# Patient Record
Sex: MALE | Race: WHITE | Employment: OTHER | URBAN - METROPOLITAN AREA
[De-identification: names, ages, dates, MRNs, and addresses within clinical notes are randomized per-mention and may not be internally consistent; named-entity substitution may affect disease eponyms.]

---

## 2017-08-03 ENCOUNTER — HOSPITAL ENCOUNTER (OUTPATIENT)
Dept: WOUND CARE | Age: 57
Discharge: HOME | End: 2017-08-03
Attending: SURGERY | Admitting: SURGERY

## 2017-08-03 VITALS
HEIGHT: 73 IN | BODY MASS INDEX: 26.77 KG/M2 | DIASTOLIC BLOOD PRESSURE: 113 MMHG | WEIGHT: 202 LBS | TEMPERATURE: 97.7 F | HEART RATE: 81 BPM | RESPIRATION RATE: 18 BRPM | SYSTOLIC BLOOD PRESSURE: 199 MMHG

## 2017-08-03 LAB
GLUCOSE BLD-MCNC: 303 MG/DL (ref 70–99)
PERFORMED ON: ABNORMAL

## 2017-08-03 ASSESSMENT — PAIN DESCRIPTION - ORIENTATION: ORIENTATION: LEFT

## 2017-08-03 ASSESSMENT — PAIN SCALES - GENERAL: PAINLEVEL_OUTOF10: 6

## 2017-08-03 ASSESSMENT — PAIN DESCRIPTION - DESCRIPTORS: DESCRIPTORS: DISCOMFORT

## 2017-08-03 ASSESSMENT — PAIN DESCRIPTION - LOCATION: LOCATION: FOOT

## 2017-08-03 ASSESSMENT — PAIN DESCRIPTION - ONSET: ONSET: ON-GOING

## 2017-08-03 ASSESSMENT — PAIN DESCRIPTION - FREQUENCY: FREQUENCY: CONTINUOUS

## 2017-08-03 ASSESSMENT — PAIN DESCRIPTION - PAIN TYPE: TYPE: CHRONIC PAIN

## 2017-08-10 ENCOUNTER — HOSPITAL ENCOUNTER (OUTPATIENT)
Dept: WOUND CARE | Age: 57
Discharge: HOME | End: 2017-08-10
Attending: SURGERY | Admitting: SURGERY

## 2017-08-10 VITALS
RESPIRATION RATE: 16 BRPM | SYSTOLIC BLOOD PRESSURE: 195 MMHG | DIASTOLIC BLOOD PRESSURE: 109 MMHG | WEIGHT: 204.6 LBS | HEART RATE: 80 BPM | HEIGHT: 73 IN | BODY MASS INDEX: 27.11 KG/M2 | TEMPERATURE: 97.7 F

## 2017-08-10 LAB
GLUCOSE BLD-MCNC: 243 MG/DL (ref 70–99)
PERFORMED ON: ABNORMAL

## 2017-08-12 LAB
GRAM STAIN RESULT: ABNORMAL
ORGANISM: ABNORMAL
WOUND/ABSCESS: ABNORMAL
WOUND/ABSCESS: ABNORMAL

## 2017-08-17 ENCOUNTER — HOSPITAL ENCOUNTER (OUTPATIENT)
Dept: WOUND CARE | Age: 57
Discharge: HOME | End: 2017-08-17
Attending: SURGERY | Admitting: SURGERY

## 2017-11-18 PROBLEM — L03.90 CELLULITIS: Status: ACTIVE | Noted: 2017-11-18

## 2017-11-18 PROBLEM — I21.4 NSTEMI (NON-ST ELEVATED MYOCARDIAL INFARCTION) (HCC): Status: ACTIVE | Noted: 2017-11-18

## 2017-11-19 PROBLEM — M86.172 OSTEOMYELITIS OF FOOT, LEFT, ACUTE (HCC): Status: ACTIVE | Noted: 2017-11-19

## 2017-12-26 ENCOUNTER — HOSPITAL ENCOUNTER (OUTPATIENT)
Dept: WOUND CARE | Age: 57
Discharge: HOME | End: 2017-12-26
Attending: CLINICAL NURSE SPECIALIST | Admitting: CLINICAL NURSE SPECIALIST

## 2017-12-26 VITALS
HEIGHT: 73 IN | SYSTOLIC BLOOD PRESSURE: 195 MMHG | BODY MASS INDEX: 29.77 KG/M2 | DIASTOLIC BLOOD PRESSURE: 109 MMHG | OXYGEN SATURATION: 96 % | WEIGHT: 224.6 LBS | RESPIRATION RATE: 20 BRPM | TEMPERATURE: 97.3 F | HEART RATE: 100 BPM

## 2017-12-26 DIAGNOSIS — M79.605 PAIN OF LEFT LOWER EXTREMITY: Primary | ICD-10-CM

## 2017-12-26 LAB
GLUCOSE BLD-MCNC: 264 MG/DL (ref 70–99)
PERFORMED ON: ABNORMAL

## 2017-12-26 PROCEDURE — 99213 OFFICE O/P EST LOW 20 MIN: CPT | Performed by: CLINICAL NURSE SPECIALIST

## 2017-12-26 PROCEDURE — 97597 DBRDMT OPN WND 1ST 20 CM/<: CPT | Performed by: CLINICAL NURSE SPECIALIST

## 2017-12-26 ASSESSMENT — PAIN DESCRIPTION - LOCATION: LOCATION: FOOT

## 2017-12-26 ASSESSMENT — PAIN DESCRIPTION - PROGRESSION: CLINICAL_PROGRESSION: NOT CHANGED

## 2017-12-26 ASSESSMENT — PAIN DESCRIPTION - FREQUENCY: FREQUENCY: INTERMITTENT

## 2017-12-26 ASSESSMENT — PAIN SCALES - GENERAL: PAINLEVEL_OUTOF10: 7

## 2017-12-26 ASSESSMENT — PAIN DESCRIPTION - PAIN TYPE: TYPE: CHRONIC PAIN

## 2017-12-26 ASSESSMENT — PAIN DESCRIPTION - DESCRIPTORS: DESCRIPTORS: ACHING

## 2017-12-26 ASSESSMENT — PAIN DESCRIPTION - ORIENTATION: ORIENTATION: LEFT

## 2017-12-26 NOTE — PROGRESS NOTES
Vascular lab staff states that they can not due venous doppler today, requests to be rescheduled. Call to Central scheduling, first available is next week. Informed [email or web address].PERLITA. Of above. Central scheduling left message for vascular to open time to schedule this exam.  She will call me when she hears back from patient. Spoke with Mary Ann Thompson, informed him of above. Instructed him to report to ER should he experience SOB or chest pain. He verbalizes understanding and agrees.

## 2017-12-26 NOTE — PROGRESS NOTES
Performing new (return) intake in wound care for patient today. He states that he has seen a foreign doctor in wound care in the past and, \"he didn'e even take a culture\". Discussed that Dr. Sarwat Sandoval did take a wound culture, I had left him a message as antibiotic needed to be changed, and never received return call. Told him that as  with Dr. Sarwat Sandoval, I have observed non follow up on his part which is probably the reason this wound has been present x 1 year. He states that he often does not wear a dressing on wound, Instructed him to wear dressing at all times, after cleansing of wound to avoid contamination. He verbalizes understanding.

## 2017-12-27 NOTE — PROGRESS NOTES
ulcer(s) above. Procedure note:     Consent obtained. Time out performed per [facility] policy. Anesthetic  Anesthetic: 4% Topical Xylocaine     Using a #15 blade scalpel and curette, I sharply debrided the left dorsal and plantar foot ulcer ulcer(s) down through and including the removal of epidermis and dermis. The type(s) of tissue debrided included biofilm, exudate and callus. Total Surface Area Debrided: 8.43 sq cm. Post  Debridement Measurements:  Wound 12/26/17 #5, top of left foot anterior,DFU wag 1, gradually appeared-Wound Length (cm): 0.7 cm  Wound 12/26/17 #4, Left plantar foot, DFU, Garcia 3, onset 8-2017, pressure-Wound Length (cm): 3 cm    Wound 12/26/17 #5, top of left foot anterior,DFU wag 1, gradually appeared-Wound Width (cm): 0.9 cm  Wound 12/26/17 #4, Left plantar foot, DFU, Garcia 3, onset 8-2017, pressure-Wound Width (cm): 2.6 cm    Wound 12/26/17 #5, top of left foot anterior,DFU wag 1, gradually appeared-Wound Depth (cm) : 0.1  Wound 12/26/17 #4, Left plantar foot, DFU, Garcia 3, onset 8-2017, pressure-Wound Depth (cm) : 1.3    The ulcers were then irrigated with normal saline solution. The procedure was completed with a small amount of bleeding, and hemostasis was by pressure. The patient tolerated the procedure well, with no significant complications. The patient's level of pain during and after the procedure was monitored, and is noted in the wound documentation flowsheet. Will discuss with Dr. Talia Gonsalez and Dr. Dell Robison for possible follow up    Discharge plan:     Continue Bactrim as prescribed  Off load as much as possible, wear Darco shoe with peg assist  Foot Xray today  US left leg to rule out DVT ordered  Go to ED for fever, chills, chest pain or shortness of breath  Appointment with PCP 12/28/17 to discuss lab work. Notify PCP of elevated B/P. Notify PCP of not having DM supplies to check BS  Stop or cut down smoking     Treatment in the wound care center today:  .     Home treatment: good handwashing before and after any dressing changes. Cleanse ulcer with saline or soap & water before dressing change. May use Vaseline (petrolatum), Aquaphor, Aveeno, CeraVe, Cetaphil, Eucerin, Lubriderm, etc for dry skin. Dressing type for home: Other: Nexodyn cleanser, Aquacel Ag and dry dressing daily. Darco shoe with peg assist. Written discharge instructions given to patient. Follow up in 1 week.     Electronically signed by LARRY Tran on 12/27/2017 at 1:10 PM.

## 2017-12-28 ENCOUNTER — HOSPITAL ENCOUNTER (OUTPATIENT)
Dept: VASCULAR LAB | Age: 57
Discharge: HOME | End: 2017-12-28
Attending: CLINICAL NURSE SPECIALIST | Admitting: CLINICAL NURSE SPECIALIST

## 2017-12-28 DIAGNOSIS — M79.605 PAIN OF LEFT LEG: ICD-10-CM

## 2018-01-02 ENCOUNTER — HOSPITAL ENCOUNTER (OUTPATIENT)
Dept: WOUND CARE | Age: 58
Discharge: HOME | End: 2018-01-02
Attending: CLINICAL NURSE SPECIALIST | Admitting: CLINICAL NURSE SPECIALIST

## 2018-01-08 ENCOUNTER — HOSPITAL ENCOUNTER (OUTPATIENT)
Dept: WOUND CARE | Age: 58
Discharge: HOME | End: 2018-01-08
Attending: PODIATRIST | Admitting: PODIATRIST

## 2018-01-08 VITALS
HEIGHT: 73 IN | TEMPERATURE: 97.1 F | SYSTOLIC BLOOD PRESSURE: 192 MMHG | BODY MASS INDEX: 30.48 KG/M2 | RESPIRATION RATE: 14 BRPM | HEART RATE: 84 BPM | DIASTOLIC BLOOD PRESSURE: 113 MMHG | WEIGHT: 230 LBS | OXYGEN SATURATION: 97 %

## 2018-01-08 DIAGNOSIS — L08.9 TYPE 2 DIABETES MELLITUS WITH LEFT DIABETIC FOOT INFECTION (HCC): ICD-10-CM

## 2018-01-08 DIAGNOSIS — M86.172 OSTEOMYELITIS OF FOOT, LEFT, ACUTE (HCC): Primary | ICD-10-CM

## 2018-01-08 DIAGNOSIS — E11.628 TYPE 2 DIABETES MELLITUS WITH LEFT DIABETIC FOOT INFECTION (HCC): ICD-10-CM

## 2018-01-08 ASSESSMENT — PAIN SCALES - GENERAL: PAINLEVEL_OUTOF10: 0

## 2018-01-08 NOTE — PROGRESS NOTES
[facility] Progress Note      Ganesh Murguia     : 1960    DATE OF VISIT:  2018    Subjective:     Ganesh Murguia is a 59 y.o. male who has a chief complaint of a diabetic ulcer located on the left foot. Significant symptoms or pertinent ulcer history since last visit: Patient has been seen in the [facility] by Beto Greenfield and Dr. Tiara Roberson. Has also seen Dr. Aaliyah Coppola in the past as well. Did see Dr. Shobha Cordoba for the wound while admitted at [facility] as well. Does know he had infection in the foot in the past, but thought it might improve with the antibiotics. Was told that his blood cultures are now negative. States that he needs a cardiac cath but could not get it due to his positive blood culture and foot issues. States that he is more worried about his breathing and has been having more shortness of breath over last several days. Has been in the ER a couple times for this and has taken rounds of antibiotics with no improvement in the breathing. Was told by his physician earlier today to go to [facility] and be seen due to his shortness of breath. Mr. Ree Butcher has a past medical history of Bacteremia; Cellulitis and abscess of foot, except toes; Diabetes mellitus ([address] Kidney disease; and MRSA (methicillin resistant staph aureus) culture positive. He has no past surgical history on file. His family history includes Dementia in his mother; Diabetes in his mother; Heart Attack in his brother and father; Hypertension in his mother; No Known Problems in his maternal grandfather, maternal grandmother, paternal grandfather, and paternal grandmother; Stroke in his brother. Mr. Ree Butcher reports that he has been smoking Cigarettes. He has been smoking about 0.25 packs per day. He has quit using smokeless tobacco. His smokeless tobacco use included Chew. He reports that he does not drink alcohol or use drugs.     His current medication list consists of acetaminophen, albuterol sulfate HFA, aspirin, furosemide, guaiFENesin-codeine, insulin glargine, and metoprolol tartrate. Allergies: Pcn [penicillins]    Pertinent items from the review of systems are discussed in the HPI; the remainder of the ROS was reviewed and is negative. Denies nausea, vomiting, fevers, chills, shortness of breath or chest pain. Objective:     BP (!) 192/113 Comment: rechecked BP  Pulse 84   Temp 97.1 °F (36.2 °C) (Oral)   Resp 14   Ht 6' 1\" (1.854 m)   Wt 230 lb (104.3 kg)   SpO2 97%   BMI 30.34 kg/m²      General Appearance: alert and oriented to person, place and time, well developed and well- nourished, in no acute distress  Skin: warm and dry, no rash or erythema  Head: normocephalic and atraumatic  Eyes: pupils equal, round, and reactive to light, extraocular eye movements intact, conjunctivae normal  ENT: tympanic membrane, external ear and ear canal normal bilaterally, nose without deformity, nasal mucosa and turbinates normal without polyps  Neck: supple and non-tender without mass, no thyromegaly or thyroid nodules, no cervical lymphadenopathy  Pulmonary/Chest: clear to auscultation bilaterally- no wheezes, rales or rhonchi, normal air movement, no respiratory distress  Cardiovascular: normal rate, regular rhythm, normal S1 and S2, no murmurs, rubs, clicks, or gallops, distal pulses intact, no carotid bruits  Abdomen: soft, non-tender, non-distended, normal bowel sounds, no masses or organomegaly.     Dorsalis pedis pulse left palpable  Posterior tibial pulse left palpable  Dorsalis pedis pulse right palpable  Posterior tibial pulse right palpable  Protective sensation absent bilateral LE      Ulcer on the plantar aspect of the left 1st MH with mild fibrotic tissue, red granulation tissue, mild to moderate serous drainage, mild hyperkeratotic rim, no undermining, no tunneling, no purulence, no malodor, no eschar, mild periwound maceration, mild to moderate periwound erythema, mild Hyponatremia E87.1    Diabetic polyneuropathy associated with type 2 diabetes mellitus ([facility] [address] E11.42    Mixed hyperlipidemia E78.2    Tobacco abuse counseling Z71.6    Diabetes education, encounter for Z71.89    Essential hypertension I10    Cardiomyopathy ([facility] [address] I42.9    Abnormal nuclear stress test R94.39    Pain of left lower extremity M79.605       Assessment of today's active condition(s): osteomyelitis left foot, diabetic foot ulcer left sub 1st MH (Garcia III), diabetes mellitus uncontrolled. Factors contributing to occurrence and/or persistence of the chronic ulcer include diabetes, poor glucose control, chronic pressure, smoking and non-adherence. Sharp debridement is not indicated today, based upon the exam findings in the ulcer(s) above. Discharge plan:     Treatment in the wound care center today:  . Home treatment: good handwashing before and after any dressing changes. Cleanse ulcer with saline or soap & water before dressing change. May use Vaseline (petrolatum), Aquaphor, Aveeno, CeraVe, Cetaphil, Eucerin, Lubriderm, etc for dry skin. Dressing type for home: Continue silver alginate and dry dressing to the ulcer as previously performed. Written discharge instructions given to patient. Offload ulcer(s) as directed. Elevate leg(s) as directed. Follow up in 1 week. Reviewed imaging results with patient. Explained that the last xray 12/26/17 showed obvious osteomyelitic changes with periosteal reaction covering large portion of the 1st metatarsal. Given that xray and multiple comorbidities I suspect that the infection has spread at this point. Explained that he would most likely require surgical intervention for the infection and high possibility of needing amputation of the hallux and partial 1st metatarsal at a minimum.  Patient states that he saw his physician earlier today and was told that he needing to go to [facility] for further evaluation given his shortness of breath. States was told he will most likely be admitted. I am not sure who he is seeing there. Patient advised to follow up with his cardiologist and nephrologist as well. If patient is admitted to [facility] then recommend Podiatry see him there. He has seen Dr. Sayra Carson there in the past.   If he is not admitted contact the [facility] tomorrow and we can help arrange appointment for the MRI as ordered today. Will need an MRI in order to better determine extent of infection in the foot. Will need to be compliant with care or will increase his odds of complications. Review of EPIC reveals multiple episodes of noncompliance. Needs to control glucose levels.        Electronically signed by Suze Neff DPM on 1/8/2018 at 2:44 PM.

## 2018-01-08 NOTE — PROGRESS NOTES
Scheduled MRI Left foot at [facility] for Monday 1/15/18 at 12:30PM.  Sujit Philippeys at 12:00PM.  Called him to inform of above.   He verbalizes understanding and also acknowledges that wound care appointment is scheduled after MRI at 1:30PM.

## 2018-01-08 NOTE — PROGRESS NOTES
Patient calls, states his f/u with cardiologist is Feb 1, 2018, not today as he had thought. Thus, he did not visit a doctor today who would possibly admit him for SOB. He continues with SOB, instructed him to report to ER. He states, \"they didn't do anything for me Saturday night except give me Lasix and BP medicine\". Informed him that if SOB is not improved,he may need additional Lasix, etc.  He verbalizes understanding and states he does not know if he will go to ER tonight. Dr. Rossy Brown aware of above.

## 2018-01-09 ENCOUNTER — HOSPITAL ENCOUNTER (OUTPATIENT)
Dept: WOUND CARE | Age: 58
Discharge: HOME | End: 2018-01-09
Attending: CLINICAL NURSE SPECIALIST | Admitting: CLINICAL NURSE SPECIALIST

## 2018-01-15 ENCOUNTER — HOSPITAL ENCOUNTER (OUTPATIENT)
Dept: WOUND CARE | Age: 58
Discharge: HOME | End: 2018-01-15
Attending: PODIATRIST | Admitting: PODIATRIST

## 2018-01-22 ENCOUNTER — HOSPITAL ENCOUNTER (OUTPATIENT)
Dept: WOUND CARE | Age: 58
Discharge: HOME | End: 2018-01-22
Attending: PODIATRIST | Admitting: PODIATRIST

## 2018-01-22 DIAGNOSIS — E11.628 TYPE 2 DIABETES MELLITUS WITH OTHER SKIN COMPLICATIONS (CODE): ICD-10-CM

## 2018-01-29 ENCOUNTER — HOSPITAL ENCOUNTER (OUTPATIENT)
Dept: WOUND CARE | Age: 58
Discharge: HOME | End: 2018-01-29
Attending: PODIATRIST | Admitting: PODIATRIST

## 2018-02-28 ENCOUNTER — OFFICE VISIT (OUTPATIENT)
Dept: CARDIOLOGY CLINIC | Age: 58
End: 2018-02-28

## 2018-02-28 VITALS
HEART RATE: 80 BPM | OXYGEN SATURATION: 99 % | BODY MASS INDEX: 28.77 KG/M2 | WEIGHT: 217.12 LBS | DIASTOLIC BLOOD PRESSURE: 92 MMHG | HEIGHT: 73 IN | SYSTOLIC BLOOD PRESSURE: 166 MMHG

## 2018-02-28 DIAGNOSIS — N18.30 CKD (CHRONIC KIDNEY DISEASE) STAGE 3, GFR 30-59 ML/MIN (HCC): ICD-10-CM

## 2018-02-28 DIAGNOSIS — I25.5 ISCHEMIC CARDIOMYOPATHY: Primary | ICD-10-CM

## 2018-02-28 DIAGNOSIS — I10 ESSENTIAL HYPERTENSION: ICD-10-CM

## 2018-02-28 PROCEDURE — 4004F PT TOBACCO SCREEN RCVD TLK: CPT | Performed by: INTERNAL MEDICINE

## 2018-02-28 PROCEDURE — 99214 OFFICE O/P EST MOD 30 MIN: CPT | Performed by: INTERNAL MEDICINE

## 2018-02-28 PROCEDURE — G8598 ASA/ANTIPLAT THER USED: HCPCS | Performed by: INTERNAL MEDICINE

## 2018-02-28 PROCEDURE — 93000 ELECTROCARDIOGRAM COMPLETE: CPT | Performed by: INTERNAL MEDICINE

## 2018-02-28 PROCEDURE — 3017F COLORECTAL CA SCREEN DOC REV: CPT | Performed by: INTERNAL MEDICINE

## 2018-02-28 PROCEDURE — G8484 FLU IMMUNIZE NO ADMIN: HCPCS | Performed by: INTERNAL MEDICINE

## 2018-02-28 PROCEDURE — G8427 DOCREV CUR MEDS BY ELIG CLIN: HCPCS | Performed by: INTERNAL MEDICINE

## 2018-02-28 PROCEDURE — G8419 CALC BMI OUT NRM PARAM NOF/U: HCPCS | Performed by: INTERNAL MEDICINE

## 2018-02-28 RX ORDER — ISOSORBIDE DINITRATE 10 MG/1
10 TABLET ORAL 2 TIMES DAILY
Qty: 60 TABLET | Refills: 6 | Status: SHIPPED | OUTPATIENT
Start: 2018-02-28 | End: 2019-01-19

## 2018-02-28 RX ORDER — FUROSEMIDE 40 MG/1
40 TABLET ORAL DAILY
Qty: 30 TABLET | Refills: 5 | Status: SHIPPED | OUTPATIENT
Start: 2018-02-28 | End: 2019-01-19

## 2018-02-28 RX ORDER — HYDRALAZINE HYDROCHLORIDE 25 MG/1
25 TABLET, FILM COATED ORAL 2 TIMES DAILY
Qty: 60 TABLET | Refills: 5 | Status: SHIPPED | OUTPATIENT
Start: 2018-02-28 | End: 2019-01-19

## 2018-02-28 RX ORDER — METOPROLOL SUCCINATE 50 MG/1
50 TABLET, EXTENDED RELEASE ORAL DAILY
Qty: 30 TABLET | Refills: 5 | Status: SHIPPED | OUTPATIENT
Start: 2018-02-28 | End: 2019-01-19

## 2018-02-28 NOTE — PROGRESS NOTES
[address] Office Note  2/28/2018     Subjective:  Mr. Nancy Jama is being seen today for cardiology follow up of HTN, HLD, ischemic CMP    Today he reports being seen by PCP and was advised to follow up with cardiologist for heart issues. He denies chest pain but admits to SOB at times. He reports since starting Lasix SOB has improved. He continues to smoke a few cigs per day. HPI:  [facility] Ab[facility] E[facility] and elevated troponin. 67-year-old male without  prior cardiac history who presented to the hospital in November 2017 with complaints of left  foot pain. The pain has been going on for months. It has gotten progressively worse, constant, severe, worse when he tries to walk on it. He has an associated ulcer. In the emergency room, his EKG was abnormal, therefore a troponin was obtained, which was abnormal and subsequently Cardiology consulted. The patient states, he never gets any chest pain or shortness  of breath. He reports having an angiogram about 4 years ago at [facility] and was told it was normal.  Unfortunately he left AMA  From [facility]  He had lexiscan and echo indicative of ischemic cardiomyopathy. Review of Systems:         12 point ROS negative in all areas as listed below except as in [place]  Constitutional, EENT, Cardiovascular, pulmonary, GI, , Musculoskeletal, skin, neurological, hematological, endocrine, Psychiatric    Reviewed past medical history, social, and family history. Smoker   Past Medical History:   Diagnosis Date    Bacteremia 11/18/2017    strep agalactiae (also cultured from foot)    Cellulitis and abscess of foot, except toes     Diabetes mellitus ([facility] [address]     Kidney disease     MRSA (methicillin resistant staph aureus) culture positive 11/19/2017    left foot     History reviewed. No pertinent surgical history.     Objective:   BP (!) 166/92   Pulse 80   Ht 6' 1\" (1.854 m)   Wt 217 lb 1.9 oz (98.5 kg)   SpO2 99%   BMI 28.65 kg/m²     Wt Readings from Last 3 Encounters:   02/28/18 217 lb 1.9 oz (98.5 kg)   01/08/18 230 lb (104.3 kg)   01/06/18 225 lb (102.1 kg)       Physical Exam:  General: No Respiratory distress, appears well developed and well nourished. Eyes:  Sclera nonicteric  Nose/Sinuses:  negative findings: nose shows no deformity, asymmetry, or inflammation, nasal mucosa normal, septum midline with no perforation or bleeding  Back:  no pain to palpation  Joint:  no active joint inflammation  Musculoskeletal:  negative  Skin:  Warm and dry  Neck:  Negative for JVD and Carotid Bruits. Chest:  Clear to auscultation, respiration easy  Cardiovascular:  RRR, S1S2 normal, no murmur, no rub or thrill. Abdomen:  Soft normal liver and spleen  Extremities:   LLL 2+ edema, no clubbing, cyanosis,  Open wound left foot looks like infeced with abscess or sinus drainage  Pulses: Femoral and pedal pulses are normal.  Neuro: intact    Medications:   Outpatient Encounter Prescriptions as of 2/28/2018   Medication Sig Dispense Refill    FERROUS SULFATE PO Take by mouth daily      metoprolol tartrate (LOPRESSOR) 25 MG tablet Take 1 tablet by mouth 2 times daily 60 tablet 0    insulin glargine (LANTUS) 100 UNIT/ML injection vial Inject 10 Units into the skin every morning      albuterol sulfate HFA (PROAIR HFA) 108 (90 Base) MCG/ACT inhaler Inhale 2 puffs into the lungs every 6 hours as needed for Wheezing 1 Inhaler 3    aspirin 81 MG tablet Take 81 mg by mouth daily      albuterol sulfate HFA (PROVENTIL HFA) 108 (90 Base) MCG/ACT inhaler Inhale 2 puffs into the lungs every 6 hours as needed for Wheezing (with spacer) 1 Inhaler 0    furosemide (LASIX) 20 MG tablet Take 1 tablet by mouth daily for 5 days 5 tablet 0    acetaminophen (APAP EXTRA STRENGTH) 500 MG tablet Take 1 tablet by mouth every 6 hours as needed for Pain 20 tablet 0     No facility-administered encounter medications on file as of 2/28/2018.          Lab Data:  CBC: No results for input(s): WBC, HGB, HCT, MCV, PLT in the last 72 hours. BMP: No results for input(s): NA, K, CL, CO2, PHOS, BUN, CREATININE in the last 72 hours. Invalid input(s): CA  LIVER PROFILE: No results for input(s): AST, ALT, LIPASE, BILIDIR, BILITOT, ALKPHOS in the last 72 hours. Invalid input(s): AMYLASE,  ALB  LIPID:   Lab Results   Component Value Date    CHOL 117 11/19/2017     Lab Results   Component Value Date    TRIG 138 11/19/2017     Lab Results   Component Value Date    HDL 21 (L) 11/19/2017     Lab Results   Component Value Date    LDLCALC 68 11/19/2017     Lab Results   Component Value Date    LABVLDL 28 11/19/2017     No results found for: CHOLHDLRATIO  PT/INR: No results for input(s): PROTIME, INR in the last 72 hours. A1C:   Lab Results   Component Value Date    LABA1C 9.2 11/18/2017     BNP:  No results for input(s): BNP in the last 72 hours. IMAGING:   EKG 2/28/18  SR Right BBB, LAFB  CXR 1/6/18  The heart size and mediastinal contours are unchanged. Bibasilar reticular opacities not substantially changed from 01/01/2018 and 12/14/2017. Lateral view suggests small pleural effusions. Findings compatible with pulmonary edema. Small pleural effusions are present    ECHO 11/20/17  Summary   Left ventricular systolic function is moderately reduced with an estimated   ejection fraction of 35-40 %.   Akinetic basal inferior and basal/mid inferolateral walls.   Moderate concentric left ventricular hypertrophy.   Grade I diastolic dysfunction with normal filling pressure.   Mild mitral and tricuspid regurgitation.   Mildly dilated left atrium.   Systolic pulmonary artery pressure (SPAP) is normal and estimated at 32 mmHg   (RA pressure 3 mmHg).   No obvious vegetation noted. Consider SONG if clinically indicated.   MPI 11/20/17:     Abnormal high risk myocardial perfusion study.   Verlean Deya is a large size moderate intensity perfusion defect involving the    inferolateral

## 2018-02-28 NOTE — LETTER
3. Documentation to PCP/referring for new patient:  Sent to PCP at close of office visit  4. CAD patient on anti-platelet: Yes  5. CAD patient on STATIN therapy:  Yes  6. Patient with CHF and aFib on anticoagulation:  NA     [address] [place], MD 2/28/2018 2:54 PM             If you have questions, please do not hesitate to call me. I look forward to following Marshall Herring along with you.     Sincerely,        [address] [place], MD

## 2018-02-28 NOTE — COMMUNICATION BODY
[address] Office Note  2/28/2018     Subjective:  Mr. Ree Butcher is being seen today for cardiology follow up of HTN, HLD, ischemic CMP    Today he reports being seen by PCP and was advised to follow up with cardiologist for heart issues. He denies chest pain but admits to SOB at times. He reports since starting Lasix SOB has improved. He continues to smoke a few cigs per day. HPI:   Patient was seen in [facility] for Abnormal EKG and elevated troponin. 67-year-old male without  prior cardiac history who presented to the hospital in November 2017 with complaints of left  foot pain. The pain has been going on for months. It has gotten progressively worse, constant, severe, worse when he tries to walk on it. He has an associated ulcer. In the emergency room, his EKG was abnormal, therefore a troponin was obtained, which was abnormal and subsequently Cardiology consulted. The patient states, he never gets any chest pain or shortness  of breath. He reports having an angiogram about 4 years ago at [facility] and was told it was normal.  Unfortunately he left AMA  From [facility]  He had lexiscan and echo indicative of ischemic cardiomyopathy. Review of Systems:         12 point ROS negative in all areas as listed below except as in [place]  Constitutional, EENT, Cardiovascular, pulmonary, GI, , Musculoskeletal, skin, neurological, hematological, endocrine, Psychiatric    Reviewed past medical history, social, and family history. Smoker   Past Medical History:   Diagnosis Date    Bacteremia 11/18/2017    strep agalactiae (also cultured from foot)    Cellulitis and abscess of foot, except toes     Diabetes mellitus ([facility] [address]     Kidney disease     MRSA (methicillin resistant staph aureus) culture positive 11/19/2017    left foot     History reviewed. No pertinent surgical history.     Objective:   BP (!) 166/92   Pulse 80   Ht 6' 1\" (1.854 m)   Wt 217 lb 1.9 oz (98.5 kg)   SpO2 99%   BMI 28.65 kg/m²      Wt Readings from Last 3 Encounters:   02/28/18 217 lb 1.9 oz (98.5 kg)   01/08/18 230 lb (104.3 kg)   01/06/18 225 lb (102.1 kg)       Physical Exam:  General: No Respiratory distress, appears well developed and well nourished. Eyes:  Sclera nonicteric  Nose/Sinuses:  negative findings: nose shows no deformity, asymmetry, or inflammation, nasal mucosa normal, septum midline with no perforation or bleeding  Back:  no pain to palpation  Joint:  no active joint inflammation  Musculoskeletal:  negative  Skin:  Warm and dry  Neck:  Negative for JVD and Carotid Bruits. Chest:  Clear to auscultation, respiration easy  Cardiovascular:  RRR, S1S2 normal, no murmur, no rub or thrill. Abdomen:  Soft normal liver and spleen  Extremities:   LLL 2+ edema, no clubbing, cyanosis,  Open wound left foot looks like infeced with abscess or sinus drainage  Pulses: Femoral and pedal pulses are normal.  Neuro: intact    Medications:   Outpatient Encounter Prescriptions as of 2/28/2018   Medication Sig Dispense Refill    FERROUS SULFATE PO Take by mouth daily      metoprolol tartrate (LOPRESSOR) 25 MG tablet Take 1 tablet by mouth 2 times daily 60 tablet 0    insulin glargine (LANTUS) 100 UNIT/ML injection vial Inject 10 Units into the skin every morning      albuterol sulfate HFA (PROAIR HFA) 108 (90 Base) MCG/ACT inhaler Inhale 2 puffs into the lungs every 6 hours as needed for Wheezing 1 Inhaler 3    aspirin 81 MG tablet Take 81 mg by mouth daily      albuterol sulfate HFA (PROVENTIL HFA) 108 (90 Base) MCG/ACT inhaler Inhale 2 puffs into the lungs every 6 hours as needed for Wheezing (with spacer) 1 Inhaler 0    furosemide (LASIX) 20 MG tablet Take 1 tablet by mouth daily for 5 days 5 tablet 0    acetaminophen (APAP EXTRA STRENGTH) 500 MG tablet Take 1 tablet by mouth every 6 hours as needed for Pain 20 tablet 0     No facility-administered encounter medications on file as of 2/28/2018.          Lab

## 2018-03-26 ENCOUNTER — HOSPITAL ENCOUNTER (OUTPATIENT)
Dept: OTHER | Age: 58
Discharge: HOME | End: 2018-03-26
Attending: NURSE PRACTITIONER | Admitting: NURSE PRACTITIONER

## 2018-03-26 DIAGNOSIS — L03.116 CELLULITIS OF LEFT FOOT: ICD-10-CM

## 2018-04-17 PROBLEM — M86.9 OSTEOMYELITIS (HCC): Status: ACTIVE | Noted: 2018-04-17

## 2018-07-03 ENCOUNTER — TELEPHONE (OUTPATIENT)
Dept: CARDIOLOGY CLINIC | Age: 58
End: 2018-07-03

## 2018-09-25 ENCOUNTER — HOSPITAL ENCOUNTER (OUTPATIENT)
Dept: WOUND CARE | Age: 58
Discharge: HOME | End: 2018-09-25
Payer: COMMERCIAL

## 2019-01-19 ENCOUNTER — APPOINTMENT (OUTPATIENT)
Dept: GENERAL RADIOLOGY | Age: 59
End: 2019-01-19
Payer: MEDICAID

## 2019-01-19 ENCOUNTER — HOSPITAL ENCOUNTER (INPATIENT)
Age: 59
LOS: 11 days | Discharge: NURSING, REHAB OR LONG-TERM CARE | DRG: 194 | End: 2019-01-30
Attending: HOSPITALIST | Admitting: INTERNAL MEDICINE
Payer: MEDICAID

## 2019-01-19 ENCOUNTER — HOSPITAL ENCOUNTER (EMERGENCY)
Age: 59
Discharge: ANOTHER HOSPITAL | End: 2019-01-19
Attending: EMERGENCY MEDICINE
Payer: MEDICAID

## 2019-01-19 ENCOUNTER — APPOINTMENT (OUTPATIENT)
Dept: CT IMAGING | Age: 59
End: 2019-01-19
Payer: MEDICAID

## 2019-01-19 VITALS
WEIGHT: 260 LBS | OXYGEN SATURATION: 97 % | BODY MASS INDEX: 35.21 KG/M2 | DIASTOLIC BLOOD PRESSURE: 76 MMHG | RESPIRATION RATE: 21 BRPM | HEART RATE: 88 BPM | SYSTOLIC BLOOD PRESSURE: 99 MMHG | HEIGHT: 72 IN | TEMPERATURE: 98 F

## 2019-01-19 DIAGNOSIS — I50.9 CONGESTIVE HEART FAILURE, UNSPECIFIED HF CHRONICITY, UNSPECIFIED HEART FAILURE TYPE (HCC): ICD-10-CM

## 2019-01-19 DIAGNOSIS — I21.4 NSTEMI (NON-ST ELEVATED MYOCARDIAL INFARCTION) (HCC): Primary | ICD-10-CM

## 2019-01-19 DIAGNOSIS — R60.1 ANASARCA: ICD-10-CM

## 2019-01-19 PROBLEM — J96.00 ACUTE RESPIRATORY FAILURE (HCC): Status: ACTIVE | Noted: 2019-01-19

## 2019-01-19 PROBLEM — J96.01 ACUTE RESPIRATORY FAILURE WITH HYPOXIA (HCC): Status: ACTIVE | Noted: 2019-01-19

## 2019-01-19 PROBLEM — R18.8 ASCITES: Status: ACTIVE | Noted: 2019-01-19

## 2019-01-19 LAB
ALBUMIN SERPL-MCNC: 2.9 G/DL (ref 3.4–5)
ALP BLD-CCNC: 80 U/L (ref 40–129)
ALT SERPL-CCNC: <5 U/L (ref 10–40)
ANION GAP SERPL CALCULATED.3IONS-SCNC: 9 MMOL/L (ref 3–16)
APTT: 30.9 SEC (ref 26–36)
APTT: 33.1 SEC (ref 26–36)
APTT: 39.7 SEC (ref 26–36)
AST SERPL-CCNC: 13 U/L (ref 15–37)
BACTERIA: ABNORMAL /HPF
BASOPHILS ABSOLUTE: 0 K/UL (ref 0–0.2)
BASOPHILS RELATIVE PERCENT: 0.5 %
BILIRUB SERPL-MCNC: <0.2 MG/DL (ref 0–1)
BILIRUBIN DIRECT: <0.2 MG/DL (ref 0–0.3)
BILIRUBIN URINE: NEGATIVE
BILIRUBIN, INDIRECT: ABNORMAL MG/DL (ref 0–1)
BLOOD, URINE: ABNORMAL
BUN BLDV-MCNC: 26 MG/DL (ref 7–20)
CALCIUM SERPL-MCNC: 8.8 MG/DL (ref 8.3–10.6)
CASTS 2: ABNORMAL /LPF
CASTS: ABNORMAL /LPF
CHLORIDE BLD-SCNC: 97 MMOL/L (ref 99–110)
CLARITY: ABNORMAL
CO2: 28 MMOL/L (ref 21–32)
COLOR: YELLOW
CREAT SERPL-MCNC: 1.7 MG/DL (ref 0.8–1.3)
EKG ATRIAL RATE: 87 BPM
EKG ATRIAL RATE: 88 BPM
EKG ATRIAL RATE: 88 BPM
EKG DIAGNOSIS: NORMAL
EKG P AXIS: 21 DEGREES
EKG P-R INTERVAL: 118 MS
EKG P-R INTERVAL: 128 MS
EKG P-R INTERVAL: 244 MS
EKG Q-T INTERVAL: 384 MS
EKG Q-T INTERVAL: 390 MS
EKG Q-T INTERVAL: 392 MS
EKG QRS DURATION: 108 MS
EKG QRS DURATION: 116 MS
EKG QRS DURATION: 118 MS
EKG QTC CALCULATION (BAZETT): 464 MS
EKG QTC CALCULATION (BAZETT): 469 MS
EKG QTC CALCULATION (BAZETT): 474 MS
EKG R AXIS: -36 DEGREES
EKG R AXIS: -47 DEGREES
EKG R AXIS: -50 DEGREES
EKG T AXIS: 133 DEGREES
EKG T AXIS: 138 DEGREES
EKG T AXIS: 139 DEGREES
EKG VENTRICULAR RATE: 87 BPM
EKG VENTRICULAR RATE: 88 BPM
EKG VENTRICULAR RATE: 88 BPM
EOSINOPHILS ABSOLUTE: 0 K/UL (ref 0–0.6)
EOSINOPHILS RELATIVE PERCENT: 0.8 %
EPITHELIAL CELLS, UA: ABNORMAL /HPF
GFR AFRICAN AMERICAN: 50
GFR NON-AFRICAN AMERICAN: 41
GLUCOSE BLD-MCNC: 146 MG/DL (ref 70–99)
GLUCOSE BLD-MCNC: 148 MG/DL (ref 70–99)
GLUCOSE BLD-MCNC: 166 MG/DL (ref 70–99)
GLUCOSE URINE: NEGATIVE MG/DL
HCT VFR BLD CALC: 28 % (ref 40.5–52.5)
HEMOGLOBIN: 8.9 G/DL (ref 13.5–17.5)
KETONES, URINE: NEGATIVE MG/DL
LEUKOCYTE ESTERASE, URINE: NEGATIVE
LYMPHOCYTES ABSOLUTE: 0.5 K/UL (ref 1–5.1)
LYMPHOCYTES RELATIVE PERCENT: 12.6 %
MCH RBC QN AUTO: 24.6 PG (ref 26–34)
MCHC RBC AUTO-ENTMCNC: 32 G/DL (ref 31–36)
MCV RBC AUTO: 77.1 FL (ref 80–100)
MICROSCOPIC EXAMINATION: YES
MONOCYTES ABSOLUTE: 0.2 K/UL (ref 0–1.3)
MONOCYTES RELATIVE PERCENT: 5.9 %
MUCUS: ABNORMAL /LPF
NEUTROPHILS ABSOLUTE: 3.1 K/UL (ref 1.7–7.7)
NEUTROPHILS RELATIVE PERCENT: 80.2 %
NITRITE, URINE: NEGATIVE
PDW BLD-RTO: 15.6 % (ref 12.4–15.4)
PERFORMED ON: ABNORMAL
PERFORMED ON: ABNORMAL
PH UA: 5
PLATELET # BLD: 348 K/UL (ref 135–450)
PMV BLD AUTO: 6.7 FL (ref 5–10.5)
POTASSIUM REFLEX MAGNESIUM: 4.4 MMOL/L (ref 3.5–5.1)
PRO-BNP: ABNORMAL PG/ML (ref 0–124)
PROTEIN UA: 100 MG/DL
RBC # BLD: 3.63 M/UL (ref 4.2–5.9)
RBC UA: ABNORMAL /HPF (ref 0–2)
SODIUM BLD-SCNC: 134 MMOL/L (ref 136–145)
SPECIFIC GRAVITY UA: 1.02
TOTAL CK: 111 U/L (ref 39–308)
TOTAL PROTEIN: 6.8 G/DL (ref 6.4–8.2)
TROPONIN: 0.49 NG/ML
TROPONIN: 0.55 NG/ML
URINE REFLEX TO CULTURE: ABNORMAL
URINE TYPE: ABNORMAL
UROBILINOGEN, URINE: 0.2 E.U./DL
WBC # BLD: 3.9 K/UL (ref 4–11)
WBC UA: ABNORMAL /HPF (ref 0–5)

## 2019-01-19 PROCEDURE — 6360000002 HC RX W HCPCS: Performed by: EMERGENCY MEDICINE

## 2019-01-19 PROCEDURE — 81001 URINALYSIS AUTO W/SCOPE: CPT

## 2019-01-19 PROCEDURE — 84484 ASSAY OF TROPONIN QUANT: CPT

## 2019-01-19 PROCEDURE — 36415 COLL VENOUS BLD VENIPUNCTURE: CPT

## 2019-01-19 PROCEDURE — 99291 CRITICAL CARE FIRST HOUR: CPT

## 2019-01-19 PROCEDURE — 93010 ELECTROCARDIOGRAM REPORT: CPT | Performed by: INTERNAL MEDICINE

## 2019-01-19 PROCEDURE — 6360000002 HC RX W HCPCS: Performed by: HOSPITALIST

## 2019-01-19 PROCEDURE — 99291 CRITICAL CARE FIRST HOUR: CPT | Performed by: INTERNAL MEDICINE

## 2019-01-19 PROCEDURE — 74177 CT ABD & PELVIS W/CONTRAST: CPT

## 2019-01-19 PROCEDURE — 85025 COMPLETE CBC W/AUTO DIFF WBC: CPT

## 2019-01-19 PROCEDURE — 6370000000 HC RX 637 (ALT 250 FOR IP): Performed by: EMERGENCY MEDICINE

## 2019-01-19 PROCEDURE — 1200000000 HC SEMI PRIVATE

## 2019-01-19 PROCEDURE — 96375 TX/PRO/DX INJ NEW DRUG ADDON: CPT

## 2019-01-19 PROCEDURE — 2580000003 HC RX 258: Performed by: HOSPITALIST

## 2019-01-19 PROCEDURE — 93005 ELECTROCARDIOGRAM TRACING: CPT | Performed by: EMERGENCY MEDICINE

## 2019-01-19 PROCEDURE — 71045 X-RAY EXAM CHEST 1 VIEW: CPT

## 2019-01-19 PROCEDURE — 6370000000 HC RX 637 (ALT 250 FOR IP): Performed by: HOSPITALIST

## 2019-01-19 PROCEDURE — 6370000000 HC RX 637 (ALT 250 FOR IP): Performed by: INTERNAL MEDICINE

## 2019-01-19 PROCEDURE — 6360000002 HC RX W HCPCS: Performed by: INTERNAL MEDICINE

## 2019-01-19 PROCEDURE — 82550 ASSAY OF CK (CPK): CPT

## 2019-01-19 PROCEDURE — 85730 THROMBOPLASTIN TIME PARTIAL: CPT

## 2019-01-19 PROCEDURE — 80048 BASIC METABOLIC PNL TOTAL CA: CPT

## 2019-01-19 PROCEDURE — 6360000004 HC RX CONTRAST MEDICATION: Performed by: EMERGENCY MEDICINE

## 2019-01-19 PROCEDURE — 2060000000 HC ICU INTERMEDIATE R&B

## 2019-01-19 PROCEDURE — 99292 CRITICAL CARE ADDL 30 MIN: CPT

## 2019-01-19 PROCEDURE — 80076 HEPATIC FUNCTION PANEL: CPT

## 2019-01-19 PROCEDURE — 83880 ASSAY OF NATRIURETIC PEPTIDE: CPT

## 2019-01-19 PROCEDURE — 96374 THER/PROPH/DIAG INJ IV PUSH: CPT

## 2019-01-19 PROCEDURE — 87641 MR-STAPH DNA AMP PROBE: CPT

## 2019-01-19 PROCEDURE — 99255 IP/OBS CONSLTJ NEW/EST HI 80: CPT | Performed by: INTERNAL MEDICINE

## 2019-01-19 PROCEDURE — 2500000003 HC RX 250 WO HCPCS: Performed by: EMERGENCY MEDICINE

## 2019-01-19 RX ORDER — ASPIRIN 81 MG/1
324 TABLET, CHEWABLE ORAL ONCE
Status: COMPLETED | OUTPATIENT
Start: 2019-01-19 | End: 2019-01-19

## 2019-01-19 RX ORDER — ALBUTEROL SULFATE 90 UG/1
2 AEROSOL, METERED RESPIRATORY (INHALATION) EVERY 6 HOURS PRN
Status: CANCELLED | OUTPATIENT
Start: 2019-01-19

## 2019-01-19 RX ORDER — SODIUM CHLORIDE 0.9 % (FLUSH) 0.9 %
10 SYRINGE (ML) INJECTION PRN
Status: DISCONTINUED | OUTPATIENT
Start: 2019-01-19 | End: 2019-01-30 | Stop reason: HOSPADM

## 2019-01-19 RX ORDER — HEPARIN SODIUM 1000 [USP'U]/ML
2000 INJECTION, SOLUTION INTRAVENOUS; SUBCUTANEOUS PRN
Status: DISCONTINUED | OUTPATIENT
Start: 2019-01-19 | End: 2019-01-20 | Stop reason: ALTCHOICE

## 2019-01-19 RX ORDER — SPIRONOLACTONE 25 MG/1
50 TABLET ORAL DAILY
Status: DISCONTINUED | OUTPATIENT
Start: 2019-01-20 | End: 2019-01-23

## 2019-01-19 RX ORDER — CALCIUM CARBONATE 200(500)MG
500 TABLET,CHEWABLE ORAL 3 TIMES DAILY PRN
Status: DISCONTINUED | OUTPATIENT
Start: 2019-01-19 | End: 2019-01-30 | Stop reason: HOSPADM

## 2019-01-19 RX ORDER — FUROSEMIDE 10 MG/ML
80 INJECTION INTRAMUSCULAR; INTRAVENOUS ONCE
Status: COMPLETED | OUTPATIENT
Start: 2019-01-19 | End: 2019-01-19

## 2019-01-19 RX ORDER — SODIUM CHLORIDE 0.9 % (FLUSH) 0.9 %
10 SYRINGE (ML) INJECTION PRN
Status: CANCELLED | OUTPATIENT
Start: 2019-01-19

## 2019-01-19 RX ORDER — LORAZEPAM 0.5 MG/1
0.5 TABLET ORAL EVERY 12 HOURS PRN
Status: DISCONTINUED | OUTPATIENT
Start: 2019-01-19 | End: 2019-01-20

## 2019-01-19 RX ORDER — SODIUM CHLORIDE 0.9 % (FLUSH) 0.9 %
10 SYRINGE (ML) INJECTION EVERY 12 HOURS SCHEDULED
Status: DISCONTINUED | OUTPATIENT
Start: 2019-01-19 | End: 2019-01-30 | Stop reason: HOSPADM

## 2019-01-19 RX ORDER — ASPIRIN 81 MG/1
81 TABLET ORAL DAILY
Status: DISCONTINUED | OUTPATIENT
Start: 2019-01-20 | End: 2019-01-30 | Stop reason: HOSPADM

## 2019-01-19 RX ORDER — LORAZEPAM 2 MG/ML
0.5 INJECTION INTRAMUSCULAR
Status: DISCONTINUED | OUTPATIENT
Start: 2019-01-19 | End: 2019-01-19 | Stop reason: HOSPADM

## 2019-01-19 RX ORDER — ATORVASTATIN CALCIUM 40 MG/1
40 TABLET, FILM COATED ORAL NIGHTLY
Status: DISCONTINUED | OUTPATIENT
Start: 2019-01-19 | End: 2019-01-30 | Stop reason: HOSPADM

## 2019-01-19 RX ORDER — DEXTROSE MONOHYDRATE 25 G/50ML
12.5 INJECTION, SOLUTION INTRAVENOUS PRN
Status: DISCONTINUED | OUTPATIENT
Start: 2019-01-19 | End: 2019-01-30 | Stop reason: HOSPADM

## 2019-01-19 RX ORDER — HEPARIN SODIUM 1000 [USP'U]/ML
4000 INJECTION, SOLUTION INTRAVENOUS; SUBCUTANEOUS PRN
Status: DISCONTINUED | OUTPATIENT
Start: 2019-01-19 | End: 2019-01-19 | Stop reason: HOSPADM

## 2019-01-19 RX ORDER — SODIUM CHLORIDE 0.9 % (FLUSH) 0.9 %
10 SYRINGE (ML) INJECTION EVERY 12 HOURS SCHEDULED
Status: CANCELLED | OUTPATIENT
Start: 2019-01-19

## 2019-01-19 RX ORDER — NICOTINE POLACRILEX 4 MG
15 LOZENGE BUCCAL PRN
Status: DISCONTINUED | OUTPATIENT
Start: 2019-01-19 | End: 2019-01-30 | Stop reason: HOSPADM

## 2019-01-19 RX ORDER — FUROSEMIDE 10 MG/ML
40 INJECTION INTRAMUSCULAR; INTRAVENOUS ONCE
Status: COMPLETED | OUTPATIENT
Start: 2019-01-19 | End: 2019-01-19

## 2019-01-19 RX ORDER — ONDANSETRON 2 MG/ML
4 INJECTION INTRAMUSCULAR; INTRAVENOUS EVERY 6 HOURS PRN
Status: CANCELLED | OUTPATIENT
Start: 2019-01-19

## 2019-01-19 RX ORDER — DEXTROSE MONOHYDRATE 50 MG/ML
100 INJECTION, SOLUTION INTRAVENOUS PRN
Status: DISCONTINUED | OUTPATIENT
Start: 2019-01-19 | End: 2019-01-30 | Stop reason: HOSPADM

## 2019-01-19 RX ORDER — HEPARIN SODIUM 1000 [USP'U]/ML
4000 INJECTION, SOLUTION INTRAVENOUS; SUBCUTANEOUS PRN
Status: DISCONTINUED | OUTPATIENT
Start: 2019-01-19 | End: 2019-01-20 | Stop reason: ALTCHOICE

## 2019-01-19 RX ORDER — FAMOTIDINE 20 MG/1
20 TABLET, FILM COATED ORAL DAILY
Status: DISCONTINUED | OUTPATIENT
Start: 2019-01-19 | End: 2019-01-29

## 2019-01-19 RX ORDER — HEPARIN SODIUM 1000 [USP'U]/ML
2000 INJECTION, SOLUTION INTRAVENOUS; SUBCUTANEOUS PRN
Status: DISCONTINUED | OUTPATIENT
Start: 2019-01-19 | End: 2019-01-19 | Stop reason: HOSPADM

## 2019-01-19 RX ORDER — HEPARIN SODIUM 1000 [USP'U]/ML
4000 INJECTION, SOLUTION INTRAVENOUS; SUBCUTANEOUS ONCE
Status: COMPLETED | OUTPATIENT
Start: 2019-01-19 | End: 2019-01-19

## 2019-01-19 RX ORDER — ONDANSETRON 2 MG/ML
4 INJECTION INTRAMUSCULAR; INTRAVENOUS EVERY 6 HOURS PRN
Status: DISCONTINUED | OUTPATIENT
Start: 2019-01-19 | End: 2019-01-30 | Stop reason: HOSPADM

## 2019-01-19 RX ORDER — ACETAMINOPHEN 500 MG
1000 TABLET ORAL EVERY 8 HOURS PRN
Status: DISCONTINUED | OUTPATIENT
Start: 2019-01-19 | End: 2019-01-30 | Stop reason: HOSPADM

## 2019-01-19 RX ORDER — ENALAPRILAT 2.5 MG/2ML
1.25 INJECTION INTRAVENOUS ONCE
Status: COMPLETED | OUTPATIENT
Start: 2019-01-19 | End: 2019-01-19

## 2019-01-19 RX ORDER — M-VIT,TX,IRON,MINS/CALC/FOLIC 27MG-0.4MG
1 TABLET ORAL DAILY
Status: DISCONTINUED | OUTPATIENT
Start: 2019-01-20 | End: 2019-01-30 | Stop reason: HOSPADM

## 2019-01-19 RX ADMIN — LORAZEPAM 0.5 MG: 0.5 TABLET ORAL at 16:56

## 2019-01-19 RX ADMIN — HEPARIN SODIUM 4000 UNITS: 1000 INJECTION, SOLUTION INTRAVENOUS; SUBCUTANEOUS at 09:48

## 2019-01-19 RX ADMIN — FUROSEMIDE 80 MG: 10 INJECTION, SOLUTION INTRAMUSCULAR; INTRAVENOUS at 16:56

## 2019-01-19 RX ADMIN — IOPAMIDOL 75 ML: 755 INJECTION, SOLUTION INTRAVENOUS at 09:28

## 2019-01-19 RX ADMIN — INSULIN LISPRO 1 UNITS: 100 INJECTION, SOLUTION INTRAVENOUS; SUBCUTANEOUS at 21:10

## 2019-01-19 RX ADMIN — FUROSEMIDE 10 MG/HR: 10 INJECTION, SOLUTION INTRAMUSCULAR; INTRAVENOUS at 15:24

## 2019-01-19 RX ADMIN — ANTACID TABLETS 500 MG: 500 TABLET, CHEWABLE ORAL at 15:24

## 2019-01-19 RX ADMIN — LORAZEPAM 0.5 MG: 0.5 TABLET ORAL at 21:04

## 2019-01-19 RX ADMIN — HEPARIN SODIUM 4000 UNITS: 1000 INJECTION INTRAVENOUS; SUBCUTANEOUS at 22:46

## 2019-01-19 RX ADMIN — NITROGLYCERIN 1 INCH: 20 OINTMENT TOPICAL at 08:47

## 2019-01-19 RX ADMIN — ATORVASTATIN CALCIUM 40 MG: 40 TABLET, FILM COATED ORAL at 21:04

## 2019-01-19 RX ADMIN — ASPIRIN 81 MG 324 MG: 81 TABLET ORAL at 09:48

## 2019-01-19 RX ADMIN — HEPARIN SODIUM 1000 UNITS/HR: 10000 INJECTION, SOLUTION INTRAVENOUS at 09:48

## 2019-01-19 RX ADMIN — LORAZEPAM 0.5 MG: 2 INJECTION INTRAMUSCULAR at 09:07

## 2019-01-19 RX ADMIN — ENALAPRILAT 1.25 MG: 1.25 INJECTION INTRAVENOUS at 08:44

## 2019-01-19 RX ADMIN — INSULIN LISPRO 1 UNITS: 100 INJECTION, SOLUTION INTRAVENOUS; SUBCUTANEOUS at 17:15

## 2019-01-19 RX ADMIN — HEPARIN SODIUM 2000 UNITS: 1000 INJECTION INTRAVENOUS; SUBCUTANEOUS at 15:28

## 2019-01-19 RX ADMIN — FAMOTIDINE 20 MG: 20 TABLET ORAL at 16:56

## 2019-01-19 RX ADMIN — ONDANSETRON 4 MG: 2 INJECTION INTRAMUSCULAR; INTRAVENOUS at 17:01

## 2019-01-19 RX ADMIN — FUROSEMIDE 40 MG: 10 INJECTION, SOLUTION INTRAMUSCULAR; INTRAVENOUS at 08:46

## 2019-01-19 RX ADMIN — HEPARIN SODIUM 12.4 ML/HR: 10000 INJECTION, SOLUTION INTRAVENOUS at 15:25

## 2019-01-20 LAB
ALBUMIN SERPL-MCNC: 2.3 G/DL (ref 3.4–5)
ALP BLD-CCNC: 69 U/L (ref 40–129)
ALT SERPL-CCNC: <5 U/L (ref 10–40)
ANION GAP SERPL CALCULATED.3IONS-SCNC: 8 MMOL/L (ref 3–16)
APPEARANCE FLUID: CLEAR
APTT: 39.6 SEC (ref 26–36)
APTT: 85.2 SEC (ref 26–36)
AST SERPL-CCNC: 12 U/L (ref 15–37)
BILIRUB SERPL-MCNC: <0.2 MG/DL (ref 0–1)
BILIRUBIN DIRECT: <0.2 MG/DL (ref 0–0.3)
BILIRUBIN, INDIRECT: ABNORMAL MG/DL (ref 0–1)
BUN BLDV-MCNC: 28 MG/DL (ref 7–20)
CALCIUM SERPL-MCNC: 8.1 MG/DL (ref 8.3–10.6)
CELL COUNT FLUID TYPE: NORMAL
CHLORIDE BLD-SCNC: 103 MMOL/L (ref 99–110)
CLOT EVALUATION: NORMAL
CO2: 27 MMOL/L (ref 21–32)
COLOR FLUID: YELLOW
CREAT SERPL-MCNC: 1.6 MG/DL (ref 0.8–1.3)
GFR AFRICAN AMERICAN: 54
GFR NON-AFRICAN AMERICAN: 44
GLUCOSE BLD-MCNC: 110 MG/DL (ref 70–99)
GLUCOSE BLD-MCNC: 113 MG/DL (ref 70–99)
GLUCOSE BLD-MCNC: 116 MG/DL (ref 70–99)
GLUCOSE BLD-MCNC: 131 MG/DL (ref 70–99)
GLUCOSE BLD-MCNC: 140 MG/DL (ref 70–99)
HCT VFR BLD CALC: 26.2 % (ref 40.5–52.5)
HEMOGLOBIN: 8.1 G/DL (ref 13.5–17.5)
LYMPHOCYTES, BODY FLUID: 26 %
MACROPHAGE FLUID: 70 %
MAGNESIUM: 1.6 MG/DL (ref 1.8–2.4)
MCH RBC QN AUTO: 24.7 PG (ref 26–34)
MCHC RBC AUTO-ENTMCNC: 31.1 G/DL (ref 31–36)
MCV RBC AUTO: 79.4 FL (ref 80–100)
MONOCYTE, FLUID: 2 %
NEUTROPHIL, FLUID: 2 %
NUCLEATED CELLS FLUID: 123 /CUMM
NUMBER OF CELLS COUNTED FLUID: 100
PDW BLD-RTO: 15.8 % (ref 12.4–15.4)
PERFORMED ON: ABNORMAL
PLATELET # BLD: 324 K/UL (ref 135–450)
PMV BLD AUTO: 6.4 FL (ref 5–10.5)
POTASSIUM REFLEX MAGNESIUM: 4.5 MMOL/L (ref 3.5–5.1)
RBC # BLD: 3.29 M/UL (ref 4.2–5.9)
RBC FLUID: 500 /CUMM
SODIUM BLD-SCNC: 138 MMOL/L (ref 136–145)
TOTAL PROTEIN: 5.8 G/DL (ref 6.4–8.2)
WBC # BLD: 5.3 K/UL (ref 4–11)

## 2019-01-20 PROCEDURE — 6360000002 HC RX W HCPCS: Performed by: HOSPITALIST

## 2019-01-20 PROCEDURE — 2580000003 HC RX 258: Performed by: HOSPITALIST

## 2019-01-20 PROCEDURE — 94761 N-INVAS EAR/PLS OXIMETRY MLT: CPT

## 2019-01-20 PROCEDURE — 6370000000 HC RX 637 (ALT 250 FOR IP): Performed by: HOSPITALIST

## 2019-01-20 PROCEDURE — 2060000000 HC ICU INTERMEDIATE R&B

## 2019-01-20 PROCEDURE — 80048 BASIC METABOLIC PNL TOTAL CA: CPT

## 2019-01-20 PROCEDURE — 88112 CYTOPATH CELL ENHANCE TECH: CPT

## 2019-01-20 PROCEDURE — 0W9G3ZZ DRAINAGE OF PERITONEAL CAVITY, PERCUTANEOUS APPROACH: ICD-10-PCS | Performed by: HOSPITALIST

## 2019-01-20 PROCEDURE — 80076 HEPATIC FUNCTION PANEL: CPT

## 2019-01-20 PROCEDURE — 99233 SBSQ HOSP IP/OBS HIGH 50: CPT | Performed by: NURSE PRACTITIONER

## 2019-01-20 PROCEDURE — 6370000000 HC RX 637 (ALT 250 FOR IP): Performed by: INTERNAL MEDICINE

## 2019-01-20 PROCEDURE — 36415 COLL VENOUS BLD VENIPUNCTURE: CPT

## 2019-01-20 PROCEDURE — 85730 THROMBOPLASTIN TIME PARTIAL: CPT

## 2019-01-20 PROCEDURE — 83735 ASSAY OF MAGNESIUM: CPT

## 2019-01-20 PROCEDURE — 87205 SMEAR GRAM STAIN: CPT

## 2019-01-20 PROCEDURE — 87070 CULTURE OTHR SPECIMN AEROBIC: CPT

## 2019-01-20 PROCEDURE — 89051 BODY FLUID CELL COUNT: CPT

## 2019-01-20 PROCEDURE — 82042 OTHER SOURCE ALBUMIN QUAN EA: CPT

## 2019-01-20 PROCEDURE — 85027 COMPLETE CBC AUTOMATED: CPT

## 2019-01-20 PROCEDURE — 88305 TISSUE EXAM BY PATHOLOGIST: CPT

## 2019-01-20 RX ORDER — HEPARIN SODIUM 5000 [USP'U]/ML
5000 INJECTION, SOLUTION INTRAVENOUS; SUBCUTANEOUS EVERY 8 HOURS SCHEDULED
Status: DISCONTINUED | OUTPATIENT
Start: 2019-01-21 | End: 2019-01-30 | Stop reason: HOSPADM

## 2019-01-20 RX ORDER — HEPARIN SODIUM 5000 [USP'U]/ML
5000 INJECTION, SOLUTION INTRAVENOUS; SUBCUTANEOUS EVERY 8 HOURS SCHEDULED
Status: DISCONTINUED | OUTPATIENT
Start: 2019-01-21 | End: 2019-01-20

## 2019-01-20 RX ORDER — CARVEDILOL 6.25 MG/1
6.25 TABLET ORAL 2 TIMES DAILY WITH MEALS
Status: DISCONTINUED | OUTPATIENT
Start: 2019-01-20 | End: 2019-01-21

## 2019-01-20 RX ORDER — MAGNESIUM SULFATE IN WATER 40 MG/ML
2 INJECTION, SOLUTION INTRAVENOUS ONCE
Status: COMPLETED | OUTPATIENT
Start: 2019-01-20 | End: 2019-01-20

## 2019-01-20 RX ORDER — LORAZEPAM 0.5 MG/1
0.5 TABLET ORAL EVERY 4 HOURS PRN
Status: DISCONTINUED | OUTPATIENT
Start: 2019-01-20 | End: 2019-01-30 | Stop reason: HOSPADM

## 2019-01-20 RX ORDER — LORAZEPAM 2 MG/ML
2 INJECTION INTRAMUSCULAR ONCE
Status: COMPLETED | OUTPATIENT
Start: 2019-01-20 | End: 2019-01-20

## 2019-01-20 RX ADMIN — ASPIRIN 81 MG: 81 TABLET, COATED ORAL at 09:45

## 2019-01-20 RX ADMIN — Medication 10 ML: at 20:27

## 2019-01-20 RX ADMIN — Medication 1 TABLET: at 09:45

## 2019-01-20 RX ADMIN — FAMOTIDINE 20 MG: 20 TABLET ORAL at 09:45

## 2019-01-20 RX ADMIN — MAGNESIUM SULFATE HEPTAHYDRATE 2 G: 40 INJECTION, SOLUTION INTRAVENOUS at 09:46

## 2019-01-20 RX ADMIN — SPIRONOLACTONE 50 MG: 25 TABLET ORAL at 09:45

## 2019-01-20 RX ADMIN — FUROSEMIDE 10 MG/HR: 10 INJECTION, SOLUTION INTRAMUSCULAR; INTRAVENOUS at 06:54

## 2019-01-20 RX ADMIN — HEPARIN SODIUM 17.1 ML/HR: 10000 INJECTION, SOLUTION INTRAVENOUS at 05:09

## 2019-01-20 RX ADMIN — LORAZEPAM 2 MG: 2 INJECTION, SOLUTION INTRAMUSCULAR; INTRAVENOUS at 16:38

## 2019-01-20 RX ADMIN — CARVEDILOL 6.25 MG: 6.25 TABLET, FILM COATED ORAL at 18:44

## 2019-01-20 RX ADMIN — ATORVASTATIN CALCIUM 40 MG: 40 TABLET, FILM COATED ORAL at 20:30

## 2019-01-20 RX ADMIN — Medication 10 ML: at 09:45

## 2019-01-20 ASSESSMENT — ENCOUNTER SYMPTOMS
WHEEZING: 0
ABDOMINAL DISTENTION: 1
SHORTNESS OF BREATH: 0
COUGH: 0

## 2019-01-21 LAB
ALBUMIN FLUID: 1.8 G/DL
ALBUMIN SERPL-MCNC: 2.4 G/DL (ref 3.4–5)
ALP BLD-CCNC: 76 U/L (ref 40–129)
ALT SERPL-CCNC: 6 U/L (ref 10–40)
ANION GAP SERPL CALCULATED.3IONS-SCNC: 9 MMOL/L (ref 3–16)
AST SERPL-CCNC: 11 U/L (ref 15–37)
BASOPHILS ABSOLUTE: 0 K/UL (ref 0–0.2)
BASOPHILS RELATIVE PERCENT: 0.3 %
BILIRUB SERPL-MCNC: <0.2 MG/DL (ref 0–1)
BILIRUBIN DIRECT: <0.2 MG/DL (ref 0–0.3)
BILIRUBIN, INDIRECT: ABNORMAL MG/DL (ref 0–1)
BUN BLDV-MCNC: 29 MG/DL (ref 7–20)
CALCIUM SERPL-MCNC: 8 MG/DL (ref 8.3–10.6)
CHLORIDE BLD-SCNC: 101 MMOL/L (ref 99–110)
CO2: 29 MMOL/L (ref 21–32)
CREAT SERPL-MCNC: 1.6 MG/DL (ref 0.8–1.3)
EOSINOPHILS ABSOLUTE: 0.1 K/UL (ref 0–0.6)
EOSINOPHILS RELATIVE PERCENT: 1.1 %
FLUID TYPE: NORMAL
GFR AFRICAN AMERICAN: 54
GFR NON-AFRICAN AMERICAN: 44
GLUCOSE BLD-MCNC: 161 MG/DL (ref 70–99)
GLUCOSE BLD-MCNC: 161 MG/DL (ref 70–99)
GLUCOSE BLD-MCNC: 169 MG/DL (ref 70–99)
GLUCOSE BLD-MCNC: 176 MG/DL (ref 70–99)
HCT VFR BLD CALC: 26.7 % (ref 40.5–52.5)
HEMOGLOBIN: 8.3 G/DL (ref 13.5–17.5)
LV EF: 38 %
LVEF MODALITY: NORMAL
LYMPHOCYTES ABSOLUTE: 0.4 K/UL (ref 1–5.1)
LYMPHOCYTES RELATIVE PERCENT: 5.2 %
MAGNESIUM: 1.8 MG/DL (ref 1.8–2.4)
MCH RBC QN AUTO: 24.8 PG (ref 26–34)
MCHC RBC AUTO-ENTMCNC: 31.2 G/DL (ref 31–36)
MCV RBC AUTO: 79.5 FL (ref 80–100)
MONOCYTES ABSOLUTE: 0.4 K/UL (ref 0–1.3)
MONOCYTES RELATIVE PERCENT: 6 %
MRSA SCREEN RT-PCR: NORMAL
NEUTROPHILS ABSOLUTE: 6 K/UL (ref 1.7–7.7)
NEUTROPHILS RELATIVE PERCENT: 87.4 %
PDW BLD-RTO: 15.7 % (ref 12.4–15.4)
PERFORMED ON: ABNORMAL
PLATELET # BLD: 347 K/UL (ref 135–450)
PMV BLD AUTO: 6.3 FL (ref 5–10.5)
POTASSIUM SERPL-SCNC: 4.6 MMOL/L (ref 3.5–5.1)
PRO-BNP: ABNORMAL PG/ML (ref 0–124)
RBC # BLD: 3.35 M/UL (ref 4.2–5.9)
SODIUM BLD-SCNC: 139 MMOL/L (ref 136–145)
TOTAL PROTEIN: 5.8 G/DL (ref 6.4–8.2)
WBC # BLD: 6.9 K/UL (ref 4–11)

## 2019-01-21 PROCEDURE — 93306 TTE W/DOPPLER COMPLETE: CPT

## 2019-01-21 PROCEDURE — 6370000000 HC RX 637 (ALT 250 FOR IP): Performed by: NURSE PRACTITIONER

## 2019-01-21 PROCEDURE — 97530 THERAPEUTIC ACTIVITIES: CPT

## 2019-01-21 PROCEDURE — 2060000000 HC ICU INTERMEDIATE R&B

## 2019-01-21 PROCEDURE — 83735 ASSAY OF MAGNESIUM: CPT

## 2019-01-21 PROCEDURE — 6370000000 HC RX 637 (ALT 250 FOR IP): Performed by: HOSPITALIST

## 2019-01-21 PROCEDURE — 97166 OT EVAL MOD COMPLEX 45 MIN: CPT

## 2019-01-21 PROCEDURE — 6360000002 HC RX W HCPCS: Performed by: HOSPITALIST

## 2019-01-21 PROCEDURE — 80076 HEPATIC FUNCTION PANEL: CPT

## 2019-01-21 PROCEDURE — 97162 PT EVAL MOD COMPLEX 30 MIN: CPT

## 2019-01-21 PROCEDURE — 2580000003 HC RX 258: Performed by: HOSPITALIST

## 2019-01-21 PROCEDURE — 85025 COMPLETE CBC W/AUTO DIFF WBC: CPT

## 2019-01-21 PROCEDURE — 6370000000 HC RX 637 (ALT 250 FOR IP): Performed by: INTERNAL MEDICINE

## 2019-01-21 PROCEDURE — 83880 ASSAY OF NATRIURETIC PEPTIDE: CPT

## 2019-01-21 PROCEDURE — 36415 COLL VENOUS BLD VENIPUNCTURE: CPT

## 2019-01-21 PROCEDURE — 99233 SBSQ HOSP IP/OBS HIGH 50: CPT | Performed by: NURSE PRACTITIONER

## 2019-01-21 PROCEDURE — 80048 BASIC METABOLIC PNL TOTAL CA: CPT

## 2019-01-21 RX ORDER — CARVEDILOL 6.25 MG/1
6.25 TABLET ORAL ONCE
Status: DISCONTINUED | OUTPATIENT
Start: 2019-01-21 | End: 2019-01-25

## 2019-01-21 RX ORDER — HYDROXYZINE PAMOATE 25 MG/1
25 CAPSULE ORAL ONCE
Status: COMPLETED | OUTPATIENT
Start: 2019-01-21 | End: 2019-01-21

## 2019-01-21 RX ORDER — UREA 10 %
2 LOTION (ML) TOPICAL NIGHTLY PRN
Status: DISCONTINUED | OUTPATIENT
Start: 2019-01-21 | End: 2019-01-24

## 2019-01-21 RX ORDER — CARVEDILOL 6.25 MG/1
12.5 TABLET ORAL 2 TIMES DAILY WITH MEALS
Status: DISCONTINUED | OUTPATIENT
Start: 2019-01-21 | End: 2019-01-25

## 2019-01-21 RX ORDER — POLYETHYLENE GLYCOL 3350 17 G/17G
17 POWDER, FOR SOLUTION ORAL 2 TIMES DAILY
Status: DISCONTINUED | OUTPATIENT
Start: 2019-01-21 | End: 2019-01-30 | Stop reason: HOSPADM

## 2019-01-21 RX ORDER — DOCUSATE SODIUM 100 MG/1
100 CAPSULE, LIQUID FILLED ORAL 2 TIMES DAILY
Status: DISCONTINUED | OUTPATIENT
Start: 2019-01-21 | End: 2019-01-30 | Stop reason: HOSPADM

## 2019-01-21 RX ADMIN — LORAZEPAM 0.5 MG: 0.5 TABLET ORAL at 01:43

## 2019-01-21 RX ADMIN — HYDROXYZINE PAMOATE 25 MG: 25 CAPSULE ORAL at 22:32

## 2019-01-21 RX ADMIN — Medication 10 ML: at 20:13

## 2019-01-21 RX ADMIN — LORAZEPAM 0.5 MG: 0.5 TABLET ORAL at 22:32

## 2019-01-21 RX ADMIN — Medication 10 ML: at 09:25

## 2019-01-21 RX ADMIN — ATORVASTATIN CALCIUM 40 MG: 40 TABLET, FILM COATED ORAL at 22:32

## 2019-01-21 RX ADMIN — SPIRONOLACTONE 50 MG: 25 TABLET ORAL at 09:16

## 2019-01-21 RX ADMIN — FAMOTIDINE 20 MG: 20 TABLET ORAL at 09:17

## 2019-01-21 RX ADMIN — MAGNESIUM HYDROXIDE 30 ML: 400 SUSPENSION ORAL at 09:28

## 2019-01-21 RX ADMIN — HEPARIN SODIUM 5000 UNITS: 5000 INJECTION INTRAVENOUS; SUBCUTANEOUS at 16:53

## 2019-01-21 RX ADMIN — Medication 1 TABLET: at 09:16

## 2019-01-21 RX ADMIN — Medication 10 ML: at 22:36

## 2019-01-21 RX ADMIN — INSULIN LISPRO 1 UNITS: 100 INJECTION, SOLUTION INTRAVENOUS; SUBCUTANEOUS at 16:53

## 2019-01-21 RX ADMIN — CARVEDILOL 12.5 MG: 6.25 TABLET, FILM COATED ORAL at 16:43

## 2019-01-21 RX ADMIN — INSULIN LISPRO 1 UNITS: 100 INJECTION, SOLUTION INTRAVENOUS; SUBCUTANEOUS at 09:17

## 2019-01-21 RX ADMIN — HEPARIN SODIUM 5000 UNITS: 5000 INJECTION INTRAVENOUS; SUBCUTANEOUS at 22:32

## 2019-01-21 RX ADMIN — INSULIN LISPRO 1 UNITS: 100 INJECTION, SOLUTION INTRAVENOUS; SUBCUTANEOUS at 22:35

## 2019-01-21 RX ADMIN — POLYETHYLENE GLYCOL 3350 17 G: 17 POWDER, FOR SOLUTION ORAL at 22:32

## 2019-01-21 RX ADMIN — DOCUSATE SODIUM 100 MG: 100 CAPSULE, LIQUID FILLED ORAL at 22:32

## 2019-01-21 RX ADMIN — Medication 400 MG: at 09:28

## 2019-01-21 RX ADMIN — ASPIRIN 81 MG: 81 TABLET, COATED ORAL at 09:17

## 2019-01-21 RX ADMIN — ONDANSETRON 4 MG: 2 INJECTION INTRAMUSCULAR; INTRAVENOUS at 20:13

## 2019-01-21 RX ADMIN — CARVEDILOL 6.25 MG: 6.25 TABLET, FILM COATED ORAL at 09:17

## 2019-01-21 RX ADMIN — LORAZEPAM 0.5 MG: 0.5 TABLET ORAL at 09:25

## 2019-01-21 RX ADMIN — LORAZEPAM 0.5 MG: 0.5 TABLET ORAL at 16:43

## 2019-01-21 ASSESSMENT — ENCOUNTER SYMPTOMS
SHORTNESS OF BREATH: 1
ABDOMINAL DISTENTION: 1
EYE DISCHARGE: 1
WHEEZING: 0
COUGH: 1

## 2019-01-21 ASSESSMENT — PAIN SCALES - GENERAL
PAINLEVEL_OUTOF10: 0
PAINLEVEL_OUTOF10: 0
PAINLEVEL_OUTOF10: 8
PAINLEVEL_OUTOF10: 0
PAINLEVEL_OUTOF10: 10
PAINLEVEL_OUTOF10: 8

## 2019-01-21 ASSESSMENT — PAIN DESCRIPTION - PAIN TYPE
TYPE: ACUTE PAIN

## 2019-01-21 ASSESSMENT — PAIN DESCRIPTION - LOCATION
LOCATION: ABDOMEN
LOCATION: HEAD;BACK
LOCATION: ABDOMEN

## 2019-01-21 ASSESSMENT — PAIN DESCRIPTION - DESCRIPTORS
DESCRIPTORS: ACHING
DESCRIPTORS: ACHING
DESCRIPTORS: HEADACHE;ACHING

## 2019-01-21 ASSESSMENT — PAIN DESCRIPTION - ORIENTATION: ORIENTATION: LOWER

## 2019-01-21 ASSESSMENT — PAIN DESCRIPTION - FREQUENCY: FREQUENCY: CONTINUOUS

## 2019-01-22 ENCOUNTER — APPOINTMENT (OUTPATIENT)
Dept: ULTRASOUND IMAGING | Age: 59
DRG: 194 | End: 2019-01-22
Attending: HOSPITALIST
Payer: MEDICAID

## 2019-01-22 ENCOUNTER — APPOINTMENT (OUTPATIENT)
Dept: ULTRASOUND IMAGING | Age: 59
DRG: 194 | End: 2019-01-22
Payer: MEDICAID

## 2019-01-22 LAB
ANION GAP SERPL CALCULATED.3IONS-SCNC: 11 MMOL/L (ref 3–16)
BUN BLDV-MCNC: 33 MG/DL (ref 7–20)
CALCIUM SERPL-MCNC: 8 MG/DL (ref 8.3–10.6)
CHLORIDE BLD-SCNC: 98 MMOL/L (ref 99–110)
CO2: 28 MMOL/L (ref 21–32)
CREAT SERPL-MCNC: 1.7 MG/DL (ref 0.8–1.3)
GFR AFRICAN AMERICAN: 50
GFR NON-AFRICAN AMERICAN: 41
GLUCOSE BLD-MCNC: 135 MG/DL (ref 70–99)
GLUCOSE BLD-MCNC: 139 MG/DL (ref 70–99)
GLUCOSE BLD-MCNC: 149 MG/DL (ref 70–99)
GLUCOSE BLD-MCNC: 150 MG/DL (ref 70–99)
GLUCOSE BLD-MCNC: 151 MG/DL (ref 70–99)
PERFORMED ON: ABNORMAL
POTASSIUM REFLEX MAGNESIUM: 4.3 MMOL/L (ref 3.5–5.1)
SODIUM BLD-SCNC: 137 MMOL/L (ref 136–145)

## 2019-01-22 PROCEDURE — 97530 THERAPEUTIC ACTIVITIES: CPT

## 2019-01-22 PROCEDURE — 6370000000 HC RX 637 (ALT 250 FOR IP): Performed by: HOSPITALIST

## 2019-01-22 PROCEDURE — 2060000000 HC ICU INTERMEDIATE R&B

## 2019-01-22 PROCEDURE — 82105 ALPHA-FETOPROTEIN SERUM: CPT

## 2019-01-22 PROCEDURE — 99232 SBSQ HOSP IP/OBS MODERATE 35: CPT | Performed by: NURSE PRACTITIONER

## 2019-01-22 PROCEDURE — 80048 BASIC METABOLIC PNL TOTAL CA: CPT

## 2019-01-22 PROCEDURE — 36415 COLL VENOUS BLD VENIPUNCTURE: CPT

## 2019-01-22 PROCEDURE — 2580000003 HC RX 258: Performed by: HOSPITALIST

## 2019-01-22 PROCEDURE — 6360000002 HC RX W HCPCS: Performed by: HOSPITALIST

## 2019-01-22 PROCEDURE — 2700000000 HC OXYGEN THERAPY PER DAY

## 2019-01-22 PROCEDURE — 94761 N-INVAS EAR/PLS OXIMETRY MLT: CPT

## 2019-01-22 PROCEDURE — 6370000000 HC RX 637 (ALT 250 FOR IP): Performed by: INTERNAL MEDICINE

## 2019-01-22 PROCEDURE — 6370000000 HC RX 637 (ALT 250 FOR IP): Performed by: NURSE PRACTITIONER

## 2019-01-22 PROCEDURE — 76705 ECHO EXAM OF ABDOMEN: CPT

## 2019-01-22 RX ORDER — METOLAZONE 2.5 MG/1
2.5 TABLET ORAL ONCE
Status: COMPLETED | OUTPATIENT
Start: 2019-01-22 | End: 2019-01-22

## 2019-01-22 RX ADMIN — HEPARIN SODIUM 5000 UNITS: 5000 INJECTION INTRAVENOUS; SUBCUTANEOUS at 08:39

## 2019-01-22 RX ADMIN — DOCUSATE SODIUM 100 MG: 100 CAPSULE, LIQUID FILLED ORAL at 11:53

## 2019-01-22 RX ADMIN — Medication 10 ML: at 22:42

## 2019-01-22 RX ADMIN — ATORVASTATIN CALCIUM 40 MG: 40 TABLET, FILM COATED ORAL at 22:36

## 2019-01-22 RX ADMIN — Medication 2 MG: at 00:06

## 2019-01-22 RX ADMIN — Medication 2 MG: at 22:36

## 2019-01-22 RX ADMIN — CARVEDILOL 12.5 MG: 6.25 TABLET, FILM COATED ORAL at 11:51

## 2019-01-22 RX ADMIN — SPIRONOLACTONE 50 MG: 25 TABLET ORAL at 11:51

## 2019-01-22 RX ADMIN — POLYETHYLENE GLYCOL 3350 17 G: 17 POWDER, FOR SOLUTION ORAL at 11:52

## 2019-01-22 RX ADMIN — ASPIRIN 81 MG: 81 TABLET, COATED ORAL at 11:51

## 2019-01-22 RX ADMIN — ACETAMINOPHEN 1000 MG: 500 TABLET ORAL at 00:07

## 2019-01-22 RX ADMIN — METOLAZONE 2.5 MG: 2.5 TABLET ORAL at 11:51

## 2019-01-22 RX ADMIN — HEPARIN SODIUM 5000 UNITS: 5000 INJECTION INTRAVENOUS; SUBCUTANEOUS at 14:49

## 2019-01-22 RX ADMIN — ANTACID TABLETS 500 MG: 500 TABLET, CHEWABLE ORAL at 00:07

## 2019-01-22 RX ADMIN — CARVEDILOL 12.5 MG: 6.25 TABLET, FILM COATED ORAL at 18:41

## 2019-01-22 RX ADMIN — ONDANSETRON 4 MG: 2 INJECTION INTRAMUSCULAR; INTRAVENOUS at 11:51

## 2019-01-22 RX ADMIN — HEPARIN SODIUM 5000 UNITS: 5000 INJECTION INTRAVENOUS; SUBCUTANEOUS at 22:36

## 2019-01-22 RX ADMIN — Medication 400 MG: at 11:52

## 2019-01-22 RX ADMIN — LORAZEPAM 0.5 MG: 0.5 TABLET ORAL at 12:24

## 2019-01-22 RX ADMIN — LORAZEPAM 0.5 MG: 0.5 TABLET ORAL at 22:36

## 2019-01-22 RX ADMIN — FAMOTIDINE 20 MG: 20 TABLET ORAL at 11:52

## 2019-01-22 RX ADMIN — POLYETHYLENE GLYCOL 3350 17 G: 17 POWDER, FOR SOLUTION ORAL at 22:37

## 2019-01-22 RX ADMIN — Medication 1 TABLET: at 11:52

## 2019-01-22 RX ADMIN — LORAZEPAM 0.5 MG: 0.5 TABLET ORAL at 03:02

## 2019-01-22 RX ADMIN — DOCUSATE SODIUM 100 MG: 100 CAPSULE, LIQUID FILLED ORAL at 22:36

## 2019-01-22 ASSESSMENT — PAIN DESCRIPTION - LOCATION
LOCATION: BACK

## 2019-01-22 ASSESSMENT — ENCOUNTER SYMPTOMS
SHORTNESS OF BREATH: 1
ABDOMINAL DISTENTION: 1
EYE DISCHARGE: 1
COUGH: 1
WHEEZING: 0

## 2019-01-22 ASSESSMENT — PAIN DESCRIPTION - ORIENTATION
ORIENTATION: LOWER

## 2019-01-22 ASSESSMENT — PAIN DESCRIPTION - PAIN TYPE
TYPE: ACUTE PAIN

## 2019-01-22 ASSESSMENT — PAIN SCALES - GENERAL
PAINLEVEL_OUTOF10: 8
PAINLEVEL_OUTOF10: 10
PAINLEVEL_OUTOF10: 9

## 2019-01-23 LAB
AMMONIA: 19 UMOL/L (ref 16–60)
ANION GAP SERPL CALCULATED.3IONS-SCNC: 10 MMOL/L (ref 3–16)
BUN BLDV-MCNC: 44 MG/DL (ref 7–20)
CALCIUM SERPL-MCNC: 7.8 MG/DL (ref 8.3–10.6)
CHLORIDE BLD-SCNC: 100 MMOL/L (ref 99–110)
CO2: 29 MMOL/L (ref 21–32)
CREAT SERPL-MCNC: 2 MG/DL (ref 0.8–1.3)
GFR AFRICAN AMERICAN: 41
GFR NON-AFRICAN AMERICAN: 34
GLUCOSE BLD-MCNC: 139 MG/DL (ref 70–99)
GLUCOSE BLD-MCNC: 141 MG/DL (ref 70–99)
GLUCOSE BLD-MCNC: 189 MG/DL (ref 70–99)
GLUCOSE BLD-MCNC: 198 MG/DL (ref 70–99)
GLUCOSE BLD-MCNC: 206 MG/DL (ref 70–99)
GLUCOSE BLD-MCNC: 291 MG/DL (ref 70–99)
HBV SURFACE AB TITR SER: 13.04 MIU/ML
PERFORMED ON: ABNORMAL
POTASSIUM REFLEX MAGNESIUM: 4.6 MMOL/L (ref 3.5–5.1)
PRO-BNP: ABNORMAL PG/ML (ref 0–124)
SODIUM BLD-SCNC: 139 MMOL/L (ref 136–145)

## 2019-01-23 PROCEDURE — 86803 HEPATITIS C AB TEST: CPT

## 2019-01-23 PROCEDURE — 6370000000 HC RX 637 (ALT 250 FOR IP): Performed by: HOSPITALIST

## 2019-01-23 PROCEDURE — 6360000002 HC RX W HCPCS: Performed by: HOSPITALIST

## 2019-01-23 PROCEDURE — 86706 HEP B SURFACE ANTIBODY: CPT

## 2019-01-23 PROCEDURE — 6370000000 HC RX 637 (ALT 250 FOR IP): Performed by: INTERNAL MEDICINE

## 2019-01-23 PROCEDURE — 2060000000 HC ICU INTERMEDIATE R&B

## 2019-01-23 PROCEDURE — 2580000003 HC RX 258: Performed by: HOSPITALIST

## 2019-01-23 PROCEDURE — 2700000000 HC OXYGEN THERAPY PER DAY

## 2019-01-23 PROCEDURE — 99232 SBSQ HOSP IP/OBS MODERATE 35: CPT | Performed by: NURSE PRACTITIONER

## 2019-01-23 PROCEDURE — 94761 N-INVAS EAR/PLS OXIMETRY MLT: CPT

## 2019-01-23 PROCEDURE — 36415 COLL VENOUS BLD VENIPUNCTURE: CPT

## 2019-01-23 PROCEDURE — 80048 BASIC METABOLIC PNL TOTAL CA: CPT

## 2019-01-23 PROCEDURE — 82140 ASSAY OF AMMONIA: CPT

## 2019-01-23 PROCEDURE — 83880 ASSAY OF NATRIURETIC PEPTIDE: CPT

## 2019-01-23 PROCEDURE — 6370000000 HC RX 637 (ALT 250 FOR IP): Performed by: NURSE PRACTITIONER

## 2019-01-23 RX ORDER — SPIRONOLACTONE 25 MG/1
100 TABLET ORAL DAILY
Status: DISCONTINUED | OUTPATIENT
Start: 2019-01-23 | End: 2019-01-26

## 2019-01-23 RX ADMIN — LORAZEPAM 0.5 MG: 0.5 TABLET ORAL at 20:56

## 2019-01-23 RX ADMIN — Medication 10 ML: at 09:00

## 2019-01-23 RX ADMIN — HEPARIN SODIUM 5000 UNITS: 5000 INJECTION INTRAVENOUS; SUBCUTANEOUS at 14:41

## 2019-01-23 RX ADMIN — FUROSEMIDE 12.5 MG/HR: 10 INJECTION, SOLUTION INTRAMUSCULAR; INTRAVENOUS at 04:25

## 2019-01-23 RX ADMIN — Medication 10 ML: at 20:52

## 2019-01-23 RX ADMIN — LORAZEPAM 0.5 MG: 0.5 TABLET ORAL at 08:00

## 2019-01-23 RX ADMIN — INSULIN LISPRO 1 UNITS: 100 INJECTION, SOLUTION INTRAVENOUS; SUBCUTANEOUS at 18:06

## 2019-01-23 RX ADMIN — SPIRONOLACTONE 100 MG: 25 TABLET ORAL at 14:41

## 2019-01-23 RX ADMIN — INSULIN LISPRO 3 UNITS: 100 INJECTION, SOLUTION INTRAVENOUS; SUBCUTANEOUS at 12:54

## 2019-01-23 RX ADMIN — CARVEDILOL 12.5 MG: 6.25 TABLET, FILM COATED ORAL at 18:12

## 2019-01-23 RX ADMIN — FAMOTIDINE 20 MG: 20 TABLET ORAL at 09:27

## 2019-01-23 RX ADMIN — SPIRONOLACTONE 50 MG: 25 TABLET ORAL at 09:26

## 2019-01-23 RX ADMIN — HEPARIN SODIUM 5000 UNITS: 5000 INJECTION INTRAVENOUS; SUBCUTANEOUS at 06:05

## 2019-01-23 RX ADMIN — Medication 400 MG: at 09:26

## 2019-01-23 RX ADMIN — Medication 1 TABLET: at 09:27

## 2019-01-23 RX ADMIN — Medication 10 ML: at 09:26

## 2019-01-23 RX ADMIN — ASPIRIN 81 MG: 81 TABLET, COATED ORAL at 09:27

## 2019-01-23 RX ADMIN — ATORVASTATIN CALCIUM 40 MG: 40 TABLET, FILM COATED ORAL at 20:47

## 2019-01-23 RX ADMIN — HEPARIN SODIUM 5000 UNITS: 5000 INJECTION INTRAVENOUS; SUBCUTANEOUS at 20:47

## 2019-01-23 RX ADMIN — POLYETHYLENE GLYCOL 3350 17 G: 17 POWDER, FOR SOLUTION ORAL at 09:25

## 2019-01-23 RX ADMIN — CARVEDILOL 12.5 MG: 6.25 TABLET, FILM COATED ORAL at 08:30

## 2019-01-23 RX ADMIN — INSULIN LISPRO 1 UNITS: 100 INJECTION, SOLUTION INTRAVENOUS; SUBCUTANEOUS at 08:30

## 2019-01-23 RX ADMIN — DOCUSATE SODIUM 100 MG: 100 CAPSULE, LIQUID FILLED ORAL at 20:47

## 2019-01-23 RX ADMIN — INSULIN LISPRO 1 UNITS: 100 INJECTION, SOLUTION INTRAVENOUS; SUBCUTANEOUS at 20:47

## 2019-01-23 RX ADMIN — POLYETHYLENE GLYCOL 3350 17 G: 17 POWDER, FOR SOLUTION ORAL at 20:47

## 2019-01-23 RX ADMIN — DOCUSATE SODIUM 100 MG: 100 CAPSULE, LIQUID FILLED ORAL at 09:27

## 2019-01-23 ASSESSMENT — PAIN DESCRIPTION - ORIENTATION
ORIENTATION: LOWER
ORIENTATION: LOWER

## 2019-01-23 ASSESSMENT — ENCOUNTER SYMPTOMS
EYE DISCHARGE: 1
COUGH: 1
SHORTNESS OF BREATH: 1
WHEEZING: 0
ABDOMINAL DISTENTION: 1

## 2019-01-23 ASSESSMENT — PAIN DESCRIPTION - PAIN TYPE
TYPE: ACUTE PAIN
TYPE: ACUTE PAIN

## 2019-01-23 ASSESSMENT — PAIN DESCRIPTION - LOCATION
LOCATION: BACK
LOCATION: BACK

## 2019-01-23 ASSESSMENT — PAIN SCALES - GENERAL
PAINLEVEL_OUTOF10: 8
PAINLEVEL_OUTOF10: 6

## 2019-01-24 LAB
AFP: 0.8 UG/L
ANION GAP SERPL CALCULATED.3IONS-SCNC: 8 MMOL/L (ref 3–16)
ANTI-NUCLEAR ANTIBODY (ANA): NEGATIVE
BODY FLUID CULTURE, STERILE: NORMAL
BUN BLDV-MCNC: 45 MG/DL (ref 7–20)
CALCIUM SERPL-MCNC: 7.9 MG/DL (ref 8.3–10.6)
CHLORIDE BLD-SCNC: 100 MMOL/L (ref 99–110)
CO2: 32 MMOL/L (ref 21–32)
CREAT SERPL-MCNC: 2.1 MG/DL (ref 0.8–1.3)
FERRITIN: 274 NG/ML (ref 30–400)
GFR AFRICAN AMERICAN: 39
GFR NON-AFRICAN AMERICAN: 32
GLUCOSE BLD-MCNC: 140 MG/DL (ref 70–99)
GLUCOSE BLD-MCNC: 154 MG/DL (ref 70–99)
GLUCOSE BLD-MCNC: 193 MG/DL (ref 70–99)
GLUCOSE BLD-MCNC: 198 MG/DL (ref 70–99)
GLUCOSE BLD-MCNC: 260 MG/DL (ref 70–99)
GRAM STAIN RESULT: NORMAL
HAV IGM SER IA-ACNC: NORMAL
HEPATITIS B CORE IGM ANTIBODY: NORMAL
HEPATITIS B SURFACE ANTIGEN INTERPRETATION: NORMAL
HEPATITIS C ANTIBODY INTERPRETATION: NORMAL
HEPATITIS C VIRUS AB BY CIA INDEX: 0.06 IV
HEPATITIS C VIRUS AB BY CIA INTERPRETATION: NEGATIVE
INR BLD: 1.05 (ref 0.86–1.14)
IRON SATURATION: 16 % (ref 20–50)
IRON: 25 UG/DL (ref 59–158)
PERFORMED ON: ABNORMAL
POTASSIUM REFLEX MAGNESIUM: 4.5 MMOL/L (ref 3.5–5.1)
PROTHROMBIN TIME: 12 SEC (ref 9.8–13)
SODIUM BLD-SCNC: 140 MMOL/L (ref 136–145)
TOTAL IRON BINDING CAPACITY: 160 UG/DL (ref 260–445)

## 2019-01-24 PROCEDURE — 82105 ALPHA-FETOPROTEIN SERUM: CPT

## 2019-01-24 PROCEDURE — 99232 SBSQ HOSP IP/OBS MODERATE 35: CPT | Performed by: NURSE PRACTITIONER

## 2019-01-24 PROCEDURE — 94640 AIRWAY INHALATION TREATMENT: CPT

## 2019-01-24 PROCEDURE — 82103 ALPHA-1-ANTITRYPSIN TOTAL: CPT

## 2019-01-24 PROCEDURE — 6370000000 HC RX 637 (ALT 250 FOR IP): Performed by: INTERNAL MEDICINE

## 2019-01-24 PROCEDURE — 83540 ASSAY OF IRON: CPT

## 2019-01-24 PROCEDURE — 85610 PROTHROMBIN TIME: CPT

## 2019-01-24 PROCEDURE — 2580000003 HC RX 258: Performed by: HOSPITALIST

## 2019-01-24 PROCEDURE — 83516 IMMUNOASSAY NONANTIBODY: CPT

## 2019-01-24 PROCEDURE — 6370000000 HC RX 637 (ALT 250 FOR IP): Performed by: HOSPITALIST

## 2019-01-24 PROCEDURE — 36415 COLL VENOUS BLD VENIPUNCTURE: CPT

## 2019-01-24 PROCEDURE — 6360000002 HC RX W HCPCS: Performed by: HOSPITALIST

## 2019-01-24 PROCEDURE — 94664 DEMO&/EVAL PT USE INHALER: CPT

## 2019-01-24 PROCEDURE — 86038 ANTINUCLEAR ANTIBODIES: CPT

## 2019-01-24 PROCEDURE — 2060000000 HC ICU INTERMEDIATE R&B

## 2019-01-24 PROCEDURE — 97530 THERAPEUTIC ACTIVITIES: CPT

## 2019-01-24 PROCEDURE — 6360000002 HC RX W HCPCS: Performed by: NURSE PRACTITIONER

## 2019-01-24 PROCEDURE — 80074 ACUTE HEPATITIS PANEL: CPT

## 2019-01-24 PROCEDURE — 6370000000 HC RX 637 (ALT 250 FOR IP): Performed by: NURSE PRACTITIONER

## 2019-01-24 PROCEDURE — 80048 BASIC METABOLIC PNL TOTAL CA: CPT

## 2019-01-24 PROCEDURE — 82728 ASSAY OF FERRITIN: CPT

## 2019-01-24 PROCEDURE — 83550 IRON BINDING TEST: CPT

## 2019-01-24 PROCEDURE — 82104 ALPHA-1-ANTITRYPSIN PHENO: CPT

## 2019-01-24 PROCEDURE — 94761 N-INVAS EAR/PLS OXIMETRY MLT: CPT

## 2019-01-24 PROCEDURE — 2700000000 HC OXYGEN THERAPY PER DAY

## 2019-01-24 RX ORDER — CHOLECALCIFEROL (VITAMIN D3) 125 MCG
5 CAPSULE ORAL NIGHTLY PRN
Status: DISCONTINUED | OUTPATIENT
Start: 2019-01-24 | End: 2019-01-30 | Stop reason: HOSPADM

## 2019-01-24 RX ORDER — INSULIN GLARGINE 100 [IU]/ML
5 INJECTION, SOLUTION SUBCUTANEOUS NIGHTLY
Status: DISCONTINUED | OUTPATIENT
Start: 2019-01-24 | End: 2019-01-26

## 2019-01-24 RX ORDER — ALBUTEROL SULFATE 2.5 MG/3ML
2.5 SOLUTION RESPIRATORY (INHALATION) EVERY 6 HOURS PRN
Status: DISCONTINUED | OUTPATIENT
Start: 2019-01-24 | End: 2019-01-30 | Stop reason: HOSPADM

## 2019-01-24 RX ADMIN — DOCUSATE SODIUM 100 MG: 100 CAPSULE, LIQUID FILLED ORAL at 09:15

## 2019-01-24 RX ADMIN — Medication 5 MG: at 22:26

## 2019-01-24 RX ADMIN — HEPARIN SODIUM 5000 UNITS: 5000 INJECTION INTRAVENOUS; SUBCUTANEOUS at 14:10

## 2019-01-24 RX ADMIN — Medication 400 MG: at 09:15

## 2019-01-24 RX ADMIN — CARVEDILOL 12.5 MG: 6.25 TABLET, FILM COATED ORAL at 16:18

## 2019-01-24 RX ADMIN — INSULIN GLARGINE 5 UNITS: 100 INJECTION, SOLUTION SUBCUTANEOUS at 22:28

## 2019-01-24 RX ADMIN — Medication 1 TABLET: at 09:14

## 2019-01-24 RX ADMIN — CARVEDILOL 12.5 MG: 6.25 TABLET, FILM COATED ORAL at 09:15

## 2019-01-24 RX ADMIN — ASPIRIN 81 MG: 81 TABLET, COATED ORAL at 09:15

## 2019-01-24 RX ADMIN — Medication 10 ML: at 09:15

## 2019-01-24 RX ADMIN — LORAZEPAM 0.5 MG: 0.5 TABLET ORAL at 09:15

## 2019-01-24 RX ADMIN — POLYETHYLENE GLYCOL 3350 17 G: 17 POWDER, FOR SOLUTION ORAL at 22:11

## 2019-01-24 RX ADMIN — SPIRONOLACTONE 100 MG: 25 TABLET ORAL at 09:15

## 2019-01-24 RX ADMIN — HEPARIN SODIUM 5000 UNITS: 5000 INJECTION INTRAVENOUS; SUBCUTANEOUS at 20:13

## 2019-01-24 RX ADMIN — HEPARIN SODIUM 5000 UNITS: 5000 INJECTION INTRAVENOUS; SUBCUTANEOUS at 06:00

## 2019-01-24 RX ADMIN — INSULIN LISPRO 1 UNITS: 100 INJECTION, SOLUTION INTRAVENOUS; SUBCUTANEOUS at 22:26

## 2019-01-24 RX ADMIN — ATORVASTATIN CALCIUM 40 MG: 40 TABLET, FILM COATED ORAL at 20:13

## 2019-01-24 RX ADMIN — POLYETHYLENE GLYCOL 3350 17 G: 17 POWDER, FOR SOLUTION ORAL at 09:14

## 2019-01-24 RX ADMIN — INSULIN LISPRO 1 UNITS: 100 INJECTION, SOLUTION INTRAVENOUS; SUBCUTANEOUS at 16:25

## 2019-01-24 RX ADMIN — INSULIN LISPRO 3 UNITS: 100 INJECTION, SOLUTION INTRAVENOUS; SUBCUTANEOUS at 13:01

## 2019-01-24 RX ADMIN — DOCUSATE SODIUM 100 MG: 100 CAPSULE, LIQUID FILLED ORAL at 20:13

## 2019-01-24 RX ADMIN — Medication 10 ML: at 20:13

## 2019-01-24 RX ADMIN — ALBUTEROL SULFATE 2.5 MG: 2.5 SOLUTION RESPIRATORY (INHALATION) at 01:48

## 2019-01-24 RX ADMIN — FAMOTIDINE 20 MG: 20 TABLET ORAL at 09:15

## 2019-01-24 RX ADMIN — LORAZEPAM 0.5 MG: 0.5 TABLET ORAL at 20:13

## 2019-01-24 RX ADMIN — FUROSEMIDE 12.5 MG/HR: 10 INJECTION, SOLUTION INTRAMUSCULAR; INTRAVENOUS at 03:47

## 2019-01-24 RX ADMIN — LORAZEPAM 0.5 MG: 0.5 TABLET ORAL at 01:25

## 2019-01-24 RX ADMIN — INSULIN LISPRO 1 UNITS: 100 INJECTION, SOLUTION INTRAVENOUS; SUBCUTANEOUS at 09:14

## 2019-01-24 ASSESSMENT — PAIN SCALES - GENERAL
PAINLEVEL_OUTOF10: 0

## 2019-01-24 ASSESSMENT — ENCOUNTER SYMPTOMS
WHEEZING: 0
ABDOMINAL DISTENTION: 1
EYE DISCHARGE: 1
SHORTNESS OF BREATH: 1
COUGH: 1

## 2019-01-25 PROBLEM — E44.0 MODERATE MALNUTRITION (HCC): Chronic | Status: ACTIVE | Noted: 2019-01-25

## 2019-01-25 LAB
ANION GAP SERPL CALCULATED.3IONS-SCNC: 6 MMOL/L (ref 3–16)
BUN BLDV-MCNC: 50 MG/DL (ref 7–20)
CALCIUM SERPL-MCNC: 7.7 MG/DL (ref 8.3–10.6)
CHLORIDE BLD-SCNC: 95 MMOL/L (ref 99–110)
CO2: 34 MMOL/L (ref 21–32)
CREAT SERPL-MCNC: 2.1 MG/DL (ref 0.8–1.3)
GFR AFRICAN AMERICAN: 39
GFR NON-AFRICAN AMERICAN: 32
GLUCOSE BLD-MCNC: 180 MG/DL (ref 70–99)
GLUCOSE BLD-MCNC: 209 MG/DL (ref 70–99)
GLUCOSE BLD-MCNC: 210 MG/DL (ref 70–99)
GLUCOSE BLD-MCNC: 225 MG/DL (ref 70–99)
GLUCOSE BLD-MCNC: 279 MG/DL (ref 70–99)
PERFORMED ON: ABNORMAL
POTASSIUM SERPL-SCNC: 4.5 MMOL/L (ref 3.5–5.1)
PRO-BNP: ABNORMAL PG/ML (ref 0–124)
SODIUM BLD-SCNC: 135 MMOL/L (ref 136–145)

## 2019-01-25 PROCEDURE — 6370000000 HC RX 637 (ALT 250 FOR IP): Performed by: INTERNAL MEDICINE

## 2019-01-25 PROCEDURE — 99233 SBSQ HOSP IP/OBS HIGH 50: CPT | Performed by: NURSE PRACTITIONER

## 2019-01-25 PROCEDURE — 2580000003 HC RX 258: Performed by: NURSE PRACTITIONER

## 2019-01-25 PROCEDURE — 83880 ASSAY OF NATRIURETIC PEPTIDE: CPT

## 2019-01-25 PROCEDURE — 6360000002 HC RX W HCPCS: Performed by: NURSE PRACTITIONER

## 2019-01-25 PROCEDURE — 6370000000 HC RX 637 (ALT 250 FOR IP): Performed by: NURSE PRACTITIONER

## 2019-01-25 PROCEDURE — 2060000000 HC ICU INTERMEDIATE R&B

## 2019-01-25 PROCEDURE — 2580000003 HC RX 258: Performed by: HOSPITALIST

## 2019-01-25 PROCEDURE — 6370000000 HC RX 637 (ALT 250 FOR IP): Performed by: HOSPITALIST

## 2019-01-25 PROCEDURE — 6360000002 HC RX W HCPCS: Performed by: HOSPITALIST

## 2019-01-25 PROCEDURE — 80048 BASIC METABOLIC PNL TOTAL CA: CPT

## 2019-01-25 PROCEDURE — 36415 COLL VENOUS BLD VENIPUNCTURE: CPT

## 2019-01-25 RX ORDER — CARVEDILOL 25 MG/1
25 TABLET ORAL 2 TIMES DAILY WITH MEALS
Status: DISCONTINUED | OUTPATIENT
Start: 2019-01-25 | End: 2019-01-30 | Stop reason: HOSPADM

## 2019-01-25 RX ADMIN — ASPIRIN 81 MG: 81 TABLET, COATED ORAL at 08:56

## 2019-01-25 RX ADMIN — FUROSEMIDE 10 MG/HR: 10 INJECTION, SOLUTION INTRAMUSCULAR; INTRAVENOUS at 02:29

## 2019-01-25 RX ADMIN — POLYETHYLENE GLYCOL 3350 17 G: 17 POWDER, FOR SOLUTION ORAL at 08:56

## 2019-01-25 RX ADMIN — SPIRONOLACTONE 100 MG: 25 TABLET ORAL at 08:56

## 2019-01-25 RX ADMIN — ATORVASTATIN CALCIUM 40 MG: 40 TABLET, FILM COATED ORAL at 21:08

## 2019-01-25 RX ADMIN — INSULIN LISPRO 1 UNITS: 100 INJECTION, SOLUTION INTRAVENOUS; SUBCUTANEOUS at 21:09

## 2019-01-25 RX ADMIN — Medication 1 TABLET: at 08:56

## 2019-01-25 RX ADMIN — Medication 10 ML: at 21:09

## 2019-01-25 RX ADMIN — HEPARIN SODIUM 5000 UNITS: 5000 INJECTION INTRAVENOUS; SUBCUTANEOUS at 05:25

## 2019-01-25 RX ADMIN — Medication 10 ML: at 08:56

## 2019-01-25 RX ADMIN — LORAZEPAM 0.5 MG: 0.5 TABLET ORAL at 14:20

## 2019-01-25 RX ADMIN — INSULIN LISPRO 2 UNITS: 100 INJECTION, SOLUTION INTRAVENOUS; SUBCUTANEOUS at 08:55

## 2019-01-25 RX ADMIN — HEPARIN SODIUM 5000 UNITS: 5000 INJECTION INTRAVENOUS; SUBCUTANEOUS at 21:09

## 2019-01-25 RX ADMIN — CARVEDILOL 25 MG: 25 TABLET, FILM COATED ORAL at 17:30

## 2019-01-25 RX ADMIN — HEPARIN SODIUM 5000 UNITS: 5000 INJECTION INTRAVENOUS; SUBCUTANEOUS at 13:35

## 2019-01-25 RX ADMIN — ONDANSETRON 4 MG: 2 INJECTION INTRAMUSCULAR; INTRAVENOUS at 13:30

## 2019-01-25 RX ADMIN — Medication 400 MG: at 08:56

## 2019-01-25 RX ADMIN — Medication 10 ML: at 21:11

## 2019-01-25 RX ADMIN — INSULIN LISPRO 3 UNITS: 100 INJECTION, SOLUTION INTRAVENOUS; SUBCUTANEOUS at 11:28

## 2019-01-25 RX ADMIN — DOCUSATE SODIUM 100 MG: 100 CAPSULE, LIQUID FILLED ORAL at 08:56

## 2019-01-25 RX ADMIN — LORAZEPAM 0.5 MG: 0.5 TABLET ORAL at 21:08

## 2019-01-25 RX ADMIN — INSULIN LISPRO 2 UNITS: 100 INJECTION, SOLUTION INTRAVENOUS; SUBCUTANEOUS at 16:55

## 2019-01-25 RX ADMIN — Medication 5 MG: at 23:49

## 2019-01-25 RX ADMIN — CARVEDILOL 12.5 MG: 6.25 TABLET, FILM COATED ORAL at 08:56

## 2019-01-25 RX ADMIN — INSULIN GLARGINE 5 UNITS: 100 INJECTION, SOLUTION SUBCUTANEOUS at 21:08

## 2019-01-25 RX ADMIN — FAMOTIDINE 20 MG: 20 TABLET ORAL at 08:56

## 2019-01-25 ASSESSMENT — ENCOUNTER SYMPTOMS
EYE DISCHARGE: 1
SHORTNESS OF BREATH: 1
WHEEZING: 0
ABDOMINAL DISTENTION: 1
COUGH: 1

## 2019-01-25 ASSESSMENT — PAIN SCALES - GENERAL
PAINLEVEL_OUTOF10: 0
PAINLEVEL_OUTOF10: 0

## 2019-01-26 LAB
AFP: 0.6 UG/L
ALBUMIN SERPL-MCNC: 2.3 G/DL (ref 3.4–5)
ALPHA-1 ANTITRYPSIN PHENOTYPE: ABNORMAL
ALPHA-1 ANTITRYPSIN: 240 MG/DL (ref 90–200)
ANION GAP SERPL CALCULATED.3IONS-SCNC: 7 MMOL/L (ref 3–16)
BACTERIA: ABNORMAL /HPF
BILIRUBIN URINE: NEGATIVE
BLOOD, URINE: ABNORMAL
BUN BLDV-MCNC: 51 MG/DL (ref 7–20)
CALCIUM SERPL-MCNC: 7.8 MG/DL (ref 8.3–10.6)
CHLORIDE BLD-SCNC: 96 MMOL/L (ref 99–110)
CLARITY: CLEAR
CO2: 34 MMOL/L (ref 21–32)
COLOR: YELLOW
CREAT SERPL-MCNC: 2 MG/DL (ref 0.8–1.3)
CREATININE URINE: 42.7 MG/DL (ref 39–259)
EPITHELIAL CELLS, UA: ABNORMAL /HPF
F-ACTIN AB IGG: 10 UNITS (ref 0–19)
GFR AFRICAN AMERICAN: 41
GFR NON-AFRICAN AMERICAN: 34
GLUCOSE BLD-MCNC: 163 MG/DL (ref 70–99)
GLUCOSE BLD-MCNC: 193 MG/DL (ref 70–99)
GLUCOSE BLD-MCNC: 235 MG/DL (ref 70–99)
GLUCOSE BLD-MCNC: 250 MG/DL (ref 70–99)
GLUCOSE BLD-MCNC: 266 MG/DL (ref 70–99)
GLUCOSE URINE: NEGATIVE MG/DL
KETONES, URINE: NEGATIVE MG/DL
LEUKOCYTE ESTERASE, URINE: ABNORMAL
MICROSCOPIC EXAMINATION: YES
MITOCHONDRIAL M2 AB, IGG: 9 UNITS (ref 0–20)
NITRITE, URINE: NEGATIVE
PERFORMED ON: ABNORMAL
PH UA: 7
POTASSIUM SERPL-SCNC: 5 MMOL/L (ref 3.5–5.1)
PROTEIN PROTEIN: 89 MG/DL
PROTEIN UA: 100 MG/DL
PROTEIN/CREAT RATIO: 2.1 MG/DL
RBC UA: ABNORMAL /HPF (ref 0–2)
SODIUM BLD-SCNC: 137 MMOL/L (ref 136–145)
SPECIFIC GRAVITY UA: 1.01
UROBILINOGEN, URINE: 0.2 E.U./DL
WBC UA: ABNORMAL /HPF (ref 0–5)

## 2019-01-26 PROCEDURE — 6370000000 HC RX 637 (ALT 250 FOR IP): Performed by: NURSE PRACTITIONER

## 2019-01-26 PROCEDURE — 2580000003 HC RX 258: Performed by: HOSPITALIST

## 2019-01-26 PROCEDURE — 6370000000 HC RX 637 (ALT 250 FOR IP): Performed by: INTERNAL MEDICINE

## 2019-01-26 PROCEDURE — 82040 ASSAY OF SERUM ALBUMIN: CPT

## 2019-01-26 PROCEDURE — 80048 BASIC METABOLIC PNL TOTAL CA: CPT

## 2019-01-26 PROCEDURE — 6360000002 HC RX W HCPCS: Performed by: HOSPITALIST

## 2019-01-26 PROCEDURE — 36415 COLL VENOUS BLD VENIPUNCTURE: CPT

## 2019-01-26 PROCEDURE — 2060000000 HC ICU INTERMEDIATE R&B

## 2019-01-26 PROCEDURE — 81001 URINALYSIS AUTO W/SCOPE: CPT

## 2019-01-26 PROCEDURE — 84156 ASSAY OF PROTEIN URINE: CPT

## 2019-01-26 PROCEDURE — 82570 ASSAY OF URINE CREATININE: CPT

## 2019-01-26 PROCEDURE — 6370000000 HC RX 637 (ALT 250 FOR IP): Performed by: HOSPITALIST

## 2019-01-26 PROCEDURE — 99233 SBSQ HOSP IP/OBS HIGH 50: CPT | Performed by: NURSE PRACTITIONER

## 2019-01-26 PROCEDURE — 94761 N-INVAS EAR/PLS OXIMETRY MLT: CPT

## 2019-01-26 PROCEDURE — 2700000000 HC OXYGEN THERAPY PER DAY

## 2019-01-26 RX ORDER — INSULIN GLARGINE 100 [IU]/ML
7 INJECTION, SOLUTION SUBCUTANEOUS NIGHTLY
Status: DISCONTINUED | OUTPATIENT
Start: 2019-01-26 | End: 2019-01-30 | Stop reason: HOSPADM

## 2019-01-26 RX ORDER — INSULIN GLARGINE 100 [IU]/ML
8 INJECTION, SOLUTION SUBCUTANEOUS NIGHTLY
Status: DISCONTINUED | OUTPATIENT
Start: 2019-01-26 | End: 2019-01-26

## 2019-01-26 RX ORDER — SPIRONOLACTONE 25 MG/1
75 TABLET ORAL DAILY
Status: DISCONTINUED | OUTPATIENT
Start: 2019-01-27 | End: 2019-01-26

## 2019-01-26 RX ORDER — HYDRALAZINE HYDROCHLORIDE 25 MG/1
25 TABLET, FILM COATED ORAL EVERY 12 HOURS SCHEDULED
Status: DISCONTINUED | OUTPATIENT
Start: 2019-01-26 | End: 2019-01-30 | Stop reason: HOSPADM

## 2019-01-26 RX ADMIN — DOCUSATE SODIUM 100 MG: 100 CAPSULE, LIQUID FILLED ORAL at 08:14

## 2019-01-26 RX ADMIN — INSULIN LISPRO 1 UNITS: 100 INJECTION, SOLUTION INTRAVENOUS; SUBCUTANEOUS at 08:14

## 2019-01-26 RX ADMIN — ONDANSETRON 4 MG: 2 INJECTION INTRAMUSCULAR; INTRAVENOUS at 13:59

## 2019-01-26 RX ADMIN — HEPARIN SODIUM 5000 UNITS: 5000 INJECTION INTRAVENOUS; SUBCUTANEOUS at 05:42

## 2019-01-26 RX ADMIN — INSULIN GLARGINE 7 UNITS: 100 INJECTION, SOLUTION SUBCUTANEOUS at 20:37

## 2019-01-26 RX ADMIN — Medication 5 MG: at 20:37

## 2019-01-26 RX ADMIN — SODIUM CHLORIDE, PRESERVATIVE FREE 10 ML: 5 INJECTION INTRAVENOUS at 00:03

## 2019-01-26 RX ADMIN — HYDRALAZINE HYDROCHLORIDE 25 MG: 25 TABLET, FILM COATED ORAL at 20:36

## 2019-01-26 RX ADMIN — Medication 10 ML: at 20:36

## 2019-01-26 RX ADMIN — SPIRONOLACTONE 100 MG: 25 TABLET ORAL at 08:14

## 2019-01-26 RX ADMIN — CARVEDILOL 25 MG: 25 TABLET, FILM COATED ORAL at 17:52

## 2019-01-26 RX ADMIN — HEPARIN SODIUM 5000 UNITS: 5000 INJECTION INTRAVENOUS; SUBCUTANEOUS at 13:59

## 2019-01-26 RX ADMIN — ASPIRIN 81 MG: 81 TABLET, COATED ORAL at 08:14

## 2019-01-26 RX ADMIN — ONDANSETRON 4 MG: 2 INJECTION INTRAMUSCULAR; INTRAVENOUS at 00:02

## 2019-01-26 RX ADMIN — ONDANSETRON 4 MG: 2 INJECTION INTRAMUSCULAR; INTRAVENOUS at 22:29

## 2019-01-26 RX ADMIN — INSULIN LISPRO 3 UNITS: 100 INJECTION, SOLUTION INTRAVENOUS; SUBCUTANEOUS at 17:52

## 2019-01-26 RX ADMIN — CARVEDILOL 25 MG: 25 TABLET, FILM COATED ORAL at 08:14

## 2019-01-26 RX ADMIN — FAMOTIDINE 20 MG: 20 TABLET ORAL at 08:14

## 2019-01-26 RX ADMIN — INSULIN LISPRO 3 UNITS: 100 INJECTION, SOLUTION INTRAVENOUS; SUBCUTANEOUS at 12:06

## 2019-01-26 RX ADMIN — POLYETHYLENE GLYCOL 3350 17 G: 17 POWDER, FOR SOLUTION ORAL at 20:37

## 2019-01-26 RX ADMIN — Medication 400 MG: at 08:14

## 2019-01-26 RX ADMIN — LORAZEPAM 0.5 MG: 0.5 TABLET ORAL at 01:08

## 2019-01-26 RX ADMIN — LORAZEPAM 0.5 MG: 0.5 TABLET ORAL at 12:33

## 2019-01-26 RX ADMIN — LORAZEPAM 0.5 MG: 0.5 TABLET ORAL at 05:42

## 2019-01-26 RX ADMIN — Medication 10 ML: at 08:14

## 2019-01-26 RX ADMIN — DOCUSATE SODIUM 100 MG: 100 CAPSULE, LIQUID FILLED ORAL at 20:37

## 2019-01-26 RX ADMIN — POLYETHYLENE GLYCOL 3350 17 G: 17 POWDER, FOR SOLUTION ORAL at 08:14

## 2019-01-26 RX ADMIN — INSULIN LISPRO 1 UNITS: 100 INJECTION, SOLUTION INTRAVENOUS; SUBCUTANEOUS at 20:37

## 2019-01-26 RX ADMIN — ATORVASTATIN CALCIUM 40 MG: 40 TABLET, FILM COATED ORAL at 20:37

## 2019-01-26 RX ADMIN — HEPARIN SODIUM 5000 UNITS: 5000 INJECTION INTRAVENOUS; SUBCUTANEOUS at 20:37

## 2019-01-26 RX ADMIN — LORAZEPAM 0.5 MG: 0.5 TABLET ORAL at 20:37

## 2019-01-26 RX ADMIN — HYDRALAZINE HYDROCHLORIDE 25 MG: 25 TABLET, FILM COATED ORAL at 12:30

## 2019-01-26 RX ADMIN — Medication 1 TABLET: at 08:14

## 2019-01-26 ASSESSMENT — PAIN SCALES - GENERAL: PAINLEVEL_OUTOF10: 0

## 2019-01-27 LAB
ANION GAP SERPL CALCULATED.3IONS-SCNC: 5 MMOL/L (ref 3–16)
BUN BLDV-MCNC: 50 MG/DL (ref 7–20)
CALCIUM SERPL-MCNC: 7.9 MG/DL (ref 8.3–10.6)
CHLORIDE BLD-SCNC: 93 MMOL/L (ref 99–110)
CO2: 36 MMOL/L (ref 21–32)
CREAT SERPL-MCNC: 1.8 MG/DL (ref 0.8–1.3)
GFR AFRICAN AMERICAN: 47
GFR NON-AFRICAN AMERICAN: 39
GLUCOSE BLD-MCNC: 153 MG/DL (ref 70–99)
GLUCOSE BLD-MCNC: 156 MG/DL (ref 70–99)
GLUCOSE BLD-MCNC: 192 MG/DL (ref 70–99)
GLUCOSE BLD-MCNC: 204 MG/DL (ref 70–99)
GLUCOSE BLD-MCNC: 247 MG/DL (ref 70–99)
PERFORMED ON: ABNORMAL
POTASSIUM SERPL-SCNC: 5.1 MMOL/L (ref 3.5–5.1)
SODIUM BLD-SCNC: 134 MMOL/L (ref 136–145)

## 2019-01-27 PROCEDURE — 80048 BASIC METABOLIC PNL TOTAL CA: CPT

## 2019-01-27 PROCEDURE — 94761 N-INVAS EAR/PLS OXIMETRY MLT: CPT

## 2019-01-27 PROCEDURE — 6370000000 HC RX 637 (ALT 250 FOR IP): Performed by: NURSE PRACTITIONER

## 2019-01-27 PROCEDURE — 6360000002 HC RX W HCPCS: Performed by: HOSPITALIST

## 2019-01-27 PROCEDURE — 6370000000 HC RX 637 (ALT 250 FOR IP): Performed by: INTERNAL MEDICINE

## 2019-01-27 PROCEDURE — 6370000000 HC RX 637 (ALT 250 FOR IP): Performed by: HOSPITALIST

## 2019-01-27 PROCEDURE — 82105 ALPHA-FETOPROTEIN SERUM: CPT

## 2019-01-27 PROCEDURE — 2060000000 HC ICU INTERMEDIATE R&B

## 2019-01-27 PROCEDURE — 36415 COLL VENOUS BLD VENIPUNCTURE: CPT

## 2019-01-27 PROCEDURE — 99232 SBSQ HOSP IP/OBS MODERATE 35: CPT | Performed by: INTERNAL MEDICINE

## 2019-01-27 PROCEDURE — 2580000003 HC RX 258: Performed by: HOSPITALIST

## 2019-01-27 PROCEDURE — 2700000000 HC OXYGEN THERAPY PER DAY

## 2019-01-27 RX ORDER — ALBUMIN (HUMAN) 12.5 G/50ML
50 SOLUTION INTRAVENOUS ONCE
Status: DISCONTINUED | OUTPATIENT
Start: 2019-01-28 | End: 2019-01-30

## 2019-01-27 RX ADMIN — Medication 10 ML: at 09:09

## 2019-01-27 RX ADMIN — Medication 5 MG: at 20:16

## 2019-01-27 RX ADMIN — FAMOTIDINE 20 MG: 20 TABLET ORAL at 09:07

## 2019-01-27 RX ADMIN — LORAZEPAM 0.5 MG: 0.5 TABLET ORAL at 03:09

## 2019-01-27 RX ADMIN — HYDRALAZINE HYDROCHLORIDE 25 MG: 25 TABLET, FILM COATED ORAL at 09:07

## 2019-01-27 RX ADMIN — INSULIN LISPRO 2 UNITS: 100 INJECTION, SOLUTION INTRAVENOUS; SUBCUTANEOUS at 09:07

## 2019-01-27 RX ADMIN — ATORVASTATIN CALCIUM 40 MG: 40 TABLET, FILM COATED ORAL at 20:16

## 2019-01-27 RX ADMIN — CARVEDILOL 25 MG: 25 TABLET, FILM COATED ORAL at 09:06

## 2019-01-27 RX ADMIN — POLYETHYLENE GLYCOL 3350 17 G: 17 POWDER, FOR SOLUTION ORAL at 09:09

## 2019-01-27 RX ADMIN — DOCUSATE SODIUM 100 MG: 100 CAPSULE, LIQUID FILLED ORAL at 20:16

## 2019-01-27 RX ADMIN — INSULIN LISPRO 1 UNITS: 100 INJECTION, SOLUTION INTRAVENOUS; SUBCUTANEOUS at 20:26

## 2019-01-27 RX ADMIN — INSULIN GLARGINE 7 UNITS: 100 INJECTION, SOLUTION SUBCUTANEOUS at 20:17

## 2019-01-27 RX ADMIN — INSULIN LISPRO 1 UNITS: 100 INJECTION, SOLUTION INTRAVENOUS; SUBCUTANEOUS at 13:46

## 2019-01-27 RX ADMIN — INSULIN LISPRO 1 UNITS: 100 INJECTION, SOLUTION INTRAVENOUS; SUBCUTANEOUS at 17:59

## 2019-01-27 RX ADMIN — CARVEDILOL 25 MG: 25 TABLET, FILM COATED ORAL at 18:07

## 2019-01-27 RX ADMIN — Medication 10 ML: at 09:07

## 2019-01-27 RX ADMIN — HEPARIN SODIUM 5000 UNITS: 5000 INJECTION INTRAVENOUS; SUBCUTANEOUS at 04:53

## 2019-01-27 RX ADMIN — Medication 400 MG: at 09:07

## 2019-01-27 RX ADMIN — Medication 10 ML: at 20:16

## 2019-01-27 RX ADMIN — LORAZEPAM 0.5 MG: 0.5 TABLET ORAL at 20:16

## 2019-01-27 RX ADMIN — ASPIRIN 81 MG: 81 TABLET, COATED ORAL at 09:07

## 2019-01-27 RX ADMIN — Medication 1 TABLET: at 09:07

## 2019-01-27 RX ADMIN — DOCUSATE SODIUM 100 MG: 100 CAPSULE, LIQUID FILLED ORAL at 09:06

## 2019-01-27 RX ADMIN — HYDRALAZINE HYDROCHLORIDE 25 MG: 25 TABLET, FILM COATED ORAL at 20:16

## 2019-01-27 ASSESSMENT — PAIN SCALES - GENERAL
PAINLEVEL_OUTOF10: 0

## 2019-01-28 ENCOUNTER — APPOINTMENT (OUTPATIENT)
Dept: ULTRASOUND IMAGING | Age: 59
DRG: 194 | End: 2019-01-28
Attending: HOSPITALIST
Payer: MEDICAID

## 2019-01-28 LAB
ABO/RH: NORMAL
ANTIBODY SCREEN: NORMAL
BLOOD BANK DISPENSE STATUS: NORMAL
BLOOD BANK DISPENSE STATUS: NORMAL
BLOOD BANK PRODUCT CODE: NORMAL
BLOOD BANK PRODUCT CODE: NORMAL
BPU ID: NORMAL
BPU ID: NORMAL
DESCRIPTION BLOOD BANK: NORMAL
DESCRIPTION BLOOD BANK: NORMAL
GLUCOSE BLD-MCNC: 128 MG/DL (ref 70–99)
GLUCOSE BLD-MCNC: 187 MG/DL (ref 70–99)
GLUCOSE BLD-MCNC: 227 MG/DL (ref 70–99)
GLUCOSE BLD-MCNC: 237 MG/DL (ref 70–99)
HCT VFR BLD CALC: 19.4 % (ref 40.5–52.5)
HEMOGLOBIN: 6.1 G/DL (ref 13.5–17.5)
INR BLD: 1.11 (ref 0.86–1.14)
MCH RBC QN AUTO: 25.3 PG (ref 26–34)
MCHC RBC AUTO-ENTMCNC: 31.4 G/DL (ref 31–36)
MCV RBC AUTO: 80.5 FL (ref 80–100)
PDW BLD-RTO: 17 % (ref 12.4–15.4)
PERFORMED ON: ABNORMAL
PLATELET # BLD: 310 K/UL (ref 135–450)
PMV BLD AUTO: 6.6 FL (ref 5–10.5)
PROTHROMBIN TIME: 12.7 SEC (ref 9.8–13)
RBC # BLD: 2.41 M/UL (ref 4.2–5.9)
WBC # BLD: 5.1 K/UL (ref 4–11)

## 2019-01-28 PROCEDURE — 6370000000 HC RX 637 (ALT 250 FOR IP): Performed by: INTERNAL MEDICINE

## 2019-01-28 PROCEDURE — 6360000002 HC RX W HCPCS: Performed by: INTERNAL MEDICINE

## 2019-01-28 PROCEDURE — 86901 BLOOD TYPING SEROLOGIC RH(D): CPT

## 2019-01-28 PROCEDURE — 85027 COMPLETE CBC AUTOMATED: CPT

## 2019-01-28 PROCEDURE — 2580000003 HC RX 258: Performed by: HOSPITALIST

## 2019-01-28 PROCEDURE — 36415 COLL VENOUS BLD VENIPUNCTURE: CPT

## 2019-01-28 PROCEDURE — 36430 TRANSFUSION BLD/BLD COMPNT: CPT

## 2019-01-28 PROCEDURE — 85610 PROTHROMBIN TIME: CPT

## 2019-01-28 PROCEDURE — 6360000002 HC RX W HCPCS: Performed by: HOSPITALIST

## 2019-01-28 PROCEDURE — 6370000000 HC RX 637 (ALT 250 FOR IP): Performed by: HOSPITALIST

## 2019-01-28 PROCEDURE — P9047 ALBUMIN (HUMAN), 25%, 50ML: HCPCS | Performed by: INTERNAL MEDICINE

## 2019-01-28 PROCEDURE — 86900 BLOOD TYPING SEROLOGIC ABO: CPT

## 2019-01-28 PROCEDURE — 2060000000 HC ICU INTERMEDIATE R&B

## 2019-01-28 PROCEDURE — 49083 ABD PARACENTESIS W/IMAGING: CPT

## 2019-01-28 PROCEDURE — 86850 RBC ANTIBODY SCREEN: CPT

## 2019-01-28 PROCEDURE — P9016 RBC LEUKOCYTES REDUCED: HCPCS

## 2019-01-28 PROCEDURE — 97110 THERAPEUTIC EXERCISES: CPT

## 2019-01-28 PROCEDURE — 6370000000 HC RX 637 (ALT 250 FOR IP): Performed by: NURSE PRACTITIONER

## 2019-01-28 PROCEDURE — 2580000003 HC RX 258: Performed by: INTERNAL MEDICINE

## 2019-01-28 PROCEDURE — 86923 COMPATIBILITY TEST ELECTRIC: CPT

## 2019-01-28 RX ORDER — ISOSORBIDE MONONITRATE 30 MG/1
30 TABLET, EXTENDED RELEASE ORAL DAILY
Status: DISCONTINUED | OUTPATIENT
Start: 2019-01-28 | End: 2019-01-30 | Stop reason: HOSPADM

## 2019-01-28 RX ORDER — 0.9 % SODIUM CHLORIDE 0.9 %
250 INTRAVENOUS SOLUTION INTRAVENOUS ONCE
Status: COMPLETED | OUTPATIENT
Start: 2019-01-28 | End: 2019-01-29

## 2019-01-28 RX ORDER — ALBUMIN (HUMAN) 12.5 G/50ML
25 SOLUTION INTRAVENOUS EVERY 8 HOURS
Status: COMPLETED | OUTPATIENT
Start: 2019-01-28 | End: 2019-01-30

## 2019-01-28 RX ADMIN — CARVEDILOL 25 MG: 25 TABLET, FILM COATED ORAL at 16:30

## 2019-01-28 RX ADMIN — HEPARIN SODIUM 5000 UNITS: 5000 INJECTION INTRAVENOUS; SUBCUTANEOUS at 22:49

## 2019-01-28 RX ADMIN — INSULIN GLARGINE 7 UNITS: 100 INJECTION, SOLUTION SUBCUTANEOUS at 22:49

## 2019-01-28 RX ADMIN — Medication 400 MG: at 08:15

## 2019-01-28 RX ADMIN — ISOSORBIDE MONONITRATE 30 MG: 30 TABLET, EXTENDED RELEASE ORAL at 15:54

## 2019-01-28 RX ADMIN — CARVEDILOL 25 MG: 25 TABLET, FILM COATED ORAL at 08:15

## 2019-01-28 RX ADMIN — LORAZEPAM 0.5 MG: 0.5 TABLET ORAL at 02:36

## 2019-01-28 RX ADMIN — LORAZEPAM 0.5 MG: 0.5 TABLET ORAL at 18:30

## 2019-01-28 RX ADMIN — Medication 10 ML: at 08:21

## 2019-01-28 RX ADMIN — ASPIRIN 81 MG: 81 TABLET, COATED ORAL at 08:16

## 2019-01-28 RX ADMIN — INSULIN LISPRO 1 UNITS: 100 INJECTION, SOLUTION INTRAVENOUS; SUBCUTANEOUS at 08:17

## 2019-01-28 RX ADMIN — HYDRALAZINE HYDROCHLORIDE 25 MG: 25 TABLET, FILM COATED ORAL at 22:55

## 2019-01-28 RX ADMIN — Medication 1 TABLET: at 08:15

## 2019-01-28 RX ADMIN — LORAZEPAM 0.5 MG: 0.5 TABLET ORAL at 08:16

## 2019-01-28 RX ADMIN — LORAZEPAM 0.5 MG: 0.5 TABLET ORAL at 22:55

## 2019-01-28 RX ADMIN — Medication 10 ML: at 22:51

## 2019-01-28 RX ADMIN — Medication 10 ML: at 23:09

## 2019-01-28 RX ADMIN — SODIUM CHLORIDE, PRESERVATIVE FREE 10 ML: 5 INJECTION INTRAVENOUS at 08:21

## 2019-01-28 RX ADMIN — SODIUM CHLORIDE 250 ML: 9 INJECTION, SOLUTION INTRAVENOUS at 17:36

## 2019-01-28 RX ADMIN — HYDRALAZINE HYDROCHLORIDE 25 MG: 25 TABLET, FILM COATED ORAL at 08:15

## 2019-01-28 RX ADMIN — FAMOTIDINE 20 MG: 20 TABLET ORAL at 08:16

## 2019-01-28 RX ADMIN — Medication 10 ML: at 08:16

## 2019-01-28 RX ADMIN — INSULIN LISPRO 1 UNITS: 100 INJECTION, SOLUTION INTRAVENOUS; SUBCUTANEOUS at 22:49

## 2019-01-28 RX ADMIN — ATORVASTATIN CALCIUM 40 MG: 40 TABLET, FILM COATED ORAL at 22:55

## 2019-01-28 RX ADMIN — INSULIN LISPRO 2 UNITS: 100 INJECTION, SOLUTION INTRAVENOUS; SUBCUTANEOUS at 16:57

## 2019-01-28 RX ADMIN — ALBUMIN (HUMAN) 25 G: 0.25 INJECTION, SOLUTION INTRAVENOUS at 16:21

## 2019-01-28 ASSESSMENT — PAIN SCALES - GENERAL
PAINLEVEL_OUTOF10: 0

## 2019-01-29 ENCOUNTER — ANESTHESIA (OUTPATIENT)
Dept: ENDOSCOPY | Age: 59
DRG: 194 | End: 2019-01-29
Payer: MEDICAID

## 2019-01-29 ENCOUNTER — ANESTHESIA EVENT (OUTPATIENT)
Dept: ENDOSCOPY | Age: 59
DRG: 194 | End: 2019-01-29
Payer: MEDICAID

## 2019-01-29 VITALS — OXYGEN SATURATION: 93 % | SYSTOLIC BLOOD PRESSURE: 127 MMHG | DIASTOLIC BLOOD PRESSURE: 87 MMHG

## 2019-01-29 LAB
AFP: 0.6 UG/L
ALBUMIN SERPL-MCNC: 2.6 G/DL (ref 3.4–5)
ANION GAP SERPL CALCULATED.3IONS-SCNC: 7 MMOL/L (ref 3–16)
BUN BLDV-MCNC: 50 MG/DL (ref 7–20)
CALCIUM SERPL-MCNC: 7.8 MG/DL (ref 8.3–10.6)
CHLORIDE BLD-SCNC: 96 MMOL/L (ref 99–110)
CO2: 33 MMOL/L (ref 21–32)
CREAT SERPL-MCNC: 1.9 MG/DL (ref 0.8–1.3)
GFR AFRICAN AMERICAN: 44
GFR NON-AFRICAN AMERICAN: 36
GLUCOSE BLD-MCNC: 113 MG/DL (ref 70–99)
GLUCOSE BLD-MCNC: 128 MG/DL (ref 70–99)
GLUCOSE BLD-MCNC: 135 MG/DL (ref 70–99)
GLUCOSE BLD-MCNC: 180 MG/DL (ref 70–99)
GLUCOSE BLD-MCNC: 216 MG/DL (ref 70–99)
HCT VFR BLD CALC: 23.6 % (ref 40.5–52.5)
HEMOGLOBIN: 7.7 G/DL (ref 13.5–17.5)
MCH RBC QN AUTO: 26 PG (ref 26–34)
MCHC RBC AUTO-ENTMCNC: 32.4 G/DL (ref 31–36)
MCV RBC AUTO: 80.1 FL (ref 80–100)
PDW BLD-RTO: 17.5 % (ref 12.4–15.4)
PERFORMED ON: ABNORMAL
PHOSPHORUS: 2.6 MG/DL (ref 2.5–4.9)
PLATELET # BLD: 288 K/UL (ref 135–450)
PMV BLD AUTO: 6.6 FL (ref 5–10.5)
POTASSIUM SERPL-SCNC: 4.7 MMOL/L (ref 3.5–5.1)
RBC # BLD: 2.95 M/UL (ref 4.2–5.9)
SODIUM BLD-SCNC: 136 MMOL/L (ref 136–145)
WBC # BLD: 5.7 K/UL (ref 4–11)

## 2019-01-29 PROCEDURE — 6370000000 HC RX 637 (ALT 250 FOR IP): Performed by: INTERNAL MEDICINE

## 2019-01-29 PROCEDURE — 2580000003 HC RX 258: Performed by: NURSE ANESTHETIST, CERTIFIED REGISTERED

## 2019-01-29 PROCEDURE — 7100000011 HC PHASE II RECOVERY - ADDTL 15 MIN: Performed by: INTERNAL MEDICINE

## 2019-01-29 PROCEDURE — 2580000003 HC RX 258: Performed by: HOSPITALIST

## 2019-01-29 PROCEDURE — 2060000000 HC ICU INTERMEDIATE R&B

## 2019-01-29 PROCEDURE — 6360000002 HC RX W HCPCS: Performed by: HOSPITALIST

## 2019-01-29 PROCEDURE — 6360000002 HC RX W HCPCS: Performed by: NURSE ANESTHETIST, CERTIFIED REGISTERED

## 2019-01-29 PROCEDURE — 6360000002 HC RX W HCPCS: Performed by: INTERNAL MEDICINE

## 2019-01-29 PROCEDURE — 2709999900 HC NON-CHARGEABLE SUPPLY: Performed by: INTERNAL MEDICINE

## 2019-01-29 PROCEDURE — 2500000003 HC RX 250 WO HCPCS: Performed by: NURSE ANESTHETIST, CERTIFIED REGISTERED

## 2019-01-29 PROCEDURE — 80069 RENAL FUNCTION PANEL: CPT

## 2019-01-29 PROCEDURE — 36415 COLL VENOUS BLD VENIPUNCTURE: CPT

## 2019-01-29 PROCEDURE — 0DJ08ZZ INSPECTION OF UPPER INTESTINAL TRACT, VIA NATURAL OR ARTIFICIAL OPENING ENDOSCOPIC: ICD-10-PCS | Performed by: INTERNAL MEDICINE

## 2019-01-29 PROCEDURE — 3609017100 HC EGD: Performed by: INTERNAL MEDICINE

## 2019-01-29 PROCEDURE — 6370000000 HC RX 637 (ALT 250 FOR IP): Performed by: NURSE PRACTITIONER

## 2019-01-29 PROCEDURE — 7100000010 HC PHASE II RECOVERY - FIRST 15 MIN: Performed by: INTERNAL MEDICINE

## 2019-01-29 PROCEDURE — 6370000000 HC RX 637 (ALT 250 FOR IP): Performed by: HOSPITALIST

## 2019-01-29 PROCEDURE — 99232 SBSQ HOSP IP/OBS MODERATE 35: CPT | Performed by: INTERNAL MEDICINE

## 2019-01-29 PROCEDURE — 85027 COMPLETE CBC AUTOMATED: CPT

## 2019-01-29 PROCEDURE — P9047 ALBUMIN (HUMAN), 25%, 50ML: HCPCS | Performed by: INTERNAL MEDICINE

## 2019-01-29 PROCEDURE — 3700000000 HC ANESTHESIA ATTENDED CARE: Performed by: INTERNAL MEDICINE

## 2019-01-29 RX ORDER — PANTOPRAZOLE SODIUM 40 MG/1
40 TABLET, DELAYED RELEASE ORAL
Qty: 30 TABLET | Refills: 0 | Status: SHIPPED | OUTPATIENT
Start: 2019-01-30 | End: 2021-01-09

## 2019-01-29 RX ORDER — TORSEMIDE 100 MG/1
50 TABLET ORAL DAILY
Status: DISCONTINUED | OUTPATIENT
Start: 2019-01-30 | End: 2019-01-30 | Stop reason: HOSPADM

## 2019-01-29 RX ORDER — ISOSORBIDE MONONITRATE 30 MG/1
30 TABLET, EXTENDED RELEASE ORAL DAILY
Qty: 30 TABLET | Refills: 0 | Status: SHIPPED | OUTPATIENT
Start: 2019-01-30 | End: 2019-02-27 | Stop reason: ALTCHOICE

## 2019-01-29 RX ORDER — SODIUM CHLORIDE, SODIUM LACTATE, POTASSIUM CHLORIDE, CALCIUM CHLORIDE 600; 310; 30; 20 MG/100ML; MG/100ML; MG/100ML; MG/100ML
INJECTION, SOLUTION INTRAVENOUS CONTINUOUS PRN
Status: DISCONTINUED | OUTPATIENT
Start: 2019-01-29 | End: 2019-01-29 | Stop reason: SDUPTHER

## 2019-01-29 RX ORDER — LIDOCAINE HYDROCHLORIDE 10 MG/ML
INJECTION, SOLUTION INFILTRATION; PERINEURAL PRN
Status: DISCONTINUED | OUTPATIENT
Start: 2019-01-29 | End: 2019-01-29 | Stop reason: SDUPTHER

## 2019-01-29 RX ORDER — PANTOPRAZOLE SODIUM 40 MG/1
40 TABLET, DELAYED RELEASE ORAL
Status: DISCONTINUED | OUTPATIENT
Start: 2019-01-29 | End: 2019-01-30 | Stop reason: HOSPADM

## 2019-01-29 RX ORDER — HYDRALAZINE HYDROCHLORIDE 25 MG/1
25 TABLET, FILM COATED ORAL 2 TIMES DAILY
Qty: 60 TABLET | Refills: 0 | Status: SHIPPED | OUTPATIENT
Start: 2019-01-29 | End: 2019-02-27 | Stop reason: ALTCHOICE

## 2019-01-29 RX ORDER — ATORVASTATIN CALCIUM 40 MG/1
40 TABLET, FILM COATED ORAL NIGHTLY
Qty: 30 TABLET | Refills: 0 | Status: SHIPPED | OUTPATIENT
Start: 2019-01-29 | End: 2020-03-21

## 2019-01-29 RX ORDER — PROPOFOL 10 MG/ML
INJECTION, EMULSION INTRAVENOUS PRN
Status: DISCONTINUED | OUTPATIENT
Start: 2019-01-29 | End: 2019-01-29 | Stop reason: SDUPTHER

## 2019-01-29 RX ORDER — CARVEDILOL 25 MG/1
25 TABLET ORAL 2 TIMES DAILY
Qty: 60 TABLET | Refills: 0 | Status: ON HOLD | OUTPATIENT
Start: 2019-01-29 | End: 2019-02-23 | Stop reason: HOSPADM

## 2019-01-29 RX ADMIN — PROPOFOL 50 MG: 10 INJECTION, EMULSION INTRAVENOUS at 09:35

## 2019-01-29 RX ADMIN — ACETAMINOPHEN 1000 MG: 500 TABLET ORAL at 20:24

## 2019-01-29 RX ADMIN — LORAZEPAM 0.5 MG: 0.5 TABLET ORAL at 02:50

## 2019-01-29 RX ADMIN — HYDRALAZINE HYDROCHLORIDE 25 MG: 25 TABLET, FILM COATED ORAL at 20:24

## 2019-01-29 RX ADMIN — Medication 5 MG: at 20:23

## 2019-01-29 RX ADMIN — Medication 1 TABLET: at 08:10

## 2019-01-29 RX ADMIN — SODIUM CHLORIDE, POTASSIUM CHLORIDE, SODIUM LACTATE AND CALCIUM CHLORIDE: 600; 310; 30; 20 INJECTION, SOLUTION INTRAVENOUS at 09:25

## 2019-01-29 RX ADMIN — CARVEDILOL 25 MG: 25 TABLET, FILM COATED ORAL at 08:08

## 2019-01-29 RX ADMIN — PANTOPRAZOLE SODIUM 40 MG: 40 TABLET, DELAYED RELEASE ORAL at 12:16

## 2019-01-29 RX ADMIN — Medication 5 MG: at 02:50

## 2019-01-29 RX ADMIN — ATORVASTATIN CALCIUM 40 MG: 40 TABLET, FILM COATED ORAL at 20:23

## 2019-01-29 RX ADMIN — LORAZEPAM 0.5 MG: 0.5 TABLET ORAL at 23:21

## 2019-01-29 RX ADMIN — ALBUMIN (HUMAN) 25 G: 0.25 INJECTION, SOLUTION INTRAVENOUS at 07:59

## 2019-01-29 RX ADMIN — CARVEDILOL 25 MG: 25 TABLET, FILM COATED ORAL at 17:12

## 2019-01-29 RX ADMIN — INSULIN LISPRO 2 UNITS: 100 INJECTION, SOLUTION INTRAVENOUS; SUBCUTANEOUS at 17:12

## 2019-01-29 RX ADMIN — HEPARIN SODIUM 5000 UNITS: 5000 INJECTION INTRAVENOUS; SUBCUTANEOUS at 20:24

## 2019-01-29 RX ADMIN — LORAZEPAM 0.5 MG: 0.5 TABLET ORAL at 18:55

## 2019-01-29 RX ADMIN — ISOSORBIDE MONONITRATE 30 MG: 30 TABLET, EXTENDED RELEASE ORAL at 08:09

## 2019-01-29 RX ADMIN — ALBUMIN (HUMAN) 25 G: 0.25 INJECTION, SOLUTION INTRAVENOUS at 00:39

## 2019-01-29 RX ADMIN — Medication 400 MG: at 08:09

## 2019-01-29 RX ADMIN — Medication 10 ML: at 07:59

## 2019-01-29 RX ADMIN — Medication 10 ML: at 20:24

## 2019-01-29 RX ADMIN — LORAZEPAM 0.5 MG: 0.5 TABLET ORAL at 12:16

## 2019-01-29 RX ADMIN — ASPIRIN 81 MG: 81 TABLET, COATED ORAL at 08:08

## 2019-01-29 RX ADMIN — HYDRALAZINE HYDROCHLORIDE 25 MG: 25 TABLET, FILM COATED ORAL at 08:09

## 2019-01-29 RX ADMIN — HEPARIN SODIUM 5000 UNITS: 5000 INJECTION INTRAVENOUS; SUBCUTANEOUS at 06:48

## 2019-01-29 RX ADMIN — Medication 10 ML: at 20:31

## 2019-01-29 RX ADMIN — LIDOCAINE HYDROCHLORIDE 40 MG: 10 INJECTION, SOLUTION INFILTRATION; PERINEURAL at 09:35

## 2019-01-29 RX ADMIN — HEPARIN SODIUM 5000 UNITS: 5000 INJECTION INTRAVENOUS; SUBCUTANEOUS at 16:22

## 2019-01-29 RX ADMIN — ALBUMIN (HUMAN) 25 G: 0.25 INJECTION, SOLUTION INTRAVENOUS at 16:22

## 2019-01-29 RX ADMIN — INSULIN LISPRO 1 UNITS: 100 INJECTION, SOLUTION INTRAVENOUS; SUBCUTANEOUS at 20:25

## 2019-01-29 ASSESSMENT — PAIN DESCRIPTION - LOCATION: LOCATION: BACK

## 2019-01-29 ASSESSMENT — PAIN SCALES - GENERAL
PAINLEVEL_OUTOF10: 0
PAINLEVEL_OUTOF10: 5

## 2019-01-29 ASSESSMENT — PAIN DESCRIPTION - PAIN TYPE: TYPE: ACUTE PAIN

## 2019-01-30 VITALS
BODY MASS INDEX: 30.37 KG/M2 | HEIGHT: 72 IN | TEMPERATURE: 97.4 F | OXYGEN SATURATION: 94 % | WEIGHT: 224.21 LBS | HEART RATE: 72 BPM | DIASTOLIC BLOOD PRESSURE: 62 MMHG | SYSTOLIC BLOOD PRESSURE: 110 MMHG | RESPIRATION RATE: 17 BRPM

## 2019-01-30 LAB
ANION GAP SERPL CALCULATED.3IONS-SCNC: 8 MMOL/L (ref 3–16)
BUN BLDV-MCNC: 51 MG/DL (ref 7–20)
CALCIUM SERPL-MCNC: 7.8 MG/DL (ref 8.3–10.6)
CHLORIDE BLD-SCNC: 98 MMOL/L (ref 99–110)
CO2: 31 MMOL/L (ref 21–32)
CREAT SERPL-MCNC: 1.8 MG/DL (ref 0.8–1.3)
GFR AFRICAN AMERICAN: 47
GFR NON-AFRICAN AMERICAN: 39
GLUCOSE BLD-MCNC: 150 MG/DL (ref 70–99)
GLUCOSE BLD-MCNC: 151 MG/DL (ref 70–99)
GLUCOSE BLD-MCNC: 213 MG/DL (ref 70–99)
GLUCOSE BLD-MCNC: 233 MG/DL (ref 70–99)
PERFORMED ON: ABNORMAL
POTASSIUM SERPL-SCNC: 5 MMOL/L (ref 3.5–5.1)
SODIUM BLD-SCNC: 137 MMOL/L (ref 136–145)

## 2019-01-30 PROCEDURE — 6370000000 HC RX 637 (ALT 250 FOR IP): Performed by: INTERNAL MEDICINE

## 2019-01-30 PROCEDURE — 6370000000 HC RX 637 (ALT 250 FOR IP): Performed by: NURSE PRACTITIONER

## 2019-01-30 PROCEDURE — 6360000002 HC RX W HCPCS: Performed by: INTERNAL MEDICINE

## 2019-01-30 PROCEDURE — 6370000000 HC RX 637 (ALT 250 FOR IP): Performed by: HOSPITALIST

## 2019-01-30 PROCEDURE — 36415 COLL VENOUS BLD VENIPUNCTURE: CPT

## 2019-01-30 PROCEDURE — P9047 ALBUMIN (HUMAN), 25%, 50ML: HCPCS | Performed by: INTERNAL MEDICINE

## 2019-01-30 PROCEDURE — 80048 BASIC METABOLIC PNL TOTAL CA: CPT

## 2019-01-30 PROCEDURE — 6360000002 HC RX W HCPCS: Performed by: HOSPITALIST

## 2019-01-30 PROCEDURE — 2580000003 HC RX 258: Performed by: HOSPITALIST

## 2019-01-30 RX ORDER — ALBUMIN (HUMAN) 12.5 G/50ML
25 SOLUTION INTRAVENOUS EVERY 8 HOURS
Status: DISCONTINUED | OUTPATIENT
Start: 2019-01-30 | End: 2019-01-30 | Stop reason: HOSPADM

## 2019-01-30 RX ADMIN — Medication 10 ML: at 09:12

## 2019-01-30 RX ADMIN — HEPARIN SODIUM 5000 UNITS: 5000 INJECTION INTRAVENOUS; SUBCUTANEOUS at 04:13

## 2019-01-30 RX ADMIN — Medication 400 MG: at 09:10

## 2019-01-30 RX ADMIN — CARVEDILOL 25 MG: 25 TABLET, FILM COATED ORAL at 09:09

## 2019-01-30 RX ADMIN — HYDRALAZINE HYDROCHLORIDE 25 MG: 25 TABLET, FILM COATED ORAL at 09:10

## 2019-01-30 RX ADMIN — LORAZEPAM 0.5 MG: 0.5 TABLET ORAL at 04:12

## 2019-01-30 RX ADMIN — Medication 1 TABLET: at 09:09

## 2019-01-30 RX ADMIN — LORAZEPAM 0.5 MG: 0.5 TABLET ORAL at 09:10

## 2019-01-30 RX ADMIN — DOCUSATE SODIUM 100 MG: 100 CAPSULE, LIQUID FILLED ORAL at 09:10

## 2019-01-30 RX ADMIN — ASPIRIN 81 MG: 81 TABLET, COATED ORAL at 09:10

## 2019-01-30 RX ADMIN — ALBUMIN (HUMAN) 25 G: 0.25 INJECTION, SOLUTION INTRAVENOUS at 00:16

## 2019-01-30 RX ADMIN — TORSEMIDE 50 MG: 100 TABLET ORAL at 09:13

## 2019-01-30 RX ADMIN — PANTOPRAZOLE SODIUM 40 MG: 40 TABLET, DELAYED RELEASE ORAL at 04:12

## 2019-01-30 RX ADMIN — ALBUMIN (HUMAN) 25 G: 0.25 INJECTION, SOLUTION INTRAVENOUS at 09:09

## 2019-01-30 RX ADMIN — ISOSORBIDE MONONITRATE 30 MG: 30 TABLET, EXTENDED RELEASE ORAL at 09:10

## 2019-01-30 RX ADMIN — POLYETHYLENE GLYCOL 3350 17 G: 17 POWDER, FOR SOLUTION ORAL at 09:10

## 2019-01-30 RX ADMIN — INSULIN LISPRO 2 UNITS: 100 INJECTION, SOLUTION INTRAVENOUS; SUBCUTANEOUS at 09:11

## 2019-01-30 RX ADMIN — Medication 10 ML: at 09:13

## 2019-02-13 ENCOUNTER — APPOINTMENT (OUTPATIENT)
Dept: GENERAL RADIOLOGY | Age: 59
DRG: 280 | End: 2019-02-13
Payer: MEDICAID

## 2019-02-13 ENCOUNTER — HOSPITAL ENCOUNTER (INPATIENT)
Age: 59
LOS: 1 days | Discharge: NURSING, REHAB OR LONG-TERM CARE | DRG: 280 | End: 2019-02-15
Attending: EMERGENCY MEDICINE | Admitting: INTERNAL MEDICINE
Payer: MEDICAID

## 2019-02-13 DIAGNOSIS — R60.1 ANASARCA: Primary | ICD-10-CM

## 2019-02-13 DIAGNOSIS — K70.31 ASCITES DUE TO ALCOHOLIC CIRRHOSIS (HCC): ICD-10-CM

## 2019-02-13 LAB
A/G RATIO: 0.8 (ref 1.1–2.2)
ALBUMIN SERPL-MCNC: 2.6 G/DL (ref 3.4–5)
ALP BLD-CCNC: 68 U/L (ref 40–129)
ALT SERPL-CCNC: 9 U/L (ref 10–40)
AMMONIA: 23 UMOL/L (ref 16–60)
ANION GAP SERPL CALCULATED.3IONS-SCNC: 9 MMOL/L (ref 3–16)
AST SERPL-CCNC: 19 U/L (ref 15–37)
BILIRUB SERPL-MCNC: <0.2 MG/DL (ref 0–1)
BUN BLDV-MCNC: 50 MG/DL (ref 7–20)
CALCIUM SERPL-MCNC: 7.7 MG/DL (ref 8.3–10.6)
CHLORIDE BLD-SCNC: 102 MMOL/L (ref 99–110)
CO2: 24 MMOL/L (ref 21–32)
CREAT SERPL-MCNC: 1.7 MG/DL (ref 0.8–1.3)
GFR AFRICAN AMERICAN: 50
GFR NON-AFRICAN AMERICAN: 41
GLOBULIN: 3.4 G/DL
GLUCOSE BLD-MCNC: 225 MG/DL (ref 70–99)
HCT VFR BLD CALC: 22 % (ref 40.5–52.5)
HEMOGLOBIN: 7.1 G/DL (ref 13.5–17.5)
MCH RBC QN AUTO: 26.3 PG (ref 26–34)
MCHC RBC AUTO-ENTMCNC: 32 G/DL (ref 31–36)
MCV RBC AUTO: 82.1 FL (ref 80–100)
PDW BLD-RTO: 18.5 % (ref 12.4–15.4)
PLATELET # BLD: 334 K/UL (ref 135–450)
PMV BLD AUTO: 6.5 FL (ref 5–10.5)
POTASSIUM REFLEX MAGNESIUM: 5.3 MMOL/L (ref 3.5–5.1)
PRO-BNP: ABNORMAL PG/ML (ref 0–124)
RBC # BLD: 2.68 M/UL (ref 4.2–5.9)
SODIUM BLD-SCNC: 135 MMOL/L (ref 136–145)
TOTAL PROTEIN: 6 G/DL (ref 6.4–8.2)
TROPONIN: 0.28 NG/ML
WBC # BLD: 6 K/UL (ref 4–11)

## 2019-02-13 PROCEDURE — 82140 ASSAY OF AMMONIA: CPT

## 2019-02-13 PROCEDURE — 85027 COMPLETE CBC AUTOMATED: CPT

## 2019-02-13 PROCEDURE — 84484 ASSAY OF TROPONIN QUANT: CPT

## 2019-02-13 PROCEDURE — 96374 THER/PROPH/DIAG INJ IV PUSH: CPT

## 2019-02-13 PROCEDURE — 93005 ELECTROCARDIOGRAM TRACING: CPT | Performed by: EMERGENCY MEDICINE

## 2019-02-13 PROCEDURE — 87077 CULTURE AEROBIC IDENTIFY: CPT

## 2019-02-13 PROCEDURE — 93010 ELECTROCARDIOGRAM REPORT: CPT | Performed by: INTERNAL MEDICINE

## 2019-02-13 PROCEDURE — 71046 X-RAY EXAM CHEST 2 VIEWS: CPT

## 2019-02-13 PROCEDURE — 80053 COMPREHEN METABOLIC PANEL: CPT

## 2019-02-13 PROCEDURE — 87186 SC STD MICRODIL/AGAR DIL: CPT

## 2019-02-13 PROCEDURE — G0378 HOSPITAL OBSERVATION PER HR: HCPCS

## 2019-02-13 PROCEDURE — 83880 ASSAY OF NATRIURETIC PEPTIDE: CPT

## 2019-02-13 PROCEDURE — 87086 URINE CULTURE/COLONY COUNT: CPT

## 2019-02-13 PROCEDURE — 99285 EMERGENCY DEPT VISIT HI MDM: CPT

## 2019-02-13 PROCEDURE — 6360000002 HC RX W HCPCS: Performed by: EMERGENCY MEDICINE

## 2019-02-13 RX ORDER — FUROSEMIDE 10 MG/ML
40 INJECTION INTRAMUSCULAR; INTRAVENOUS ONCE
Status: COMPLETED | OUTPATIENT
Start: 2019-02-13 | End: 2019-02-13

## 2019-02-13 RX ADMIN — FUROSEMIDE 40 MG: 10 INJECTION, SOLUTION INTRAMUSCULAR; INTRAVENOUS at 22:41

## 2019-02-14 ENCOUNTER — APPOINTMENT (OUTPATIENT)
Dept: ULTRASOUND IMAGING | Age: 59
DRG: 280 | End: 2019-02-14
Payer: MEDICAID

## 2019-02-14 LAB
ANION GAP SERPL CALCULATED.3IONS-SCNC: 10 MMOL/L (ref 3–16)
BACTERIA: ABNORMAL /HPF
BASOPHILS ABSOLUTE: 0 K/UL (ref 0–0.2)
BASOPHILS RELATIVE PERCENT: 0.4 %
BILIRUBIN URINE: NEGATIVE
BLOOD, URINE: ABNORMAL
BUN BLDV-MCNC: 52 MG/DL (ref 7–20)
CALCIUM SERPL-MCNC: 8.3 MG/DL (ref 8.3–10.6)
CHLORIDE BLD-SCNC: 103 MMOL/L (ref 99–110)
CLARITY: ABNORMAL
CO2: 26 MMOL/L (ref 21–32)
COLOR: YELLOW
CREAT SERPL-MCNC: 2 MG/DL (ref 0.8–1.3)
EKG ATRIAL RATE: 76 BPM
EKG DIAGNOSIS: NORMAL
EKG P AXIS: -42 DEGREES
EKG P-R INTERVAL: 152 MS
EKG Q-T INTERVAL: 390 MS
EKG QRS DURATION: 116 MS
EKG QTC CALCULATION (BAZETT): 441 MS
EKG R AXIS: -58 DEGREES
EKG T AXIS: 185 DEGREES
EKG VENTRICULAR RATE: 77 BPM
EOSINOPHILS ABSOLUTE: 0.1 K/UL (ref 0–0.6)
EOSINOPHILS RELATIVE PERCENT: 2.5 %
GFR AFRICAN AMERICAN: 41
GFR NON-AFRICAN AMERICAN: 34
GLUCOSE BLD-MCNC: 124 MG/DL (ref 70–99)
GLUCOSE BLD-MCNC: 160 MG/DL (ref 70–99)
GLUCOSE BLD-MCNC: 161 MG/DL (ref 70–99)
GLUCOSE BLD-MCNC: 172 MG/DL (ref 70–99)
GLUCOSE BLD-MCNC: 173 MG/DL (ref 70–99)
GLUCOSE BLD-MCNC: 175 MG/DL (ref 70–99)
GLUCOSE URINE: NEGATIVE MG/DL
HCT VFR BLD CALC: 23.4 % (ref 40.5–52.5)
HEMOGLOBIN: 7.6 G/DL (ref 13.5–17.5)
INR BLD: 1.11 (ref 0.86–1.14)
KETONES, URINE: NEGATIVE MG/DL
LEUKOCYTE ESTERASE, URINE: ABNORMAL
LYMPHOCYTES ABSOLUTE: 0.6 K/UL (ref 1–5.1)
LYMPHOCYTES RELATIVE PERCENT: 10.4 %
MCH RBC QN AUTO: 26.5 PG (ref 26–34)
MCHC RBC AUTO-ENTMCNC: 32.4 G/DL (ref 31–36)
MCV RBC AUTO: 81.9 FL (ref 80–100)
MICROSCOPIC EXAMINATION: YES
MONOCYTES ABSOLUTE: 0.5 K/UL (ref 0–1.3)
MONOCYTES RELATIVE PERCENT: 8.5 %
MRSA SCREEN RT-PCR: NORMAL
NEUTROPHILS ABSOLUTE: 4.5 K/UL (ref 1.7–7.7)
NEUTROPHILS RELATIVE PERCENT: 78.2 %
NITRITE, URINE: POSITIVE
PDW BLD-RTO: 18.7 % (ref 12.4–15.4)
PERFORMED ON: ABNORMAL
PH UA: 6.5
PLATELET # BLD: 335 K/UL (ref 135–450)
PMV BLD AUTO: 6.3 FL (ref 5–10.5)
POTASSIUM REFLEX MAGNESIUM: 5.1 MMOL/L (ref 3.5–5.1)
PROTEIN UA: 100 MG/DL
PROTHROMBIN TIME: 12.7 SEC (ref 9.8–13)
RBC # BLD: 2.85 M/UL (ref 4.2–5.9)
RBC UA: ABNORMAL /HPF (ref 0–2)
SODIUM BLD-SCNC: 139 MMOL/L (ref 136–145)
SPECIFIC GRAVITY UA: 1.02
TROPONIN: 0.29 NG/ML
TROPONIN: 0.33 NG/ML
URINE TYPE: ABNORMAL
UROBILINOGEN, URINE: 0.2 E.U./DL
WBC # BLD: 5.7 K/UL (ref 4–11)
WBC UA: >100 /HPF (ref 0–5)

## 2019-02-14 PROCEDURE — 0W9G3ZZ DRAINAGE OF PERITONEAL CAVITY, PERCUTANEOUS APPROACH: ICD-10-PCS | Performed by: RADIOLOGY

## 2019-02-14 PROCEDURE — 6370000000 HC RX 637 (ALT 250 FOR IP): Performed by: NURSE PRACTITIONER

## 2019-02-14 PROCEDURE — 94664 DEMO&/EVAL PT USE INHALER: CPT

## 2019-02-14 PROCEDURE — 2580000003 HC RX 258: Performed by: NURSE PRACTITIONER

## 2019-02-14 PROCEDURE — 94640 AIRWAY INHALATION TREATMENT: CPT

## 2019-02-14 PROCEDURE — 81001 URINALYSIS AUTO W/SCOPE: CPT

## 2019-02-14 PROCEDURE — 97530 THERAPEUTIC ACTIVITIES: CPT

## 2019-02-14 PROCEDURE — 6360000002 HC RX W HCPCS: Performed by: RADIOLOGY

## 2019-02-14 PROCEDURE — 85025 COMPLETE CBC W/AUTO DIFF WBC: CPT

## 2019-02-14 PROCEDURE — 0W9G3ZZ DRAINAGE OF PERITONEAL CAVITY, PERCUTANEOUS APPROACH: ICD-10-PCS | Performed by: PHYSICAL MEDICINE & REHABILITATION

## 2019-02-14 PROCEDURE — 97166 OT EVAL MOD COMPLEX 45 MIN: CPT

## 2019-02-14 PROCEDURE — G0378 HOSPITAL OBSERVATION PER HR: HCPCS

## 2019-02-14 PROCEDURE — 85610 PROTHROMBIN TIME: CPT

## 2019-02-14 PROCEDURE — 6360000002 HC RX W HCPCS: Performed by: INTERNAL MEDICINE

## 2019-02-14 PROCEDURE — 96375 TX/PRO/DX INJ NEW DRUG ADDON: CPT

## 2019-02-14 PROCEDURE — 2709999900 US GUIDED PARACENTESIS

## 2019-02-14 PROCEDURE — 36415 COLL VENOUS BLD VENIPUNCTURE: CPT

## 2019-02-14 PROCEDURE — 94150 VITAL CAPACITY TEST: CPT

## 2019-02-14 PROCEDURE — 84484 ASSAY OF TROPONIN QUANT: CPT

## 2019-02-14 PROCEDURE — 87641 MR-STAPH DNA AMP PROBE: CPT

## 2019-02-14 PROCEDURE — 6370000000 HC RX 637 (ALT 250 FOR IP): Performed by: INTERNAL MEDICINE

## 2019-02-14 PROCEDURE — 97162 PT EVAL MOD COMPLEX 30 MIN: CPT

## 2019-02-14 PROCEDURE — P9047 ALBUMIN (HUMAN), 25%, 50ML: HCPCS | Performed by: RADIOLOGY

## 2019-02-14 PROCEDURE — 80048 BASIC METABOLIC PNL TOTAL CA: CPT

## 2019-02-14 RX ORDER — ASPIRIN 81 MG/1
243 TABLET, CHEWABLE ORAL DAILY
Status: DISCONTINUED | OUTPATIENT
Start: 2019-02-14 | End: 2019-02-15 | Stop reason: HOSPADM

## 2019-02-14 RX ORDER — FUROSEMIDE 40 MG/1
40 TABLET ORAL DAILY
Status: DISCONTINUED | OUTPATIENT
Start: 2019-02-14 | End: 2019-02-15 | Stop reason: HOSPADM

## 2019-02-14 RX ORDER — NICOTINE POLACRILEX 4 MG
15 LOZENGE BUCCAL PRN
Status: DISCONTINUED | OUTPATIENT
Start: 2019-02-14 | End: 2019-02-15 | Stop reason: HOSPADM

## 2019-02-14 RX ORDER — INSULIN GLARGINE 100 [IU]/ML
7 INJECTION, SOLUTION SUBCUTANEOUS NIGHTLY
Status: DISCONTINUED | OUTPATIENT
Start: 2019-02-14 | End: 2019-02-15 | Stop reason: HOSPADM

## 2019-02-14 RX ORDER — ALBUTEROL SULFATE 90 UG/1
2 AEROSOL, METERED RESPIRATORY (INHALATION) EVERY 6 HOURS PRN
Status: DISCONTINUED | OUTPATIENT
Start: 2019-02-14 | End: 2019-02-14

## 2019-02-14 RX ORDER — PANTOPRAZOLE SODIUM 40 MG/1
40 TABLET, DELAYED RELEASE ORAL
Status: DISCONTINUED | OUTPATIENT
Start: 2019-02-14 | End: 2019-02-15 | Stop reason: HOSPADM

## 2019-02-14 RX ORDER — ONDANSETRON 2 MG/ML
4 INJECTION INTRAMUSCULAR; INTRAVENOUS EVERY 6 HOURS PRN
Status: DISCONTINUED | OUTPATIENT
Start: 2019-02-14 | End: 2019-02-15 | Stop reason: HOSPADM

## 2019-02-14 RX ORDER — SODIUM CHLORIDE 0.9 % (FLUSH) 0.9 %
10 SYRINGE (ML) INJECTION EVERY 12 HOURS SCHEDULED
Status: DISCONTINUED | OUTPATIENT
Start: 2019-02-14 | End: 2019-02-15 | Stop reason: HOSPADM

## 2019-02-14 RX ORDER — ALBUTEROL SULFATE 2.5 MG/3ML
2.5 SOLUTION RESPIRATORY (INHALATION) EVERY 4 HOURS
Status: DISCONTINUED | OUTPATIENT
Start: 2019-02-15 | End: 2019-02-15 | Stop reason: HOSPADM

## 2019-02-14 RX ORDER — DEXTROSE MONOHYDRATE 25 G/50ML
12.5 INJECTION, SOLUTION INTRAVENOUS PRN
Status: DISCONTINUED | OUTPATIENT
Start: 2019-02-14 | End: 2019-02-15 | Stop reason: HOSPADM

## 2019-02-14 RX ORDER — ATORVASTATIN CALCIUM 10 MG/1
40 TABLET, FILM COATED ORAL NIGHTLY
Status: DISCONTINUED | OUTPATIENT
Start: 2019-02-14 | End: 2019-02-15 | Stop reason: HOSPADM

## 2019-02-14 RX ORDER — INSULIN GLARGINE 100 [IU]/ML
3 INJECTION, SOLUTION SUBCUTANEOUS ONCE
Status: COMPLETED | OUTPATIENT
Start: 2019-02-14 | End: 2019-02-14

## 2019-02-14 RX ORDER — CARVEDILOL 25 MG/1
25 TABLET ORAL 2 TIMES DAILY
Status: DISCONTINUED | OUTPATIENT
Start: 2019-02-14 | End: 2019-02-15 | Stop reason: HOSPADM

## 2019-02-14 RX ORDER — DEXTROSE MONOHYDRATE 50 MG/ML
100 INJECTION, SOLUTION INTRAVENOUS PRN
Status: DISCONTINUED | OUTPATIENT
Start: 2019-02-14 | End: 2019-02-15 | Stop reason: HOSPADM

## 2019-02-14 RX ORDER — SODIUM CHLORIDE 0.9 % (FLUSH) 0.9 %
10 SYRINGE (ML) INJECTION PRN
Status: DISCONTINUED | OUTPATIENT
Start: 2019-02-14 | End: 2019-02-15 | Stop reason: HOSPADM

## 2019-02-14 RX ORDER — HYDRALAZINE HYDROCHLORIDE 25 MG/1
25 TABLET, FILM COATED ORAL 2 TIMES DAILY
Status: DISCONTINUED | OUTPATIENT
Start: 2019-02-14 | End: 2019-02-15 | Stop reason: HOSPADM

## 2019-02-14 RX ORDER — ALBUTEROL SULFATE 90 UG/1
2 AEROSOL, METERED RESPIRATORY (INHALATION) EVERY 4 HOURS
Status: DISCONTINUED | OUTPATIENT
Start: 2019-02-14 | End: 2019-02-14

## 2019-02-14 RX ORDER — LORAZEPAM 0.5 MG/1
0.5 TABLET ORAL EVERY 8 HOURS PRN
Status: DISCONTINUED | OUTPATIENT
Start: 2019-02-14 | End: 2019-02-15 | Stop reason: HOSPADM

## 2019-02-14 RX ORDER — ALBUMIN (HUMAN) 12.5 G/50ML
100 SOLUTION INTRAVENOUS ONCE
Status: COMPLETED | OUTPATIENT
Start: 2019-02-14 | End: 2019-02-14

## 2019-02-14 RX ORDER — ISOSORBIDE MONONITRATE 30 MG/1
30 TABLET, EXTENDED RELEASE ORAL DAILY
Status: DISCONTINUED | OUTPATIENT
Start: 2019-02-14 | End: 2019-02-15 | Stop reason: HOSPADM

## 2019-02-14 RX ADMIN — HYDRALAZINE HYDROCHLORIDE 25 MG: 25 TABLET, FILM COATED ORAL at 20:00

## 2019-02-14 RX ADMIN — Medication 2 PUFF: at 03:30

## 2019-02-14 RX ADMIN — LORAZEPAM 0.5 MG: 0.5 TABLET ORAL at 20:01

## 2019-02-14 RX ADMIN — ALBUMIN (HUMAN) 100 ML: 0.25 INJECTION, SOLUTION INTRAVENOUS at 17:21

## 2019-02-14 RX ADMIN — LORAZEPAM 0.5 MG: 0.5 TABLET ORAL at 09:35

## 2019-02-14 RX ADMIN — INSULIN LISPRO 1 UNITS: 100 INJECTION, SOLUTION INTRAVENOUS; SUBCUTANEOUS at 23:01

## 2019-02-14 RX ADMIN — HYDRALAZINE HYDROCHLORIDE 25 MG: 25 TABLET, FILM COATED ORAL at 02:53

## 2019-02-14 RX ADMIN — INSULIN GLARGINE 7 UNITS: 100 INJECTION, SOLUTION SUBCUTANEOUS at 20:01

## 2019-02-14 RX ADMIN — ATORVASTATIN CALCIUM 40 MG: 10 TABLET, FILM COATED ORAL at 02:53

## 2019-02-14 RX ADMIN — CARVEDILOL 25 MG: 25 TABLET, FILM COATED ORAL at 09:35

## 2019-02-14 RX ADMIN — Medication 2 PUFF: at 16:25

## 2019-02-14 RX ADMIN — INSULIN LISPRO 2 UNITS: 100 INJECTION, SOLUTION INTRAVENOUS; SUBCUTANEOUS at 09:37

## 2019-02-14 RX ADMIN — INSULIN LISPRO 2 UNITS: 100 INJECTION, SOLUTION INTRAVENOUS; SUBCUTANEOUS at 12:57

## 2019-02-14 RX ADMIN — CARVEDILOL 25 MG: 25 TABLET, FILM COATED ORAL at 02:54

## 2019-02-14 RX ADMIN — SODIUM CHLORIDE, PRESERVATIVE FREE 10 ML: 5 INJECTION INTRAVENOUS at 20:02

## 2019-02-14 RX ADMIN — PANTOPRAZOLE SODIUM 40 MG: 40 TABLET, DELAYED RELEASE ORAL at 05:34

## 2019-02-14 RX ADMIN — CARVEDILOL 25 MG: 25 TABLET, FILM COATED ORAL at 20:01

## 2019-02-14 RX ADMIN — HYDRALAZINE HYDROCHLORIDE 25 MG: 25 TABLET, FILM COATED ORAL at 09:35

## 2019-02-14 RX ADMIN — INSULIN GLARGINE 3 UNITS: 100 INJECTION, SOLUTION SUBCUTANEOUS at 05:22

## 2019-02-14 RX ADMIN — ALBUTEROL SULFATE 2.5 MG: 2.5 SOLUTION RESPIRATORY (INHALATION) at 23:21

## 2019-02-14 RX ADMIN — Medication 2 PUFF: at 11:31

## 2019-02-14 RX ADMIN — FUROSEMIDE 40 MG: 40 TABLET ORAL at 09:35

## 2019-02-14 RX ADMIN — Medication 2 PUFF: at 19:34

## 2019-02-14 RX ADMIN — ATORVASTATIN CALCIUM 40 MG: 10 TABLET, FILM COATED ORAL at 20:01

## 2019-02-14 RX ADMIN — Medication 2 PUFF: at 07:27

## 2019-02-14 RX ADMIN — SODIUM CHLORIDE, PRESERVATIVE FREE 10 ML: 5 INJECTION INTRAVENOUS at 09:35

## 2019-02-14 RX ADMIN — ISOSORBIDE MONONITRATE 30 MG: 30 TABLET, EXTENDED RELEASE ORAL at 09:35

## 2019-02-14 ASSESSMENT — PAIN SCALES - GENERAL
PAINLEVEL_OUTOF10: 0

## 2019-02-14 ASSESSMENT — PULMONARY FUNCTION TESTS: PEFR_L/MIN: 18

## 2019-02-15 ENCOUNTER — HOSPITAL ENCOUNTER (INPATIENT)
Age: 59
LOS: 2 days | Discharge: HOME | DRG: 280 | End: 2019-02-17
Attending: PHYSICAL MEDICINE & REHABILITATION | Admitting: PHYSICAL MEDICINE & REHABILITATION
Payer: MEDICAID

## 2019-02-15 VITALS
WEIGHT: 270.6 LBS | DIASTOLIC BLOOD PRESSURE: 77 MMHG | OXYGEN SATURATION: 97 % | HEIGHT: 72 IN | BODY MASS INDEX: 36.65 KG/M2 | HEART RATE: 73 BPM | SYSTOLIC BLOOD PRESSURE: 151 MMHG | RESPIRATION RATE: 16 BRPM | TEMPERATURE: 97.8 F

## 2019-02-15 LAB
ANION GAP SERPL CALCULATED.3IONS-SCNC: 10 MMOL/L (ref 3–16)
BUN BLDV-MCNC: 56 MG/DL (ref 7–20)
CALCIUM SERPL-MCNC: 7.7 MG/DL (ref 8.3–10.6)
CHLORIDE BLD-SCNC: 103 MMOL/L (ref 99–110)
CO2: 24 MMOL/L (ref 21–32)
CREAT SERPL-MCNC: 2.1 MG/DL (ref 0.8–1.3)
GFR AFRICAN AMERICAN: 39
GFR NON-AFRICAN AMERICAN: 32
GLUCOSE BLD-MCNC: 125 MG/DL (ref 70–99)
GLUCOSE BLD-MCNC: 141 MG/DL (ref 70–99)
GLUCOSE BLD-MCNC: 145 MG/DL (ref 70–99)
GLUCOSE BLD-MCNC: 148 MG/DL (ref 70–99)
GLUCOSE BLD-MCNC: 158 MG/DL (ref 70–99)
HCT VFR BLD CALC: 22.2 % (ref 40.5–52.5)
HEMOGLOBIN: 7.3 G/DL (ref 13.5–17.5)
MCH RBC QN AUTO: 26.9 PG (ref 26–34)
MCHC RBC AUTO-ENTMCNC: 33 G/DL (ref 31–36)
MCV RBC AUTO: 81.4 FL (ref 80–100)
PDW BLD-RTO: 18.6 % (ref 12.4–15.4)
PERFORMED ON: ABNORMAL
PLATELET # BLD: 304 K/UL (ref 135–450)
PMV BLD AUTO: 6.5 FL (ref 5–10.5)
POTASSIUM SERPL-SCNC: 4.7 MMOL/L (ref 3.5–5.1)
RBC # BLD: 2.73 M/UL (ref 4.2–5.9)
SODIUM BLD-SCNC: 137 MMOL/L (ref 136–145)
WBC # BLD: 5.2 K/UL (ref 4–11)

## 2019-02-15 PROCEDURE — 6370000000 HC RX 637 (ALT 250 FOR IP): Performed by: PHYSICAL MEDICINE & REHABILITATION

## 2019-02-15 PROCEDURE — 6360000002 HC RX W HCPCS: Performed by: INTERNAL MEDICINE

## 2019-02-15 PROCEDURE — 6370000000 HC RX 637 (ALT 250 FOR IP): Performed by: NURSE PRACTITIONER

## 2019-02-15 PROCEDURE — 2580000003 HC RX 258: Performed by: NURSE PRACTITIONER

## 2019-02-15 PROCEDURE — 6370000000 HC RX 637 (ALT 250 FOR IP): Performed by: INTERNAL MEDICINE

## 2019-02-15 PROCEDURE — 85027 COMPLETE CBC AUTOMATED: CPT

## 2019-02-15 PROCEDURE — 94664 DEMO&/EVAL PT USE INHALER: CPT

## 2019-02-15 PROCEDURE — 1200000000 HC SEMI PRIVATE

## 2019-02-15 PROCEDURE — 36415 COLL VENOUS BLD VENIPUNCTURE: CPT

## 2019-02-15 PROCEDURE — 80048 BASIC METABOLIC PNL TOTAL CA: CPT

## 2019-02-15 PROCEDURE — 94640 AIRWAY INHALATION TREATMENT: CPT

## 2019-02-15 PROCEDURE — 6360000002 HC RX W HCPCS: Performed by: NURSE PRACTITIONER

## 2019-02-15 PROCEDURE — 1280000000 HC REHAB R&B

## 2019-02-15 PROCEDURE — 97116 GAIT TRAINING THERAPY: CPT

## 2019-02-15 RX ORDER — DEXTROSE MONOHYDRATE 25 G/50ML
12.5 INJECTION, SOLUTION INTRAVENOUS PRN
Status: CANCELLED | OUTPATIENT
Start: 2019-02-15

## 2019-02-15 RX ORDER — CARVEDILOL 25 MG/1
25 TABLET ORAL 2 TIMES DAILY
Status: DISCONTINUED | OUTPATIENT
Start: 2019-02-15 | End: 2019-02-17 | Stop reason: HOSPADM

## 2019-02-15 RX ORDER — POLYETHYLENE GLYCOL 3350 17 G/17G
17 POWDER, FOR SOLUTION ORAL DAILY
Status: CANCELLED | OUTPATIENT
Start: 2019-02-15

## 2019-02-15 RX ORDER — CIPROFLOXACIN 500 MG/1
250 TABLET, FILM COATED ORAL EVERY 12 HOURS SCHEDULED
Status: DISCONTINUED | OUTPATIENT
Start: 2019-02-15 | End: 2019-02-15 | Stop reason: HOSPADM

## 2019-02-15 RX ORDER — LORAZEPAM 0.5 MG/1
0.5 TABLET ORAL EVERY 8 HOURS PRN
Status: CANCELLED | OUTPATIENT
Start: 2019-02-15

## 2019-02-15 RX ORDER — NICOTINE POLACRILEX 4 MG
15 LOZENGE BUCCAL PRN
Status: DISCONTINUED | OUTPATIENT
Start: 2019-02-15 | End: 2019-02-17 | Stop reason: HOSPADM

## 2019-02-15 RX ORDER — HYDRALAZINE HYDROCHLORIDE 25 MG/1
25 TABLET, FILM COATED ORAL 2 TIMES DAILY
Status: CANCELLED | OUTPATIENT
Start: 2019-02-15

## 2019-02-15 RX ORDER — ACETAMINOPHEN 325 MG/1
650 TABLET ORAL EVERY 4 HOURS PRN
Status: CANCELLED | OUTPATIENT
Start: 2019-02-15

## 2019-02-15 RX ORDER — DEXTROSE MONOHYDRATE 50 MG/ML
100 INJECTION, SOLUTION INTRAVENOUS PRN
Status: CANCELLED | OUTPATIENT
Start: 2019-02-15

## 2019-02-15 RX ORDER — FUROSEMIDE 40 MG/1
40 TABLET ORAL DAILY
Status: CANCELLED | OUTPATIENT
Start: 2019-02-16

## 2019-02-15 RX ORDER — SENNA AND DOCUSATE SODIUM 50; 8.6 MG/1; MG/1
2 TABLET, FILM COATED ORAL 2 TIMES DAILY
Status: DISCONTINUED | OUTPATIENT
Start: 2019-02-15 | End: 2019-02-17 | Stop reason: HOSPADM

## 2019-02-15 RX ORDER — ISOSORBIDE MONONITRATE 30 MG/1
30 TABLET, EXTENDED RELEASE ORAL DAILY
Status: CANCELLED | OUTPATIENT
Start: 2019-02-16

## 2019-02-15 RX ORDER — PANTOPRAZOLE SODIUM 40 MG/1
40 TABLET, DELAYED RELEASE ORAL
Status: DISCONTINUED | OUTPATIENT
Start: 2019-02-16 | End: 2019-02-17 | Stop reason: HOSPADM

## 2019-02-15 RX ORDER — INSULIN GLARGINE 100 [IU]/ML
7 INJECTION, SOLUTION SUBCUTANEOUS NIGHTLY
Status: DISCONTINUED | OUTPATIENT
Start: 2019-02-15 | End: 2019-02-17 | Stop reason: HOSPADM

## 2019-02-15 RX ORDER — INSULIN GLARGINE 100 [IU]/ML
7 INJECTION, SOLUTION SUBCUTANEOUS NIGHTLY
Status: CANCELLED | OUTPATIENT
Start: 2019-02-15

## 2019-02-15 RX ORDER — ALBUTEROL SULFATE 2.5 MG/3ML
2.5 SOLUTION RESPIRATORY (INHALATION) EVERY 4 HOURS PRN
Status: DISCONTINUED | OUTPATIENT
Start: 2019-02-15 | End: 2019-02-17

## 2019-02-15 RX ORDER — PANTOPRAZOLE SODIUM 40 MG/1
40 TABLET, DELAYED RELEASE ORAL
Status: CANCELLED | OUTPATIENT
Start: 2019-02-16

## 2019-02-15 RX ORDER — ASPIRIN 81 MG/1
81 TABLET, CHEWABLE ORAL DAILY
Status: DISCONTINUED | OUTPATIENT
Start: 2019-02-16 | End: 2019-02-17 | Stop reason: HOSPADM

## 2019-02-15 RX ORDER — CIPROFLOXACIN 500 MG/1
250 TABLET, FILM COATED ORAL EVERY 12 HOURS SCHEDULED
Status: CANCELLED | OUTPATIENT
Start: 2019-02-15 | End: 2019-02-18

## 2019-02-15 RX ORDER — ATORVASTATIN CALCIUM 40 MG/1
40 TABLET, FILM COATED ORAL NIGHTLY
Status: CANCELLED | OUTPATIENT
Start: 2019-02-15

## 2019-02-15 RX ORDER — POLYETHYLENE GLYCOL 3350 17 G/17G
17 POWDER, FOR SOLUTION ORAL DAILY
Status: DISCONTINUED | OUTPATIENT
Start: 2019-02-15 | End: 2019-02-17 | Stop reason: HOSPADM

## 2019-02-15 RX ORDER — CARVEDILOL 25 MG/1
25 TABLET ORAL 2 TIMES DAILY
Status: CANCELLED | OUTPATIENT
Start: 2019-02-15

## 2019-02-15 RX ORDER — ATORVASTATIN CALCIUM 40 MG/1
40 TABLET, FILM COATED ORAL NIGHTLY
Status: DISCONTINUED | OUTPATIENT
Start: 2019-02-15 | End: 2019-02-17 | Stop reason: HOSPADM

## 2019-02-15 RX ORDER — SENNA AND DOCUSATE SODIUM 50; 8.6 MG/1; MG/1
2 TABLET, FILM COATED ORAL 2 TIMES DAILY
Status: CANCELLED | OUTPATIENT
Start: 2019-02-15

## 2019-02-15 RX ORDER — ONDANSETRON HYDROCHLORIDE 8 MG/1
4 TABLET, FILM COATED ORAL EVERY 8 HOURS PRN
Status: CANCELLED | OUTPATIENT
Start: 2019-02-15

## 2019-02-15 RX ORDER — ACETAMINOPHEN 325 MG/1
650 TABLET ORAL EVERY 4 HOURS PRN
Status: DISCONTINUED | OUTPATIENT
Start: 2019-02-15 | End: 2019-02-17 | Stop reason: HOSPADM

## 2019-02-15 RX ORDER — ISOSORBIDE MONONITRATE 30 MG/1
30 TABLET, EXTENDED RELEASE ORAL DAILY
Status: DISCONTINUED | OUTPATIENT
Start: 2019-02-16 | End: 2019-02-17 | Stop reason: HOSPADM

## 2019-02-15 RX ORDER — ALBUTEROL SULFATE 2.5 MG/3ML
2.5 SOLUTION RESPIRATORY (INHALATION) EVERY 4 HOURS PRN
Status: CANCELLED | OUTPATIENT
Start: 2019-02-15

## 2019-02-15 RX ORDER — ASPIRIN 81 MG/1
81 TABLET, CHEWABLE ORAL DAILY
Status: CANCELLED | OUTPATIENT
Start: 2019-02-16

## 2019-02-15 RX ORDER — NICOTINE POLACRILEX 4 MG
15 LOZENGE BUCCAL PRN
Status: CANCELLED | OUTPATIENT
Start: 2019-02-15

## 2019-02-15 RX ORDER — ONDANSETRON 4 MG/1
4 TABLET, ORALLY DISINTEGRATING ORAL EVERY 8 HOURS PRN
Status: DISCONTINUED | OUTPATIENT
Start: 2019-02-15 | End: 2019-02-17 | Stop reason: HOSPADM

## 2019-02-15 RX ORDER — HYDRALAZINE HYDROCHLORIDE 25 MG/1
25 TABLET, FILM COATED ORAL 2 TIMES DAILY
Status: DISCONTINUED | OUTPATIENT
Start: 2019-02-15 | End: 2019-02-17 | Stop reason: HOSPADM

## 2019-02-15 RX ORDER — LORAZEPAM 0.5 MG/1
0.5 TABLET ORAL EVERY 8 HOURS PRN
Status: DISCONTINUED | OUTPATIENT
Start: 2019-02-15 | End: 2019-02-16

## 2019-02-15 RX ORDER — BISACODYL 10 MG
10 SUPPOSITORY, RECTAL RECTAL DAILY PRN
Status: DISCONTINUED | OUTPATIENT
Start: 2019-02-15 | End: 2019-02-17 | Stop reason: HOSPADM

## 2019-02-15 RX ORDER — CIPROFLOXACIN 500 MG/1
250 TABLET, FILM COATED ORAL EVERY 12 HOURS SCHEDULED
Status: DISCONTINUED | OUTPATIENT
Start: 2019-02-15 | End: 2019-02-17 | Stop reason: HOSPADM

## 2019-02-15 RX ORDER — FUROSEMIDE 40 MG/1
40 TABLET ORAL DAILY
Status: DISCONTINUED | OUTPATIENT
Start: 2019-02-16 | End: 2019-02-17 | Stop reason: HOSPADM

## 2019-02-15 RX ORDER — BISACODYL 10 MG
10 SUPPOSITORY, RECTAL RECTAL DAILY PRN
Status: CANCELLED | OUTPATIENT
Start: 2019-02-15

## 2019-02-15 RX ADMIN — LORAZEPAM 0.5 MG: 0.5 TABLET ORAL at 23:22

## 2019-02-15 RX ADMIN — LORAZEPAM 0.5 MG: 0.5 TABLET ORAL at 04:31

## 2019-02-15 RX ADMIN — ENOXAPARIN SODIUM 40 MG: 40 INJECTION SUBCUTANEOUS at 09:36

## 2019-02-15 RX ADMIN — PANTOPRAZOLE SODIUM 40 MG: 40 TABLET, DELAYED RELEASE ORAL at 06:09

## 2019-02-15 RX ADMIN — ATORVASTATIN CALCIUM 40 MG: 40 TABLET, FILM COATED ORAL at 21:44

## 2019-02-15 RX ADMIN — ALBUTEROL SULFATE 2.5 MG: 2.5 SOLUTION RESPIRATORY (INHALATION) at 12:24

## 2019-02-15 RX ADMIN — CARVEDILOL 25 MG: 25 TABLET, FILM COATED ORAL at 09:37

## 2019-02-15 RX ADMIN — ALBUTEROL SULFATE 2.5 MG: 2.5 SOLUTION RESPIRATORY (INHALATION) at 21:02

## 2019-02-15 RX ADMIN — FUROSEMIDE 40 MG: 40 TABLET ORAL at 09:37

## 2019-02-15 RX ADMIN — ALBUTEROL SULFATE 2.5 MG: 2.5 SOLUTION RESPIRATORY (INHALATION) at 07:28

## 2019-02-15 RX ADMIN — HYDRALAZINE HYDROCHLORIDE 25 MG: 25 TABLET, FILM COATED ORAL at 09:37

## 2019-02-15 RX ADMIN — LORAZEPAM 0.5 MG: 0.5 TABLET ORAL at 15:24

## 2019-02-15 RX ADMIN — HYDRALAZINE HYDROCHLORIDE 25 MG: 25 TABLET, FILM COATED ORAL at 21:44

## 2019-02-15 RX ADMIN — CIPROFLOXACIN HYDROCHLORIDE 250 MG: 500 TABLET, FILM COATED ORAL at 21:44

## 2019-02-15 RX ADMIN — ASPIRIN 81 MG 243 MG: 81 TABLET ORAL at 09:37

## 2019-02-15 RX ADMIN — INSULIN GLARGINE 7 UNITS: 100 INJECTION, SOLUTION SUBCUTANEOUS at 21:45

## 2019-02-15 RX ADMIN — SODIUM CHLORIDE, PRESERVATIVE FREE 10 ML: 5 INJECTION INTRAVENOUS at 09:37

## 2019-02-15 RX ADMIN — ACETAMINOPHEN 650 MG: 325 TABLET ORAL at 22:37

## 2019-02-15 RX ADMIN — ALBUTEROL SULFATE 2.5 MG: 2.5 SOLUTION RESPIRATORY (INHALATION) at 03:50

## 2019-02-15 RX ADMIN — INSULIN LISPRO 2 UNITS: 100 INJECTION, SOLUTION INTRAVENOUS; SUBCUTANEOUS at 09:30

## 2019-02-15 RX ADMIN — STANDARDIZED SENNA CONCENTRATE AND DOCUSATE SODIUM 2 TABLET: 8.6; 5 TABLET, FILM COATED ORAL at 21:45

## 2019-02-15 RX ADMIN — CARVEDILOL 25 MG: 25 TABLET, FILM COATED ORAL at 21:45

## 2019-02-15 RX ADMIN — ALBUTEROL SULFATE 2.5 MG: 2.5 SOLUTION RESPIRATORY (INHALATION) at 16:57

## 2019-02-15 RX ADMIN — INSULIN LISPRO 1 UNITS: 100 INJECTION, SOLUTION INTRAVENOUS; SUBCUTANEOUS at 21:45

## 2019-02-15 RX ADMIN — INSULIN LISPRO 2 UNITS: 100 INJECTION, SOLUTION INTRAVENOUS; SUBCUTANEOUS at 17:32

## 2019-02-15 RX ADMIN — ISOSORBIDE MONONITRATE 30 MG: 30 TABLET, EXTENDED RELEASE ORAL at 09:37

## 2019-02-15 ASSESSMENT — PAIN SCALES - GENERAL
PAINLEVEL_OUTOF10: 0
PAINLEVEL_OUTOF10: 0
PAINLEVEL_OUTOF10: 8
PAINLEVEL_OUTOF10: 4
PAINLEVEL_OUTOF10: 0

## 2019-02-16 PROBLEM — E44.0 MODERATE MALNUTRITION (HCC): Chronic | Status: ACTIVE | Noted: 2019-02-16

## 2019-02-16 LAB
ANION GAP SERPL CALCULATED.3IONS-SCNC: 11 MMOL/L (ref 3–16)
BUN BLDV-MCNC: 59 MG/DL (ref 7–20)
CALCIUM SERPL-MCNC: 8.1 MG/DL (ref 8.3–10.6)
CHLORIDE BLD-SCNC: 103 MMOL/L (ref 99–110)
CO2: 24 MMOL/L (ref 21–32)
CREAT SERPL-MCNC: 1.8 MG/DL (ref 0.8–1.3)
GFR AFRICAN AMERICAN: 47
GFR NON-AFRICAN AMERICAN: 39
GLUCOSE BLD-MCNC: 129 MG/DL (ref 70–99)
GLUCOSE BLD-MCNC: 208 MG/DL (ref 70–99)
GLUCOSE BLD-MCNC: 215 MG/DL (ref 70–99)
GLUCOSE BLD-MCNC: 89 MG/DL (ref 70–99)
GLUCOSE BLD-MCNC: 92 MG/DL (ref 70–99)
HCT VFR BLD CALC: 24.1 % (ref 40.5–52.5)
HEMOGLOBIN: 7.7 G/DL (ref 13.5–17.5)
MAGNESIUM: 1.5 MG/DL (ref 1.8–2.4)
MCH RBC QN AUTO: 26.3 PG (ref 26–34)
MCHC RBC AUTO-ENTMCNC: 32 G/DL (ref 31–36)
MCV RBC AUTO: 82.1 FL (ref 80–100)
ORGANISM: ABNORMAL
PDW BLD-RTO: 18.5 % (ref 12.4–15.4)
PERFORMED ON: ABNORMAL
PERFORMED ON: NORMAL
PLATELET # BLD: 316 K/UL (ref 135–450)
PMV BLD AUTO: 6.3 FL (ref 5–10.5)
POTASSIUM REFLEX MAGNESIUM: 5.3 MMOL/L (ref 3.5–5.1)
RBC # BLD: 2.93 M/UL (ref 4.2–5.9)
SODIUM BLD-SCNC: 138 MMOL/L (ref 136–145)
URINE CULTURE, ROUTINE: ABNORMAL
URINE CULTURE, ROUTINE: ABNORMAL
WBC # BLD: 4.9 K/UL (ref 4–11)

## 2019-02-16 PROCEDURE — 97530 THERAPEUTIC ACTIVITIES: CPT

## 2019-02-16 PROCEDURE — 97166 OT EVAL MOD COMPLEX 45 MIN: CPT

## 2019-02-16 PROCEDURE — 1280000000 HC REHAB R&B

## 2019-02-16 PROCEDURE — 36415 COLL VENOUS BLD VENIPUNCTURE: CPT

## 2019-02-16 PROCEDURE — 83735 ASSAY OF MAGNESIUM: CPT

## 2019-02-16 PROCEDURE — 94640 AIRWAY INHALATION TREATMENT: CPT

## 2019-02-16 PROCEDURE — 6360000002 HC RX W HCPCS: Performed by: PHYSICAL MEDICINE & REHABILITATION

## 2019-02-16 PROCEDURE — 51798 US URINE CAPACITY MEASURE: CPT

## 2019-02-16 PROCEDURE — 6360000002 HC RX W HCPCS: Performed by: INTERNAL MEDICINE

## 2019-02-16 PROCEDURE — 97116 GAIT TRAINING THERAPY: CPT

## 2019-02-16 PROCEDURE — 97110 THERAPEUTIC EXERCISES: CPT

## 2019-02-16 PROCEDURE — 6370000000 HC RX 637 (ALT 250 FOR IP): Performed by: PHYSICAL MEDICINE & REHABILITATION

## 2019-02-16 PROCEDURE — 85027 COMPLETE CBC AUTOMATED: CPT

## 2019-02-16 PROCEDURE — 6370000000 HC RX 637 (ALT 250 FOR IP): Performed by: INTERNAL MEDICINE

## 2019-02-16 PROCEDURE — 97542 WHEELCHAIR MNGMENT TRAINING: CPT

## 2019-02-16 PROCEDURE — 97535 SELF CARE MNGMENT TRAINING: CPT

## 2019-02-16 PROCEDURE — 97162 PT EVAL MOD COMPLEX 30 MIN: CPT

## 2019-02-16 PROCEDURE — 80048 BASIC METABOLIC PNL TOTAL CA: CPT

## 2019-02-16 RX ORDER — LORAZEPAM 0.5 MG/1
0.5 TABLET ORAL EVERY 6 HOURS PRN
Status: DISCONTINUED | OUTPATIENT
Start: 2019-02-16 | End: 2019-02-17 | Stop reason: HOSPADM

## 2019-02-16 RX ADMIN — LORAZEPAM 0.5 MG: 0.5 TABLET ORAL at 18:58

## 2019-02-16 RX ADMIN — CARVEDILOL 25 MG: 25 TABLET, FILM COATED ORAL at 22:34

## 2019-02-16 RX ADMIN — FUROSEMIDE 40 MG: 40 TABLET ORAL at 09:08

## 2019-02-16 RX ADMIN — ATORVASTATIN CALCIUM 40 MG: 40 TABLET, FILM COATED ORAL at 22:34

## 2019-02-16 RX ADMIN — ONDANSETRON 4 MG: 4 TABLET, ORALLY DISINTEGRATING ORAL at 02:34

## 2019-02-16 RX ADMIN — STANDARDIZED SENNA CONCENTRATE AND DOCUSATE SODIUM 2 TABLET: 8.6; 5 TABLET, FILM COATED ORAL at 09:08

## 2019-02-16 RX ADMIN — PANTOPRAZOLE SODIUM 40 MG: 40 TABLET, DELAYED RELEASE ORAL at 06:17

## 2019-02-16 RX ADMIN — ASPIRIN 81 MG 81 MG: 81 TABLET ORAL at 09:08

## 2019-02-16 RX ADMIN — HYDRALAZINE HYDROCHLORIDE 25 MG: 25 TABLET, FILM COATED ORAL at 09:07

## 2019-02-16 RX ADMIN — HYDRALAZINE HYDROCHLORIDE 25 MG: 25 TABLET, FILM COATED ORAL at 22:35

## 2019-02-16 RX ADMIN — INSULIN LISPRO 4 UNITS: 100 INJECTION, SOLUTION INTRAVENOUS; SUBCUTANEOUS at 12:24

## 2019-02-16 RX ADMIN — POLYETHYLENE GLYCOL 3350 17 G: 17 POWDER, FOR SOLUTION ORAL at 09:08

## 2019-02-16 RX ADMIN — INSULIN LISPRO 2 UNITS: 100 INJECTION, SOLUTION INTRAVENOUS; SUBCUTANEOUS at 22:35

## 2019-02-16 RX ADMIN — ALBUTEROL SULFATE 2.5 MG: 2.5 SOLUTION RESPIRATORY (INHALATION) at 00:57

## 2019-02-16 RX ADMIN — LORAZEPAM 0.5 MG: 0.5 TABLET ORAL at 09:49

## 2019-02-16 RX ADMIN — ENOXAPARIN SODIUM 40 MG: 40 INJECTION SUBCUTANEOUS at 09:08

## 2019-02-16 RX ADMIN — INSULIN GLARGINE 7 UNITS: 100 INJECTION, SOLUTION SUBCUTANEOUS at 22:35

## 2019-02-16 RX ADMIN — ALBUTEROL SULFATE 2.5 MG: 2.5 SOLUTION RESPIRATORY (INHALATION) at 19:20

## 2019-02-16 RX ADMIN — ISOSORBIDE MONONITRATE 30 MG: 30 TABLET, EXTENDED RELEASE ORAL at 09:08

## 2019-02-16 RX ADMIN — CARVEDILOL 25 MG: 25 TABLET, FILM COATED ORAL at 09:08

## 2019-02-16 RX ADMIN — ALBUTEROL SULFATE 2.5 MG: 2.5 SOLUTION RESPIRATORY (INHALATION) at 09:11

## 2019-02-16 RX ADMIN — CIPROFLOXACIN HYDROCHLORIDE 250 MG: 500 TABLET, FILM COATED ORAL at 22:34

## 2019-02-16 RX ADMIN — STANDARDIZED SENNA CONCENTRATE AND DOCUSATE SODIUM 2 TABLET: 8.6; 5 TABLET, FILM COATED ORAL at 22:35

## 2019-02-16 RX ADMIN — CIPROFLOXACIN HYDROCHLORIDE 250 MG: 500 TABLET, FILM COATED ORAL at 09:08

## 2019-02-16 ASSESSMENT — PAIN SCALES - GENERAL
PAINLEVEL_OUTOF10: 0

## 2019-02-17 VITALS
HEART RATE: 76 BPM | HEIGHT: 72 IN | SYSTOLIC BLOOD PRESSURE: 150 MMHG | DIASTOLIC BLOOD PRESSURE: 76 MMHG | TEMPERATURE: 98.3 F | WEIGHT: 263.01 LBS | RESPIRATION RATE: 20 BRPM | BODY MASS INDEX: 35.62 KG/M2 | OXYGEN SATURATION: 98 %

## 2019-02-17 LAB
ANION GAP SERPL CALCULATED.3IONS-SCNC: 12 MMOL/L (ref 3–16)
BUN BLDV-MCNC: 59 MG/DL (ref 7–20)
CALCIUM SERPL-MCNC: 8 MG/DL (ref 8.3–10.6)
CHLORIDE BLD-SCNC: 100 MMOL/L (ref 99–110)
CO2: 23 MMOL/L (ref 21–32)
CREAT SERPL-MCNC: 2.1 MG/DL (ref 0.8–1.3)
GFR AFRICAN AMERICAN: 39
GFR NON-AFRICAN AMERICAN: 32
GLUCOSE BLD-MCNC: 121 MG/DL (ref 70–99)
GLUCOSE BLD-MCNC: 134 MG/DL (ref 70–99)
PERFORMED ON: ABNORMAL
POTASSIUM SERPL-SCNC: 5 MMOL/L (ref 3.5–5.1)
SODIUM BLD-SCNC: 135 MMOL/L (ref 136–145)

## 2019-02-17 PROCEDURE — 80048 BASIC METABOLIC PNL TOTAL CA: CPT

## 2019-02-17 PROCEDURE — 6370000000 HC RX 637 (ALT 250 FOR IP): Performed by: INTERNAL MEDICINE

## 2019-02-17 PROCEDURE — 94640 AIRWAY INHALATION TREATMENT: CPT

## 2019-02-17 PROCEDURE — 6370000000 HC RX 637 (ALT 250 FOR IP): Performed by: PHYSICAL MEDICINE & REHABILITATION

## 2019-02-17 PROCEDURE — 6360000002 HC RX W HCPCS: Performed by: PHYSICAL MEDICINE & REHABILITATION

## 2019-02-17 PROCEDURE — 36415 COLL VENOUS BLD VENIPUNCTURE: CPT

## 2019-02-17 PROCEDURE — 6360000002 HC RX W HCPCS: Performed by: INTERNAL MEDICINE

## 2019-02-17 RX ORDER — ALBUTEROL SULFATE 2.5 MG/3ML
2.5 SOLUTION RESPIRATORY (INHALATION) EVERY 4 HOURS
Status: DISCONTINUED | OUTPATIENT
Start: 2019-02-17 | End: 2019-02-17 | Stop reason: HOSPADM

## 2019-02-17 RX ADMIN — ENOXAPARIN SODIUM 40 MG: 40 INJECTION SUBCUTANEOUS at 07:45

## 2019-02-17 RX ADMIN — HYDRALAZINE HYDROCHLORIDE 25 MG: 25 TABLET, FILM COATED ORAL at 07:46

## 2019-02-17 RX ADMIN — ALBUTEROL SULFATE 2.5 MG: 2.5 SOLUTION RESPIRATORY (INHALATION) at 07:42

## 2019-02-17 RX ADMIN — FUROSEMIDE 40 MG: 40 TABLET ORAL at 07:46

## 2019-02-17 RX ADMIN — ALBUTEROL SULFATE 2.5 MG: 2.5 SOLUTION RESPIRATORY (INHALATION) at 04:39

## 2019-02-17 RX ADMIN — ASPIRIN 81 MG 81 MG: 81 TABLET ORAL at 07:45

## 2019-02-17 RX ADMIN — CARVEDILOL 25 MG: 25 TABLET, FILM COATED ORAL at 07:46

## 2019-02-17 RX ADMIN — ALBUTEROL SULFATE 2.5 MG: 2.5 SOLUTION RESPIRATORY (INHALATION) at 00:18

## 2019-02-17 RX ADMIN — LORAZEPAM 0.5 MG: 0.5 TABLET ORAL at 07:46

## 2019-02-17 RX ADMIN — PANTOPRAZOLE SODIUM 40 MG: 40 TABLET, DELAYED RELEASE ORAL at 06:43

## 2019-02-17 RX ADMIN — STANDARDIZED SENNA CONCENTRATE AND DOCUSATE SODIUM 2 TABLET: 8.6; 5 TABLET, FILM COATED ORAL at 07:45

## 2019-02-17 RX ADMIN — LORAZEPAM 0.5 MG: 0.5 TABLET ORAL at 01:05

## 2019-02-17 RX ADMIN — ISOSORBIDE MONONITRATE 30 MG: 30 TABLET, EXTENDED RELEASE ORAL at 07:45

## 2019-02-17 RX ADMIN — CIPROFLOXACIN HYDROCHLORIDE 250 MG: 500 TABLET, FILM COATED ORAL at 07:45

## 2019-02-17 ASSESSMENT — PAIN SCALES - GENERAL
PAINLEVEL_OUTOF10: 0

## 2019-02-21 ENCOUNTER — APPOINTMENT (OUTPATIENT)
Dept: ULTRASOUND IMAGING | Age: 59
DRG: 280 | End: 2019-02-21
Payer: MEDICAID

## 2019-02-21 ENCOUNTER — APPOINTMENT (OUTPATIENT)
Dept: GENERAL RADIOLOGY | Age: 59
DRG: 280 | End: 2019-02-21
Payer: MEDICAID

## 2019-02-21 ENCOUNTER — HOSPITAL ENCOUNTER (INPATIENT)
Age: 59
LOS: 2 days | Discharge: HOME | DRG: 280 | End: 2019-02-23
Attending: EMERGENCY MEDICINE | Admitting: INTERNAL MEDICINE
Payer: MEDICAID

## 2019-02-21 DIAGNOSIS — R06.02 SHORTNESS OF BREATH: ICD-10-CM

## 2019-02-21 DIAGNOSIS — R94.31 QT PROLONGATION: ICD-10-CM

## 2019-02-21 DIAGNOSIS — K70.31 ASCITES DUE TO ALCOHOLIC CIRRHOSIS (HCC): ICD-10-CM

## 2019-02-21 DIAGNOSIS — R09.02 HYPOXIA: ICD-10-CM

## 2019-02-21 DIAGNOSIS — R14.0 ABDOMINAL DISTENSION: Primary | ICD-10-CM

## 2019-02-21 DIAGNOSIS — R77.8 ELEVATED TROPONIN: ICD-10-CM

## 2019-02-21 DIAGNOSIS — N18.9 CHRONIC KIDNEY DISEASE, UNSPECIFIED CKD STAGE: ICD-10-CM

## 2019-02-21 PROBLEM — E87.70 FLUID OVERLOAD: Status: ACTIVE | Noted: 2019-02-21

## 2019-02-21 LAB
A/G RATIO: 0.8 (ref 1.1–2.2)
ALBUMIN SERPL-MCNC: 2.8 G/DL (ref 3.4–5)
ALP BLD-CCNC: 78 U/L (ref 40–129)
ALT SERPL-CCNC: <5 U/L (ref 10–40)
ANION GAP SERPL CALCULATED.3IONS-SCNC: 8 MMOL/L (ref 3–16)
APTT: 22.1 SEC (ref 26–36)
AST SERPL-CCNC: 13 U/L (ref 15–37)
BASOPHILS ABSOLUTE: 0.1 K/UL (ref 0–0.2)
BASOPHILS RELATIVE PERCENT: 0.9 %
BILIRUB SERPL-MCNC: <0.2 MG/DL (ref 0–1)
BUN BLDV-MCNC: 46 MG/DL (ref 7–20)
CALCIUM SERPL-MCNC: 8.3 MG/DL (ref 8.3–10.6)
CHLORIDE BLD-SCNC: 102 MMOL/L (ref 99–110)
CO2: 26 MMOL/L (ref 21–32)
CREAT SERPL-MCNC: 1.8 MG/DL (ref 0.8–1.3)
EKG ATRIAL RATE: 86 BPM
EKG DIAGNOSIS: NORMAL
EKG P-R INTERVAL: 176 MS
EKG Q-T INTERVAL: 446 MS
EKG QRS DURATION: 122 MS
EKG QTC CALCULATION (BAZETT): 533 MS
EKG R AXIS: -49 DEGREES
EKG T AXIS: 136 DEGREES
EKG VENTRICULAR RATE: 86 BPM
EOSINOPHILS ABSOLUTE: 0.2 K/UL (ref 0–0.6)
EOSINOPHILS RELATIVE PERCENT: 3 %
GFR AFRICAN AMERICAN: 47
GFR NON-AFRICAN AMERICAN: 39
GLOBULIN: 3.7 G/DL
GLUCOSE BLD-MCNC: 146 MG/DL (ref 70–99)
GLUCOSE BLD-MCNC: 146 MG/DL (ref 70–99)
GLUCOSE BLD-MCNC: 234 MG/DL (ref 70–99)
HCT VFR BLD CALC: 25.6 % (ref 40.5–52.5)
HEMOGLOBIN: 8.2 G/DL (ref 13.5–17.5)
INR BLD: 1.01 (ref 0.86–1.14)
LYMPHOCYTES ABSOLUTE: 0.7 K/UL (ref 1–5.1)
LYMPHOCYTES RELATIVE PERCENT: 9.8 %
MAGNESIUM: 1.5 MG/DL (ref 1.8–2.4)
MCH RBC QN AUTO: 26.4 PG (ref 26–34)
MCHC RBC AUTO-ENTMCNC: 31.9 G/DL (ref 31–36)
MCV RBC AUTO: 82.5 FL (ref 80–100)
MONOCYTES ABSOLUTE: 0.5 K/UL (ref 0–1.3)
MONOCYTES RELATIVE PERCENT: 6.1 %
NEUTROPHILS ABSOLUTE: 6.1 K/UL (ref 1.7–7.7)
NEUTROPHILS RELATIVE PERCENT: 80.2 %
PDW BLD-RTO: 18.4 % (ref 12.4–15.4)
PERFORMED ON: ABNORMAL
PERFORMED ON: ABNORMAL
PHOSPHORUS: 4.2 MG/DL (ref 2.5–4.9)
PLATELET # BLD: 317 K/UL (ref 135–450)
PMV BLD AUTO: 6.6 FL (ref 5–10.5)
POTASSIUM REFLEX MAGNESIUM: 4.3 MMOL/L (ref 3.5–5.1)
PRO-BNP: ABNORMAL PG/ML (ref 0–124)
PROTHROMBIN TIME: 11.5 SEC (ref 9.8–13)
RBC # BLD: 3.1 M/UL (ref 4.2–5.9)
SODIUM BLD-SCNC: 136 MMOL/L (ref 136–145)
TOTAL PROTEIN: 6.5 G/DL (ref 6.4–8.2)
TROPONIN: 0.31 NG/ML
TROPONIN: 0.34 NG/ML
TROPONIN: 0.37 NG/ML
WBC # BLD: 7.6 K/UL (ref 4–11)

## 2019-02-21 PROCEDURE — 0W9G3ZZ DRAINAGE OF PERITONEAL CAVITY, PERCUTANEOUS APPROACH: ICD-10-PCS | Performed by: RADIOLOGY

## 2019-02-21 PROCEDURE — 71045 X-RAY EXAM CHEST 1 VIEW: CPT

## 2019-02-21 PROCEDURE — 84100 ASSAY OF PHOSPHORUS: CPT

## 2019-02-21 PROCEDURE — 83735 ASSAY OF MAGNESIUM: CPT

## 2019-02-21 PROCEDURE — 2060000000 HC ICU INTERMEDIATE R&B

## 2019-02-21 PROCEDURE — 85610 PROTHROMBIN TIME: CPT

## 2019-02-21 PROCEDURE — 94761 N-INVAS EAR/PLS OXIMETRY MLT: CPT

## 2019-02-21 PROCEDURE — 6360000002 HC RX W HCPCS: Performed by: PHYSICIAN ASSISTANT

## 2019-02-21 PROCEDURE — 80053 COMPREHEN METABOLIC PANEL: CPT

## 2019-02-21 PROCEDURE — 49083 ABD PARACENTESIS W/IMAGING: CPT

## 2019-02-21 PROCEDURE — 99291 CRITICAL CARE FIRST HOUR: CPT

## 2019-02-21 PROCEDURE — 84443 ASSAY THYROID STIM HORMONE: CPT

## 2019-02-21 PROCEDURE — 84484 ASSAY OF TROPONIN QUANT: CPT

## 2019-02-21 PROCEDURE — 96365 THER/PROPH/DIAG IV INF INIT: CPT

## 2019-02-21 PROCEDURE — 85025 COMPLETE CBC W/AUTO DIFF WBC: CPT

## 2019-02-21 PROCEDURE — 93010 ELECTROCARDIOGRAM REPORT: CPT | Performed by: INTERNAL MEDICINE

## 2019-02-21 PROCEDURE — 6370000000 HC RX 637 (ALT 250 FOR IP): Performed by: PHYSICIAN ASSISTANT

## 2019-02-21 PROCEDURE — 96366 THER/PROPH/DIAG IV INF ADDON: CPT

## 2019-02-21 PROCEDURE — 6360000002 HC RX W HCPCS: Performed by: EMERGENCY MEDICINE

## 2019-02-21 PROCEDURE — 36415 COLL VENOUS BLD VENIPUNCTURE: CPT

## 2019-02-21 PROCEDURE — 85730 THROMBOPLASTIN TIME PARTIAL: CPT

## 2019-02-21 PROCEDURE — 83880 ASSAY OF NATRIURETIC PEPTIDE: CPT

## 2019-02-21 PROCEDURE — 2580000003 HC RX 258: Performed by: PHYSICIAN ASSISTANT

## 2019-02-21 PROCEDURE — 93005 ELECTROCARDIOGRAM TRACING: CPT | Performed by: PHYSICIAN ASSISTANT

## 2019-02-21 RX ORDER — SODIUM CHLORIDE 0.9 % (FLUSH) 0.9 %
10 SYRINGE (ML) INJECTION EVERY 12 HOURS SCHEDULED
Status: DISCONTINUED | OUTPATIENT
Start: 2019-02-21 | End: 2019-02-23 | Stop reason: HOSPADM

## 2019-02-21 RX ORDER — HYDRALAZINE HYDROCHLORIDE 25 MG/1
25 TABLET, FILM COATED ORAL 2 TIMES DAILY
Status: DISCONTINUED | OUTPATIENT
Start: 2019-02-21 | End: 2019-02-23

## 2019-02-21 RX ORDER — LISINOPRIL 5 MG/1
5 TABLET ORAL DAILY
Status: ON HOLD | COMMUNITY
End: 2019-02-23

## 2019-02-21 RX ORDER — SODIUM CHLORIDE 0.9 % (FLUSH) 0.9 %
10 SYRINGE (ML) INJECTION PRN
Status: DISCONTINUED | OUTPATIENT
Start: 2019-02-21 | End: 2019-02-23 | Stop reason: HOSPADM

## 2019-02-21 RX ORDER — PANTOPRAZOLE SODIUM 40 MG/1
40 TABLET, DELAYED RELEASE ORAL
Status: DISCONTINUED | OUTPATIENT
Start: 2019-02-22 | End: 2019-02-23 | Stop reason: HOSPADM

## 2019-02-21 RX ORDER — ASPIRIN 81 MG/1
243 TABLET, CHEWABLE ORAL DAILY
Status: DISCONTINUED | OUTPATIENT
Start: 2019-02-21 | End: 2019-02-22

## 2019-02-21 RX ORDER — ATORVASTATIN CALCIUM 10 MG/1
40 TABLET, FILM COATED ORAL NIGHTLY
Status: DISCONTINUED | OUTPATIENT
Start: 2019-02-21 | End: 2019-02-23 | Stop reason: HOSPADM

## 2019-02-21 RX ORDER — FUROSEMIDE 80 MG
80 TABLET ORAL DAILY
COMMUNITY
End: 2019-02-27 | Stop reason: ALTCHOICE

## 2019-02-21 RX ORDER — METOPROLOL SUCCINATE 50 MG/1
100 TABLET, EXTENDED RELEASE ORAL DAILY
Status: ON HOLD | COMMUNITY
End: 2019-02-23

## 2019-02-21 RX ORDER — ISOSORBIDE MONONITRATE 30 MG/1
30 TABLET, EXTENDED RELEASE ORAL DAILY
Status: DISCONTINUED | OUTPATIENT
Start: 2019-02-21 | End: 2019-02-23 | Stop reason: HOSPADM

## 2019-02-21 RX ORDER — MAGNESIUM SULFATE IN WATER 40 MG/ML
2 INJECTION, SOLUTION INTRAVENOUS ONCE
Status: COMPLETED | OUTPATIENT
Start: 2019-02-21 | End: 2019-02-21

## 2019-02-21 RX ORDER — ONDANSETRON 2 MG/ML
4 INJECTION INTRAMUSCULAR; INTRAVENOUS EVERY 6 HOURS PRN
Status: DISCONTINUED | OUTPATIENT
Start: 2019-02-21 | End: 2019-02-23 | Stop reason: HOSPADM

## 2019-02-21 RX ORDER — FUROSEMIDE 10 MG/ML
40 INJECTION INTRAMUSCULAR; INTRAVENOUS ONCE
Status: COMPLETED | OUTPATIENT
Start: 2019-02-21 | End: 2019-02-21

## 2019-02-21 RX ORDER — FUROSEMIDE 10 MG/ML
40 INJECTION INTRAMUSCULAR; INTRAVENOUS 2 TIMES DAILY
Status: DISCONTINUED | OUTPATIENT
Start: 2019-02-21 | End: 2019-02-22

## 2019-02-21 RX ORDER — MAGNESIUM SULFATE HEPTAHYDRATE 500 MG/ML
2 INJECTION, SOLUTION INTRAMUSCULAR; INTRAVENOUS ONCE
Status: DISCONTINUED | OUTPATIENT
Start: 2019-02-21 | End: 2019-02-21 | Stop reason: SDUPTHER

## 2019-02-21 RX ADMIN — FUROSEMIDE 40 MG: 10 INJECTION, SOLUTION INTRAMUSCULAR; INTRAVENOUS at 16:02

## 2019-02-21 RX ADMIN — ISOSORBIDE MONONITRATE 30 MG: 30 TABLET ORAL at 16:58

## 2019-02-21 RX ADMIN — HYDRALAZINE HYDROCHLORIDE 25 MG: 25 TABLET, FILM COATED ORAL at 22:00

## 2019-02-21 RX ADMIN — Medication 10 ML: at 22:10

## 2019-02-21 RX ADMIN — ATORVASTATIN CALCIUM 40 MG: 10 TABLET, FILM COATED ORAL at 22:00

## 2019-02-21 RX ADMIN — ENOXAPARIN SODIUM 40 MG: 40 INJECTION SUBCUTANEOUS at 16:58

## 2019-02-21 RX ADMIN — FUROSEMIDE 40 MG: 10 INJECTION, SOLUTION INTRAMUSCULAR; INTRAVENOUS at 22:00

## 2019-02-21 RX ADMIN — MAGNESIUM SULFATE HEPTAHYDRATE 2 G: 40 INJECTION, SOLUTION INTRAVENOUS at 13:18

## 2019-02-21 RX ADMIN — ASPIRIN 81 MG 243 MG: 81 TABLET ORAL at 16:58

## 2019-02-21 ASSESSMENT — ENCOUNTER SYMPTOMS
ABDOMINAL PAIN: 0
NAUSEA: 0
DIARRHEA: 0
SHORTNESS OF BREATH: 1
VOMITING: 0
RHINORRHEA: 0
COUGH: 0
ABDOMINAL DISTENTION: 1

## 2019-02-21 ASSESSMENT — PAIN SCALES - GENERAL: PAINLEVEL_OUTOF10: 0

## 2019-02-22 PROBLEM — N18.9 CHRONIC KIDNEY DISEASE: Status: ACTIVE | Noted: 2018-02-28

## 2019-02-22 LAB
ANION GAP SERPL CALCULATED.3IONS-SCNC: 10 MMOL/L (ref 3–16)
BASOPHILS ABSOLUTE: 0 K/UL (ref 0–0.2)
BASOPHILS RELATIVE PERCENT: 0.6 %
BUN BLDV-MCNC: 42 MG/DL (ref 7–20)
CALCIUM SERPL-MCNC: 8.2 MG/DL (ref 8.3–10.6)
CHLORIDE BLD-SCNC: 106 MMOL/L (ref 99–110)
CO2: 26 MMOL/L (ref 21–32)
CREAT SERPL-MCNC: 1.8 MG/DL (ref 0.8–1.3)
EOSINOPHILS ABSOLUTE: 0.2 K/UL (ref 0–0.6)
EOSINOPHILS RELATIVE PERCENT: 3.5 %
ESTIMATED AVERAGE GLUCOSE: 116.9 MG/DL
GFR AFRICAN AMERICAN: 47
GFR NON-AFRICAN AMERICAN: 39
GLUCOSE BLD-MCNC: 131 MG/DL (ref 70–99)
GLUCOSE BLD-MCNC: 147 MG/DL (ref 70–99)
GLUCOSE BLD-MCNC: 204 MG/DL (ref 70–99)
GLUCOSE BLD-MCNC: 209 MG/DL (ref 70–99)
GLUCOSE BLD-MCNC: 214 MG/DL (ref 70–99)
HBA1C MFR BLD: 5.7 %
HCT VFR BLD CALC: 24.3 % (ref 40.5–52.5)
HEMOGLOBIN: 7.7 G/DL (ref 13.5–17.5)
LYMPHOCYTES ABSOLUTE: 0.9 K/UL (ref 1–5.1)
LYMPHOCYTES RELATIVE PERCENT: 13.5 %
MAGNESIUM: 1.8 MG/DL (ref 1.8–2.4)
MCH RBC QN AUTO: 25.7 PG (ref 26–34)
MCHC RBC AUTO-ENTMCNC: 31.5 G/DL (ref 31–36)
MCV RBC AUTO: 81.4 FL (ref 80–100)
MONOCYTES ABSOLUTE: 0.4 K/UL (ref 0–1.3)
MONOCYTES RELATIVE PERCENT: 6.6 %
NEUTROPHILS ABSOLUTE: 5.1 K/UL (ref 1.7–7.7)
NEUTROPHILS RELATIVE PERCENT: 75.8 %
PDW BLD-RTO: 17.9 % (ref 12.4–15.4)
PERFORMED ON: ABNORMAL
PLATELET # BLD: 317 K/UL (ref 135–450)
PMV BLD AUTO: 6.9 FL (ref 5–10.5)
POTASSIUM SERPL-SCNC: 4.8 MMOL/L (ref 3.5–5.1)
RBC # BLD: 2.99 M/UL (ref 4.2–5.9)
SODIUM BLD-SCNC: 142 MMOL/L (ref 136–145)
T3 FREE: 1.5 PG/ML (ref 2.3–4.2)
T4 FREE: 0.9 NG/DL (ref 0.9–1.8)
TSH SERPL DL<=0.05 MIU/L-ACNC: 14.94 UIU/ML (ref 0.27–4.2)
WBC # BLD: 6.7 K/UL (ref 4–11)

## 2019-02-22 PROCEDURE — 6370000000 HC RX 637 (ALT 250 FOR IP): Performed by: INTERNAL MEDICINE

## 2019-02-22 PROCEDURE — 83735 ASSAY OF MAGNESIUM: CPT

## 2019-02-22 PROCEDURE — 84481 FREE ASSAY (FT-3): CPT

## 2019-02-22 PROCEDURE — 2580000003 HC RX 258: Performed by: PHYSICIAN ASSISTANT

## 2019-02-22 PROCEDURE — 36415 COLL VENOUS BLD VENIPUNCTURE: CPT

## 2019-02-22 PROCEDURE — 99254 IP/OBS CNSLTJ NEW/EST MOD 60: CPT | Performed by: INTERNAL MEDICINE

## 2019-02-22 PROCEDURE — 6360000002 HC RX W HCPCS: Performed by: PHYSICIAN ASSISTANT

## 2019-02-22 PROCEDURE — 6370000000 HC RX 637 (ALT 250 FOR IP): Performed by: PHYSICIAN ASSISTANT

## 2019-02-22 PROCEDURE — 84439 ASSAY OF FREE THYROXINE: CPT

## 2019-02-22 PROCEDURE — 99223 1ST HOSP IP/OBS HIGH 75: CPT | Performed by: INTERNAL MEDICINE

## 2019-02-22 PROCEDURE — 80061 LIPID PANEL: CPT

## 2019-02-22 PROCEDURE — 80048 BASIC METABOLIC PNL TOTAL CA: CPT

## 2019-02-22 PROCEDURE — 83036 HEMOGLOBIN GLYCOSYLATED A1C: CPT

## 2019-02-22 PROCEDURE — 85025 COMPLETE CBC W/AUTO DIFF WBC: CPT

## 2019-02-22 PROCEDURE — 2060000000 HC ICU INTERMEDIATE R&B

## 2019-02-22 RX ORDER — LEVOTHYROXINE SODIUM 0.05 MG/1
50 TABLET ORAL DAILY
Status: DISCONTINUED | OUTPATIENT
Start: 2019-02-22 | End: 2019-02-23 | Stop reason: HOSPADM

## 2019-02-22 RX ORDER — FUROSEMIDE 40 MG/1
40 TABLET ORAL DAILY
Status: DISCONTINUED | OUTPATIENT
Start: 2019-02-23 | End: 2019-02-23 | Stop reason: HOSPADM

## 2019-02-22 RX ORDER — LANOLIN ALCOHOL/MO/W.PET/CERES
3 CREAM (GRAM) TOPICAL NIGHTLY PRN
Status: DISCONTINUED | OUTPATIENT
Start: 2019-02-22 | End: 2019-02-23 | Stop reason: HOSPADM

## 2019-02-22 RX ORDER — DEXTROSE MONOHYDRATE 50 MG/ML
100 INJECTION, SOLUTION INTRAVENOUS PRN
Status: DISCONTINUED | OUTPATIENT
Start: 2019-02-22 | End: 2019-02-23 | Stop reason: HOSPADM

## 2019-02-22 RX ORDER — NICOTINE POLACRILEX 4 MG
15 LOZENGE BUCCAL PRN
Status: DISCONTINUED | OUTPATIENT
Start: 2019-02-22 | End: 2019-02-23 | Stop reason: HOSPADM

## 2019-02-22 RX ORDER — ASPIRIN 81 MG/1
81 TABLET, CHEWABLE ORAL DAILY
Status: DISCONTINUED | OUTPATIENT
Start: 2019-02-22 | End: 2019-02-22

## 2019-02-22 RX ORDER — DEXTROSE MONOHYDRATE 25 G/50ML
12.5 INJECTION, SOLUTION INTRAVENOUS PRN
Status: DISCONTINUED | OUTPATIENT
Start: 2019-02-22 | End: 2019-02-23 | Stop reason: HOSPADM

## 2019-02-22 RX ORDER — ASPIRIN 81 MG/1
81 TABLET, CHEWABLE ORAL DAILY
Status: DISCONTINUED | OUTPATIENT
Start: 2019-02-23 | End: 2019-02-23 | Stop reason: HOSPADM

## 2019-02-22 RX ORDER — METOPROLOL SUCCINATE 25 MG/1
25 TABLET, EXTENDED RELEASE ORAL DAILY
Status: DISCONTINUED | OUTPATIENT
Start: 2019-02-22 | End: 2019-02-23 | Stop reason: HOSPADM

## 2019-02-22 RX ADMIN — ATORVASTATIN CALCIUM 40 MG: 10 TABLET, FILM COATED ORAL at 20:35

## 2019-02-22 RX ADMIN — PANTOPRAZOLE SODIUM 40 MG: 40 TABLET, DELAYED RELEASE ORAL at 06:16

## 2019-02-22 RX ADMIN — ASPIRIN 81 MG 243 MG: 81 TABLET ORAL at 08:43

## 2019-02-22 RX ADMIN — FUROSEMIDE 40 MG: 10 INJECTION, SOLUTION INTRAMUSCULAR; INTRAVENOUS at 08:43

## 2019-02-22 RX ADMIN — Medication 10 ML: at 20:35

## 2019-02-22 RX ADMIN — ISOSORBIDE MONONITRATE 30 MG: 30 TABLET ORAL at 08:42

## 2019-02-22 RX ADMIN — LEVOTHYROXINE SODIUM 50 MCG: 50 TABLET ORAL at 16:03

## 2019-02-22 RX ADMIN — INSULIN LISPRO 1 UNITS: 100 INJECTION, SOLUTION INTRAVENOUS; SUBCUTANEOUS at 20:35

## 2019-02-22 RX ADMIN — HYDRALAZINE HYDROCHLORIDE 25 MG: 25 TABLET, FILM COATED ORAL at 20:35

## 2019-02-22 RX ADMIN — METOPROLOL SUCCINATE 25 MG: 25 TABLET, FILM COATED, EXTENDED RELEASE ORAL at 16:03

## 2019-02-22 RX ADMIN — INSULIN LISPRO 2 UNITS: 100 INJECTION, SOLUTION INTRAVENOUS; SUBCUTANEOUS at 16:24

## 2019-02-22 RX ADMIN — HYDRALAZINE HYDROCHLORIDE 25 MG: 25 TABLET, FILM COATED ORAL at 08:43

## 2019-02-22 RX ADMIN — ENOXAPARIN SODIUM 40 MG: 40 INJECTION SUBCUTANEOUS at 08:43

## 2019-02-22 RX ADMIN — Medication 10 ML: at 08:43

## 2019-02-23 ENCOUNTER — APPOINTMENT (OUTPATIENT)
Dept: ULTRASOUND IMAGING | Age: 59
DRG: 280 | End: 2019-02-23
Payer: MEDICAID

## 2019-02-23 VITALS
HEART RATE: 69 BPM | TEMPERATURE: 97 F | BODY MASS INDEX: 32.47 KG/M2 | OXYGEN SATURATION: 93 % | RESPIRATION RATE: 18 BRPM | HEIGHT: 72 IN | SYSTOLIC BLOOD PRESSURE: 161 MMHG | WEIGHT: 239.7 LBS | DIASTOLIC BLOOD PRESSURE: 88 MMHG

## 2019-02-23 LAB
ANION GAP SERPL CALCULATED.3IONS-SCNC: 10 MMOL/L (ref 3–16)
BUN BLDV-MCNC: 37 MG/DL (ref 7–20)
CALCIUM SERPL-MCNC: 7.7 MG/DL (ref 8.3–10.6)
CHLORIDE BLD-SCNC: 102 MMOL/L (ref 99–110)
CO2: 23 MMOL/L (ref 21–32)
CREAT SERPL-MCNC: 1.7 MG/DL (ref 0.8–1.3)
GFR AFRICAN AMERICAN: 50
GFR NON-AFRICAN AMERICAN: 41
GLUCOSE BLD-MCNC: 158 MG/DL (ref 70–99)
GLUCOSE BLD-MCNC: 172 MG/DL (ref 70–99)
GLUCOSE BLD-MCNC: 173 MG/DL (ref 70–99)
MAGNESIUM: 1.7 MG/DL (ref 1.8–2.4)
PERFORMED ON: ABNORMAL
PERFORMED ON: ABNORMAL
POTASSIUM SERPL-SCNC: 4.8 MMOL/L (ref 3.5–5.1)
PRO-BNP: ABNORMAL PG/ML (ref 0–124)
SODIUM BLD-SCNC: 135 MMOL/L (ref 136–145)

## 2019-02-23 PROCEDURE — 80048 BASIC METABOLIC PNL TOTAL CA: CPT

## 2019-02-23 PROCEDURE — 6370000000 HC RX 637 (ALT 250 FOR IP): Performed by: INTERNAL MEDICINE

## 2019-02-23 PROCEDURE — 83880 ASSAY OF NATRIURETIC PEPTIDE: CPT

## 2019-02-23 PROCEDURE — 99239 HOSP IP/OBS DSCHRG MGMT >30: CPT | Performed by: INTERNAL MEDICINE

## 2019-02-23 PROCEDURE — 36415 COLL VENOUS BLD VENIPUNCTURE: CPT

## 2019-02-23 PROCEDURE — 83735 ASSAY OF MAGNESIUM: CPT

## 2019-02-23 PROCEDURE — 94640 AIRWAY INHALATION TREATMENT: CPT

## 2019-02-23 PROCEDURE — 49083 ABD PARACENTESIS W/IMAGING: CPT

## 2019-02-23 PROCEDURE — 6370000000 HC RX 637 (ALT 250 FOR IP): Performed by: PHYSICIAN ASSISTANT

## 2019-02-23 PROCEDURE — 94664 DEMO&/EVAL PT USE INHALER: CPT

## 2019-02-23 PROCEDURE — P9047 ALBUMIN (HUMAN), 25%, 50ML: HCPCS | Performed by: INTERNAL MEDICINE

## 2019-02-23 PROCEDURE — 2580000003 HC RX 258: Performed by: PHYSICIAN ASSISTANT

## 2019-02-23 PROCEDURE — 6360000002 HC RX W HCPCS: Performed by: INTERNAL MEDICINE

## 2019-02-23 RX ORDER — ALBUMIN (HUMAN) 12.5 G/50ML
50 SOLUTION INTRAVENOUS ONCE
Status: COMPLETED | OUTPATIENT
Start: 2019-02-23 | End: 2019-02-23

## 2019-02-23 RX ORDER — LISINOPRIL 2.5 MG/1
2.5 TABLET ORAL DAILY
Qty: 30 TABLET | Refills: 0
Start: 2019-02-23 | End: 2019-02-27 | Stop reason: ALTCHOICE

## 2019-02-23 RX ORDER — METOPROLOL SUCCINATE 50 MG/1
50 TABLET, EXTENDED RELEASE ORAL DAILY
Qty: 30 TABLET | Refills: 3 | Status: ON HOLD
Start: 2019-02-23 | End: 2022-01-01 | Stop reason: SDUPTHER

## 2019-02-23 RX ORDER — HYDRALAZINE HYDROCHLORIDE 25 MG/1
25 TABLET, FILM COATED ORAL 2 TIMES DAILY
Status: DISCONTINUED | OUTPATIENT
Start: 2019-02-24 | End: 2019-02-23 | Stop reason: HOSPADM

## 2019-02-23 RX ORDER — LEVOTHYROXINE SODIUM 0.05 MG/1
50 TABLET ORAL DAILY
Qty: 30 TABLET | Refills: 0 | Status: SHIPPED | OUTPATIENT
Start: 2019-02-24 | End: 2021-01-09

## 2019-02-23 RX ORDER — ALBUTEROL SULFATE 90 UG/1
2 AEROSOL, METERED RESPIRATORY (INHALATION) EVERY 6 HOURS PRN
Status: DISCONTINUED | OUTPATIENT
Start: 2019-02-23 | End: 2019-02-23 | Stop reason: HOSPADM

## 2019-02-23 RX ADMIN — Medication 400 MG: at 08:41

## 2019-02-23 RX ADMIN — Medication 2 PUFF: at 09:58

## 2019-02-23 RX ADMIN — PANTOPRAZOLE SODIUM 40 MG: 40 TABLET, DELAYED RELEASE ORAL at 06:41

## 2019-02-23 RX ADMIN — ASPIRIN 81 MG 81 MG: 81 TABLET ORAL at 08:33

## 2019-02-23 RX ADMIN — Medication 10 ML: at 08:34

## 2019-02-23 RX ADMIN — MELATONIN 3 MG ORAL TABLET 3 MG: 3 TABLET ORAL at 00:24

## 2019-02-23 RX ADMIN — LEVOTHYROXINE SODIUM 50 MCG: 50 TABLET ORAL at 06:41

## 2019-02-23 RX ADMIN — FUROSEMIDE 40 MG: 40 TABLET ORAL at 08:33

## 2019-02-23 RX ADMIN — METOPROLOL SUCCINATE 25 MG: 25 TABLET, FILM COATED, EXTENDED RELEASE ORAL at 08:33

## 2019-02-23 RX ADMIN — ISOSORBIDE MONONITRATE 30 MG: 30 TABLET ORAL at 08:33

## 2019-02-23 RX ADMIN — ALBUMIN (HUMAN) 12.5 G: 0.25 INJECTION, SOLUTION INTRAVENOUS at 15:05

## 2019-02-25 ENCOUNTER — TELEPHONE (OUTPATIENT)
Dept: TELEMETRY | Age: 59
End: 2019-02-25

## 2019-02-25 LAB
CHOLESTEROL, TOTAL: 86 MG/DL (ref 0–199)
HDLC SERPL-MCNC: 28 MG/DL (ref 40–60)
LDL CHOLESTEROL CALCULATED: 40 MG/DL
TRIGL SERPL-MCNC: 88 MG/DL (ref 0–150)
VLDLC SERPL CALC-MCNC: 18 MG/DL

## 2019-02-27 ENCOUNTER — HOSPITAL ENCOUNTER (OUTPATIENT)
Dept: ULTRASOUND IMAGING | Age: 59
Discharge: HOME | End: 2019-02-27
Payer: MEDICAID

## 2019-02-27 ENCOUNTER — OFFICE VISIT (OUTPATIENT)
Dept: CARDIOLOGY CLINIC | Age: 59
End: 2019-02-27
Payer: MEDICAID

## 2019-02-27 VITALS
SYSTOLIC BLOOD PRESSURE: 126 MMHG | WEIGHT: 228.8 LBS | HEIGHT: 72 IN | OXYGEN SATURATION: 99 % | BODY MASS INDEX: 30.99 KG/M2 | DIASTOLIC BLOOD PRESSURE: 72 MMHG | HEART RATE: 66 BPM

## 2019-02-27 VITALS
OXYGEN SATURATION: 96 % | HEART RATE: 68 BPM | DIASTOLIC BLOOD PRESSURE: 87 MMHG | RESPIRATION RATE: 20 BRPM | SYSTOLIC BLOOD PRESSURE: 165 MMHG

## 2019-02-27 DIAGNOSIS — E11.42 DIABETIC POLYNEUROPATHY ASSOCIATED WITH TYPE 2 DIABETES MELLITUS (HCC): ICD-10-CM

## 2019-02-27 DIAGNOSIS — I50.22 CHRONIC SYSTOLIC CHF (CONGESTIVE HEART FAILURE) (HCC): ICD-10-CM

## 2019-02-27 DIAGNOSIS — R18.8 OTHER ASCITES: ICD-10-CM

## 2019-02-27 DIAGNOSIS — I25.5 ISCHEMIC CARDIOMYOPATHY: Primary | ICD-10-CM

## 2019-02-27 DIAGNOSIS — N18.30 STAGE 3 CHRONIC KIDNEY DISEASE (HCC): ICD-10-CM

## 2019-02-27 DIAGNOSIS — I10 ESSENTIAL HYPERTENSION: ICD-10-CM

## 2019-02-27 DIAGNOSIS — E78.2 MIXED HYPERLIPIDEMIA: ICD-10-CM

## 2019-02-27 PROCEDURE — 49083 ABD PARACENTESIS W/IMAGING: CPT

## 2019-02-27 PROCEDURE — 99214 OFFICE O/P EST MOD 30 MIN: CPT | Performed by: NURSE PRACTITIONER

## 2019-02-27 RX ORDER — ISOSORBIDE DINITRATE AND HYDRALAZINE HYDROCHLORIDE 37.5; 2 MG/1; MG/1
1 TABLET ORAL 3 TIMES DAILY
Qty: 90 TABLET | Refills: 3 | Status: ON HOLD | OUTPATIENT
Start: 2019-02-27 | End: 2022-01-01 | Stop reason: SDUPTHER

## 2019-02-27 RX ORDER — TORSEMIDE 100 MG/1
50 TABLET ORAL DAILY
Qty: 30 TABLET | Refills: 3 | Status: SHIPPED | OUTPATIENT
Start: 2019-02-27 | End: 2021-01-09

## 2019-03-12 ENCOUNTER — HOSPITAL ENCOUNTER (OUTPATIENT)
Dept: ULTRASOUND IMAGING | Age: 59
Discharge: HOME | End: 2019-03-12
Payer: MEDICAID

## 2019-03-12 VITALS
OXYGEN SATURATION: 100 % | SYSTOLIC BLOOD PRESSURE: 152 MMHG | RESPIRATION RATE: 18 BRPM | DIASTOLIC BLOOD PRESSURE: 85 MMHG | HEART RATE: 57 BPM

## 2019-03-12 DIAGNOSIS — R18.8 OTHER ASCITES: ICD-10-CM

## 2019-03-12 PROCEDURE — 49083 ABD PARACENTESIS W/IMAGING: CPT

## 2019-03-19 ENCOUNTER — HOSPITAL ENCOUNTER (OUTPATIENT)
Dept: ULTRASOUND IMAGING | Age: 59
Discharge: HOME | End: 2019-03-19
Payer: MEDICAID

## 2019-03-19 VITALS
SYSTOLIC BLOOD PRESSURE: 151 MMHG | RESPIRATION RATE: 18 BRPM | DIASTOLIC BLOOD PRESSURE: 84 MMHG | HEART RATE: 71 BPM | OXYGEN SATURATION: 100 %

## 2019-03-19 DIAGNOSIS — K70.31 ASCITES DUE TO ALCOHOLIC CIRRHOSIS (HCC): ICD-10-CM

## 2019-03-19 PROCEDURE — 49083 ABD PARACENTESIS W/IMAGING: CPT

## 2019-03-26 ENCOUNTER — HOSPITAL ENCOUNTER (OUTPATIENT)
Dept: ULTRASOUND IMAGING | Age: 59
Discharge: HOME | End: 2019-03-26
Payer: MEDICAID

## 2019-03-26 VITALS
RESPIRATION RATE: 16 BRPM | DIASTOLIC BLOOD PRESSURE: 98 MMHG | HEART RATE: 74 BPM | SYSTOLIC BLOOD PRESSURE: 184 MMHG | OXYGEN SATURATION: 99 %

## 2019-03-26 DIAGNOSIS — K70.31 ASCITES DUE TO ALCOHOLIC CIRRHOSIS (HCC): ICD-10-CM

## 2019-03-26 PROCEDURE — 49083 ABD PARACENTESIS W/IMAGING: CPT

## 2019-04-02 ENCOUNTER — HOSPITAL ENCOUNTER (OUTPATIENT)
Dept: ULTRASOUND IMAGING | Age: 59
Discharge: HOME | End: 2019-04-02
Payer: MEDICAID

## 2019-04-02 VITALS
HEART RATE: 62 BPM | SYSTOLIC BLOOD PRESSURE: 185 MMHG | RESPIRATION RATE: 18 BRPM | OXYGEN SATURATION: 99 % | DIASTOLIC BLOOD PRESSURE: 97 MMHG

## 2019-04-02 DIAGNOSIS — K70.31 ASCITES DUE TO ALCOHOLIC CIRRHOSIS (HCC): ICD-10-CM

## 2019-04-02 PROCEDURE — 49083 ABD PARACENTESIS W/IMAGING: CPT

## 2019-04-02 NOTE — PROGRESS NOTES
Image guided paracentesis completed. Removed  4400mL of clear yellow _ colored fluid. Pt tolerated procedure without any sign or symptoms of distress. Vital signs stable see flow sheets. Discharge instructions provided and explained. Pt verbalized understanding.  Pt left the department in stable condition in wheelchair.

## 2019-04-09 ENCOUNTER — HOSPITAL ENCOUNTER (OUTPATIENT)
Dept: ULTRASOUND IMAGING | Age: 59
Discharge: HOME | End: 2019-04-09
Payer: MEDICAID

## 2019-04-09 VITALS
DIASTOLIC BLOOD PRESSURE: 87 MMHG | RESPIRATION RATE: 17 BRPM | HEART RATE: 61 BPM | SYSTOLIC BLOOD PRESSURE: 187 MMHG | OXYGEN SATURATION: 100 %

## 2019-04-09 DIAGNOSIS — K70.31 ASCITES DUE TO ALCOHOLIC CIRRHOSIS (HCC): ICD-10-CM

## 2019-04-09 PROCEDURE — 49083 ABD PARACENTESIS W/IMAGING: CPT

## 2019-04-16 ENCOUNTER — HOSPITAL ENCOUNTER (OUTPATIENT)
Dept: ULTRASOUND IMAGING | Age: 59
Discharge: HOME | End: 2019-04-16
Payer: MEDICAID

## 2019-04-16 VITALS
OXYGEN SATURATION: 100 % | HEART RATE: 61 BPM | DIASTOLIC BLOOD PRESSURE: 93 MMHG | SYSTOLIC BLOOD PRESSURE: 183 MMHG | RESPIRATION RATE: 12 BRPM

## 2019-04-16 DIAGNOSIS — K70.31 ASCITES DUE TO ALCOHOLIC CIRRHOSIS (HCC): ICD-10-CM

## 2019-04-16 PROCEDURE — 49083 ABD PARACENTESIS W/IMAGING: CPT

## 2019-04-16 NOTE — PROGRESS NOTES
Image guided paracentesis completed. Removed 3.2 L of clear yellow _ colored fluid. Pt tolerated procedure without any sign or symptoms of distress. Vital signs stable see flow sheets. Discharge instructions provided and explained. Pt verbalized understanding.  Pt left the department in stable condition in wheelchair.

## 2019-04-23 ENCOUNTER — HOSPITAL ENCOUNTER (OUTPATIENT)
Dept: ULTRASOUND IMAGING | Age: 59
Discharge: HOME | End: 2019-04-23
Payer: MEDICAID

## 2019-04-23 VITALS
HEART RATE: 62 BPM | DIASTOLIC BLOOD PRESSURE: 73 MMHG | OXYGEN SATURATION: 100 % | SYSTOLIC BLOOD PRESSURE: 179 MMHG | RESPIRATION RATE: 16 BRPM

## 2019-04-23 DIAGNOSIS — K70.31 ASCITES DUE TO ALCOHOLIC CIRRHOSIS (HCC): ICD-10-CM

## 2019-04-23 PROCEDURE — 49083 ABD PARACENTESIS W/IMAGING: CPT

## 2019-04-23 NOTE — PROGRESS NOTES
Image guided paracentesis completed. Removed 3.3 L of clear yellow _ colored fluid. Pt tolerated procedure without any sign or symptoms of distress. Vital signs stable see flow sheets. Discharge instructions provided and explained. Pt verbalized understanding.  Pt left the department in stable condition in wheelchair.

## 2019-04-30 ENCOUNTER — HOSPITAL ENCOUNTER (OUTPATIENT)
Dept: ULTRASOUND IMAGING | Age: 59
Discharge: HOME | End: 2019-04-30
Payer: MEDICAID

## 2019-04-30 DIAGNOSIS — K70.31 ASCITES DUE TO ALCOHOLIC CIRRHOSIS (HCC): ICD-10-CM

## 2019-04-30 PROCEDURE — 76705 ECHO EXAM OF ABDOMEN: CPT

## 2019-05-07 ENCOUNTER — HOSPITAL ENCOUNTER (OUTPATIENT)
Dept: ULTRASOUND IMAGING | Age: 59
Discharge: HOME | End: 2019-05-07
Payer: MEDICAID

## 2019-05-07 VITALS
RESPIRATION RATE: 16 BRPM | OXYGEN SATURATION: 100 % | HEART RATE: 73 BPM | DIASTOLIC BLOOD PRESSURE: 84 MMHG | SYSTOLIC BLOOD PRESSURE: 178 MMHG

## 2019-05-07 DIAGNOSIS — K70.31 ASCITES DUE TO ALCOHOLIC CIRRHOSIS (HCC): ICD-10-CM

## 2019-05-07 PROCEDURE — 49083 ABD PARACENTESIS W/IMAGING: CPT

## 2019-05-07 NOTE — PROGRESS NOTES
Image guided paracentesis completed per Dr. Freddie Dunne. 4 L of clear yellow  colored fluid. Pt tolerated procedure without any sign or symptoms of distress. Vital signs stable see flow sheets. Discharge instructions provided and explained. Pt verbalized understanding. Pt left the department in stable condition in wheelchair. Patient refuses Albumin.    Suzan Garrido RN

## 2019-05-21 ENCOUNTER — HOSPITAL ENCOUNTER (OUTPATIENT)
Dept: ULTRASOUND IMAGING | Age: 59
Discharge: HOME | End: 2019-05-21
Payer: MEDICAID

## 2019-05-21 VITALS
HEART RATE: 69 BPM | RESPIRATION RATE: 16 BRPM | SYSTOLIC BLOOD PRESSURE: 203 MMHG | OXYGEN SATURATION: 100 % | DIASTOLIC BLOOD PRESSURE: 97 MMHG

## 2019-05-21 DIAGNOSIS — K70.31 ASCITES DUE TO ALCOHOLIC CIRRHOSIS (HCC): ICD-10-CM

## 2019-05-21 PROCEDURE — 49083 ABD PARACENTESIS W/IMAGING: CPT

## 2019-05-21 NOTE — PROGRESS NOTES
Vital signs taken see flow sheets. /106 mm/Hg  mm/Hg. Pt alert and oriented x 4. Follows commands and states he feels fine. Per Pt has not taken blood pressure medications in four days. Advised pt to go to the ER and pt stated \"hell no\". Educated pt on hypertension and the possibility of a stroke. Pt still refused to go to the ER. Dr Korin Diaz notified of hypertension.

## 2019-06-04 ENCOUNTER — HOSPITAL ENCOUNTER (OUTPATIENT)
Dept: ULTRASOUND IMAGING | Age: 59
Discharge: HOME | End: 2019-06-04
Payer: MEDICAID

## 2019-06-04 VITALS
HEART RATE: 51 BPM | DIASTOLIC BLOOD PRESSURE: 79 MMHG | RESPIRATION RATE: 16 BRPM | OXYGEN SATURATION: 100 % | SYSTOLIC BLOOD PRESSURE: 180 MMHG

## 2019-06-04 DIAGNOSIS — K70.31 ASCITES DUE TO ALCOHOLIC CIRRHOSIS (HCC): ICD-10-CM

## 2019-06-04 PROCEDURE — 49083 ABD PARACENTESIS W/IMAGING: CPT

## 2019-06-04 NOTE — PROGRESS NOTES
Image guided paracentesis completed per Dr. Deon Ghosh of  tea colored fluid. Pt tolerated procedure without any sign or symptoms of distress. Vital signs stable see flow sheets. Discharge instructions provided and explained. Pt verbalized understanding. Pt left the department in stable condition in wheelchair. Patient refuses Albumin. Pt has been educated on the importance Albumin multiple times.

## 2019-06-18 ENCOUNTER — HOSPITAL ENCOUNTER (OUTPATIENT)
Dept: ULTRASOUND IMAGING | Age: 59
Discharge: HOME | End: 2019-06-18
Payer: MEDICAID

## 2019-06-18 VITALS
RESPIRATION RATE: 16 BRPM | HEART RATE: 53 BPM | DIASTOLIC BLOOD PRESSURE: 91 MMHG | OXYGEN SATURATION: 98 % | SYSTOLIC BLOOD PRESSURE: 189 MMHG

## 2019-06-18 DIAGNOSIS — K70.31 ASCITES DUE TO ALCOHOLIC CIRRHOSIS (HCC): ICD-10-CM

## 2019-06-18 PROCEDURE — 49083 ABD PARACENTESIS W/IMAGING: CPT

## 2019-06-25 ENCOUNTER — HOSPITAL ENCOUNTER (OUTPATIENT)
Dept: ULTRASOUND IMAGING | Age: 59
Discharge: HOME | End: 2019-06-25
Payer: MEDICAID

## 2019-06-25 VITALS
DIASTOLIC BLOOD PRESSURE: 85 MMHG | HEART RATE: 64 BPM | OXYGEN SATURATION: 99 % | RESPIRATION RATE: 17 BRPM | SYSTOLIC BLOOD PRESSURE: 176 MMHG

## 2019-06-25 DIAGNOSIS — K70.31 ASCITES DUE TO ALCOHOLIC CIRRHOSIS (HCC): ICD-10-CM

## 2019-06-25 PROCEDURE — 49083 ABD PARACENTESIS W/IMAGING: CPT

## 2019-06-25 NOTE — PROGRESS NOTES
Image guided paracentesis completed per Dr. Sharyn Flowers. 4.4 L of clear yellow colored fluid. Pt tolerated procedure without any sign or symptoms of distress. Vital signs stable see flow sheets. Hypertension noted prior to procedure. Discharge instructions provided and explained. Pt verbalized understanding. Pt left the department in stable condition in wheelchair. Patient refuses Albumin.  Pt has been educated on the importance Albumin multiple times. Pt ambulated off floor in stable condition

## 2019-07-02 ENCOUNTER — HOSPITAL ENCOUNTER (OUTPATIENT)
Dept: ULTRASOUND IMAGING | Age: 59
Discharge: HOME | End: 2019-07-02
Payer: MEDICAID

## 2019-07-02 VITALS
HEART RATE: 73 BPM | RESPIRATION RATE: 16 BRPM | OXYGEN SATURATION: 98 % | SYSTOLIC BLOOD PRESSURE: 191 MMHG | DIASTOLIC BLOOD PRESSURE: 90 MMHG

## 2019-07-02 DIAGNOSIS — R18.8 OTHER ASCITES: ICD-10-CM

## 2019-07-02 PROCEDURE — 49083 ABD PARACENTESIS W/IMAGING: CPT

## 2019-07-16 ENCOUNTER — HOSPITAL ENCOUNTER (OUTPATIENT)
Dept: ULTRASOUND IMAGING | Age: 59
Discharge: HOME | End: 2019-07-16
Payer: MEDICAID

## 2019-07-16 VITALS
HEART RATE: 55 BPM | SYSTOLIC BLOOD PRESSURE: 172 MMHG | RESPIRATION RATE: 15 BRPM | DIASTOLIC BLOOD PRESSURE: 91 MMHG | OXYGEN SATURATION: 99 %

## 2019-07-16 DIAGNOSIS — R18.8 OTHER ASCITES: ICD-10-CM

## 2019-07-16 PROCEDURE — 49083 ABD PARACENTESIS W/IMAGING: CPT

## 2019-07-16 NOTE — PROGRESS NOTES
Paracentesis complete per Dr. Marion Monday, 6.5 Liters removed, patient tolerated well. No albumin given, patient refuses infusion. Discharge instructions given, patient verbalized understanding.   Suzan DELATORRE RN

## 2019-07-30 ENCOUNTER — HOSPITAL ENCOUNTER (OUTPATIENT)
Dept: ULTRASOUND IMAGING | Age: 59
Discharge: HOME | End: 2019-07-30
Payer: MEDICAID

## 2019-07-30 VITALS
RESPIRATION RATE: 18 BRPM | DIASTOLIC BLOOD PRESSURE: 95 MMHG | SYSTOLIC BLOOD PRESSURE: 182 MMHG | HEART RATE: 51 BPM | OXYGEN SATURATION: 98 %

## 2019-07-30 DIAGNOSIS — R18.8 OTHER ASCITES: ICD-10-CM

## 2019-07-30 PROCEDURE — 49083 ABD PARACENTESIS W/IMAGING: CPT

## 2019-07-30 NOTE — PROGRESS NOTES
Paracentesis completed by Dr. John Nieto, 5 182 95 Liters removed, patient tolerated well. No albumin given, patient refuses infusion despite education. Discharge instructions provided and explained, patient verbalized understanding of the discharge instructions and states no more questions. Vital signs stable see flow sheets. Pt discharged in stable condition with all belongings.

## 2019-08-06 ENCOUNTER — HOSPITAL ENCOUNTER (OUTPATIENT)
Dept: ULTRASOUND IMAGING | Age: 59
Discharge: HOME | End: 2019-08-06
Payer: MEDICAID

## 2019-08-06 DIAGNOSIS — R18.8 OTHER ASCITES: ICD-10-CM

## 2019-08-06 PROCEDURE — 49083 ABD PARACENTESIS W/IMAGING: CPT

## 2019-08-13 ENCOUNTER — HOSPITAL ENCOUNTER (OUTPATIENT)
Dept: ULTRASOUND IMAGING | Age: 59
Discharge: HOME | End: 2019-08-13
Payer: MEDICAID

## 2019-08-13 VITALS
HEART RATE: 72 BPM | DIASTOLIC BLOOD PRESSURE: 106 MMHG | RESPIRATION RATE: 18 BRPM | SYSTOLIC BLOOD PRESSURE: 210 MMHG | OXYGEN SATURATION: 97 %

## 2019-08-13 DIAGNOSIS — R18.8 OTHER ASCITES: ICD-10-CM

## 2019-08-13 PROCEDURE — 49083 ABD PARACENTESIS W/IMAGING: CPT

## 2019-08-27 ENCOUNTER — HOSPITAL ENCOUNTER (OUTPATIENT)
Dept: ULTRASOUND IMAGING | Age: 59
Discharge: HOME | End: 2019-08-27
Payer: MEDICAID

## 2019-08-27 VITALS
RESPIRATION RATE: 17 BRPM | OXYGEN SATURATION: 99 % | DIASTOLIC BLOOD PRESSURE: 95 MMHG | HEART RATE: 54 BPM | SYSTOLIC BLOOD PRESSURE: 179 MMHG

## 2019-08-27 DIAGNOSIS — R18.8 OTHER ASCITES: ICD-10-CM

## 2019-08-27 PROCEDURE — 49083 ABD PARACENTESIS W/IMAGING: CPT

## 2019-09-03 ENCOUNTER — HOSPITAL ENCOUNTER (OUTPATIENT)
Dept: ULTRASOUND IMAGING | Age: 59
Discharge: HOME | End: 2019-09-03
Payer: MEDICAID

## 2019-09-03 VITALS
DIASTOLIC BLOOD PRESSURE: 95 MMHG | RESPIRATION RATE: 16 BRPM | HEART RATE: 51 BPM | OXYGEN SATURATION: 100 % | SYSTOLIC BLOOD PRESSURE: 172 MMHG

## 2019-09-03 DIAGNOSIS — R18.8 OTHER ASCITES: ICD-10-CM

## 2019-09-03 PROCEDURE — 49083 ABD PARACENTESIS W/IMAGING: CPT

## 2019-09-10 ENCOUNTER — HOSPITAL ENCOUNTER (OUTPATIENT)
Dept: ULTRASOUND IMAGING | Age: 59
Discharge: HOME | End: 2019-09-10
Payer: MEDICAID

## 2019-09-10 VITALS
OXYGEN SATURATION: 98 % | RESPIRATION RATE: 15 BRPM | SYSTOLIC BLOOD PRESSURE: 163 MMHG | DIASTOLIC BLOOD PRESSURE: 88 MMHG | HEART RATE: 52 BPM

## 2019-09-10 DIAGNOSIS — R18.8 OTHER ASCITES: ICD-10-CM

## 2019-09-10 PROCEDURE — 49083 ABD PARACENTESIS W/IMAGING: CPT

## 2019-09-10 ASSESSMENT — PAIN - FUNCTIONAL ASSESSMENT: PAIN_FUNCTIONAL_ASSESSMENT: 0-10

## 2019-09-24 ENCOUNTER — HOSPITAL ENCOUNTER (OUTPATIENT)
Dept: ULTRASOUND IMAGING | Age: 59
Discharge: HOME | End: 2019-09-24
Payer: MEDICAID

## 2019-09-24 VITALS
OXYGEN SATURATION: 96 % | DIASTOLIC BLOOD PRESSURE: 97 MMHG | SYSTOLIC BLOOD PRESSURE: 183 MMHG | RESPIRATION RATE: 17 BRPM | HEART RATE: 62 BPM

## 2019-09-24 DIAGNOSIS — R18.8 OTHER ASCITES: ICD-10-CM

## 2019-09-24 PROCEDURE — 49083 ABD PARACENTESIS W/IMAGING: CPT

## 2019-10-08 ENCOUNTER — HOSPITAL ENCOUNTER (OUTPATIENT)
Dept: ULTRASOUND IMAGING | Age: 59
Discharge: HOME | End: 2019-10-08
Payer: MEDICAID

## 2019-10-08 VITALS
OXYGEN SATURATION: 96 % | DIASTOLIC BLOOD PRESSURE: 89 MMHG | RESPIRATION RATE: 18 BRPM | HEART RATE: 50 BPM | SYSTOLIC BLOOD PRESSURE: 183 MMHG

## 2019-10-08 DIAGNOSIS — R18.8 OTHER ASCITES: ICD-10-CM

## 2019-10-08 PROCEDURE — 49083 ABD PARACENTESIS W/IMAGING: CPT

## 2019-10-22 ENCOUNTER — HOSPITAL ENCOUNTER (OUTPATIENT)
Dept: ULTRASOUND IMAGING | Age: 59
Discharge: HOME | End: 2019-10-22
Payer: MEDICAID

## 2019-10-22 DIAGNOSIS — K70.31 ALCOHOLIC CIRRHOSIS OF LIVER WITH ASCITES (HCC): ICD-10-CM

## 2019-10-22 PROCEDURE — 49083 ABD PARACENTESIS W/IMAGING: CPT

## 2019-11-05 ENCOUNTER — HOSPITAL ENCOUNTER (OUTPATIENT)
Dept: ULTRASOUND IMAGING | Age: 59
Discharge: HOME | End: 2019-11-05
Payer: MEDICAID

## 2019-11-05 DIAGNOSIS — R18.8 OTHER ASCITES: ICD-10-CM

## 2019-11-05 PROCEDURE — 49083 ABD PARACENTESIS W/IMAGING: CPT

## 2019-11-19 ENCOUNTER — HOSPITAL ENCOUNTER (OUTPATIENT)
Dept: ULTRASOUND IMAGING | Age: 59
Discharge: HOME | End: 2019-11-19
Payer: MEDICAID

## 2019-11-19 VITALS
HEART RATE: 54 BPM | DIASTOLIC BLOOD PRESSURE: 97 MMHG | RESPIRATION RATE: 18 BRPM | SYSTOLIC BLOOD PRESSURE: 180 MMHG | OXYGEN SATURATION: 98 %

## 2019-11-19 DIAGNOSIS — R18.8 OTHER ASCITES: ICD-10-CM

## 2019-11-19 PROCEDURE — 49083 ABD PARACENTESIS W/IMAGING: CPT

## 2019-11-25 ENCOUNTER — TELEPHONE (OUTPATIENT)
Dept: INTERNAL MEDICINE CLINIC | Age: 59
End: 2019-11-25

## 2019-11-26 ENCOUNTER — HOSPITAL ENCOUNTER (OUTPATIENT)
Dept: ULTRASOUND IMAGING | Age: 59
Discharge: HOME | End: 2019-11-26
Payer: MEDICAID

## 2019-11-26 VITALS
OXYGEN SATURATION: 97 % | RESPIRATION RATE: 18 BRPM | HEART RATE: 53 BPM | SYSTOLIC BLOOD PRESSURE: 147 MMHG | DIASTOLIC BLOOD PRESSURE: 82 MMHG

## 2019-11-26 DIAGNOSIS — R18.8 OTHER ASCITES: ICD-10-CM

## 2019-11-26 PROCEDURE — 49083 ABD PARACENTESIS W/IMAGING: CPT

## 2019-12-10 ENCOUNTER — HOSPITAL ENCOUNTER (OUTPATIENT)
Dept: ULTRASOUND IMAGING | Age: 59
Discharge: HOME | End: 2019-12-10
Payer: MEDICAID

## 2019-12-10 VITALS
SYSTOLIC BLOOD PRESSURE: 176 MMHG | RESPIRATION RATE: 16 BRPM | OXYGEN SATURATION: 97 % | HEART RATE: 57 BPM | DIASTOLIC BLOOD PRESSURE: 98 MMHG

## 2019-12-10 DIAGNOSIS — R18.8 OTHER ASCITES: ICD-10-CM

## 2019-12-10 PROCEDURE — 49083 ABD PARACENTESIS W/IMAGING: CPT

## 2019-12-17 ENCOUNTER — HOSPITAL ENCOUNTER (OUTPATIENT)
Dept: ULTRASOUND IMAGING | Age: 59
Discharge: HOME | End: 2019-12-17
Payer: MEDICAID

## 2019-12-17 VITALS
SYSTOLIC BLOOD PRESSURE: 155 MMHG | OXYGEN SATURATION: 98 % | DIASTOLIC BLOOD PRESSURE: 85 MMHG | RESPIRATION RATE: 15 BRPM | HEART RATE: 52 BPM

## 2019-12-17 DIAGNOSIS — R18.8 OTHER ASCITES: ICD-10-CM

## 2019-12-17 PROCEDURE — 49083 ABD PARACENTESIS W/IMAGING: CPT

## 2019-12-31 ENCOUNTER — HOSPITAL ENCOUNTER (OUTPATIENT)
Dept: ULTRASOUND IMAGING | Age: 59
Discharge: HOME | End: 2019-12-31
Payer: MEDICAID

## 2019-12-31 VITALS
OXYGEN SATURATION: 99 % | RESPIRATION RATE: 18 BRPM | HEART RATE: 70 BPM | DIASTOLIC BLOOD PRESSURE: 99 MMHG | SYSTOLIC BLOOD PRESSURE: 182 MMHG

## 2019-12-31 PROCEDURE — 49083 ABD PARACENTESIS W/IMAGING: CPT

## 2019-12-31 NOTE — PROGRESS NOTES
Image guided paracentesis complete per Dr. Brendan Tsang, 7.9  liters removed, patient tolerated well. Patient discharged in stable condition with discharge instructions. No needs at time of discharge.   Suzan DELATORRE RN

## 2020-01-07 ENCOUNTER — HOSPITAL ENCOUNTER (OUTPATIENT)
Dept: ULTRASOUND IMAGING | Age: 60
Discharge: HOME | End: 2020-01-07
Payer: MEDICAID

## 2020-01-07 VITALS
OXYGEN SATURATION: 98 % | HEART RATE: 56 BPM | DIASTOLIC BLOOD PRESSURE: 85 MMHG | SYSTOLIC BLOOD PRESSURE: 153 MMHG | RESPIRATION RATE: 18 BRPM

## 2020-01-07 PROCEDURE — 49083 ABD PARACENTESIS W/IMAGING: CPT

## 2020-01-07 NOTE — PROGRESS NOTES
Image guided paracentesis complete per Dr. Rehan Gee, 4850 ml removed, patient tolerated well. Patient discharged in stable condition with discharge instructions. No needs at time of discharge.   Alyson Justin

## 2020-01-14 ENCOUNTER — HOSPITAL ENCOUNTER (OUTPATIENT)
Dept: ULTRASOUND IMAGING | Age: 60
Discharge: HOME | End: 2020-01-14
Payer: MEDICAID

## 2020-01-14 VITALS
DIASTOLIC BLOOD PRESSURE: 97 MMHG | RESPIRATION RATE: 15 BRPM | OXYGEN SATURATION: 100 % | SYSTOLIC BLOOD PRESSURE: 148 MMHG | HEART RATE: 60 BPM

## 2020-01-14 PROCEDURE — 49083 ABD PARACENTESIS W/IMAGING: CPT

## 2020-01-14 NOTE — PROGRESS NOTES
Image guided paracentesis complete per Dr. Chino, 4.4 L removed, patient tolerated well. Patient discharged in stable condition with discharge instructions. No needs at time of discharge.   Ramez Hinojoas

## 2020-01-21 ENCOUNTER — HOSPITAL ENCOUNTER (OUTPATIENT)
Dept: ULTRASOUND IMAGING | Age: 60
Discharge: HOME | End: 2020-01-21
Payer: MEDICAID

## 2020-01-21 VITALS
OXYGEN SATURATION: 97 % | DIASTOLIC BLOOD PRESSURE: 106 MMHG | RESPIRATION RATE: 15 BRPM | HEART RATE: 73 BPM | SYSTOLIC BLOOD PRESSURE: 192 MMHG

## 2020-01-21 PROCEDURE — 49083 ABD PARACENTESIS W/IMAGING: CPT

## 2020-01-21 NOTE — PROGRESS NOTES
Image guided paracentesis complete per Dr. Jozef Acosta, patient tolerated procedure without any signs or symptoms of distress. Asymptomatic Hypertension noted. Recommend patient go to ER. Pt stated \"no I feel fine. I will make an appointment with my doctor\". Pt also stated he has not been taking his blood pressure medication. Educated pt on risk of systolic blood pressure in the 200's including stroke. Pt still refused to go to the ER. Patient discharged in stable condition with discharge instructions.

## 2020-01-28 ENCOUNTER — HOSPITAL ENCOUNTER (OUTPATIENT)
Dept: ULTRASOUND IMAGING | Age: 60
Discharge: HOME | End: 2020-01-28
Payer: MEDICAID

## 2020-01-28 VITALS
OXYGEN SATURATION: 98 % | RESPIRATION RATE: 15 BRPM | HEART RATE: 78 BPM | DIASTOLIC BLOOD PRESSURE: 102 MMHG | SYSTOLIC BLOOD PRESSURE: 192 MMHG

## 2020-01-28 PROCEDURE — 49083 ABD PARACENTESIS W/IMAGING: CPT

## 2020-02-04 ENCOUNTER — HOSPITAL ENCOUNTER (OUTPATIENT)
Dept: ULTRASOUND IMAGING | Age: 60
Discharge: HOME | End: 2020-02-04
Payer: MEDICAID

## 2020-02-04 VITALS
HEART RATE: 79 BPM | OXYGEN SATURATION: 98 % | DIASTOLIC BLOOD PRESSURE: 177 MMHG | RESPIRATION RATE: 15 BRPM | SYSTOLIC BLOOD PRESSURE: 201 MMHG

## 2020-02-04 PROCEDURE — 49083 ABD PARACENTESIS W/IMAGING: CPT

## 2020-02-04 NOTE — PROGRESS NOTES
Paracentesis complete per Dr. Shell Wan. 900 ml of yellow fluid removed, attempts made to drain more fluid, unsuccessfully. HTN again noted on this admission, patient is aware, states he is going to follow up with MD. Patient discharged with personal belongings, no needs.   Suzan DELATORRE RN
no

## 2020-02-11 ENCOUNTER — HOSPITAL ENCOUNTER (OUTPATIENT)
Dept: ULTRASOUND IMAGING | Age: 60
Discharge: HOME | End: 2020-02-11
Payer: MEDICAID

## 2020-02-11 VITALS
SYSTOLIC BLOOD PRESSURE: 207 MMHG | HEART RATE: 84 BPM | RESPIRATION RATE: 15 BRPM | OXYGEN SATURATION: 98 % | DIASTOLIC BLOOD PRESSURE: 124 MMHG

## 2020-02-11 PROCEDURE — 49083 ABD PARACENTESIS W/IMAGING: CPT

## 2020-02-18 ENCOUNTER — HOSPITAL ENCOUNTER (OUTPATIENT)
Dept: ULTRASOUND IMAGING | Age: 60
Discharge: HOME | End: 2020-02-18
Payer: MEDICAID

## 2020-02-18 VITALS
OXYGEN SATURATION: 94 % | SYSTOLIC BLOOD PRESSURE: 203 MMHG | HEART RATE: 78 BPM | RESPIRATION RATE: 15 BRPM | DIASTOLIC BLOOD PRESSURE: 113 MMHG

## 2020-02-18 PROCEDURE — 49083 ABD PARACENTESIS W/IMAGING: CPT

## 2020-02-25 ENCOUNTER — HOSPITAL ENCOUNTER (OUTPATIENT)
Dept: ULTRASOUND IMAGING | Age: 60
Discharge: HOME | End: 2020-02-25
Payer: MEDICAID

## 2020-02-25 ENCOUNTER — HOSPITAL ENCOUNTER (OUTPATIENT)
Dept: WOUND CARE | Age: 60
Discharge: HOME | End: 2020-02-25
Payer: MEDICAID

## 2020-02-25 VITALS
OXYGEN SATURATION: 98 % | RESPIRATION RATE: 15 BRPM | DIASTOLIC BLOOD PRESSURE: 100 MMHG | HEART RATE: 74 BPM | SYSTOLIC BLOOD PRESSURE: 187 MMHG

## 2020-02-25 PROCEDURE — 49083 ABD PARACENTESIS W/IMAGING: CPT

## 2020-02-25 NOTE — PROGRESS NOTES
Image guided paracentesis complete per Dr. Acosta, 5.5 L removed, patient tolerated procedure without any signs or symptoms of distress. Asymptomatic Hypertension noted. Recommend patient go to ER. Pt stated \"no I feel fine and I had an appointment last week with PCP and he started me on new medication\". Educated pt on risk of systolic blood pressure in the 200's including stroke. Pt still refused to go to the ER. Discharge instructions provided and explained.  Pt verbalizes understanding of the discharge instructions and states no more questions. Patient discharged in stable condition with discharge instructions.

## 2020-03-03 ENCOUNTER — HOSPITAL ENCOUNTER (OUTPATIENT)
Dept: ULTRASOUND IMAGING | Age: 60
Discharge: HOME | End: 2020-03-03
Payer: MEDICAID

## 2020-03-03 VITALS
RESPIRATION RATE: 16 BRPM | HEART RATE: 63 BPM | DIASTOLIC BLOOD PRESSURE: 110 MMHG | OXYGEN SATURATION: 96 % | SYSTOLIC BLOOD PRESSURE: 191 MMHG

## 2020-03-03 PROCEDURE — 49083 ABD PARACENTESIS W/IMAGING: CPT

## 2020-03-03 NOTE — PROGRESS NOTES
Paracentesis complete per Dr. Zack Miller. 5350 ml of yellow fluid removed. Patient tolerated well. HTN noted once again, patient educated on risks of this. Patient declines further treatment. Discharge instructions given, patient verbalized understanding.   Suzan DELATORRE RN

## 2020-03-10 ENCOUNTER — HOSPITAL ENCOUNTER (OUTPATIENT)
Dept: ULTRASOUND IMAGING | Age: 60
Discharge: HOME | End: 2020-03-10
Payer: MEDICAID

## 2020-03-10 VITALS
RESPIRATION RATE: 16 BRPM | OXYGEN SATURATION: 98 % | DIASTOLIC BLOOD PRESSURE: 90 MMHG | HEART RATE: 62 BPM | SYSTOLIC BLOOD PRESSURE: 187 MMHG

## 2020-03-10 PROCEDURE — 49083 ABD PARACENTESIS W/IMAGING: CPT

## 2020-03-10 NOTE — BRIEF OP NOTE
Brief Postoperative Note    Mercy Kothari  YOB: 1960  [id number]    Pre-operative Diagnosis: ascites    Post-operative Diagnosis: Same    Procedure: paracentesis    Anesthesia: Local    Surgeons: Clark Hightower MD    Estimated Blood Loss: Less than 5 mL    Complications: None    Specimens: Was Obtained: ascitic fluid    Findings: Successful US-guided paracentesis.     Electronically signed by Clark Hightower MD on 3/10/2020 at 3:01 PM

## 2020-03-17 ENCOUNTER — HOSPITAL ENCOUNTER (OUTPATIENT)
Dept: ULTRASOUND IMAGING | Age: 60
Discharge: HOME | End: 2020-03-17
Payer: MEDICAID

## 2020-03-17 VITALS
HEART RATE: 69 BPM | DIASTOLIC BLOOD PRESSURE: 96 MMHG | OXYGEN SATURATION: 97 % | RESPIRATION RATE: 16 BRPM | SYSTOLIC BLOOD PRESSURE: 171 MMHG

## 2020-03-17 PROCEDURE — 49083 ABD PARACENTESIS W/IMAGING: CPT

## 2020-03-21 ENCOUNTER — HOSPITAL ENCOUNTER (EMERGENCY)
Age: 60
Discharge: LEFT BEFORE DISCHARGE | End: 2020-03-21
Attending: EMERGENCY MEDICINE
Payer: MEDICAID

## 2020-03-21 ENCOUNTER — APPOINTMENT (OUTPATIENT)
Dept: CT IMAGING | Age: 60
End: 2020-03-21
Payer: MEDICAID

## 2020-03-21 VITALS
RESPIRATION RATE: 20 BRPM | HEART RATE: 90 BPM | BODY MASS INDEX: 31.15 KG/M2 | SYSTOLIC BLOOD PRESSURE: 212 MMHG | WEIGHT: 230 LBS | TEMPERATURE: 100.5 F | OXYGEN SATURATION: 97 % | DIASTOLIC BLOOD PRESSURE: 108 MMHG | HEIGHT: 72 IN

## 2020-03-21 LAB
A/G RATIO: 1.2 (ref 1.1–2.2)
ALBUMIN SERPL-MCNC: 3.5 G/DL (ref 3.4–5)
ALP BLD-CCNC: 121 U/L (ref 40–129)
ALT SERPL-CCNC: 13 U/L (ref 10–40)
AMYLASE: 73 U/L (ref 25–115)
ANION GAP SERPL CALCULATED.3IONS-SCNC: 15 MMOL/L (ref 3–16)
APTT: 28 SEC (ref 24.2–36.2)
AST SERPL-CCNC: 23 U/L (ref 15–37)
BACTERIA: ABNORMAL /HPF
BASOPHILS ABSOLUTE: 0 K/UL (ref 0–0.2)
BASOPHILS RELATIVE PERCENT: 0.1 %
BILIRUB SERPL-MCNC: 0.3 MG/DL (ref 0–1)
BILIRUBIN URINE: NEGATIVE
BLOOD, URINE: ABNORMAL
BUN BLDV-MCNC: 47 MG/DL (ref 7–20)
CALCIUM SERPL-MCNC: 8.4 MG/DL (ref 8.3–10.6)
CHLORIDE BLD-SCNC: 104 MMOL/L (ref 99–110)
CLARITY: CLEAR
CO2: 19 MMOL/L (ref 21–32)
COLOR: YELLOW
CREAT SERPL-MCNC: 2.8 MG/DL (ref 0.8–1.3)
EOSINOPHILS ABSOLUTE: 0 K/UL (ref 0–0.6)
EOSINOPHILS RELATIVE PERCENT: 0.1 %
EPITHELIAL CELLS, UA: ABNORMAL /HPF (ref 0–5)
GFR AFRICAN AMERICAN: 28
GFR NON-AFRICAN AMERICAN: 23
GLOBULIN: 3 G/DL
GLUCOSE BLD-MCNC: 193 MG/DL (ref 70–99)
GLUCOSE URINE: 250 MG/DL
HCT VFR BLD CALC: 33.7 % (ref 40.5–52.5)
HEMOGLOBIN: 10.6 G/DL (ref 13.5–17.5)
INR BLD: 1.05 (ref 0.86–1.14)
KETONES, URINE: NEGATIVE MG/DL
LACTIC ACID: 1.5 MMOL/L (ref 0.4–2)
LEUKOCYTE ESTERASE, URINE: NEGATIVE
LIPASE: 34 U/L (ref 13–60)
LYMPHOCYTES ABSOLUTE: 0.1 K/UL (ref 1–5.1)
LYMPHOCYTES RELATIVE PERCENT: 2.1 %
MCH RBC QN AUTO: 25.5 PG (ref 26–34)
MCHC RBC AUTO-ENTMCNC: 31.6 G/DL (ref 31–36)
MCV RBC AUTO: 80.7 FL (ref 80–100)
MICROSCOPIC EXAMINATION: YES
MONOCYTES ABSOLUTE: 0.2 K/UL (ref 0–1.3)
MONOCYTES RELATIVE PERCENT: 3.1 %
NEUTROPHILS ABSOLUTE: 6.5 K/UL (ref 1.7–7.7)
NEUTROPHILS RELATIVE PERCENT: 94.6 %
NITRITE, URINE: NEGATIVE
PDW BLD-RTO: 16.6 % (ref 12.4–15.4)
PH UA: 5.5 (ref 5–8)
PLATELET # BLD: 190 K/UL (ref 135–450)
PMV BLD AUTO: 7.9 FL (ref 5–10.5)
POTASSIUM REFLEX MAGNESIUM: 5.2 MMOL/L (ref 3.5–5.1)
PRO-BNP: ABNORMAL PG/ML (ref 0–124)
PROTEIN UA: >=300 MG/DL
PROTHROMBIN TIME: 12.2 SEC (ref 10–13.2)
RAPID INFLUENZA  B AGN: NEGATIVE
RAPID INFLUENZA A AGN: NEGATIVE
RBC # BLD: 4.17 M/UL (ref 4.2–5.9)
RBC UA: ABNORMAL /HPF (ref 0–4)
SODIUM BLD-SCNC: 138 MMOL/L (ref 136–145)
SPECIFIC GRAVITY UA: 1.02 (ref 1–1.03)
TOTAL PROTEIN: 6.5 G/DL (ref 6.4–8.2)
TROPONIN: 0.44 NG/ML
URINE TYPE: ABNORMAL
UROBILINOGEN, URINE: 0.2 E.U./DL
WBC # BLD: 6.9 K/UL (ref 4–11)
WBC UA: ABNORMAL /HPF (ref 0–5)

## 2020-03-21 PROCEDURE — 87086 URINE CULTURE/COLONY COUNT: CPT

## 2020-03-21 PROCEDURE — 80053 COMPREHEN METABOLIC PANEL: CPT

## 2020-03-21 PROCEDURE — 81001 URINALYSIS AUTO W/SCOPE: CPT

## 2020-03-21 PROCEDURE — 99284 EMERGENCY DEPT VISIT MOD MDM: CPT

## 2020-03-21 PROCEDURE — 87040 BLOOD CULTURE FOR BACTERIA: CPT

## 2020-03-21 PROCEDURE — 82150 ASSAY OF AMYLASE: CPT

## 2020-03-21 PROCEDURE — 85730 THROMBOPLASTIN TIME PARTIAL: CPT

## 2020-03-21 PROCEDURE — 83690 ASSAY OF LIPASE: CPT

## 2020-03-21 PROCEDURE — 83880 ASSAY OF NATRIURETIC PEPTIDE: CPT

## 2020-03-21 PROCEDURE — 85025 COMPLETE CBC W/AUTO DIFF WBC: CPT

## 2020-03-21 PROCEDURE — 84484 ASSAY OF TROPONIN QUANT: CPT

## 2020-03-21 PROCEDURE — 96374 THER/PROPH/DIAG INJ IV PUSH: CPT

## 2020-03-21 PROCEDURE — 85610 PROTHROMBIN TIME: CPT

## 2020-03-21 PROCEDURE — 74176 CT ABD & PELVIS W/O CONTRAST: CPT

## 2020-03-21 PROCEDURE — 87804 INFLUENZA ASSAY W/OPTIC: CPT

## 2020-03-21 PROCEDURE — 36415 COLL VENOUS BLD VENIPUNCTURE: CPT

## 2020-03-21 PROCEDURE — 6360000002 HC RX W HCPCS: Performed by: EMERGENCY MEDICINE

## 2020-03-21 PROCEDURE — 83605 ASSAY OF LACTIC ACID: CPT

## 2020-03-21 RX ORDER — ONDANSETRON 2 MG/ML
4 INJECTION INTRAMUSCULAR; INTRAVENOUS ONCE
Status: COMPLETED | OUTPATIENT
Start: 2020-03-21 | End: 2020-03-21

## 2020-03-21 RX ORDER — SODIUM CHLORIDE, SODIUM LACTATE, POTASSIUM CHLORIDE, CALCIUM CHLORIDE 600; 310; 30; 20 MG/100ML; MG/100ML; MG/100ML; MG/100ML
1000 INJECTION, SOLUTION INTRAVENOUS ONCE
Status: DISCONTINUED | OUTPATIENT
Start: 2020-03-21 | End: 2020-03-21

## 2020-03-21 RX ADMIN — ONDANSETRON HYDROCHLORIDE 4 MG: 2 INJECTION, SOLUTION INTRAMUSCULAR; INTRAVENOUS at 17:20

## 2020-03-21 NOTE — ED NOTES
Pt denies D/C, fever, abd pain, cough or further c/o's.  Resps even and unlab, andrei L     Farshad Brownurdes Press, RN  03/21/20 7319

## 2020-03-21 NOTE — ED PROVIDER NOTES
[facility]        Pt Name: Bob Baca  MRN: [id number]  [place] 1960  Date of evaluation: 3/21/2020  Provider: Shahana Francisco MD  PCP: LARRY Calles - CNP    This patient was seen and evaluated by the attending physician Shahana Francisco MD.      [address] [place] [address]       Chief Complaint   Patient presents with    Emesis     States N/V since last night       HISTORY OF PRESENT ILLNESS   (Location/Symptom, Timing/Onset, Context/Setting, Quality, Duration, Modifying Factors, Severity)  Note limiting factors. Bob Baca is a 62 y.o. male p/w cc of abdominal pain, nausea, vomiting, and fever for the past day. No HA, CP, SOB. Hx of liver disease and renal disease. Gets weekly paracentesis on Tuesdays at [facility]. No melena, hematochezia. Nursing Notes were all reviewed and agreed with or any disagreements were addressed  in the HPI. REVIEW OF SYSTEMS    (2-9 systems for level 4, 10 or more for level 5)     Review of Systems    Positives and Pertinent negatives as per HPI. Except as noted abovein the ROS, all other systems were reviewed and negative.        PAST MEDICAL HISTORY     Past Medical History:   Diagnosis Date    Bacteremia 11/18/2017    strep agalactiae (also cultured from foot)    Cellulitis and abscess of foot, except toes     CHF (congestive heart failure) (HCC)     Diabetes mellitus ([address]     Hypertension     Kidney disease     MRSA (methicillin resistant staph aureus) culture positive 4/17/18, 11/19/2017    left foot         SURGICAL HISTORY     Past Surgical History:   Procedure Laterality Date    LEG AMPUTATION BELOW KNEE      UPPER GASTROINTESTINAL ENDOSCOPY N/A 1/29/2019    EGD ESOPHAGOGASTRODUODENOSCOPY performed by Patricia Landry MD at [address] [place] [address]      Left BKA         CURRENTMEDICATIONS       Previous Medications    ALBUTEROL SULFATE HFA (PROAIR HFA) 108 (90 BASE) MCG/ACT INHALER    Inhale 2 puffs into the lungs every 6 hours as needed for Wheezing    ASPIRIN 81 MG TABLET    Take 1 tablet by mouth daily    ATORVASTATIN (LIPITOR) 40 MG TABLET    Take 1 tablet by mouth nightly    INSULIN LISPRO (HUMALOG KWIKPEN) 100 UNIT/ML PEN    Glucose: Dose:   No Insulin 140-249 1 Unit 250-349 2 Units Over 350 -3 Units    ISOSORBIDE-HYDRALAZINE (BIDIL) 20-37.5 MG PER TABLET    Take 1 tablet by mouth 3 times daily    LEVOTHYROXINE (SYNTHROID) 50 MCG TABLET    Take 1 tablet by mouth Daily    MAGNESIUM OXIDE (MAG-OX) 400 (241.3 MG) MG TABS TABLET    Take 1 tablet by mouth daily    METOPROLOL SUCCINATE (TOPROL XL) 50 MG EXTENDED RELEASE TABLET    Take 1 tablet by mouth daily    PANTOPRAZOLE (PROTONIX) 40 MG TABLET    Take 1 tablet by mouth every morning (before breakfast)    TORSEMIDE (DEMADEX) 100 MG TABLET    Take 0.5 tablets by mouth daily         ALLERGIES     Pcn [penicillins]    FAMILYHISTORY       Family History   Problem Relation Age of Onset    Diabetes Mother     Hypertension Mother     Dementia Mother     Heart Attack Father     Stroke Brother     No Known Problems Maternal Grandmother     No Known Problems Maternal Grandfather     No Known Problems Paternal Grandmother     No Known Problems Paternal Grandfather     Heart Attack Brother           SOCIAL HISTORY       Social History     Socioeconomic History    Marital status:       Spouse name: None    Number of children: None    Years of education: None    Highest education level: None   Occupational History    Occupation: unemployed   Social Needs    Financial resource strain: None    Food insecurity     Worry: None     Inability: None    Transportation needs     Medical: None     Non-medical: None   Tobacco Use    Smoking status: Current Some Day Smoker     Packs/day: 0.25     Years: 40.00     Pack years: 10.00     Types: Cigarettes    Smokeless tobacco: Former User     Types: R [address] comment: attempting to quit at this time   Substance and Sexual Activity    Alcohol use: No     Comment: none    Drug use: No    Sexual activity: Yes     Partners: Female   Lifestyle    Physical activity     Days per week: None     Minutes per session: None    Stress: None   Relationships    Social connections     Talks on phone: None     Gets together: None     Attends Judaism service: None     Active member of club or organization: None     Attends meetings of clubs or organizations: None     Relationship status: None    Intimate partner violence     Fear of current or ex partner: None     Emotionally abused: None     Physically abused: None     Forced sexual activity: None   Other Topics Concern    None   Social History Narrative    None       SCREENINGS             PHYSICAL EXAM    (up to 7 for level 4, 8 or more for level 5)     ED Triage Vitals [03/21/20 1620]   BP Temp Temp Source Pulse Resp SpO2 Height Weight   (!) 212/108 100.5 °F (38.1 °C) Oral 90 20 97 % 6' (1.829 m) 230 lb (104.3 kg)       Physical Exam     General Appearance:  Alert, cooperative, no distress, appears stated age. Head:  Normocephalic, without obvious abnormality, atraumatic. Eyes:  conjunctiva/corneas clear, EOM's intact. Sclera anicteric. ENT: Mucous membranes moist.   Neck: Supple, symmetrical, trachea midline, no adenopathy. No jugular venous distention. Lungs:   No Respiratory Distress. Chest Wall:     Heart:  RRR no m/c/g/r   Abdomen:   Soft, NT, distended, ascitic   Extremities:  Full range of motion. LLE BKA Amp   Pulses: Symmetric x3   Skin:  No rashes or lesions to exposed skin. Neurologic: Alert and oriented X 3. Motor grossly normal.  Speech clear.           DIAGNOSTIC RESULTS   LABS:    Labs Reviewed   CBC WITH AUTO DIFFERENTIAL - Abnormal; Notable for the following components:       Result Value    RBC 4.17 (*)     Hemoglobin 10.6 (*)     Hematocrit 33.7 (*)     MCH 25.5 (*)     RDW 16.6 (*) [phone]   RAPID INFLUENZA A/B ANTIGENS    Narrative:     Performed at:  [facility]  [address] [place] [address]. [place], [facility] [address]   Phone [phone]   CULTURE, URINE   CULTURE, BLOOD 1   CULTURE, BLOOD 2   LIPASE    Narrative:     Performed at:  [facility]  [address] [place] [address]. [place] [facility] [address]   Phone [phone]   AMYLASE    Narrative:     Performed at:  [facility]  [address] [place] [address]. [place], [facility] [address]   Phone [phone]    Narrative:     Performed at:  [facility]  [address] [place] [address]. [place], [facility] [address]   Phone [phone]   APTT    Narrative:     Performed at:  [facility]  [address] [place] [address]. [place] [employer]   Phone [phone]   LACTIC ACID, PLASMA    Narrative:     Performed at:  [facility]  [address] [place] [address]. [place] [employer]   Phone [phone]       All other labs were within normal range or not returned as of this dictation. EKG: All EKG's are interpreted by the Emergency Department Physician in the absence of a cardiologist.  Please see their note for interpretation of EKG. RADIOLOGY:   Non-plain film images such as CT, Ultrasound and MRI are read by the radiologist. Plain radiographic images are visualized andpreliminarily interpreted by the  ED Provider with the below findings:        Interpretation perthe Radiologist below, if available at the time of this note:    CT ABDOMEN PELVIS WO CONTRAST Additional Contrast? None   Preliminary Result   1. No CT findings of diverticulitis. 2. Cirrhosis with ascites. 3. Increasing size left pleural effusion with pleural calcifications. 4. Constipation.            Us Guided Paracentesis    Result Date: 3/17/2020  PROCEDURE:

## 2020-03-21 NOTE — ED NOTES
Pt states \"I can't stay in the hospital.\" I am claustrophobic and can't stay in a room like this. \" MD made aware.  AMA risks explained, pt verbalizes understanding     Kate Mayorga RN  03/21/20 3820

## 2020-03-23 ENCOUNTER — CARE COORDINATION (OUTPATIENT)
Dept: CARE COORDINATION | Age: 60
End: 2020-03-23

## 2020-03-23 LAB — URINE CULTURE, ROUTINE: NORMAL

## 2020-03-23 NOTE — CARE COORDINATION
Care Transition outreach s/p ED visit 3.21.20   Left  requesting return callback.   .  Provided & repeated direct callback to reach Care Transition Nurse (CTN)

## 2020-03-24 ENCOUNTER — HOSPITAL ENCOUNTER (OUTPATIENT)
Dept: ULTRASOUND IMAGING | Age: 60
Discharge: HOME | End: 2020-03-24
Payer: MEDICAID

## 2020-03-24 VITALS
DIASTOLIC BLOOD PRESSURE: 90 MMHG | SYSTOLIC BLOOD PRESSURE: 149 MMHG | TEMPERATURE: 97.5 F | OXYGEN SATURATION: 91 % | RESPIRATION RATE: 19 BRPM | HEART RATE: 61 BPM

## 2020-03-24 PROCEDURE — 49083 ABD PARACENTESIS W/IMAGING: CPT

## 2020-03-26 LAB
BLOOD CULTURE, ROUTINE: NORMAL
CULTURE, BLOOD 2: NORMAL

## 2020-04-02 ENCOUNTER — CARE COORDINATION (OUTPATIENT)
Dept: CARE COORDINATION | Age: 60
End: 2020-04-02

## 2020-04-02 NOTE — CARE COORDINATION
No further outreach to patient at this time, as he was unable to reach for three previous contact attempts related to his ED visit on 3.21.20.

## 2020-04-07 ENCOUNTER — HOSPITAL ENCOUNTER (OUTPATIENT)
Dept: ULTRASOUND IMAGING | Age: 60
Discharge: HOME | End: 2020-04-07
Payer: MEDICAID

## 2020-04-07 VITALS
RESPIRATION RATE: 18 BRPM | SYSTOLIC BLOOD PRESSURE: 185 MMHG | OXYGEN SATURATION: 98 % | DIASTOLIC BLOOD PRESSURE: 95 MMHG | HEART RATE: 67 BPM

## 2020-04-07 PROCEDURE — 49083 ABD PARACENTESIS W/IMAGING: CPT

## 2020-04-21 ENCOUNTER — HOSPITAL ENCOUNTER (OUTPATIENT)
Dept: ULTRASOUND IMAGING | Age: 60
Discharge: HOME | End: 2020-04-21
Payer: MEDICAID

## 2020-04-21 PROCEDURE — 49083 ABD PARACENTESIS W/IMAGING: CPT

## 2020-04-28 ENCOUNTER — HOSPITAL ENCOUNTER (OUTPATIENT)
Dept: ULTRASOUND IMAGING | Age: 60
Discharge: HOME | End: 2020-04-28
Payer: MEDICAID

## 2020-04-28 VITALS
HEART RATE: 77 BPM | OXYGEN SATURATION: 98 % | SYSTOLIC BLOOD PRESSURE: 182 MMHG | DIASTOLIC BLOOD PRESSURE: 88 MMHG | RESPIRATION RATE: 18 BRPM

## 2020-04-28 PROCEDURE — 49083 ABD PARACENTESIS W/IMAGING: CPT

## 2020-04-28 NOTE — PROGRESS NOTES
Paracentesis complete per Dr. Flora Pena, 5.4 Liters of yellow fluid removed. Patient tolerated well, discharge instructions given, patient verbalized understanding.   Suzan DELATORRE RN

## 2020-05-05 ENCOUNTER — HOSPITAL ENCOUNTER (OUTPATIENT)
Dept: ULTRASOUND IMAGING | Age: 60
Discharge: HOME | End: 2020-05-05
Payer: MEDICAID

## 2020-05-05 VITALS
OXYGEN SATURATION: 98 % | SYSTOLIC BLOOD PRESSURE: 196 MMHG | DIASTOLIC BLOOD PRESSURE: 105 MMHG | RESPIRATION RATE: 18 BRPM | HEART RATE: 65 BPM

## 2020-05-05 PROCEDURE — 49083 ABD PARACENTESIS W/IMAGING: CPT

## 2020-05-12 ENCOUNTER — HOSPITAL ENCOUNTER (OUTPATIENT)
Dept: ULTRASOUND IMAGING | Age: 60
Discharge: HOME | End: 2020-05-12
Payer: MEDICAID

## 2020-05-12 VITALS
SYSTOLIC BLOOD PRESSURE: 184 MMHG | OXYGEN SATURATION: 93 % | RESPIRATION RATE: 18 BRPM | HEART RATE: 62 BPM | DIASTOLIC BLOOD PRESSURE: 98 MMHG

## 2020-05-12 PROCEDURE — 49083 ABD PARACENTESIS W/IMAGING: CPT

## 2020-05-19 ENCOUNTER — HOSPITAL ENCOUNTER (OUTPATIENT)
Dept: ULTRASOUND IMAGING | Age: 60
Discharge: HOME | End: 2020-05-19
Payer: MEDICAID

## 2020-05-19 PROCEDURE — 49083 ABD PARACENTESIS W/IMAGING: CPT

## 2020-05-19 NOTE — PROGRESS NOTES
Paracentesis complete per Dr. Tian Biswas 6350 ml of yellow fluid removed. Patient tolerated procedure well, discharge instructions given. Patient discharged in stable condition.   Suzan DELATORRE RN

## 2020-05-26 ENCOUNTER — HOSPITAL ENCOUNTER (OUTPATIENT)
Dept: ULTRASOUND IMAGING | Age: 60
Discharge: HOME | End: 2020-05-26
Payer: MEDICAID

## 2020-05-26 VITALS
SYSTOLIC BLOOD PRESSURE: 170 MMHG | RESPIRATION RATE: 18 BRPM | OXYGEN SATURATION: 98 % | DIASTOLIC BLOOD PRESSURE: 84 MMHG | HEART RATE: 72 BPM

## 2020-05-26 PROCEDURE — 49083 ABD PARACENTESIS W/IMAGING: CPT

## 2020-06-02 ENCOUNTER — HOSPITAL ENCOUNTER (OUTPATIENT)
Dept: ULTRASOUND IMAGING | Age: 60
Discharge: HOME | End: 2020-06-02
Payer: MEDICAID

## 2020-06-02 VITALS
RESPIRATION RATE: 18 BRPM | HEART RATE: 63 BPM | OXYGEN SATURATION: 98 % | SYSTOLIC BLOOD PRESSURE: 146 MMHG | DIASTOLIC BLOOD PRESSURE: 66 MMHG

## 2020-06-02 PROCEDURE — 49083 ABD PARACENTESIS W/IMAGING: CPT

## 2020-06-02 NOTE — PROGRESS NOTES
Image guided paracentesis completed by Dr. Marbin Heller. 5100 ml of yellow fluid removed. Time out complete, VSS on RA. Patient tolerated well. 0 Grams of Albumin given per MD order. Discharge instructions given, patient verbalized understanding. Patient discharged in stable condition, no needs at time of discharge.   Suzan DELATORRE RN

## 2020-06-09 ENCOUNTER — HOSPITAL ENCOUNTER (OUTPATIENT)
Dept: ULTRASOUND IMAGING | Age: 60
Discharge: HOME | End: 2020-06-09
Payer: MEDICAID

## 2020-06-09 VITALS
HEART RATE: 67 BPM | RESPIRATION RATE: 18 BRPM | SYSTOLIC BLOOD PRESSURE: 176 MMHG | DIASTOLIC BLOOD PRESSURE: 88 MMHG | OXYGEN SATURATION: 98 %

## 2020-06-09 PROCEDURE — 49083 ABD PARACENTESIS W/IMAGING: CPT

## 2020-06-16 ENCOUNTER — HOSPITAL ENCOUNTER (OUTPATIENT)
Dept: ULTRASOUND IMAGING | Age: 60
Discharge: HOME | End: 2020-06-16
Payer: MEDICAID

## 2020-06-16 PROCEDURE — 49083 ABD PARACENTESIS W/IMAGING: CPT

## 2020-06-23 ENCOUNTER — HOSPITAL ENCOUNTER (OUTPATIENT)
Dept: ULTRASOUND IMAGING | Age: 60
Discharge: HOME | End: 2020-06-23
Payer: MEDICAID

## 2020-06-23 VITALS
DIASTOLIC BLOOD PRESSURE: 79 MMHG | SYSTOLIC BLOOD PRESSURE: 160 MMHG | OXYGEN SATURATION: 98 % | HEART RATE: 67 BPM | RESPIRATION RATE: 16 BRPM

## 2020-06-23 PROCEDURE — 49083 ABD PARACENTESIS W/IMAGING: CPT

## 2020-06-23 ASSESSMENT — PAIN - FUNCTIONAL ASSESSMENT: PAIN_FUNCTIONAL_ASSESSMENT: 0-10

## 2020-06-30 ENCOUNTER — HOSPITAL ENCOUNTER (OUTPATIENT)
Dept: ULTRASOUND IMAGING | Age: 60
Discharge: HOME | End: 2020-06-30
Payer: MEDICAID

## 2020-06-30 VITALS
OXYGEN SATURATION: 98 % | DIASTOLIC BLOOD PRESSURE: 93 MMHG | SYSTOLIC BLOOD PRESSURE: 173 MMHG | HEART RATE: 65 BPM | RESPIRATION RATE: 15 BRPM

## 2020-06-30 PROCEDURE — 49083 ABD PARACENTESIS W/IMAGING: CPT

## 2020-06-30 NOTE — PROGRESS NOTES
Image guided paracentesis completed by Dr. Jessica Khan. 5.2 Liters of yellow fluid removed. Time out complete, VSS on RA. Patient tolerated well. Grams of Albumin given per MD order. Discharge instructions given, patient verbalized understanding. Patient discharged in stable condition, no needs at time of discharge.   Suzan DELATORRE RN

## 2020-07-08 ENCOUNTER — HOSPITAL ENCOUNTER (OUTPATIENT)
Dept: ULTRASOUND IMAGING | Age: 60
Discharge: HOME | End: 2020-07-08
Payer: MEDICAID

## 2020-07-08 PROCEDURE — 49083 ABD PARACENTESIS W/IMAGING: CPT

## 2020-07-14 ENCOUNTER — HOSPITAL ENCOUNTER (OUTPATIENT)
Dept: ULTRASOUND IMAGING | Age: 60
Discharge: HOME | End: 2020-07-14
Payer: MEDICAID

## 2020-07-14 PROCEDURE — 49083 ABD PARACENTESIS W/IMAGING: CPT

## 2020-07-21 ENCOUNTER — HOSPITAL ENCOUNTER (OUTPATIENT)
Dept: ULTRASOUND IMAGING | Age: 60
Discharge: HOME | End: 2020-07-21
Payer: MEDICAID

## 2020-07-21 PROCEDURE — 49083 ABD PARACENTESIS W/IMAGING: CPT

## 2020-07-28 ENCOUNTER — HOSPITAL ENCOUNTER (OUTPATIENT)
Dept: ULTRASOUND IMAGING | Age: 60
Discharge: HOME | End: 2020-07-28
Payer: MEDICAID

## 2020-07-28 PROCEDURE — 49083 ABD PARACENTESIS W/IMAGING: CPT

## 2020-07-28 NOTE — PROGRESS NOTES
Image guided paracentesis completed by Dr. Ander Favre. 5.7 Liters of yellow fluid removed. Time out complete, VSS on RA. Patient tolerated procedure without any signs or symptoms of distress. Discharge instructions given, patient verbalized understanding. Patient discharged in stable condition, no needs at time of discharge.

## 2020-08-04 ENCOUNTER — HOSPITAL ENCOUNTER (OUTPATIENT)
Dept: ULTRASOUND IMAGING | Age: 60
Discharge: HOME | End: 2020-08-04
Payer: MEDICAID

## 2020-08-04 VITALS
OXYGEN SATURATION: 99 % | DIASTOLIC BLOOD PRESSURE: 96 MMHG | SYSTOLIC BLOOD PRESSURE: 166 MMHG | HEART RATE: 65 BPM | RESPIRATION RATE: 15 BRPM

## 2020-08-04 PROCEDURE — 49083 ABD PARACENTESIS W/IMAGING: CPT

## 2020-08-04 NOTE — PROGRESS NOTES
Image guided paracentesis completed by Dr. Flor Galarza. Time out complete, VSS on RA. Patient tolerated well. Discharge instructions given, patient verbalized understanding. Patient discharged in stable condition, no needs at time of discharge.   Suzan DELATORRE RN

## 2020-08-11 ENCOUNTER — HOSPITAL ENCOUNTER (OUTPATIENT)
Dept: ULTRASOUND IMAGING | Age: 60
Discharge: HOME | End: 2020-08-11
Payer: MEDICAID

## 2020-08-11 ENCOUNTER — HOSPITAL ENCOUNTER (EMERGENCY)
Age: 60
Discharge: HOME | End: 2020-08-11
Attending: EMERGENCY MEDICINE
Payer: MEDICAID

## 2020-08-11 VITALS
DIASTOLIC BLOOD PRESSURE: 88 MMHG | RESPIRATION RATE: 16 BRPM | SYSTOLIC BLOOD PRESSURE: 175 MMHG | OXYGEN SATURATION: 97 % | HEART RATE: 65 BPM | TEMPERATURE: 98 F | BODY MASS INDEX: 31.15 KG/M2 | HEIGHT: 72 IN | WEIGHT: 230 LBS

## 2020-08-11 PROCEDURE — 99283 EMERGENCY DEPT VISIT LOW MDM: CPT

## 2020-08-11 PROCEDURE — 49083 ABD PARACENTESIS W/IMAGING: CPT

## 2020-08-12 NOTE — ED PROVIDER NOTES
Emergency Department Attending Note    Mau Osborne MD    Date of ED VIsit: 8/11/2020    CHIEF COMPLAINT  Post-op Problem (pt states he has regular paracentesis at [facility], states he had paracentesis today at 1400 and since then continues to have drainage from needle insertion sites)      HISTORY OF PRESENT ILLNESS  Ami Delgadillo is a 62 y.o. male  With Vital signs of BP (!) 177/89   Pulse 66   Temp 98 °F (36.7 °C) (Oral)   Resp 16   Ht 6' (1.829 m)   Wt 230 lb (104.3 kg)   SpO2 98%   BMI 31.19 kg/m²  who presents to the ED with a complaint of leaking from his paracentesis site. Patient seen and evaluated in room 7. Patient reports leaking at his paracentesis site. Patient had a paracentesis done today at 2:00 called the nurse on call who told him to come to the emergency department because of his leaking site. He is got clear fluid leaking from the site which is on the left lower quadrant. He is got no abdominal pain with this no redness. No blood. This is only probably peritoneal fluid that is draining. .  No other complaints, modifying factors or associated symptoms. Patients Past medical history reviewed and listed below  Past Medical History:   Diagnosis Date    Bacteremia 11/18/2017    strep agalactiae (also cultured from foot)    Cellulitis and abscess of foot, except toes     CHF (congestive heart failure) (HCC)     Diabetes mellitus ([facility] [address]     Hypertension     Kidney disease     MRSA (methicillin resistant staph aureus) culture positive 4/17/18, 11/19/2017    left foot     Past Surgical History:   Procedure Laterality Date    LEG AMPUTATION BELOW KNEE      PARACENTESIS      UPPER GASTROINTESTINAL ENDOSCOPY N/A 1/29/2019    EGD ESOPHAGOGASTRODUODENOSCOPY performed by Ike Spurling, MD at [address] [place] [address]       I have reviewed the following from the nursing documentation.     Family History   Problem Relation Age of Onset    Diabetes Mother  Hypertension Mother     Dementia Mother     Heart Attack Father     Stroke Brother     No Known Problems Maternal Grandmother     No Known Problems Maternal Grandfather     No Known Problems Paternal Grandmother     No Known Problems Paternal Grandfather     Heart Attack Brother      Social History     Socioeconomic History    Marital status:      Spouse name: Not on file    Number of children: Not on file    Years of education: Not on file    Highest education level: Not on file   Occupational History    Occupation: unemployed   Social Needs    Financial resource strain: Not on file    Food insecurity     Worry: Not on file     Inability: Not on file   [employer] needs     Medical: Not on file     Non-medical: Not on file   Tobacco Use    Smoking status: Current Some Day Smoker     Packs/day: 1.00     Years: 40.00     Pack years: 40.00     Types: Cigarettes    Smokeless tobacco: Former User     Types: Chew    Tobacco comment: attempting to quit at this time   Substance and Sexual Activity    Alcohol use: No     Comment: none    Drug use: Yes     Types: Marijuana    Sexual activity: Yes     Partners: Female   Lifestyle    Physical activity     Days per week: Not on file     Minutes per session: Not on file    Stress: Not on file   Relationships    Social connections     Talks on phone: Not on file     Gets together: Not on file     Attends Restorationist service: Not on file     Active member of club or organization: Not on file     Attends meetings of clubs or organizations: Not on file     Relationship status: Not on file    Intimate partner violence     Fear of current or ex partner: Not on file     Emotionally abused: Not on file     Physically abused: Not on file     Forced sexual activity: Not on file   Other Topics Concern    Not on file   Social History Narrative    Not on file     No current facility-administered medications for this encounter.       Current Outpatient All extremities neurovascularly intact. SKIN: Warm and dry. No acute rashes. NEUROLOGICAL: Alert and oriented. Strength 5/5, sensation intact. Gait normal.   PSYCHIATRIC: Normal mood and affect. No HI or SI expressed to me. RADIOLOGY    If acquired see below     EKG:     If acquired see below       ED COURSE/MDM    Patient underwent cleaning of the area with Betadine. And then I placed a Tegaderm on the leaking site hopefully in an effort to tamponade. If it continues to leak it should slow down with the Tegaderm. If not I told him to call the nurse back again and he should go to [facility] if he has continued problems with that. I explained to him the concern regarding any infection. And that if he had any abdominal pain with this he should call his doctor. The ED course and plan were reviewed and results discussed with the patient    The patient understood and agreed with the Discharge/transfer planning.     CLINICAL IMPRESSION and DISPOSITION    Aliya Reynolds was stable and diagnosed with leaking peritoneal fluid    Patient was treated with Tegaderm and Betadine       Irene Garcia MD  08/11/20 9260

## 2020-08-18 ENCOUNTER — HOSPITAL ENCOUNTER (OUTPATIENT)
Dept: ULTRASOUND IMAGING | Age: 60
Discharge: HOME | End: 2020-08-18
Payer: MEDICAID

## 2020-08-18 PROCEDURE — 49083 ABD PARACENTESIS W/IMAGING: CPT

## 2020-08-25 ENCOUNTER — HOSPITAL ENCOUNTER (OUTPATIENT)
Dept: ULTRASOUND IMAGING | Age: 60
Discharge: HOME | End: 2020-08-25
Payer: MEDICAID

## 2020-08-25 VITALS
DIASTOLIC BLOOD PRESSURE: 88 MMHG | RESPIRATION RATE: 15 BRPM | HEART RATE: 66 BPM | SYSTOLIC BLOOD PRESSURE: 174 MMHG | OXYGEN SATURATION: 97 %

## 2020-08-25 PROCEDURE — 49083 ABD PARACENTESIS W/IMAGING: CPT

## 2020-08-25 NOTE — PROGRESS NOTES
Image guided paracentesis completed by Dr. Alisson Lui. Time out complete, VSS on RA. Patient tolerated well. Patient refuses Albumin. Discharge instructions given, patient verbalized understanding. Patient discharged in stable condition, no needs at time of discharge.   Suzan DELATORRE RN

## 2020-09-01 ENCOUNTER — HOSPITAL ENCOUNTER (OUTPATIENT)
Dept: ULTRASOUND IMAGING | Age: 60
Discharge: HOME | End: 2020-09-01
Payer: MEDICAID

## 2020-09-01 VITALS
OXYGEN SATURATION: 98 % | HEART RATE: 68 BPM | DIASTOLIC BLOOD PRESSURE: 86 MMHG | SYSTOLIC BLOOD PRESSURE: 176 MMHG | RESPIRATION RATE: 16 BRPM

## 2020-09-01 PROCEDURE — 49083 ABD PARACENTESIS W/IMAGING: CPT

## 2020-09-08 ENCOUNTER — HOSPITAL ENCOUNTER (OUTPATIENT)
Dept: ULTRASOUND IMAGING | Age: 60
Discharge: HOME | End: 2020-09-08
Payer: MEDICAID

## 2020-09-08 PROCEDURE — 76705 ECHO EXAM OF ABDOMEN: CPT

## 2020-09-08 NOTE — PROGRESS NOTES
Image guided paracentesis completed by Dr. Jim Nur. 6.3 Liters of yellow fluid removed. Time out complete, VSS on RA. Patient tolerated well. Patient refuses Albumin. Discharge instructions given, patient verbalized understanding. Patient discharged in stable condition, no needs at time of discharge.   Suzan DELATORRE RN

## 2020-09-15 ENCOUNTER — HOSPITAL ENCOUNTER (OUTPATIENT)
Dept: ULTRASOUND IMAGING | Age: 60
Discharge: HOME | End: 2020-09-15
Payer: MEDICAID

## 2020-09-15 PROCEDURE — 49083 ABD PARACENTESIS W/IMAGING: CPT

## 2020-09-22 ENCOUNTER — HOSPITAL ENCOUNTER (OUTPATIENT)
Dept: ULTRASOUND IMAGING | Age: 60
Discharge: HOME | End: 2020-09-22
Payer: MEDICAID

## 2020-09-22 VITALS
RESPIRATION RATE: 16 BRPM | OXYGEN SATURATION: 98 % | SYSTOLIC BLOOD PRESSURE: 200 MMHG | DIASTOLIC BLOOD PRESSURE: 81 MMHG | HEART RATE: 66 BPM

## 2020-09-22 PROCEDURE — 49083 ABD PARACENTESIS W/IMAGING: CPT

## 2020-09-22 NOTE — PROGRESS NOTES
Image guided paracentesis completed by Dr. Coleman Cargo. 6 Liters of yellow fluid removed, refuses Albumin. Time out completed prior, consent obtained. Patient tolerated well. HTN once again noted, -200's, and patient encouraged to speak with MD concerning this, patient is asymptomatic a this time. Denies need to go to the ER. Discharge instructions given, patient verbalized understanding. Patient discharged in stable condition, no needs at time of discharge.   [facility]

## 2020-09-29 ENCOUNTER — HOSPITAL ENCOUNTER (OUTPATIENT)
Dept: NURSING | Age: 60
Setting detail: INFUSION SERIES
End: 2020-09-29
Payer: MEDICAID

## 2020-09-29 ENCOUNTER — HOSPITAL ENCOUNTER (OUTPATIENT)
Dept: ULTRASOUND IMAGING | Age: 60
Discharge: HOME | End: 2020-09-29
Payer: MEDICAID

## 2020-09-29 VITALS
HEART RATE: 81 BPM | OXYGEN SATURATION: 96 % | SYSTOLIC BLOOD PRESSURE: 208 MMHG | RESPIRATION RATE: 14 BRPM | DIASTOLIC BLOOD PRESSURE: 120 MMHG

## 2020-09-29 PROCEDURE — 49083 ABD PARACENTESIS W/IMAGING: CPT

## 2020-10-06 ENCOUNTER — HOSPITAL ENCOUNTER (OUTPATIENT)
Dept: ULTRASOUND IMAGING | Age: 60
Discharge: HOME | End: 2020-10-06
Payer: MEDICAID

## 2020-10-06 PROCEDURE — 49083 ABD PARACENTESIS W/IMAGING: CPT

## 2020-10-13 ENCOUNTER — HOSPITAL ENCOUNTER (OUTPATIENT)
Dept: NURSING | Age: 60
Setting detail: INFUSION SERIES
End: 2020-10-13
Payer: MEDICAID

## 2020-10-13 ENCOUNTER — HOSPITAL ENCOUNTER (OUTPATIENT)
Dept: ULTRASOUND IMAGING | Age: 60
Discharge: HOME | End: 2020-10-13
Payer: MEDICAID

## 2020-10-13 PROCEDURE — 49083 ABD PARACENTESIS W/IMAGING: CPT

## 2020-10-20 ENCOUNTER — HOSPITAL ENCOUNTER (OUTPATIENT)
Dept: NURSING | Age: 60
Setting detail: INFUSION SERIES
End: 2020-10-20
Payer: MEDICAID

## 2020-10-20 ENCOUNTER — HOSPITAL ENCOUNTER (OUTPATIENT)
Dept: ULTRASOUND IMAGING | Age: 60
Discharge: HOME | End: 2020-10-20
Payer: MEDICAID

## 2020-10-20 PROCEDURE — 49083 ABD PARACENTESIS W/IMAGING: CPT

## 2020-10-20 NOTE — PROGRESS NOTES
Image guided paracentesis completed by Dr. Ana Casas. - Liters of yellow fluid removed. Time out complete, VSS on RA. Patient tolerated well. Patient refuses Albumin despite education on the importance of the administration. Discharge instructions given, patient verbalized understanding. Patient discharged in stable condition, no needs at time of discharge.

## 2020-10-27 ENCOUNTER — HOSPITAL ENCOUNTER (OUTPATIENT)
Dept: NURSING | Age: 60
Setting detail: INFUSION SERIES
End: 2020-10-27
Payer: MEDICAID

## 2020-10-27 ENCOUNTER — HOSPITAL ENCOUNTER (OUTPATIENT)
Dept: ULTRASOUND IMAGING | Age: 60
Discharge: HOME | End: 2020-10-27
Payer: MEDICAID

## 2020-10-27 VITALS
DIASTOLIC BLOOD PRESSURE: 107 MMHG | OXYGEN SATURATION: 97 % | HEART RATE: 66 BPM | RESPIRATION RATE: 22 BRPM | SYSTOLIC BLOOD PRESSURE: 182 MMHG

## 2020-10-27 PROCEDURE — 49083 ABD PARACENTESIS W/IMAGING: CPT

## 2020-10-27 NOTE — PROGRESS NOTES
Image guided paracentesis completed. Removed 6350_L of clear yellow_ colored fluid. Pt tolerated procedure without complications. Administered 0_gms of Albumin per order. Discharge instructions provided and pt verbalized understanding. Pt left the department in stable condition. Pt refused albumin.

## 2020-11-03 ENCOUNTER — HOSPITAL ENCOUNTER (OUTPATIENT)
Dept: ULTRASOUND IMAGING | Age: 60
Discharge: HOME | End: 2020-11-03
Payer: MEDICAID

## 2020-11-03 PROBLEM — I10 ESSENTIAL HYPERTENSION: Status: RESOLVED | Noted: 2020-11-03 | Resolved: 2020-11-03

## 2020-11-03 PROCEDURE — 49083 ABD PARACENTESIS W/IMAGING: CPT

## 2020-11-10 ENCOUNTER — HOSPITAL ENCOUNTER (OUTPATIENT)
Dept: ULTRASOUND IMAGING | Age: 60
Discharge: HOME | End: 2020-11-10
Payer: MEDICAID

## 2020-11-10 PROCEDURE — 49083 ABD PARACENTESIS W/IMAGING: CPT

## 2020-11-10 NOTE — BRIEF OP NOTE
Brief Postoperative Note    Junior Torres  YOB: 1960  [id number]    Pre-operative Diagnosis: ascites    Post-operative Diagnosis: Same    Procedure: US-guided paracentesis    Anesthesia: Local    Surgeons: Tariq Grimes MD    Estimated Blood Loss: Less than 5 mL    Complications: None    Specimens: Was Obtained: ascitic fluid drained    Findings: Successful US-guided paracentesis.     Electronically signed by Tariq Grimes MD on 11/10/2020 at 2:48 PM

## 2020-11-17 ENCOUNTER — HOSPITAL ENCOUNTER (OUTPATIENT)
Dept: ULTRASOUND IMAGING | Age: 60
Discharge: HOME | End: 2020-11-17
Payer: MEDICAID

## 2020-11-17 PROCEDURE — 49083 ABD PARACENTESIS W/IMAGING: CPT

## 2020-11-24 ENCOUNTER — HOSPITAL ENCOUNTER (OUTPATIENT)
Dept: ULTRASOUND IMAGING | Age: 60
Discharge: HOME | End: 2020-11-24
Payer: MEDICAID

## 2020-11-24 PROCEDURE — 49083 ABD PARACENTESIS W/IMAGING: CPT

## 2020-12-01 ENCOUNTER — HOSPITAL ENCOUNTER (OUTPATIENT)
Dept: ULTRASOUND IMAGING | Age: 60
Discharge: HOME | End: 2020-12-01
Payer: MEDICAID

## 2020-12-01 PROCEDURE — 49083 ABD PARACENTESIS W/IMAGING: CPT

## 2020-12-08 ENCOUNTER — HOSPITAL ENCOUNTER (OUTPATIENT)
Dept: ULTRASOUND IMAGING | Age: 60
Discharge: HOME | End: 2020-12-08
Payer: MEDICAID

## 2020-12-08 ENCOUNTER — HOSPITAL ENCOUNTER (OUTPATIENT)
Dept: NURSING | Age: 60
Setting detail: INFUSION SERIES
End: 2020-12-08
Payer: MEDICAID

## 2020-12-08 VITALS
SYSTOLIC BLOOD PRESSURE: 216 MMHG | RESPIRATION RATE: 16 BRPM | OXYGEN SATURATION: 98 % | HEART RATE: 84 BPM | DIASTOLIC BLOOD PRESSURE: 112 MMHG

## 2020-12-08 PROCEDURE — 49083 ABD PARACENTESIS W/IMAGING: CPT

## 2020-12-08 NOTE — PROGRESS NOTES
Patient's blood pressure remains consistently high. Patient is unhappy with previous provider, new referral given to patient for Dr. Faby Jim. Appointment made to establish care. New appointment made for Tuesday, December 15th @ 1130. Patient aware of appointment time.   Summer MEMO DELATORRE RN

## 2020-12-15 ENCOUNTER — HOSPITAL ENCOUNTER (OUTPATIENT)
Dept: ULTRASOUND IMAGING | Age: 60
Discharge: HOME | End: 2020-12-15
Payer: MEDICAID

## 2020-12-15 PROCEDURE — 49083 ABD PARACENTESIS W/IMAGING: CPT

## 2020-12-22 ENCOUNTER — HOSPITAL ENCOUNTER (OUTPATIENT)
Dept: NURSING | Age: 60
Setting detail: INFUSION SERIES
End: 2020-12-22
Payer: MEDICAID

## 2020-12-29 ENCOUNTER — HOSPITAL ENCOUNTER (OUTPATIENT)
Dept: NURSING | Age: 60
Setting detail: INFUSION SERIES
End: 2020-12-29
Payer: MEDICAID

## 2020-12-29 ENCOUNTER — HOSPITAL ENCOUNTER (OUTPATIENT)
Dept: ULTRASOUND IMAGING | Age: 60
Discharge: HOME | End: 2020-12-29
Payer: MEDICAID

## 2020-12-29 PROCEDURE — 49083 ABD PARACENTESIS W/IMAGING: CPT

## 2021-01-01 ENCOUNTER — HOSPITAL ENCOUNTER (OUTPATIENT)
Dept: NURSING | Age: 61
Setting detail: INFUSION SERIES
End: 2021-01-01
Payer: COMMERCIAL

## 2021-01-01 ENCOUNTER — HOSPITAL ENCOUNTER (OUTPATIENT)
Dept: NURSING | Age: 61
Setting detail: INFUSION SERIES
End: 2021-01-01

## 2021-01-01 ENCOUNTER — HOSPITAL ENCOUNTER (OUTPATIENT)
Dept: ULTRASOUND IMAGING | Age: 61
Discharge: HOME | End: 2021-11-22
Payer: COMMERCIAL

## 2021-01-01 ENCOUNTER — HOSPITAL ENCOUNTER (OUTPATIENT)
Dept: ULTRASOUND IMAGING | Age: 61
Discharge: HOME | End: 2021-12-20
Payer: COMMERCIAL

## 2021-01-01 ENCOUNTER — HOSPITAL ENCOUNTER (OUTPATIENT)
Dept: ULTRASOUND IMAGING | Age: 61
Discharge: HOME | End: 2021-08-12
Payer: COMMERCIAL

## 2021-01-01 ENCOUNTER — HOSPITAL ENCOUNTER (OUTPATIENT)
Dept: ULTRASOUND IMAGING | Age: 61
Discharge: HOME | End: 2021-12-27
Payer: COMMERCIAL

## 2021-01-01 ENCOUNTER — HOSPITAL ENCOUNTER (OUTPATIENT)
Age: 61
Discharge: HOME | End: 2021-11-22
Payer: COMMERCIAL

## 2021-01-01 ENCOUNTER — HOSPITAL ENCOUNTER (OUTPATIENT)
Dept: ULTRASOUND IMAGING | Age: 61
Discharge: HOME | End: 2021-12-06
Payer: COMMERCIAL

## 2021-01-01 DIAGNOSIS — K70.31 ASCITES DUE TO ALCOHOLIC CIRRHOSIS (HCC): ICD-10-CM

## 2021-01-01 DIAGNOSIS — K70.31 ASCITES DUE TO ALCOHOLIC CIRRHOSIS (HCC): Primary | ICD-10-CM

## 2021-01-01 DIAGNOSIS — R18.8 ASCITES OF LIVER: ICD-10-CM

## 2021-01-01 DIAGNOSIS — N18.6 END STAGE RENAL DISEASE (HCC): Primary | ICD-10-CM

## 2021-01-01 DIAGNOSIS — K70.31 ALCOHOLIC CIRRHOSIS OF LIVER WITH ASCITES (HCC): ICD-10-CM

## 2021-01-01 LAB
INR BLD: 1.15 (ref 0.88–1.12)
PLATELET # BLD: 165 K/UL (ref 135–450)
PROTHROMBIN TIME: 13.1 SEC (ref 9.9–12.7)

## 2021-01-01 PROCEDURE — 85610 PROTHROMBIN TIME: CPT

## 2021-01-01 PROCEDURE — 49083 ABD PARACENTESIS W/IMAGING: CPT

## 2021-01-01 PROCEDURE — 85049 AUTOMATED PLATELET COUNT: CPT

## 2021-01-01 PROCEDURE — 36415 COLL VENOUS BLD VENIPUNCTURE: CPT

## 2021-01-08 PROBLEM — E11.628 DIABETIC FOOT INFECTION (HCC): Status: ACTIVE | Noted: 2021-01-08

## 2021-01-08 PROBLEM — L08.9 DIABETIC FOOT INFECTION (HCC): Status: ACTIVE | Noted: 2021-01-08

## 2021-01-09 ENCOUNTER — APPOINTMENT (OUTPATIENT)
Dept: GENERAL RADIOLOGY | Age: 61
DRG: 380 | End: 2021-01-09
Attending: HOSPITALIST
Payer: MEDICAID

## 2021-01-09 ENCOUNTER — HOSPITAL ENCOUNTER (INPATIENT)
Age: 61
LOS: 7 days | Discharge: NURSING, REHAB OR LONG-TERM CARE | DRG: 380 | End: 2021-01-16
Attending: HOSPITALIST | Admitting: INTERNAL MEDICINE
Payer: MEDICAID

## 2021-01-09 DIAGNOSIS — F41.9 ANXIETY: Primary | ICD-10-CM

## 2021-01-09 PROBLEM — K70.31 ALCOHOLIC CIRRHOSIS OF LIVER WITH ASCITES (HCC): Status: ACTIVE | Noted: 2021-01-09

## 2021-01-09 LAB
A/G RATIO: 0.8 (ref 1.1–2.2)
ABO/RH: NORMAL
ALBUMIN SERPL-MCNC: 2.7 G/DL (ref 3.4–5)
ALP BLD-CCNC: 111 U/L (ref 40–129)
ALT SERPL-CCNC: 10 U/L (ref 10–40)
AMORPHOUS: ABNORMAL /HPF
AMPHETAMINE SCREEN, URINE: ABNORMAL
ANION GAP SERPL CALCULATED.3IONS-SCNC: 11 MMOL/L (ref 3–16)
ANTIBODY SCREEN: NORMAL
AST SERPL-CCNC: 18 U/L (ref 15–37)
BACTERIA: ABNORMAL /HPF
BARBITURATE SCREEN URINE: ABNORMAL
BASOPHILS ABSOLUTE: 0 K/UL (ref 0–0.2)
BASOPHILS RELATIVE PERCENT: 0.4 %
BENZODIAZEPINE SCREEN, URINE: ABNORMAL
BILIRUB SERPL-MCNC: <0.2 MG/DL (ref 0–1)
BILIRUBIN URINE: NEGATIVE
BLOOD, URINE: ABNORMAL
BUN BLDV-MCNC: 35 MG/DL (ref 7–20)
C-REACTIVE PROTEIN: 29.5 MG/L (ref 0–5.1)
CALCIUM SERPL-MCNC: 7.7 MG/DL (ref 8.3–10.6)
CANNABINOID SCREEN URINE: ABNORMAL
CHLORIDE BLD-SCNC: 102 MMOL/L (ref 99–110)
CHLORIDE URINE RANDOM: 22 MMOL/L
CLARITY: CLEAR
CO2: 22 MMOL/L (ref 21–32)
COARSE CASTS, UA: ABNORMAL /LPF (ref 0–2)
COCAINE METABOLITE SCREEN URINE: ABNORMAL
COLOR: YELLOW
CREAT SERPL-MCNC: 4.7 MG/DL (ref 0.8–1.3)
CREATININE URINE: 116.4 MG/DL (ref 39–259)
EOSINOPHILS ABSOLUTE: 0.1 K/UL (ref 0–0.6)
EOSINOPHILS RELATIVE PERCENT: 0.7 %
EPITHELIAL CELLS, UA: ABNORMAL /HPF (ref 0–5)
GFR AFRICAN AMERICAN: 15
GFR NON-AFRICAN AMERICAN: 13
GLOBULIN: 3.4 G/DL
GLUCOSE BLD-MCNC: 112 MG/DL (ref 70–99)
GLUCOSE BLD-MCNC: 122 MG/DL (ref 70–99)
GLUCOSE BLD-MCNC: 127 MG/DL (ref 70–99)
GLUCOSE BLD-MCNC: 84 MG/DL (ref 70–99)
GLUCOSE BLD-MCNC: 93 MG/DL (ref 70–99)
GLUCOSE URINE: NEGATIVE MG/DL
HCT VFR BLD CALC: 30.3 % (ref 40.5–52.5)
HEMOGLOBIN: 9.3 G/DL (ref 13.5–17.5)
HYALINE CASTS: ABNORMAL /LPF (ref 0–2)
INR BLD: 1.1 (ref 0.86–1.14)
KETONES, URINE: NEGATIVE MG/DL
LEUKOCYTE ESTERASE, URINE: NEGATIVE
LYMPHOCYTES ABSOLUTE: 0.4 K/UL (ref 1–5.1)
LYMPHOCYTES RELATIVE PERCENT: 5.9 %
Lab: ABNORMAL
MAGNESIUM: 1.5 MG/DL (ref 1.8–2.4)
MCH RBC QN AUTO: 23.9 PG (ref 26–34)
MCHC RBC AUTO-ENTMCNC: 30.6 G/DL (ref 31–36)
MCV RBC AUTO: 78.2 FL (ref 80–100)
METHADONE SCREEN, URINE: ABNORMAL
MICROSCOPIC EXAMINATION: YES
MONOCYTES ABSOLUTE: 0.4 K/UL (ref 0–1.3)
MONOCYTES RELATIVE PERCENT: 5 %
MUCUS: ABNORMAL /LPF
NEUTROPHILS ABSOLUTE: 6.5 K/UL (ref 1.7–7.7)
NEUTROPHILS RELATIVE PERCENT: 88 %
NITRITE, URINE: NEGATIVE
OPIATE SCREEN URINE: POSITIVE
OXYCODONE URINE: ABNORMAL
PDW BLD-RTO: 17.7 % (ref 12.4–15.4)
PERFORMED ON: ABNORMAL
PERFORMED ON: ABNORMAL
PERFORMED ON: NORMAL
PERFORMED ON: NORMAL
PH UA: 5.5
PH UA: 5.5 (ref 5–8)
PHENCYCLIDINE SCREEN URINE: ABNORMAL
PLATELET # BLD: 271 K/UL (ref 135–450)
PMV BLD AUTO: 7.8 FL (ref 5–10.5)
POTASSIUM SERPL-SCNC: 5.5 MMOL/L (ref 3.5–5.1)
POTASSIUM, UR: 96.1 MMOL/L
PROPOXYPHENE SCREEN: ABNORMAL
PROTEIN PROTEIN: 240 MG/DL
PROTEIN UA: 100 MG/DL
PROTEIN/CREAT RATIO: 2.1 MG/DL
PROTHROMBIN TIME: 12.8 SEC (ref 10–13.2)
RBC # BLD: 3.87 M/UL (ref 4.2–5.9)
RBC UA: ABNORMAL /HPF (ref 0–4)
SEDIMENTATION RATE, ERYTHROCYTE: 39 MM/HR (ref 0–20)
SODIUM BLD-SCNC: 135 MMOL/L (ref 136–145)
SODIUM URINE: 24 MMOL/L
SPECIFIC GRAVITY UA: >=1.03 (ref 1–1.03)
TOTAL PROTEIN: 6.1 G/DL (ref 6.4–8.2)
URINE REFLEX TO CULTURE: ABNORMAL
URINE TYPE: ABNORMAL
UROBILINOGEN, URINE: 0.2 E.U./DL
WBC # BLD: 7.4 K/UL (ref 4–11)
WBC UA: ABNORMAL /HPF (ref 0–5)

## 2021-01-09 PROCEDURE — 84300 ASSAY OF URINE SODIUM: CPT

## 2021-01-09 PROCEDURE — 6370000000 HC RX 637 (ALT 250 FOR IP): Performed by: INTERNAL MEDICINE

## 2021-01-09 PROCEDURE — 85025 COMPLETE CBC W/AUTO DIFF WBC: CPT

## 2021-01-09 PROCEDURE — 84156 ASSAY OF PROTEIN URINE: CPT

## 2021-01-09 PROCEDURE — 86850 RBC ANTIBODY SCREEN: CPT

## 2021-01-09 PROCEDURE — 86900 BLOOD TYPING SEROLOGIC ABO: CPT

## 2021-01-09 PROCEDURE — P9047 ALBUMIN (HUMAN), 25%, 50ML: HCPCS | Performed by: INTERNAL MEDICINE

## 2021-01-09 PROCEDURE — 82436 ASSAY OF URINE CHLORIDE: CPT

## 2021-01-09 PROCEDURE — 81001 URINALYSIS AUTO W/SCOPE: CPT

## 2021-01-09 PROCEDURE — 85652 RBC SED RATE AUTOMATED: CPT

## 2021-01-09 PROCEDURE — 83036 HEMOGLOBIN GLYCOSYLATED A1C: CPT

## 2021-01-09 PROCEDURE — 80307 DRUG TEST PRSMV CHEM ANLYZR: CPT

## 2021-01-09 PROCEDURE — 86901 BLOOD TYPING SEROLOGIC RH(D): CPT

## 2021-01-09 PROCEDURE — 36415 COLL VENOUS BLD VENIPUNCTURE: CPT

## 2021-01-09 PROCEDURE — 86140 C-REACTIVE PROTEIN: CPT

## 2021-01-09 PROCEDURE — 1200000000 HC SEMI PRIVATE

## 2021-01-09 PROCEDURE — 73630 X-RAY EXAM OF FOOT: CPT

## 2021-01-09 PROCEDURE — 80053 COMPREHEN METABOLIC PANEL: CPT

## 2021-01-09 PROCEDURE — 85610 PROTHROMBIN TIME: CPT

## 2021-01-09 PROCEDURE — 2580000003 HC RX 258: Performed by: INTERNAL MEDICINE

## 2021-01-09 PROCEDURE — 6360000002 HC RX W HCPCS: Performed by: INTERNAL MEDICINE

## 2021-01-09 PROCEDURE — 84133 ASSAY OF URINE POTASSIUM: CPT

## 2021-01-09 PROCEDURE — 82570 ASSAY OF URINE CREATININE: CPT

## 2021-01-09 PROCEDURE — 83735 ASSAY OF MAGNESIUM: CPT

## 2021-01-09 RX ORDER — METOPROLOL SUCCINATE 50 MG/1
50 TABLET, EXTENDED RELEASE ORAL DAILY
Status: DISCONTINUED | OUTPATIENT
Start: 2021-01-09 | End: 2021-01-11

## 2021-01-09 RX ORDER — ALBUMIN (HUMAN) 12.5 G/50ML
50 SOLUTION INTRAVENOUS ONCE
Status: COMPLETED | OUTPATIENT
Start: 2021-01-09 | End: 2021-01-09

## 2021-01-09 RX ORDER — DEXTROSE MONOHYDRATE 50 MG/ML
100 INJECTION, SOLUTION INTRAVENOUS PRN
Status: DISCONTINUED | OUTPATIENT
Start: 2021-01-09 | End: 2021-01-16 | Stop reason: HOSPADM

## 2021-01-09 RX ORDER — DEXTROSE MONOHYDRATE 25 G/50ML
12.5 INJECTION, SOLUTION INTRAVENOUS PRN
Status: DISCONTINUED | OUTPATIENT
Start: 2021-01-09 | End: 2021-01-16 | Stop reason: HOSPADM

## 2021-01-09 RX ORDER — ACETAMINOPHEN 325 MG/1
650 TABLET ORAL EVERY 6 HOURS PRN
Status: DISCONTINUED | OUTPATIENT
Start: 2021-01-09 | End: 2021-01-16 | Stop reason: HOSPADM

## 2021-01-09 RX ORDER — ATORVASTATIN CALCIUM 40 MG/1
40 TABLET, FILM COATED ORAL NIGHTLY
Status: DISCONTINUED | OUTPATIENT
Start: 2021-01-09 | End: 2021-01-16 | Stop reason: HOSPADM

## 2021-01-09 RX ORDER — LANOLIN ALCOHOL/MO/W.PET/CERES
3 CREAM (GRAM) TOPICAL NIGHTLY PRN
Status: DISCONTINUED | OUTPATIENT
Start: 2021-01-09 | End: 2021-01-16 | Stop reason: HOSPADM

## 2021-01-09 RX ORDER — ACETAMINOPHEN 650 MG/1
650 SUPPOSITORY RECTAL EVERY 6 HOURS PRN
Status: DISCONTINUED | OUTPATIENT
Start: 2021-01-09 | End: 2021-01-16 | Stop reason: HOSPADM

## 2021-01-09 RX ORDER — SODIUM CHLORIDE 0.9 % (FLUSH) 0.9 %
10 SYRINGE (ML) INJECTION PRN
Status: DISCONTINUED | OUTPATIENT
Start: 2021-01-09 | End: 2021-01-16 | Stop reason: HOSPADM

## 2021-01-09 RX ORDER — NICOTINE POLACRILEX 4 MG
15 LOZENGE BUCCAL PRN
Status: DISCONTINUED | OUTPATIENT
Start: 2021-01-09 | End: 2021-01-16 | Stop reason: HOSPADM

## 2021-01-09 RX ORDER — ONDANSETRON 2 MG/ML
4 INJECTION INTRAMUSCULAR; INTRAVENOUS EVERY 6 HOURS PRN
Status: DISCONTINUED | OUTPATIENT
Start: 2021-01-09 | End: 2021-01-16 | Stop reason: HOSPADM

## 2021-01-09 RX ORDER — HEPARIN SODIUM 5000 [USP'U]/ML
5000 INJECTION, SOLUTION INTRAVENOUS; SUBCUTANEOUS EVERY 8 HOURS SCHEDULED
Status: DISCONTINUED | OUTPATIENT
Start: 2021-01-09 | End: 2021-01-16 | Stop reason: HOSPADM

## 2021-01-09 RX ORDER — PROMETHAZINE HYDROCHLORIDE 25 MG/1
12.5 TABLET ORAL EVERY 6 HOURS PRN
Status: DISCONTINUED | OUTPATIENT
Start: 2021-01-09 | End: 2021-01-16 | Stop reason: HOSPADM

## 2021-01-09 RX ADMIN — METOPROLOL SUCCINATE 50 MG: 50 TABLET, EXTENDED RELEASE ORAL at 09:16

## 2021-01-09 RX ADMIN — ATORVASTATIN CALCIUM 40 MG: 40 TABLET, FILM COATED ORAL at 21:14

## 2021-01-09 RX ADMIN — PROMETHAZINE HYDROCHLORIDE 12.5 MG: 25 TABLET ORAL at 04:02

## 2021-01-09 RX ADMIN — HEPARIN SODIUM 5000 UNITS: 5000 INJECTION INTRAVENOUS; SUBCUTANEOUS at 14:41

## 2021-01-09 RX ADMIN — Medication 10 ML: at 21:14

## 2021-01-09 RX ADMIN — SODIUM ZIRCONIUM CYCLOSILICATE 10 G: 10 POWDER, FOR SUSPENSION ORAL at 21:14

## 2021-01-09 RX ADMIN — SODIUM ZIRCONIUM CYCLOSILICATE 10 G: 10 POWDER, FOR SUSPENSION ORAL at 18:56

## 2021-01-09 RX ADMIN — HEPARIN SODIUM 5000 UNITS: 5000 INJECTION INTRAVENOUS; SUBCUTANEOUS at 21:13

## 2021-01-09 RX ADMIN — ALBUMIN (HUMAN) 50 G: 0.25 INJECTION, SOLUTION INTRAVENOUS at 04:01

## 2021-01-09 NOTE — H&P
Hospital Medicine History & Physical      Patient:  Jed Vogel  :   1960  MRN:   [id number]  Date of Service: 21    CHIEF COMPLAINT:  Right foot ulcer    HISTORY OF PRESENT ILLNESS:    Jed Vogel is a 56 y.o. male. He was accepted in transfer from [facility]. He presented from home for a right foot ulcer. He relates that about three weeks ago he awoke one morning to find \"my whole foot looked like on big blister. \"  Since that time there has been quite a bit of skin sloughing from the leg and foot. There has been significant tissue breakdown on the sole of the foot under his big toe. He has very poor sensation in the right leg. Patient has not felt ill. He has a strong appetite. He denies rigors, sweats, and nausea. He is taking isordil 20mg tid, hydralazine 25mg tid, metoprolol succinate 50mg daily, and sliding scale lantus. Review of Systems:  All pertinent positives and negatives are as noted in the HPI section. All other systems were reviewed and are negative. Past Medical History:   Diagnosis Date    Bacteremia 2017    strep agalactiae (also cultured from foot)    Cellulitis and abscess of foot, except toes     CHF (congestive heart failure) (HCC)     Diabetes mellitus ([address]     History of liver failure     Hypertension     Kidney disease     MRSA (methicillin resistant staph aureus) culture positive 18, 2017    left foot       Past Surgical History:   Procedure Laterality Date    LEG AMPUTATION BELOW KNEE      PARACENTESIS      UPPER GASTROINTESTINAL ENDOSCOPY N/A 2019    EGD ESOPHAGOGASTRODUODENOSCOPY performed by Hemalatha Quintanilla MD at [address] [place] [address]         Prior to Admission medications    Medication Sig Start Date End Date Taking?  Authorizing Provider   isosorbide-hydrALAZINE (BIDIL) 20-37.5 MG per tablet Take 1 tablet by mouth 3 times daily 19   Erika MANRIQUE Lencho Alvarez, APRN - CNP   metoprolol succinate (TOPROL XL) 50 MG extended release tablet Take 1 tablet by mouth daily 2/23/19   Isaiah Hightower MD   magnesium oxide (MAG-OX) 400 (241.3 Mg) MG TABS tablet Take 1 tablet by mouth daily 2/24/19   Isaiah Hightower MD   insulin lispro (HUMALOG KWIKPEN) 100 UNIT/ML pen Glucose: Dose:   No Insulin 140-249 1 Unit 250-349 2 Units Over 350 -3 Units 2/23/19   Isaiah Hightower MD   atorvastatin (LIPITOR) 40 MG tablet Take 1 tablet by mouth nightly 1/29/19 3/21/20  Michael Byers MD   albuterol sulfate HFA (PROAIR HFA) 108 (90 Base) MCG/ACT inhaler Inhale 2 puffs into the lungs every 6 hours as needed for Wheezing 1/1/18   Bailey Carroll MD       Allergies:   Pcn [penicillins]    Social:   reports that he has been smoking cigarettes. He has a 40.00 pack-year smoking history. He has quit using smokeless tobacco.  His smokeless tobacco use included chew. reports no history of alcohol use. Social History     Substance and Sexual Activity   Drug Use Yes    Types: Marijuana       Family History   Problem Relation Age of Onset    Diabetes Mother     Hypertension Mother     Dementia Mother     Heart Attack Father     Stroke Brother     No Known Problems Maternal Grandmother     No Known Problems Maternal Grandfather     No Known Problems Paternal Grandmother     No Known Problems Paternal Grandfather     Heart Attack Brother        PHYSICAL EXAM:  I performed this physical examination. Vitals:  Patient Vitals for the past 24 hrs:   BP Temp Pulse Resp SpO2   01/09/21 0032 (!) 148/74 98.3 °F (36.8 °C) 59 20 98 %     No intake or output data in the 24 hours ending 01/09/21 0206    Room air    GEN:  Appearance:  Age appropriate male in NAD . Level of Consciousness:  alert . Orientation:  full    HEENT: Sclera anicteric.  no conjunctival chemosis. moist mucus membranes. no specific or diagnostic oral lesions.     NECK:  no signs of meningismus. Jugular veins are distended. Carotid pulses  2+.  no cervical lymphadenopathy. no thyromegaly. CV:  regular rhythm. normal S1 & S2.    8-0/7 blowing systolic murmur over the apex. No rub.  no gallop. PULM:  Chest excursion is symmetric. Breath sounds are generally vesicular. Adventitious sounds: Inspiratory crackles over left base    AB:  Abdominal shape is softly distended. Bowel sounds are active. Generally soft to palpation. no tenderness is present. no involuntary guarding. no rebound guarding. EXTR:  Patient is s/p L BKA    Skin of the entire right leg is woody and there is 4+ pitting edema up to the right hip. Significant tissue breakdown and eschar on the sole of the right primarily underlying the 1st MTP and extending into the bases of the 1st and 2nd digits. There are also at least two slit-like skin openings, one along the lateral mid foot, and another medially. Both of these slit shaped wounds are draining serous fluid. There is significant epidermal sloughing involving the whole foot and leg below knee. Right femoral and dp pulses 2+. PT pulse not palpable. LABS:  CBC w/ diff:  WBC 7.6 w/ 83% segs ; Hgb = 9.5 g/dL ; Plt = 273 K/mcL    ESR = 40  C-RP = 4.1    ProBNP > 35,000    CHEM: Na = 134 ; K = 5.2 ; Cl = 102 ; CO2 = 22  BUN = 35 ; Creat = 4.4  Lactate = 2.2  Procalcitonin = <0.05    COVID-19 NAAT negative    IMAGING:  Portable CXR ([place]) Report  (unable to review images on disc d/t technical problem)  There is increased groundglass density in the left mid and lower lung concerning for interstitial pneumonia. Left hemidiaphragm silhouetted out consistent with underlying consolidation or effusion.       Active Hospital Problems    Diagnosis Date Noted    Alcoholic cirrhosis of liver with ascites ([facility] [K70.31] 01/09/2021    Diabetic foot infection ([facility] [C19.063, L08.9] 01/08/2021    Cardiomyopathy, ischemic [I25.5]     Stage 3 chronic kidney disease [N18.30] 02/28/2018    Type 2 diabetes mellitus with left diabetic foot infection ([facility] [facility] [E11.628, L08.9]     Acute kidney injury superimposed on chronic kidney disease ([facility] [facility] [N17.9, N18.9]     Tobacco abuse [Z72.0] 07/29/2015       ASSESSMENT & PLAN  Type II DM w/ Diabetic Polyneuropathy, Diabetic ulcer  -  Patient received empiric vancomycin and cefepime at [facility]. Further empiric abx are not warranted until after operative debridement and tissue sampling for culture. -  Check right leg SHIRLEY. -  Will keep patient NPO and request podiatry assistance with debridement. DENIA on CKD3  - baseline creatinine ~1.8 , now 4.4. Probably type 2 hepatorenal.  -  Patient has not been taking diuretics for ascites management. He has been undergoing paracentesis every week at [facility].   ~5L is removed each time and he is not receiving albumin afterward. Last paracentesis was Thursday 1/7/21.  -  Will try to give 50g 25% albumin now. Patient relates this has been a challenge in the past though because his veins blow easily. We discussed the potential placement of a PICC line especially since he may require extended antibiotic treatment, but considering his CKD will await nephrology input on this. -  Will request nephrology assistance. I favor starting IV diuresis but will hold off pending nephrology input. Systolic Heart Failure  - LVEF = 35 - 40% by TTE 1/2019. No coronary angiogram could be found. SWMA's are present on TTE c/w prior RCA territory MI.  -  Continue home metoprolol XL. He is not prescribed an antiplatelet agent or statin. -  He has been taking hydralazine and isordil at home. Neither are a good idea in the setting of cirrhosis w/ portal HTN and ascites. I would favor stopping both and starting diuresis to manage patient's ascites and type 2 hepatorenal syndrome. I will await input from nephrology on that as well.     Alcoholic Cirrhosis w/ ascites  -  Patient has been abstinent from alcohol, but his cirrhosis is essentially not being treated aside from scheduled paracenteses. -  Aside from ascites, liver function seems well compensated. He is not encephalopathic at this time. DM2  -  Hold all oral antidiabetic agents. Start s.c. Insulin regimen based on weight. Tobacco abuse  -  Smoking cessation counseled. NRT provided. DVT prophylaxis: SCDs, s.c. UFH  Code Status:  Full  Disposition:  Inpatient for the foreseeable future.     Nabil Ordonez MD

## 2021-01-09 NOTE — CONSULTS
Full consult note to follow  -Full thickness ulcers right foot. Does not probe deep. No signs of cellulitis. xray not suspicious for OM.    -Ordered arterial studies, likely will need vascular consult.  -No need for urgent surgical intervention, can resume diet  -DSD with aquacel applied  -Will continue to follow    Rachel Zhu DPM  Office:[phone]  [address] [place] [address]

## 2021-01-09 NOTE — PROGRESS NOTES
Thank you for considering [facility] for inpatient consultation. Noted that the patient was recently seen and followed by [facility]. Please direct all renal related questions to  [facility] for this patient- [phone] Dr. Yoseph Barrera    Informed: Dr. Iqra Ramsey nephrology  [facility]  [phone]

## 2021-01-09 NOTE — CONSULTS
Podiatry Consult Note      Reason for Consult:  Right foot wound  Requesting Physician:  Storm Vinson MD    Chief Complaint: right foot wounds    History of Present Illness: The patient is a 56 y.o. male with significant past medical history of CHF, DM, CKD, MRSA, left BKA who presents for three weeks of wound formation to the right foot. He states the wound started out as a blister and then progressed into wounds. He does not have good sensation in his legs. Denies N,V,D,C,F. He denies injury. States he developed wounds on the left that resulted in a BKA due to lack of healing. He was transferred from [facility]. Current smoker.     Past Medical History:        Diagnosis Date    Bacteremia 11/18/2017    strep agalactiae (also cultured from foot)    Cellulitis and abscess of foot, except toes     CHF (congestive heart failure) (HCC)     Diabetes mellitus ([facility]     History of liver failure     Hypertension     Kidney disease     MRSA (methicillin resistant staph aureus) culture positive 4/17/18, 11/19/2017    left foot       Past Surgical History:        Procedure Laterality Date    LEG AMPUTATION BELOW KNEE      PARACENTESIS      UPPER GASTROINTESTINAL ENDOSCOPY N/A 1/29/2019    EGD ESOPHAGOGASTRODUODENOSCOPY performed by Alex Antoine MD at [address]      Left BKA       Current Medications:    Current Facility-Administered Medications: sodium chloride flush 0.9 % injection 10 mL, 10 mL, Intravenous, PRN  promethazine (PHENERGAN) tablet 12.5 mg, 12.5 mg, Oral, Q6H PRN **OR** ondansetron (ZOFRAN) injection 4 mg, 4 mg, Intravenous, Q6H PRN  acetaminophen (TYLENOL) tablet 650 mg, 650 mg, Oral, Q6H PRN **OR** acetaminophen (TYLENOL) suppository 650 mg, 650 mg, Rectal, Q6H PRN  nicotine (NICODERM CQ) 7 MG/24HR 1 patch, 1 patch, Transdermal, Daily  atorvastatin (LIPITOR) tablet 40 mg, 40 mg, Oral, Nightly  metoprolol succinate (TOPROL XL) extended release tablet 50 mg, 50 mg, Oral, Daily  melatonin tablet 3 mg, 3 mg, Oral, Nightly PRN  heparin (porcine) injection 5,000 Units, 5,000 Units, Subcutaneous, 3 times per day  glucose (GLUTOSE) 40 % oral gel 15 g, 15 g, Oral, PRN  dextrose 50 % IV solution, 12.5 g, Intravenous, PRN  glucagon (rDNA) injection 1 mg, 1 mg, Intramuscular, PRN  dextrose 5 % solution, 100 mL/hr, Intravenous, PRN  insulin lispro (HUMALOG) injection vial 0-6 Units, 0-6 Units, Subcutaneous, Q4H    Allergies:  Pcn [penicillins]    Social History:  Smokes 1 pack/3 days    Family History:     Review of Systems: as noted above    Physical Exam:    Vitals:    BP (!) 150/88   Pulse 57   Temp 98.2 °F (36.8 °C) (Oral)   Resp 18   Ht 6' 1\" (1.854 m)   Wt 252 lb 6.4 oz (114.5 kg)   SpO2 97%   BMI 33.30 kg/m²   Integument: Two full thickness ulcerations plantar to the right 1st MTP with fibrotic/necrotic base (1.5cm x 1.5cm x 0.2cm). Wound does not probe deep. No purulent drainage. No surrounding erythema, edema, or warmth. There is a 2.0cm x 0.5cm x 0.3cm wound to the dorsal lateral foot with fibrotic base. No SOI as noted above. Skin is overall thin, atrophic, and peeling. Neurologic:  Protective sensation is grossly diminished to light touch at the level of the toes, right  Vascular:  DP and PT pulses are non-palpable, right. .  No significant edema appreciated. Musculoskeletal:  Patient has 5/5 strength on inversion/everison/dorsiflexion/plantarflexion, right.  Left BKA    Labs:  CBC with Differential:    Lab Results   Component Value Date    WBC 7.4 01/09/2021    RBC 3.87 01/09/2021    HGB 9.3 01/09/2021    HCT 30.3 01/09/2021     01/09/2021    MCV 78.2 01/09/2021    MCH 23.9 01/09/2021    MCHC 30.6 01/09/2021    RDW 17.7 01/09/2021    LYMPHOPCT 5.9 01/09/2021    MONOPCT 5.0 01/09/2021    BASOPCT 0.4 01/09/2021    MONOSABS 0.4 01/09/2021    LYMPHSABS 0.4 01/09/2021    EOSABS 0.1 01/09/2021    BASOSABS 0.0 01/09/2021 CMP:    Lab Results   Component Value Date     01/09/2021    K 5.5 01/09/2021    K 5.2 03/21/2020     01/09/2021    CO2 22 01/09/2021    BUN 35 01/09/2021    CREATININE 4.7 01/09/2021    GFRAA 15 01/09/2021    AGRATIO 0.8 01/09/2021    LABGLOM 13 01/09/2021    GLUCOSE 122 01/09/2021    PROT 6.1 01/09/2021    LABALBU 2.7 01/09/2021    CALCIUM 7.7 01/09/2021    BILITOT <0.2 01/09/2021    ALKPHOS 111 01/09/2021    AST 18 01/09/2021    ALT 10 01/09/2021     HgBA1c:    Lab Results   Component Value Date    LABA1C 5.7 02/22/2019       Imaging:   Soft tissue irregularity adjacent to the 1st MTP joint, suspicious for   ulceration given patient history.  Correlate with physical exam.  No   generalized erosive pattern is seen.  If there remains strong clinical   concern for developing osteomyelitis, MR or three-phase bone scan could be   considered. Impression/Recommendations: The patient is a pleasant 56 y.o. male with:  1) Full thickness wound to fat right foot  -Aquacel AG DSD  -likely ischemic in nature, obtain arterial studies  -No urgent need for debridement  2) PVD  3) DM II with neuropathy  4) left BKA    Thank you for the opportunity to take part in this patient's care. Please call me with any questions.     Freddie Mosqueda DPM  Office: [phone]  Cell: [phone]

## 2021-01-09 NOTE — PROGRESS NOTES
4 Eyes Skin Assessment     The patient is being assess for   Admission    I agree that 2 RN's have performed a thorough Head to Toe Skin Assessment on the patient. ALL assessment sites listed below have been assessed. Areas assessed by both nurses:   [x]   Head, Face, and Ears   [x]   Shoulders, Back, and Chest, Abdomen  [x]   Arms, Elbows, and Hands   [x]   Coccyx, Sacrum, and Ischium  [x]   Legs, Feet, and Heels        Foot ulcerations R foot, dark spots on buttocks, redness/flaking on RLE. Co-signer eSignature: {Esignature:[id number]}    Does the Patient have Skin Breakdown?   Yes LDA WOUND CARE was Initiated documentation include the Bella-wound, Wound Assessment, Measurements, Dressing Treatment, Drainage, and Color\",          Gabriel Prevention initiated:  Yes   Wound Care Orders initiated:  Yes      [address]  nurse consulted for Pressure Injury (Stage 3,4, Unstageable, DTI, NWPT, Complex wounds)and New or Established Ostomies:  NA      Primary Nurse eSignature: Electronically signed by Kayla Miller RN on 1/9/21 at 5:20 AM EST

## 2021-01-10 LAB
A/G RATIO: 1 (ref 1.1–2.2)
ALBUMIN SERPL-MCNC: 2.8 G/DL (ref 3.4–5)
ALP BLD-CCNC: 98 U/L (ref 40–129)
ALT SERPL-CCNC: 9 U/L (ref 10–40)
ANION GAP SERPL CALCULATED.3IONS-SCNC: 9 MMOL/L (ref 3–16)
AST SERPL-CCNC: 14 U/L (ref 15–37)
BASOPHILS ABSOLUTE: 0 K/UL (ref 0–0.2)
BASOPHILS RELATIVE PERCENT: 0.4 %
BILIRUB SERPL-MCNC: <0.2 MG/DL (ref 0–1)
BUN BLDV-MCNC: 38 MG/DL (ref 7–20)
CALCIUM SERPL-MCNC: 7.5 MG/DL (ref 8.3–10.6)
CHLORIDE BLD-SCNC: 107 MMOL/L (ref 99–110)
CO2: 19 MMOL/L (ref 21–32)
CREAT SERPL-MCNC: 5 MG/DL (ref 0.8–1.3)
EKG ATRIAL RATE: 54 BPM
EKG DIAGNOSIS: NORMAL
EKG P AXIS: 30 DEGREES
EKG P-R INTERVAL: 80 MS
EKG Q-T INTERVAL: 470 MS
EKG QRS DURATION: 144 MS
EKG QTC CALCULATION (BAZETT): 445 MS
EKG R AXIS: -42 DEGREES
EKG T AXIS: 149 DEGREES
EKG VENTRICULAR RATE: 54 BPM
EOSINOPHILS ABSOLUTE: 0.1 K/UL (ref 0–0.6)
EOSINOPHILS RELATIVE PERCENT: 2.3 %
ESTIMATED AVERAGE GLUCOSE: 145.6 MG/DL
ESTIMATED AVERAGE GLUCOSE: 148.5 MG/DL
GFR AFRICAN AMERICAN: 14
GFR NON-AFRICAN AMERICAN: 12
GLOBULIN: 2.9 G/DL
GLUCOSE BLD-MCNC: 120 MG/DL (ref 70–99)
GLUCOSE BLD-MCNC: 141 MG/DL (ref 70–99)
GLUCOSE BLD-MCNC: 184 MG/DL (ref 70–99)
GLUCOSE BLD-MCNC: 93 MG/DL (ref 70–99)
GLUCOSE BLD-MCNC: 96 MG/DL (ref 70–99)
HBA1C MFR BLD: 6.7 %
HBA1C MFR BLD: 6.8 %
HCT VFR BLD CALC: 29.7 % (ref 40.5–52.5)
HEMOGLOBIN: 8.9 G/DL (ref 13.5–17.5)
INR BLD: 1.13 (ref 0.86–1.14)
LYMPHOCYTES ABSOLUTE: 0.6 K/UL (ref 1–5.1)
LYMPHOCYTES RELATIVE PERCENT: 10.8 %
MAGNESIUM: 1.6 MG/DL (ref 1.8–2.4)
MCH RBC QN AUTO: 23.5 PG (ref 26–34)
MCHC RBC AUTO-ENTMCNC: 30 G/DL (ref 31–36)
MCV RBC AUTO: 78.4 FL (ref 80–100)
MONOCYTES ABSOLUTE: 0.4 K/UL (ref 0–1.3)
MONOCYTES RELATIVE PERCENT: 6.7 %
NEUTROPHILS ABSOLUTE: 4.6 K/UL (ref 1.7–7.7)
NEUTROPHILS RELATIVE PERCENT: 79.8 %
PDW BLD-RTO: 18 % (ref 12.4–15.4)
PERFORMED ON: ABNORMAL
PERFORMED ON: NORMAL
PLATELET # BLD: 240 K/UL (ref 135–450)
PMV BLD AUTO: 7.6 FL (ref 5–10.5)
POTASSIUM SERPL-SCNC: 5.1 MMOL/L (ref 3.5–5.1)
PROTHROMBIN TIME: 13.1 SEC (ref 10–13.2)
RBC # BLD: 3.79 M/UL (ref 4.2–5.9)
SODIUM BLD-SCNC: 135 MMOL/L (ref 136–145)
TOTAL PROTEIN: 5.7 G/DL (ref 6.4–8.2)
WBC # BLD: 5.8 K/UL (ref 4–11)

## 2021-01-10 PROCEDURE — 6370000000 HC RX 637 (ALT 250 FOR IP): Performed by: INTERNAL MEDICINE

## 2021-01-10 PROCEDURE — 6370000000 HC RX 637 (ALT 250 FOR IP): Performed by: NURSE PRACTITIONER

## 2021-01-10 PROCEDURE — 85610 PROTHROMBIN TIME: CPT

## 2021-01-10 PROCEDURE — 6360000002 HC RX W HCPCS: Performed by: INTERNAL MEDICINE

## 2021-01-10 PROCEDURE — 6360000002 HC RX W HCPCS: Performed by: NURSE PRACTITIONER

## 2021-01-10 PROCEDURE — 85025 COMPLETE CBC W/AUTO DIFF WBC: CPT

## 2021-01-10 PROCEDURE — 1200000000 HC SEMI PRIVATE

## 2021-01-10 PROCEDURE — 93005 ELECTROCARDIOGRAM TRACING: CPT | Performed by: NURSE PRACTITIONER

## 2021-01-10 PROCEDURE — 36415 COLL VENOUS BLD VENIPUNCTURE: CPT

## 2021-01-10 PROCEDURE — 80053 COMPREHEN METABOLIC PANEL: CPT

## 2021-01-10 PROCEDURE — 83735 ASSAY OF MAGNESIUM: CPT

## 2021-01-10 PROCEDURE — 93010 ELECTROCARDIOGRAM REPORT: CPT | Performed by: INTERNAL MEDICINE

## 2021-01-10 RX ORDER — MAGNESIUM SULFATE IN WATER 40 MG/ML
2 INJECTION, SOLUTION INTRAVENOUS ONCE
Status: COMPLETED | OUTPATIENT
Start: 2021-01-10 | End: 2021-01-10

## 2021-01-10 RX ADMIN — METOPROLOL SUCCINATE 50 MG: 50 TABLET, EXTENDED RELEASE ORAL at 08:55

## 2021-01-10 RX ADMIN — HEPARIN SODIUM 5000 UNITS: 5000 INJECTION INTRAVENOUS; SUBCUTANEOUS at 15:02

## 2021-01-10 RX ADMIN — ATORVASTATIN CALCIUM 40 MG: 40 TABLET, FILM COATED ORAL at 21:44

## 2021-01-10 RX ADMIN — INSULIN LISPRO 1 UNITS: 100 INJECTION, SOLUTION INTRAVENOUS; SUBCUTANEOUS at 12:55

## 2021-01-10 RX ADMIN — MAGNESIUM SULFATE HEPTAHYDRATE 2 G: 40 INJECTION, SOLUTION INTRAVENOUS at 15:02

## 2021-01-10 RX ADMIN — HEPARIN SODIUM 5000 UNITS: 5000 INJECTION INTRAVENOUS; SUBCUTANEOUS at 06:05

## 2021-01-10 RX ADMIN — INSULIN LISPRO 1 UNITS: 100 INJECTION, SOLUTION INTRAVENOUS; SUBCUTANEOUS at 17:04

## 2021-01-10 RX ADMIN — HEPARIN SODIUM 5000 UNITS: 5000 INJECTION INTRAVENOUS; SUBCUTANEOUS at 21:44

## 2021-01-10 ASSESSMENT — PAIN SCALES - GENERAL: PAINLEVEL_OUTOF10: 0

## 2021-01-10 NOTE — CONSULTS
[facility]  Consult Note           Reason for Consult:  Acute, chronic kidney disease  Requesting Physician:  Dr. Tiffanie Bacon    Chief Complaint:  Right foot ulcer  History Obtained From:  patient, electronic medical record    History of Present Ilness:    56year old male with poorly controlled DM, HTN, progressive CKD, liver cirrhosis with recurrent ascites  requiring paracentesis admitted with right foot ulcer. We have been asked to assist in further mgmt of his A/CKD. Has had blistering of his right foot for past three weeks. Has been eating and drinking well. No sob, chest pain, abdominal pain, difficulty with urination, or fevers. SCr has now been progressive into 4.7-5.0 this admission, last seen by us when in hospital in Jan 2019. States he has not been seeing anyone for his Nephrology care. Past Medical History:        Diagnosis Date    Bacteremia 11/18/2017    strep agalactiae (also cultured from foot)    Cellulitis and abscess of foot, except toes     CHF (congestive heart failure) (HCC)     Diabetes mellitus ([facility]     History of liver failure     Hypertension     Kidney disease     MRSA (methicillin resistant staph aureus) culture positive 4/17/18, 11/19/2017    left foot       Past Surgical History:        Procedure Laterality Date    LEG AMPUTATION BELOW KNEE      PARACENTESIS      UPPER GASTROINTESTINAL ENDOSCOPY N/A 1/29/2019    EGD ESOPHAGOGASTRODUODENOSCOPY performed by Hemalatha Quintanilla MD at [address] [place] [address] Medications:    No current facility-administered medications on file prior to encounter.       Current Outpatient Medications on File Prior to Encounter   Medication Sig Dispense Refill    isosorbide-hydrALAZINE (BIDIL) 20-37.5 MG per tablet Take 1 tablet by mouth 3 times daily 90 tablet 3    metoprolol succinate (TOPROL XL) 50 MG extended release tablet Take 1 tablet by mouth daily 30 tablet 3    magnesium on file     Forced sexual activity: Not on file   Other Topics Concern    Not on file   Social History Narrative    Not on file       Family History:   Family History   Problem Relation Age of Onset    Diabetes Mother     Hypertension Mother     Dementia Mother     Heart Attack Father     Stroke Brother     No Known Problems Maternal Grandmother     No Known Problems Maternal Grandfather     No Known Problems Paternal Grandmother     No Known Problems Paternal Grandfather     Heart Attack Brother        Review of Systems:   Pertinent positives stated above in HPI. Remainder of 10 point review of systems were reviewed and were negative.     Physical exam:   Constitutional:  VITALS:  BP (!) 165/91   Pulse 59   Temp 97.7 °F (36.5 °C) (Oral)   Resp 16   Ht 6' 1\" (1.854 m)   Wt 255 lb 9.6 oz (115.9 kg)   SpO2 99%   BMI 33.72 kg/m²   Gen: alert, awake, ill-appearing  Skin: no rash, turgor wnl  Heent:  eomi, mmm  Neck: no bruits or jvd noted, thyroid normal  Cardiovascular:  S1, S2 without m/r/g  Respiratory: CTA B without w/r/r; respiratory effort normal  Abdomen:  +bs, soft, nt, nd, no hepatosplenomegaly  Ext: no lower extremity edema; R foot in wrap, s/p L BKA  Psychiatric: mood and affect appropriate; judgement and insight intact  Musculoskeletal:  Rom, muscular strength limited; digits, nails normal    Data/  CBC:   Lab Results   Component Value Date    WBC 5.8 01/10/2021    RBC 3.79 01/10/2021    HGB 8.9 01/10/2021    HCT 29.7 01/10/2021    MCV 78.4 01/10/2021    MCH 23.5 01/10/2021    MCHC 30.0 01/10/2021    RDW 18.0 01/10/2021     01/10/2021    MPV 7.6 01/10/2021     BMP:    Lab Results   Component Value Date     01/10/2021    K 5.1 01/10/2021    K 5.2 03/21/2020     01/10/2021    CO2 19 01/10/2021    BUN 38 01/10/2021    LABALBU 2.8 01/10/2021    CREATININE 5.0 01/10/2021    CALCIUM 7.5 01/10/2021    GFRAA 14 01/10/2021    LABGLOM 12 01/10/2021    GLUCOSE 96 01/10/2021 Assessment/    - Acute/Chronic kidney disease stage 4 - likely ESRD now              Etiology: Suspected DM Nephropathy with poorly controlled DM/HTN - slowly progressive   SCr likely not reflective of true GFR given underlying liver cirrhosis, poor nutritional status,   & low muscle mass              Data: Baseline serum creatinine 2.8 from March 2020, now up to 4.7-5.0 this admission   UA trace blood, 100 protein   Urine sodium 24, urine chloride 22     - CHF - decompensated              EF 86-25%, grade 2 diastolic dysfunction     - Alcoholic liver cirrhosis/recurrent ascites   Requiring weekly paracentesis at [address]              Spot urine protein/creatinine ratio 2.1 grams here              Serum albumin 2.7-2.8 cannot be entirely explained by proteinuria, suspect also due              liver disease and malnutrition as well    - Anemia of chronic disease    Hb ~9 range    - Hyperkalemia   K 5.5 on admission, better with medical mgmt with lokelma    - Hypertension   Trending on higher side    - DM2    - PVD s/p L BKA    - Ischemic right foot ulcer   Plans per Podiatry      Plan/    - Will need to start dialysis this admission to prevent further clinical deterioration - explained   to patient, who is agreeable to proceed  - Plan for tunneled HD catheter in AM  - Vein mapping for eventual fistula placement  - Can start HD on a Tues-Thurs-Sat schedule  - Renal US to rule out any obvious obstruction though CT imaging in March 2020 was unrevealing  - Check anemia indices, start HARPREET with HD  - Trend labs, bp's, urine output    Case mgmt consult for outpatient dialysis in [place]    Thank you for the consultation. Please do not hesitate to call with questions.     [employer]

## 2021-01-10 NOTE — PROGRESS NOTES
Hospitalist Progress Note      PCP: Padmini Griffin, APRN - CNP    Date of Admission: 1/9/2021    Chief Complaint: Right Foot Ulcer      Hospital Course: Lebron Pimentel is a 56 y.o. male. He was accepted in transfer from [facility]. He presented from home for a right foot ulcer. He relates that about three weeks ago he awoke one morning to find \"my whole foot looked like on big blister. \"  Since that time there has been quite a bit of skin sloughing from the leg and foot. There has been significant tissue breakdown on the sole of the foot under his big toe. He has very poor sensation in the right leg.     Patient has not felt ill. He has a strong appetite. He denies rigors, sweats, and nausea.     He is taking isordil 20mg tid, hydralazine 25mg tid, metoprolol succinate 50mg daily, and sliding scale lantus. Subjective:     Creatinine elevated this am.   DC plan:  unclear    No new medical concerns  Bedside rounding with nursing completed.     Medications:  Reviewed    Infusion Medications    dextrose       Scheduled Medications    insulin lispro  0-6 Units Subcutaneous TID WC    insulin lispro  0-3 Units Subcutaneous Nightly    nicotine  1 patch Transdermal Daily    atorvastatin  40 mg Oral Nightly    metoprolol succinate  50 mg Oral Daily    heparin (porcine)  5,000 Units Subcutaneous 3 times per day     PRN Meds: sodium chloride flush, promethazine **OR** ondansetron, acetaminophen **OR** acetaminophen, melatonin, glucose, dextrose, glucagon (rDNA), dextrose      Intake/Output Summary (Last 24 hours) at 1/10/2021 1442  Last data filed at 1/10/2021 1014  Gross per 24 hour   Intake 480 ml   Output 200 ml   Net 280 ml       Physical Exam Performed:    BP (!) 165/91   Pulse 59   Temp 97.7 °F (36.5 °C) (Oral)   Resp 16   Ht 6' 1\" (1.854 m)   Wt 255 lb 9.6 oz (115.9 kg)   SpO2 99%   BMI 33.72 kg/m²     General appearance: No apparent distress, appears stated age and cooperative. HEENT: Pupils equal, round, and reactive to light. Conjunctivae/corneas clear. Neck: Supple, with full range of motion. No jugular venous distention. Trachea midline. Respiratory:  Normal respiratory effort. Clear to auscultation, bilaterally without Rales/Wheezes/Rhonchi. Cardiovascular: Regular rate and rhythm with normal S1/S2 without murmurs, rubs or gallops. Abdomen: Soft, non-tender, non-distended with normal bowel sounds. Musculoskeletal: No clubbing, cyanosis or edema bilaterally. Full range of motion without deformity. Skin:  Patient is s/p L BKASkin of the entire right leg is woody and there is 4+ pitting edema up to the right hip. Significant tissue breakdown and eschar on the sole of the right primarily underlying the 1st MTP and extending into the bases of the 1st and 2nd digits. There are also at least two slit-like skin openings, one along the lateral mid foot, and another medially. Both of these slit shaped wounds are draining serous fluid. There is significant epidermal sloughing involving the whole foot and leg below knee. Right femoral and dp pulses 2+. PT pulse not palpable  Neurologic:  Neurovascularly intact without any focal sensory/motor deficits. Cranial nerves: II-XII intact, grossly non-focal.  Psychiatric: Alert and oriented, thought content appropriate, normal insight      Labs:   Recent Labs     01/09/21  0310 01/10/21  0656   WBC 7.4 5.8   HGB 9.3* 8.9*   HCT 30.3* 29.7*    240     Recent Labs     01/09/21  0310 01/10/21  0656   * 135*   K 5.5* 5.1    107   CO2 22 19*   BUN 35* 38*   CREATININE 4.7* 5.0*   CALCIUM 7.7* 7.5*     Recent Labs     01/09/21  0310 01/10/21  0656   AST 18 14*   ALT 10 9*   BILITOT <0.2 <0.2   ALKPHOS 111 98     Recent Labs     01/09/21  0310 01/10/21  0656   INR 1.10 1.13     No results for input(s): Olya Shai in the last 72 hours.     Urinalysis:      Lab Results   Component Value Date    NITRU Negative 01/09/2021    WBCUA 0-2 01/09/2021    BACTERIA 3+ 01/09/2021    RBCUA 3-4 01/09/2021    BLOODU TRACE-INTACT 01/09/2021    SPECGRAV >=1.030 01/09/2021    GLUCOSEU Negative 01/09/2021       Radiology:  XR FOOT RIGHT (MIN 3 VIEWS)   Final Result   Soft tissue irregularity adjacent to the 1st MTP joint, suspicious for   ulceration given patient history. Correlate with physical exam.  No   generalized erosive pattern is seen. If there remains strong clinical   concern for developing osteomyelitis, MR or three-phase bone scan could be   considered. VL DUP LOWER EXTREMITY ARTERIES BILATERAL    (Results Pending)           Assessment/Plan:    Active Hospital Problems    Diagnosis Date Noted    Alcoholic cirrhosis of liver with ascites ([facility] [K70.31] 01/09/2021    Diabetic foot infection ([facility] [I69.052, L08.9] 01/08/2021    Chronic systolic heart failure (HCC) [I50.22]     Stage 3 chronic kidney disease [N18.30] 02/28/2018    Type 2 diabetes mellitus with left diabetic foot infection ([facility] [K13.393, L08.9]     Acute kidney injury superimposed on chronic kidney disease ([facility] [N17.9, N18.9]     Tobacco abuse [Z72.0] 07/29/2015       Type II DM w/ Diabetic Polyneuropathy, Diabetic ulcer  -  Patient received empiric vancomycin and cefepime at [facility]. Further empiric abx are not warranted until after operative debridement and tissue sampling for culture. -  Check right leg SHIRLEY. -  Debridement at the direction of podiatry      DENIA on CKD3  - baseline creatinine ~1.8 , now 5.0. Probably type 2 hepatorenal.  -  Patient has not been taking diuretics for ascites management. He has been undergoing paracentesis every week at [facility].   ~5L is removed each time and he is not receiving albumin afterward. Last paracentesis was Thursday 1/7/21.  -  s/p 50g 25% albumin 1/9.    -  nephrology following     Systolic Heart Failure  - LVEF = 35 - 40% by TTE 1/2019. No coronary angiogram could be found.   SWMA's are present on TTE c/w prior RCA territory MI.  -  Continue home metoprolol XL. He is not prescribed an antiplatelet agent or statin. -  He has been taking hydralazine and isordil at home. Neither are a good idea in the setting of cirrhosis w/ portal HTN and ascites. I would favor stopping both and starting diuresis to manage patient's ascites and type 2 hepatorenal syndrome. Defer to nephrology      Alcoholic Cirrhosis w/ ascites  -  Patient has been abstinent from alcohol, but his cirrhosis is essentially not being treated aside from scheduled paracenteses. -  Aside from ascites, liver function seems well compensated. He is not encephalopathic at this time.     DM2  -  Hold all oral antidiabetic agents. Start s.c. Insulin regimen based on weight.     DVT Prophylaxis: heparin SQ  Diet: DIET CARB CONTROL;  Code Status: Full Code    PT/OT Eval Status: active and ongoing      Dispo - 2-3 days.      LARRY Salcido - CNP

## 2021-01-11 ENCOUNTER — APPOINTMENT (OUTPATIENT)
Dept: VASCULAR LAB | Age: 61
DRG: 380 | End: 2021-01-11
Attending: HOSPITALIST
Payer: MEDICAID

## 2021-01-11 ENCOUNTER — APPOINTMENT (OUTPATIENT)
Dept: ULTRASOUND IMAGING | Age: 61
DRG: 380 | End: 2021-01-11
Attending: HOSPITALIST
Payer: MEDICAID

## 2021-01-11 ENCOUNTER — APPOINTMENT (OUTPATIENT)
Dept: INTERVENTIONAL RADIOLOGY/VASCULAR | Age: 61
DRG: 380 | End: 2021-01-11
Attending: HOSPITALIST
Payer: MEDICAID

## 2021-01-11 LAB
A/G RATIO: 0.9 (ref 1.1–2.2)
ALBUMIN SERPL-MCNC: 2.8 G/DL (ref 3.4–5)
ALP BLD-CCNC: 93 U/L (ref 40–129)
ALT SERPL-CCNC: 9 U/L (ref 10–40)
ANION GAP SERPL CALCULATED.3IONS-SCNC: 9 MMOL/L (ref 3–16)
AST SERPL-CCNC: 15 U/L (ref 15–37)
BASOPHILS ABSOLUTE: 0 K/UL (ref 0–0.2)
BASOPHILS RELATIVE PERCENT: 0.5 %
BILIRUB SERPL-MCNC: <0.2 MG/DL (ref 0–1)
BUN BLDV-MCNC: 43 MG/DL (ref 7–20)
CALCIUM SERPL-MCNC: 7.1 MG/DL (ref 8.3–10.6)
CHLORIDE BLD-SCNC: 103 MMOL/L (ref 99–110)
CO2: 22 MMOL/L (ref 21–32)
CREAT SERPL-MCNC: 5.4 MG/DL (ref 0.8–1.3)
EOSINOPHILS ABSOLUTE: 0.1 K/UL (ref 0–0.6)
EOSINOPHILS RELATIVE PERCENT: 2.6 %
FERRITIN: 57.3 NG/ML (ref 30–400)
FOLATE: 2.05 NG/ML (ref 4.78–24.2)
GFR AFRICAN AMERICAN: 13
GFR NON-AFRICAN AMERICAN: 11
GLOBULIN: 3.1 G/DL
GLUCOSE BLD-MCNC: 124 MG/DL (ref 70–99)
GLUCOSE BLD-MCNC: 127 MG/DL (ref 70–99)
GLUCOSE BLD-MCNC: 128 MG/DL (ref 70–99)
GLUCOSE BLD-MCNC: 133 MG/DL (ref 70–99)
GLUCOSE BLD-MCNC: 162 MG/DL (ref 70–99)
HAV IGM SER IA-ACNC: NORMAL
HCT VFR BLD CALC: 28.5 % (ref 40.5–52.5)
HEMOGLOBIN: 8.6 G/DL (ref 13.5–17.5)
HEPATITIS B CORE IGM ANTIBODY: NORMAL
HEPATITIS B SURFACE ANTIGEN INTERPRETATION: NORMAL
HEPATITIS C ANTIBODY INTERPRETATION: NORMAL
INR BLD: 1.05 (ref 0.86–1.14)
IRON SATURATION: 12 % (ref 20–50)
IRON: 27 UG/DL (ref 59–158)
LYMPHOCYTES ABSOLUTE: 0.6 K/UL (ref 1–5.1)
LYMPHOCYTES RELATIVE PERCENT: 10.1 %
MAGNESIUM: 1.7 MG/DL (ref 1.8–2.4)
MCH RBC QN AUTO: 23.9 PG (ref 26–34)
MCHC RBC AUTO-ENTMCNC: 30.2 G/DL (ref 31–36)
MCV RBC AUTO: 79.1 FL (ref 80–100)
MONOCYTES ABSOLUTE: 0.4 K/UL (ref 0–1.3)
MONOCYTES RELATIVE PERCENT: 6.5 %
NEUTROPHILS ABSOLUTE: 4.6 K/UL (ref 1.7–7.7)
NEUTROPHILS RELATIVE PERCENT: 80.3 %
PDW BLD-RTO: 18.1 % (ref 12.4–15.4)
PERFORMED ON: ABNORMAL
PHOSPHORUS: 5.5 MG/DL (ref 2.5–4.9)
PLATELET # BLD: 231 K/UL (ref 135–450)
PMV BLD AUTO: 7.7 FL (ref 5–10.5)
POTASSIUM SERPL-SCNC: 5.2 MMOL/L (ref 3.5–5.1)
PROTHROMBIN TIME: 12.2 SEC (ref 10–13.2)
RBC # BLD: 3.6 M/UL (ref 4.2–5.9)
SODIUM BLD-SCNC: 134 MMOL/L (ref 136–145)
TOTAL IRON BINDING CAPACITY: 221 UG/DL (ref 260–445)
TOTAL PROTEIN: 5.9 G/DL (ref 6.4–8.2)
VITAMIN B-12: 506 PG/ML (ref 211–911)
WBC # BLD: 5.8 K/UL (ref 4–11)

## 2021-01-11 PROCEDURE — 02HV33Z INSERTION OF INFUSION DEVICE INTO SUPERIOR VENA CAVA, PERCUTANEOUS APPROACH: ICD-10-PCS | Performed by: RADIOLOGY

## 2021-01-11 PROCEDURE — 82728 ASSAY OF FERRITIN: CPT

## 2021-01-11 PROCEDURE — 83540 ASSAY OF IRON: CPT

## 2021-01-11 PROCEDURE — 6360000002 HC RX W HCPCS: Performed by: RADIOLOGY

## 2021-01-11 PROCEDURE — 85025 COMPLETE CBC W/AUTO DIFF WBC: CPT

## 2021-01-11 PROCEDURE — 0JH63XZ INSERTION OF TUNNELED VASCULAR ACCESS DEVICE INTO CHEST SUBCUTANEOUS TISSUE AND FASCIA, PERCUTANEOUS APPROACH: ICD-10-PCS | Performed by: RADIOLOGY

## 2021-01-11 PROCEDURE — 93971 EXTREMITY STUDY: CPT

## 2021-01-11 PROCEDURE — 2700000000 HC OXYGEN THERAPY PER DAY

## 2021-01-11 PROCEDURE — 36558 INSERT TUNNELED CV CATH: CPT

## 2021-01-11 PROCEDURE — 76770 US EXAM ABDO BACK WALL COMP: CPT

## 2021-01-11 PROCEDURE — 1200000000 HC SEMI PRIVATE

## 2021-01-11 PROCEDURE — 94761 N-INVAS EAR/PLS OXIMETRY MLT: CPT

## 2021-01-11 PROCEDURE — 83550 IRON BINDING TEST: CPT

## 2021-01-11 PROCEDURE — 82607 VITAMIN B-12: CPT

## 2021-01-11 PROCEDURE — 76937 US GUIDE VASCULAR ACCESS: CPT

## 2021-01-11 PROCEDURE — 77001 FLUOROGUIDE FOR VEIN DEVICE: CPT

## 2021-01-11 PROCEDURE — 6370000000 HC RX 637 (ALT 250 FOR IP): Performed by: NURSE PRACTITIONER

## 2021-01-11 PROCEDURE — 99254 IP/OBS CNSLTJ NEW/EST MOD 60: CPT | Performed by: SURGERY

## 2021-01-11 PROCEDURE — 80053 COMPREHEN METABOLIC PANEL: CPT

## 2021-01-11 PROCEDURE — 2580000003 HC RX 258: Performed by: INTERNAL MEDICINE

## 2021-01-11 PROCEDURE — 93925 LOWER EXTREMITY STUDY: CPT

## 2021-01-11 PROCEDURE — 6370000000 HC RX 637 (ALT 250 FOR IP): Performed by: INTERNAL MEDICINE

## 2021-01-11 PROCEDURE — 82746 ASSAY OF FOLIC ACID SERUM: CPT

## 2021-01-11 PROCEDURE — 85610 PROTHROMBIN TIME: CPT

## 2021-01-11 PROCEDURE — 83735 ASSAY OF MAGNESIUM: CPT

## 2021-01-11 PROCEDURE — 36415 COLL VENOUS BLD VENIPUNCTURE: CPT

## 2021-01-11 PROCEDURE — 80074 ACUTE HEPATITIS PANEL: CPT

## 2021-01-11 PROCEDURE — C1881 DIALYSIS ACCESS SYSTEM: HCPCS

## 2021-01-11 PROCEDURE — 84100 ASSAY OF PHOSPHORUS: CPT

## 2021-01-11 RX ORDER — IPRATROPIUM BROMIDE AND ALBUTEROL SULFATE 2.5; .5 MG/3ML; MG/3ML
1 SOLUTION RESPIRATORY (INHALATION) EVERY 4 HOURS PRN
Status: DISCONTINUED | OUTPATIENT
Start: 2021-01-11 | End: 2021-01-16 | Stop reason: HOSPADM

## 2021-01-11 RX ORDER — MIDAZOLAM HYDROCHLORIDE 5 MG/ML
INJECTION INTRAMUSCULAR; INTRAVENOUS
Status: COMPLETED | OUTPATIENT
Start: 2021-01-11 | End: 2021-01-11

## 2021-01-11 RX ORDER — FENTANYL CITRATE 50 UG/ML
INJECTION, SOLUTION INTRAMUSCULAR; INTRAVENOUS
Status: COMPLETED | OUTPATIENT
Start: 2021-01-11 | End: 2021-01-11

## 2021-01-11 RX ORDER — TRAMADOL HYDROCHLORIDE 50 MG/1
50 TABLET ORAL EVERY 6 HOURS PRN
Status: DISCONTINUED | OUTPATIENT
Start: 2021-01-11 | End: 2021-01-16 | Stop reason: HOSPADM

## 2021-01-11 RX ORDER — IPRATROPIUM BROMIDE AND ALBUTEROL SULFATE 2.5; .5 MG/3ML; MG/3ML
1 SOLUTION RESPIRATORY (INHALATION) 4 TIMES DAILY
Status: DISCONTINUED | OUTPATIENT
Start: 2021-01-11 | End: 2021-01-11

## 2021-01-11 RX ADMIN — FENTANYL CITRATE 50 MCG: 50 INJECTION INTRAMUSCULAR; INTRAVENOUS at 10:40

## 2021-01-11 RX ADMIN — Medication 10 ML: at 20:44

## 2021-01-11 RX ADMIN — TRAMADOL HYDROCHLORIDE 50 MG: 50 TABLET, FILM COATED ORAL at 08:54

## 2021-01-11 RX ADMIN — TRAMADOL HYDROCHLORIDE 50 MG: 50 TABLET, FILM COATED ORAL at 18:09

## 2021-01-11 RX ADMIN — ATORVASTATIN CALCIUM 40 MG: 40 TABLET, FILM COATED ORAL at 20:44

## 2021-01-11 RX ADMIN — ACETAMINOPHEN 650 MG: 325 TABLET ORAL at 03:26

## 2021-01-11 RX ADMIN — METOPROLOL SUCCINATE 50 MG: 50 TABLET, EXTENDED RELEASE ORAL at 08:54

## 2021-01-11 RX ADMIN — Medication 10 ML: at 08:54

## 2021-01-11 RX ADMIN — ACETAMINOPHEN 650 MG: 325 TABLET ORAL at 14:25

## 2021-01-11 RX ADMIN — MIDAZOLAM HYDROCHLORIDE 1 MG: 5 INJECTION, SOLUTION INTRAMUSCULAR; INTRAVENOUS at 10:40

## 2021-01-11 RX ADMIN — INSULIN LISPRO 1 UNITS: 100 INJECTION, SOLUTION INTRAVENOUS; SUBCUTANEOUS at 20:45

## 2021-01-11 ASSESSMENT — PAIN SCALES - GENERAL: PAINLEVEL_OUTOF10: 5

## 2021-01-11 NOTE — CONSULTS
Vascular Surgery Consultation    Date of Admission:  1/9/2021  1:55 AM  Date of Consultation:  1/11/2021    PCP:  LARRY James CNP       Chief Complaint: Right foot wounds. History of Present Illness: We are asked to see this patient in consultation by Dr. Blade Mckeon  Regarding PVD and ischemic foot wounds. Katarina Pena is a 56 y.o. male who has a history of CHF, CKD, liver disease, S/P L BKA. He has several sores on right foot that have been present for past 3 weeks. Denies significant pain. Started on HD this admission. Patient has been seen by Podiatry and vascular studies obtained showing monophasic flow throughout RLE consistent inflow disease. Vascular consult obtained prior to any debridement. Past Medical History:  Past Medical History:   Diagnosis Date    Bacteremia 11/18/2017    strep agalactiae (also cultured from foot)    Cellulitis and abscess of foot, except toes     [facility]     Diabetes mellitus ([facility]     History of liver failure     Hypertension     Kidney disease     MRSA (methicillin resistant staph aureus) culture positive 4/17/18, 11/19/2017    left foot       Past Surgical History:  Past Surgical History:   Procedure Laterality Date    IR TUNNELED CATHETER PLACEMENT GREATER THAN 5 YEARS  1/11/2021    IR TUNNELED CATHETER PLACEMENT GREATER THAN 5 YEARS 1/11/2021 Brett Yi MD [facility]    LEG AMPUTATION BELOW KNEE      PARACENTESIS      UPPER GASTROINTESTINAL ENDOSCOPY N/A 1/29/2019    EGD ESOPHAGOGASTRODUODENOSCOPY performed by Luis Eduardo Velez MD at [address] [place] [address]      Left BKA       Home Medications:   Prior to Admission medications    Medication Sig Start Date End Date Taking?  Authorizing Provider   isosorbide-hydrALAZINE (BIDIL) 20-37.5 MG per tablet Take 1 tablet by mouth 3 times daily 2/27/19   LARRY Keane CNP   metoprolol succinate (TOPROL XL) 50 MG extended release tablet Take 1 tablet by mouth daily 2/23/19   Miguel Putnam MD   magnesium oxide (MAG-OX) 400 (241.3 Mg) MG TABS tablet Take 1 tablet by mouth daily 2/24/19   Miguel Putnam MD   insulin lispro (HUMALOG KWIKPEN) 100 UNIT/ML pen Glucose: Dose:   No Insulin 140-249 1 Unit 250-349 2 Units Over 350 -3 Units 2/23/19   Miguel Putnam MD   atorvastatin (LIPITOR) 40 MG tablet Take 1 tablet by mouth nightly 1/29/19 3/21/20  Naomie Cruz MD   albuterol sulfate HFA (PROAIR HFA) 108 (90 Base) MCG/ACT inhaler Inhale 2 puffs into the lungs every 6 hours as needed for Wheezing 1/1/18   Tyron Johnson MD        Facility Administered Medications:   [facility] insulin lispro  0-6 Units Subcutaneous TID     insulin lispro  0-3 Units Subcutaneous Nightly    nicotine  1 patch Transdermal Daily    atorvastatin  40 mg Oral Nightly    heparin (porcine)  5,000 Units Subcutaneous 3 times per day       Allergies:  Pcn [penicillins]     Social History:      Social History     Socioeconomic History    Marital status:       Spouse name: Not on file    Number of children: Not on file    Years of education: Not on file    Highest education level: Not on file   Occupational History    Occupation: unemployed   Social Needs    Financial resource strain: Not on file    Food insecurity     Worry: Not on file     Inability: Not on file   [employer] needs     Medical: Not on file     Non-medical: Not on file   Tobacco Use    Smoking status: Current Some Day Smoker     Packs/day: 1.00     Years: 40.00     Pack years: 40.00     Types: Cigarettes    Smokeless tobacco: Former User     Types: Chew    Tobacco comment: attempting to quit at this time   Substance and Sexual Activity    Alcohol use: No     Comment: none    Drug use: Yes     Types: Marijuana    Sexual activity: Yes     Partners: Female   Lifestyle    Physical activity     Days per week: Not on file     Minutes per session: Not on file    Stress: Not on file   Relationships    Social connections     Talks on phone: Not on file     Gets together: Not on file     Attends [employer] service: Not on file     Active member of club or organization: Not on file     Attends meetings of clubs or organizations: Not on file     Relationship status: Not on file    Intimate partner violence     Fear of current or ex partner: Not on file     Emotionally abused: Not on file     Physically abused: Not on file     Forced sexual activity: Not on file   Other Topics Concern    Not on file   Social History Narrative    Not on file       Family History:        Problem Relation Age of Onset    Diabetes Mother     Hypertension Mother     Dementia Mother     Heart Attack Father     Stroke Brother     No Known Problems Maternal Grandmother     No Known Problems Maternal Grandfather     No Known Problems Paternal Grandmother     No Known Problems Paternal Grandfather     Heart Attack Brother        Review of Systems:  A 14 point review of systems was completed. Pertinent positives identified in the HPI, all other review of systems negative. Physical Examination:    BP (!) 192/99   Pulse 56   Temp 97.5 °F (36.4 °C) (Oral)   Resp 20   Ht 6' 1\" (1.854 m)   Wt 252 lb 11.2 oz (114.6 kg)   SpO2 100%   BMI 33.34 kg/m²        Admission Weight: 252 lb 6.4 oz (114.5 kg)       General appearance: NAD  Eyes: PERRLA  Neck: no JVD, no lymphadenopathy. Respiratory: effort is unlabored, no crackles, wheezes or rubs. Cardiovascular: regular, no murmur. Pulses:    radial femoral DP PT   RIGHT 2 1 doppler doppler   LEFT 2 1 - -   GI: abdomen soft, protuberant  Musculoskeletal: strength and tone normal.  Extremities: L BKA. Several R Plantar ulcerations. Moderate RLE edema. Neuro/psychiatric: grossly intact. MEDICAL DECISION MAKING/TESTING      Arterial duplex reviewed:       Right Impression   Right ankle/brachial was not obtained due to bandages. The common femoral artery demonstrates monophasic flow indicating aortic-iliac   inflow disease. The proximal - distal SFA, popliteal artery, posterior tibial, anterior tibial   and peroneal arteries demonstrate monophasic flow. There is atherosclerotic plaque seen within the common femoral artery,   proximal - distal SFA, popliteal and calf arteries consistent with < 50%   stenosis. Left Impression   The left leg has been amputated from the knee below. The common femoral artery demonstrates monophasic flow indicating aortic-iliac   inflow disease. The proximal- distal SFA demonstrate monophasic waveforms. There is atherosclerotic plaque seen within the common femoral artery   consistent with <50% stenosis. There is atherosclerotic plaque seen within the proximal - distal SFA with   calcific shadowing so a stenosis cannot be ruled out. There were no previous studies to use for comparison.       Conclusions        Summary        There is significant and bilateral aorto-iliac inflow disease within the    lower extremities.   Herchel Tom is moderate diffuse heterogeneous plaque noted throughout the    bilateral lower extremities. Labs:   CBC:   Recent Labs     01/09/21  0310 01/10/21  0656 01/11/21  0629   WBC 7.4 5.8 5.8   HGB 9.3* 8.9* 8.6*   HCT 30.3* 29.7* 28.5*   MCV 78.2* 78.4* 79.1*    240 231     BMP:   Recent Labs     01/09/21  0310 01/10/21  0656 01/11/21  0629   * 135* 134*   K 5.5* 5.1 5.2*    107 103   CO2 22 19* 22   PHOS  --   --  5.5*   BUN 35* 38* 43*   CREATININE 4.7* 5.0* 5.4*   CALCIUM 7.7* 7.5* 7.1*   MG 1.50* 1.60* 1.70*     Cardiac Enzymes: No results for input(s): CKTOTAL, CKMB, CKMBINDEX, TROPONINI in the last 72 hours. PT/INR:   Recent Labs     01/09/21  0310 01/10/21  0656 01/11/21  0629   PROTIME 12.8 13.1 12.2   INR 1.10 1.13 1.05     APTT: No results for input(s): APTT in the last 72 hours.   Liver Profile:  Lab Results   Component Value Date AST 15 01/11/2021    ALT 9 01/11/2021    BILIDIR <0.2 01/21/2019    BILITOT <0.2 01/11/2021    ALKPHOS 93 01/11/2021     Lab Results   Component Value Date    CHOL 86 02/22/2019    HDL 28 02/22/2019    TRIG 88 02/22/2019     TSH:  Lab Results   Component Value Date    TSH 14.94 02/21/2019     UA:   Lab Results   Component Value Date    COLORU Yellow 01/09/2021    PHUR 5.5 01/09/2021    PHUR 5.5 01/09/2021    LABCAST 1-3 Hyaline 01/19/2019    WBCUA 0-2 01/09/2021    RBCUA 3-4 01/09/2021    MUCUS 1+ 01/09/2021    BACTERIA 3+ 01/09/2021    CLARITYU Clear 01/09/2021    SPECGRAV >=1.030 01/09/2021    LEUKOCYTESUR Negative 01/09/2021    UROBILINOGEN 0.2 01/09/2021    BILIRUBINUR Negative 01/09/2021    BLOODU TRACE-INTACT 01/09/2021    GLUCOSEU Negative 01/09/2021    AMORPHOUS 2+ 01/09/2021           Asessment:  PVD with ulcerations RLE. S/P L BKA. Plan: Angiogram with possible intervention to maximize healing potential prior to debridement/toe amputation. CO2 can be used to avoid further decline in renal function.

## 2021-01-11 NOTE — BRIEF OP NOTE
Brief Postoperative Note      Patient: Adina Alvarado  YOB: 1960  MRN: [id number]    Date of Procedure:     Pre-Op Diagnosis: * No surgery found *    Post-Op Diagnosis: renal insufficiency       R TDC placement in IJ    50 mcg fentanyl, 1 mg versed given    Assistant:  * Surgery not found *    Anesthesia: * No surgery found *    Estimated Blood Loss (mL): Minimal    Complications: None    Findings: None    Electronically signed by Yancy Ram MD on 1/11/2021 at 11:01 AM

## 2021-01-11 NOTE — PROGRESS NOTES
Vascular    Ischemic ulceration RLE, S/P L BKA  Acute on Chronic Kidney disease. Will plan for CO2 Aortogram and RLE angio tomorrow.    Full consult note to follow

## 2021-01-11 NOTE — PROGRESS NOTES
Hospitalist Progress Note      PCP: LARRY Eckert - CNP    Date of Admission: 1/9/2021    Chief Complaint: Right Foot Ulcer    Hospital Course: Mohan Coleman is a 56 y.o. male. He was accepted in transfer from [facility]. He presented from home for a right foot ulcer. He relates that about three weeks ago he awoke one morning to find \"my whole foot looked like on big blister. \"  Since that time there has been quite a bit of skin sloughing from the leg and foot. There has been significant tissue breakdown on the sole of the foot under his big toe. He has very poor sensation in the right leg.     Patient has not felt ill. He has a strong appetite. He denies rigors, sweats, and nausea.     He is taking isordil 20mg tid, hydralazine 25mg tid, metoprolol succinate 50mg daily, and sliding scale lantus. Subjective: No complaints. Vascath placed. Arterial mapping in process.     Medications:  Reviewed    Infusion Medications    dextrose       Scheduled Medications    ipratropium-albuterol  1 ampule Inhalation 4x daily    insulin lispro  0-6 Units Subcutaneous TID WC    insulin lispro  0-3 Units Subcutaneous Nightly    nicotine  1 patch Transdermal Daily    atorvastatin  40 mg Oral Nightly    metoprolol succinate  50 mg Oral Daily    heparin (porcine)  5,000 Units Subcutaneous 3 times per day     PRN Meds: traMADol, sodium chloride flush, promethazine **OR** ondansetron, acetaminophen **OR** acetaminophen, melatonin, glucose, dextrose, glucagon (rDNA), dextrose      Intake/Output Summary (Last 24 hours) at 1/11/2021 1326  Last data filed at 1/11/2021 0322  Gross per 24 hour   Intake 240 ml   Output 750 ml   Net -510 ml       Physical Exam Performed:    BP (!) 179/92   Pulse 58   Temp 97.6 °F (36.4 °C) (Oral)   Resp 20   Ht 6' 1\" (1.854 m)   Wt 252 lb 11.2 oz (114.6 kg)   SpO2 95%   BMI 33.34 kg/m²     General appearance: No apparent distress, appears stated age and cooperative. HEENT: Pupils equal, round, and reactive to light. Conjunctivae/corneas clear. Neck: Supple, with full range of motion. No jugular venous distention. Trachea midline. Respiratory:  Normal respiratory effort. Clear to auscultation, bilaterally without Rales/Wheezes/Rhonchi. Cardiovascular: Regular rate and rhythm with normal S1/S2 without murmurs, rubs or gallops. Abdomen: Soft, non-tender, non-distended with normal bowel sounds. Musculoskeletal: No clubbing, cyanosis or edema bilaterally. Full range of motion without deformity. Skin:  Patient is s/p L BKA. Right foot with dressing in place - did not not remove. Neurologic:  Neurovascularly intact without any focal sensory/motor deficits. Cranial nerves: II-XII intact, grossly non-focal.  Psychiatric: Alert and oriented, thought content appropriate, normal insight      Labs:   Recent Labs     01/09/21  0310 01/10/21  0656 01/11/21  0629   WBC 7.4 5.8 5.8   HGB 9.3* 8.9* 8.6*   HCT 30.3* 29.7* 28.5*    240 231     Recent Labs     01/09/21  0310 01/10/21  0656 01/11/21  0629   * 135* 134*   K 5.5* 5.1 5.2*    107 103   CO2 22 19* 22   BUN 35* 38* 43*   CREATININE 4.7* 5.0* 5.4*   CALCIUM 7.7* 7.5* 7.1*   PHOS  --   --  5.5*     Recent Labs     01/09/21  0310 01/10/21  0656 01/11/21  0629   AST 18 14* 15   ALT 10 9* 9*   BILITOT <0.2 <0.2 <0.2   ALKPHOS 111 98 93     Recent Labs     01/09/21  0310 01/10/21  0656 01/11/21  0629   INR 1.10 1.13 1.05     No results for input(s): Justin Pang in the last 72 hours.     Urinalysis:      Lab Results   Component Value Date    NITRU Negative 01/09/2021    WBCUA 0-2 01/09/2021    BACTERIA 3+ 01/09/2021    RBCUA 3-4 01/09/2021    BLOODU TRACE-INTACT 01/09/2021    SPECGRAV >=1.030 01/09/2021    GLUCOSEU Negative 01/09/2021       Radiology:  VL DUP LOWER EXTREMITY ARTERIES BILATERAL         VL PRE OP VEIN MAPPING         IR TUNNELED CVC PLACE WO SQ PORT/PUMP > 5 YEARS   Final Result   Successful ultrasound and fluoroscopy guided tunneled catheter placement . XR FOOT RIGHT (MIN 3 VIEWS)   Final Result   Soft tissue irregularity adjacent to the 1st MTP joint, suspicious for   ulceration given patient history. Correlate with physical exam.  No   generalized erosive pattern is seen. If there remains strong clinical   concern for developing osteomyelitis, MR or three-phase bone scan could be   considered. US RENAL COMPLETE    (Results Pending)           Assessment/Plan:    Active Hospital Problems    Diagnosis Date Noted    Alcoholic cirrhosis of liver with ascites ([facility] [K70.31] 01/09/2021    Diabetic foot infection ([facility] [R76.424, L08.9] 01/08/2021    Chronic systolic heart failure (HCC) [I50.22]     Stage 3 chronic kidney disease [N18.30] 02/28/2018    Type 2 diabetes mellitus with left diabetic foot infection ([facility] [O82.418, L08.9]     Acute kidney injury superimposed on chronic kidney disease ([facility] [N17.9, N18.9]     Tobacco abuse [Z72.0] 07/29/2015       Type II DM w/ Diabetic Polyneuropathy, Diabetic ulcer  -  Patient received empiric vancomycin and cefepime at [facility].    -  No evidence of cellulitis or OM, so not further abx indicated. -  f/u right leg SHIRLEY. -  podiatry consulted and following.      DENIA on CKD3  -  baseline creatinine ~1.8. Probably type 2 hepatorenal.  -  Patient has not been taking diuretics for ascites management. He has been undergoing paracentesis every week at [facility].   ~5L is removed each time and he is not receiving albumin afterward. Last paracentesis was Thursday 1/7/21.  -  s/p 50g 25% albumin 1/9.    -  nephrology following  -  S/p vascath placement. Planning to start HD on 1/12/2021. CM working on outpatient placement.     Systolic Heart Failure  -  LVEF = 35 - 40% by TTE 1/2019. No coronary angiogram could be found. SWMA's are present on TTE c/w prior RCA territory MI.  -  Continue home metoprolol XL.   He is not prescribed an antiplatelet agent or statin. -  He has been taking hydralazine and isordil at home. Neither are a good idea in the setting of cirrhosis w/ portal HTN and ascites.      Alcoholic Cirrhosis w/ ascites  -  Patient has been abstinent from alcohol, but his cirrhosis is essentially not being treated aside from scheduled paracenteses. -  Aside from ascites, liver function seems well compensated. He is not encephalopathic at this time.     DM2  -  Hold all oral antidiabetic agents. Start s.c. Insulin regimen based on weight.     DVT Prophylaxis: heparin SQ  Diet: DIET RENAL;  Code Status: Full Code    PT/OT Eval Status: active and ongoing    Dispo - 2-3 days.      Eunice Meredith, APRN - CNP

## 2021-01-11 NOTE — CARE COORDINATION
CASE MANAGEMENT INITIAL ASSESSMENT      Reviewed chart and completed assessment via telephone with:patient  Explained Case Management role/services. Primary contact information:christie Pathak    Health Care Decision Maker :   Primary Decision Maker: Elma Cha - [place] - [phone]          Can this person be reached and be able to respond quickly, such as within a few minutes or hours? Yes    Admit date/status:01/09/2021  Diagnosis:Diabetic foot infection    Is this a Readmission?:  No      Insurance:Medicaid   Precert required for SNF: Yes       3 night stay required: No    Living arrangements, Adls, care needs, prior to admission:Pt lives alone in ranch style house w/zero steps, caring for his own ADL's. He now, however, has R foot wounds and a L BKA. Transportation:private, but may need assistance     Durable Medical Equipment at home:  Walker_x_Cane_x_RTS_x_ BSC__Shower Chair__  02__ HHN__ CPAP__  BiPap__  Hospital Bed__ W/C___ Other__________    Services in the home and/or outpatient, prior to [address] (if applicable):  New, not set up yet  · Name:  · Address:  · Dialysis Schedule:  · Phone:  · Fax:    PT/OT recs:need [facility] Exemption Notification (HEN):not initiated    Barriers to discharge:nond    Plan/comments:Pt plans to go SNF with possibility of LTC; Referrals made to [facility] and [facility]. Pt has Medicaid. CM spoke to Genoveva Mcdowell to initiate new onset HD; Hep panel ordered.   CM will continue to assist.  Elsie Quintero RN       [facility] on chart for MD mercado

## 2021-01-11 NOTE — PRE SEDATION
Sedation Pre-Procedure Note    Patient Name: Melita Bay   YOB: 1960  Room/Bed: 5799/9089-92  Medical Record Number: [id number]  Date: 1/11/2021   Time: 10:35 AM       Indication:  Renal insufficiency, needs dialysis    Consent: I have discussed with the patient and/or the patient representative the indication, alternatives, and the possible risks and/or complications of the planned procedure and the anesthesia methods. The patient and/or patient representative appear to understand and agree to proceed. Vital Signs:   Vitals:    01/11/21 0850   BP: (!) 182/101   Pulse: 62   Resp: 16   Temp: 97.6 °F (36.4 °C)   SpO2: 99%       Past Medical History:   has a past medical history of Bacteremia, Cellulitis and abscess of foot, except toes, CHF (congestive heart failure) ([facility] Diabetes mellitus ([facility] History of liver failure, Hypertension, Kidney disease, and MRSA (methicillin resistant staph aureus) culture positive. Past Surgical History:   has a past surgical history that includes Leg amputation below knee; vascular surgery; Upper gastrointestinal endoscopy (N/A, 1/29/2019); and Paracentesis. Medications:   Scheduled Meds:    insulin lispro  0-6 Units Subcutaneous TID WC    insulin lispro  0-3 Units Subcutaneous Nightly    nicotine  1 patch Transdermal Daily    atorvastatin  40 mg Oral Nightly    metoprolol succinate  50 mg Oral Daily    heparin (porcine)  5,000 Units Subcutaneous 3 times per day     Continuous Infusions:    dextrose       PRN Meds: traMADol, sodium chloride flush, promethazine **OR** ondansetron, acetaminophen **OR** acetaminophen, melatonin, glucose, dextrose, glucagon (rDNA), dextrose  Home Meds:   Prior to Admission medications    Medication Sig Start Date End Date Taking?  Authorizing Provider   isosorbide-hydrALAZINE (BIDIL) 20-37.5 MG per tablet Take 1 tablet by mouth 3 times daily 2/27/19   LARRY Vance - CNP   metoprolol succinate (TOPROL XL) 50

## 2021-01-11 NOTE — PROGRESS NOTES
Melita Bay is a 56 y.o. male patient seen today resting comfortably.  Denies pedal pain    Current Facility-Administered Medications   Medication Dose Route Frequency Provider Last Rate Last Admin    insulin lispro (HUMALOG) injection vial 0-6 Units  0-6 Units Subcutaneous TID WC LARRY Gandhi NP   1 Units at 01/10/21 1704    insulin lispro (HUMALOG) injection vial 0-3 Units  0-3 Units Subcutaneous Nightly LARRY Gandhi NP        sodium chloride flush 0.9 % injection 10 mL  10 mL Intravenous PRN Marcelino Decker MD   10 mL at 01/09/21 2114    promethazine (PHENERGAN) tablet 12.5 mg  12.5 mg Oral Q6H PRN Marcelino Decker MD   12.5 mg at 01/09/21 0402    Or    ondansetron (ZOFRAN) injection 4 mg  4 mg Intravenous Q6H PRN Marcelino Decker MD        acetaminophen (TYLENOL) tablet 650 mg  650 mg Oral Q6H PRN Marcelino Decker MD        Or   Maira Joiner acetaminophen (TYLENOL) suppository 650 mg  650 mg Rectal Q6H PRN Marcelino Decker MD        nicotine (NICODERM CQ) 7 MG/24HR 1 patch  1 patch Transdermal Daily Marcelino Decker MD   1 patch at 01/10/21 1136    atorvastatin (LIPITOR) tablet 40 mg  40 mg Oral Nightly Marcelino Decker MD   40 mg at 01/10/21 2144    metoprolol succinate (TOPROL XL) extended release tablet 50 mg  50 mg Oral Daily Marcelino Decker MD   50 mg at 01/10/21 0855    melatonin tablet 3 mg  3 mg Oral Nightly PRN Marcelino Decker MD        heparin (porcine) injection 5,000 Units  5,000 Units Subcutaneous 3 times per day Marcelino Decker MD   5,000 Units at 01/10/21 2144    glucose (GLUTOSE) 40 % oral gel 15 g  15 g Oral PRN Marcelino Decker MD        dextrose 50 % IV solution  12.5 g Intravenous PRKWESI Decker MD        glucagon (rDNA) injection 1 mg  1 mg Intramuscular PRN Marcelino Decker MD        dextrose 5 % solution  100 mL/hr Intravenous EDGARD Decker MD         Allergies   Allergen Reactions    Pcn [Penicillins] Hives     Active Problems: Tobacco abuse    Type 2 diabetes mellitus with left diabetic foot infection (HCC)    Acute kidney injury superimposed on chronic kidney disease (HCC)    Stage 3 chronic kidney disease    Chronic systolic heart failure (HCC)    Diabetic foot infection (HCC)    Alcoholic cirrhosis of liver with ascites ([facility]  Resolved Problems:    * No resolved hospital problems. *    Blood pressure (!) 143/84, pulse (!) 45, temperature 98 °F (36.7 °C), temperature source (P) Oral, resp. rate (P) 14, height 6' 1\" (1.854 m), weight 255 lb 9.6 oz (115.9 kg), SpO2 93 %. Subjective  Objective   Integument: Two full thickness ulcerations plantar to the right 1st MTP with fibrotic/necrotic base (1.5cm x 1.5cm x 0.2cm). Wound does not probe deep. No purulent drainage. No surrounding erythema, edema, or warmth. There is a 2.0cm x 0.5cm x 0.3cm wound to the dorsal lateral foot with fibrotic base. No SOI as noted above. Skin is overall thin, atrophic, and peeling. Neurologic:  Protective sensation is grossly diminished to light touch at the level of the toes, right  Vascular:  DP and PT pulses are non-palpable, right. .  No significant edema appreciated. Musculoskeletal:  Patient has 5/5 strength on inversion/everison/dorsiflexion/plantarflexion, right. Left BKA    SHIRLEY: pending      Impression/Recommendations:     The patient is a pleasant 56 y.o. male with:  1) Full thickness wound to fat right foot  -Aquacel AG DSD  -likely ischemic in nature, obtain arterial studies  -No urgent need for debridement  2) PVD  -Awaiting arterial studies  3) DM II with neuropathy  4) left BKA    Seng Glover DPM  1/10/2021

## 2021-01-11 NOTE — PROGRESS NOTES
Progress Note    HISTORY     CC:   Foot Ulcer              We are following for renal failure      Subjective/   HPI:  Patient is doing ok. BP is high. He did get his [facility] placed.   He is also getting vein mapping     ROS:  Constitutional:  No fevers, No Chills, + weakness  Cardiovascular:  No palpations  Respiratory:  No wheezing, no cough  Skin:  No rash, no itching  :  No hematuria, no dysuria     Social Hx:  No family at bedside     Past Medical and Surgical History:  - Reviewed, no changes     EXAM       Objective/     Vitals:    01/11/21 1056 01/11/21 1100 01/11/21 1106 01/11/21 1343   BP: (!) 157/92 (!) 185/89 (!) 179/92 (!) 192/99   Pulse: 56 57 58 56   Resp: 17 20 20 20   Temp:    97.5 °F (36.4 °C)   TempSrc:    Oral   SpO2: 97% 100% 95% 100%   Weight:       Height:         24HR INTAKE/OUTPUT:      Intake/Output Summary (Last 24 hours) at 1/11/2021 1351  Last data filed at 1/11/2021 0322  Gross per 24 hour   Intake 240 ml   Output 750 ml   Net -510 ml     Constitutional:  Alert, awake, no apparent distress  Eyes:  Pupils reactive, sclera clear   Neck:  Normal thyroid, no masses   Cardiovascular:  Regular, no rub  Respiratory:  No distress, no wheezing  Psychiatry:  Appropriate mood/affect, alert  Abdomen: +bs, soft, nt, no masses   Musculoskeletal: No LE edema, no clubbing   Lymphatics:  No LAD in neck, no supraclavicular nodes       MEDICAL DECISION MAKING       Data/  Recent Labs     01/09/21  0310 01/10/21  0656 01/11/21  0629   WBC 7.4 5.8 5.8   HGB 9.3* 8.9* 8.6*   HCT 30.3* 29.7* 28.5*   MCV 78.2* 78.4* 79.1*    240 231     Recent Labs     01/09/21  0310 01/10/21  0656 01/11/21  0629   * 135* 134*   K 5.5* 5.1 5.2*    107 103   CO2 22 19* 22   GLUCOSE 122* 96 124*   PHOS  --   --  5.5*   MG 1.50* 1.60* 1.70*   BUN 35* 38* 43*   CREATININE 4.7* 5.0* 5.4*   LABGLOM 13* 12* 11*   GFRAA 15* 14* 13*       Assessment/     ESRD:  - Etiology:  Diabetic Nephropathy     - TDC placed 1/11/2021. Vein mapping on 3/61/6703     Systolic Heart Failure:  EF = 35% / Grade 2 diastolic   - Compensated     Hypertension:  Range is high     Anemia:  Due to anemia of chronic disease    Hyperkalemia:  Stable    Alcoholic Cirrhosis/recurrent ascites:  - getting paracentesis at [address]     Will need to start dialysis this admission to prevent further clinical deterioration - explained   to patient, who is agreeable to proceed. Will start on dialysis.       Check anemia indices, start HARPREET with HD  Trend labs     Case mgmt consult for outpatient dialysis in [facility]  -----------------------------  Avila Connelly M.D.   [facility]

## 2021-01-12 LAB
A/G RATIO: 0.9 (ref 1.1–2.2)
ALBUMIN SERPL-MCNC: 2.8 G/DL (ref 3.4–5)
ALP BLD-CCNC: 95 U/L (ref 40–129)
ALT SERPL-CCNC: 7 U/L (ref 10–40)
ANION GAP SERPL CALCULATED.3IONS-SCNC: 7 MMOL/L (ref 3–16)
AST SERPL-CCNC: 15 U/L (ref 15–37)
BASOPHILS ABSOLUTE: 0 K/UL (ref 0–0.2)
BASOPHILS RELATIVE PERCENT: 0.3 %
BILIRUB SERPL-MCNC: <0.2 MG/DL (ref 0–1)
BUN BLDV-MCNC: 45 MG/DL (ref 7–20)
CALCIUM SERPL-MCNC: 7.1 MG/DL (ref 8.3–10.6)
CHLORIDE BLD-SCNC: 104 MMOL/L (ref 99–110)
CO2: 23 MMOL/L (ref 21–32)
CREAT SERPL-MCNC: 6 MG/DL (ref 0.8–1.3)
EOSINOPHILS ABSOLUTE: 0.2 K/UL (ref 0–0.6)
EOSINOPHILS RELATIVE PERCENT: 2.9 %
GFR AFRICAN AMERICAN: 12
GFR NON-AFRICAN AMERICAN: 10
GLOBULIN: 3 G/DL
GLUCOSE BLD-MCNC: 106 MG/DL (ref 70–99)
GLUCOSE BLD-MCNC: 110 MG/DL (ref 70–99)
GLUCOSE BLD-MCNC: 112 MG/DL (ref 70–99)
GLUCOSE BLD-MCNC: 122 MG/DL (ref 70–99)
HAV IGM SER IA-ACNC: NORMAL
HBV SURFACE AB TITR SER: 16.64 MIU/ML
HCT VFR BLD CALC: 28.8 % (ref 40.5–52.5)
HEMOGLOBIN: 8.7 G/DL (ref 13.5–17.5)
HEPATITIS B CORE IGM ANTIBODY: NORMAL
HEPATITIS B SURFACE ANTIGEN INTERPRETATION: NORMAL
HEPATITIS C ANTIBODY INTERPRETATION: NORMAL
INR BLD: 1.08 (ref 0.86–1.14)
LYMPHOCYTES ABSOLUTE: 0.5 K/UL (ref 1–5.1)
LYMPHOCYTES RELATIVE PERCENT: 8 %
MAGNESIUM: 1.7 MG/DL (ref 1.8–2.4)
MCH RBC QN AUTO: 23.8 PG (ref 26–34)
MCHC RBC AUTO-ENTMCNC: 30.2 G/DL (ref 31–36)
MCV RBC AUTO: 78.8 FL (ref 80–100)
MONOCYTES ABSOLUTE: 0.4 K/UL (ref 0–1.3)
MONOCYTES RELATIVE PERCENT: 5.5 %
NEUTROPHILS ABSOLUTE: 5.5 K/UL (ref 1.7–7.7)
NEUTROPHILS RELATIVE PERCENT: 83.3 %
PDW BLD-RTO: 18 % (ref 12.4–15.4)
PERFORMED ON: ABNORMAL
PLATELET # BLD: 237 K/UL (ref 135–450)
PMV BLD AUTO: 7.6 FL (ref 5–10.5)
POTASSIUM SERPL-SCNC: 5.2 MMOL/L (ref 3.5–5.1)
PROTHROMBIN TIME: 12.5 SEC (ref 10–13.2)
RBC # BLD: 3.66 M/UL (ref 4.2–5.9)
SODIUM BLD-SCNC: 134 MMOL/L (ref 136–145)
TOTAL PROTEIN: 5.8 G/DL (ref 6.4–8.2)
WBC # BLD: 6.6 K/UL (ref 4–11)

## 2021-01-12 PROCEDURE — 36415 COLL VENOUS BLD VENIPUNCTURE: CPT

## 2021-01-12 PROCEDURE — 1200000000 HC SEMI PRIVATE

## 2021-01-12 PROCEDURE — 80074 ACUTE HEPATITIS PANEL: CPT

## 2021-01-12 PROCEDURE — 6360000002 HC RX W HCPCS: Performed by: NURSE PRACTITIONER

## 2021-01-12 PROCEDURE — 97163 PT EVAL HIGH COMPLEX 45 MIN: CPT

## 2021-01-12 PROCEDURE — 90935 HEMODIALYSIS ONE EVALUATION: CPT

## 2021-01-12 PROCEDURE — 5A1D70Z PERFORMANCE OF URINARY FILTRATION, INTERMITTENT, LESS THAN 6 HOURS PER DAY: ICD-10-PCS | Performed by: INTERNAL MEDICINE

## 2021-01-12 PROCEDURE — 2580000003 HC RX 258: Performed by: INTERNAL MEDICINE

## 2021-01-12 PROCEDURE — 93005 ELECTROCARDIOGRAM TRACING: CPT | Performed by: NURSE PRACTITIONER

## 2021-01-12 PROCEDURE — 6360000002 HC RX W HCPCS: Performed by: INTERNAL MEDICINE

## 2021-01-12 PROCEDURE — 97535 SELF CARE MNGMENT TRAINING: CPT

## 2021-01-12 PROCEDURE — 97116 GAIT TRAINING THERAPY: CPT

## 2021-01-12 PROCEDURE — 85025 COMPLETE CBC W/AUTO DIFF WBC: CPT

## 2021-01-12 PROCEDURE — 86704 HEP B CORE ANTIBODY TOTAL: CPT

## 2021-01-12 PROCEDURE — 97167 OT EVAL HIGH COMPLEX 60 MIN: CPT

## 2021-01-12 PROCEDURE — 6370000000 HC RX 637 (ALT 250 FOR IP): Performed by: INTERNAL MEDICINE

## 2021-01-12 PROCEDURE — 83735 ASSAY OF MAGNESIUM: CPT

## 2021-01-12 PROCEDURE — 97530 THERAPEUTIC ACTIVITIES: CPT

## 2021-01-12 PROCEDURE — 6370000000 HC RX 637 (ALT 250 FOR IP): Performed by: NURSE PRACTITIONER

## 2021-01-12 PROCEDURE — 80053 COMPREHEN METABOLIC PANEL: CPT

## 2021-01-12 PROCEDURE — 86706 HEP B SURFACE ANTIBODY: CPT

## 2021-01-12 PROCEDURE — 85610 PROTHROMBIN TIME: CPT

## 2021-01-12 RX ORDER — HEPARIN SODIUM 1000 [USP'U]/ML
3200 INJECTION, SOLUTION INTRAVENOUS; SUBCUTANEOUS PRN
Status: DISCONTINUED | OUTPATIENT
Start: 2021-01-12 | End: 2021-01-16 | Stop reason: HOSPADM

## 2021-01-12 RX ORDER — PROCHLORPERAZINE EDISYLATE 5 MG/ML
10 INJECTION INTRAMUSCULAR; INTRAVENOUS ONCE
Status: COMPLETED | OUTPATIENT
Start: 2021-01-12 | End: 2021-01-12

## 2021-01-12 RX ADMIN — TRAMADOL HYDROCHLORIDE 50 MG: 50 TABLET, FILM COATED ORAL at 08:02

## 2021-01-12 RX ADMIN — ATORVASTATIN CALCIUM 40 MG: 40 TABLET, FILM COATED ORAL at 22:37

## 2021-01-12 RX ADMIN — ACETAMINOPHEN 650 MG: 325 TABLET ORAL at 03:58

## 2021-01-12 RX ADMIN — PROCHLORPERAZINE EDISYLATE 10 MG: 5 INJECTION INTRAMUSCULAR; INTRAVENOUS at 11:40

## 2021-01-12 RX ADMIN — TRAMADOL HYDROCHLORIDE 50 MG: 50 TABLET, FILM COATED ORAL at 00:29

## 2021-01-12 RX ADMIN — Medication 10 ML: at 22:37

## 2021-01-12 RX ADMIN — ONDANSETRON 4 MG: 2 INJECTION INTRAMUSCULAR; INTRAVENOUS at 18:08

## 2021-01-12 RX ADMIN — ONDANSETRON 4 MG: 2 INJECTION INTRAMUSCULAR; INTRAVENOUS at 09:45

## 2021-01-12 ASSESSMENT — PAIN DESCRIPTION - FREQUENCY
FREQUENCY: CONTINUOUS

## 2021-01-12 ASSESSMENT — PAIN DESCRIPTION - PROGRESSION: CLINICAL_PROGRESSION: NOT CHANGED

## 2021-01-12 ASSESSMENT — PAIN DESCRIPTION - LOCATION
LOCATION: BACK;FOOT
LOCATION: BACK

## 2021-01-12 ASSESSMENT — PAIN DESCRIPTION - PAIN TYPE
TYPE: CHRONIC PAIN
TYPE: CHRONIC PAIN

## 2021-01-12 ASSESSMENT — PAIN SCALES - GENERAL
PAINLEVEL_OUTOF10: 0
PAINLEVEL_OUTOF10: 0
PAINLEVEL_OUTOF10: 10

## 2021-01-12 ASSESSMENT — PAIN DESCRIPTION - DESCRIPTORS
DESCRIPTORS: ACHING
DESCRIPTORS: ACHING

## 2021-01-12 ASSESSMENT — PAIN DESCRIPTION - ORIENTATION
ORIENTATION: UPPER
ORIENTATION: RIGHT

## 2021-01-12 ASSESSMENT — PAIN DESCRIPTION - ONSET
ONSET: ON-GOING
ONSET: ON-GOING

## 2021-01-12 NOTE — PROGRESS NOTES
Hospitalist Progress Note      PCP: Irvin Louis, APRN - CNP    Date of Admission: 1/9/2021    Chief Complaint: Right Foot Ulcer    Hospital Course: Omega Wood is a 56 y.o. male. He was accepted in transfer from [facility]. He presented from home for a right foot ulcer. He relates that about three weeks ago he awoke one morning to find \"my whole foot looked like on big blister. \"  Since that time there has been quite a bit of skin sloughing from the leg and foot. There has been significant tissue breakdown on the sole of the foot under his big toe. He has very poor sensation in the right leg.     Patient has not felt ill. He has a strong appetite. He denies rigors, sweats, and nausea.     He is taking isordil 20mg tid, hydralazine 25mg tid, metoprolol succinate 50mg daily, and sliding scale lantus. Subjective: Feels terrible and very nauseous this AM. Planning for RLE angio and HD today.     Medications:  Reviewed    Infusion Medications    dextrose       Scheduled Medications    insulin lispro  0-6 Units Subcutaneous TID WC    insulin lispro  0-3 Units Subcutaneous Nightly    nicotine  1 patch Transdermal Daily    atorvastatin  40 mg Oral Nightly    [Held by provider] heparin (porcine)  5,000 Units Subcutaneous 3 times per day     PRN Meds: heparin (porcine), traMADol, ipratropium-albuterol, sodium chloride flush, promethazine **OR** ondansetron, acetaminophen **OR** acetaminophen, melatonin, glucose, dextrose, glucagon (rDNA), dextrose      Intake/Output Summary (Last 24 hours) at 1/12/2021 1801  Last data filed at 1/12/2021 0402  Gross per 24 hour   Intake --   Output 1025 ml   Net -1025 ml       Physical Exam Performed:    BP (!) 198/112   Pulse 71   Temp (!) 89.6 °F (32 °C) (Axillary)   Resp 20   Ht 6' 1\" (1.854 m)   Wt 250 lb 12.8 oz (113.8 kg)   SpO2 98%   BMI 33.09 kg/m²     General appearance: No apparent distress, appears stated age and Result      VL PRE OP VEIN MAPPING   Final Result      IR TUNNELED CVC PLACE WO SQ PORT/PUMP > 5 YEARS   Final Result   Successful ultrasound and fluoroscopy guided tunneled catheter placement . XR FOOT RIGHT (MIN 3 VIEWS)   Final Result   Soft tissue irregularity adjacent to the 1st MTP joint, suspicious for   ulceration given patient history. Correlate with physical exam.  No   generalized erosive pattern is seen. If there remains strong clinical   concern for developing osteomyelitis, MR or three-phase bone scan could be   considered. XR CHEST (2 VW)    (Results Pending)           Assessment/Plan:    Active Hospital Problems    Diagnosis Date Noted    Alcoholic cirrhosis of liver with ascites ([facility] [address] [K70.31] 01/09/2021    [facility] [C62.850, L08.9] 01/08/2021    Chronic systolic heart failure (HCC) [I50.22]     Stage 3 chronic kidney disease [N18.30] 02/28/2018    Type 2 diabetes mellitus with left diabetic foot infection ([facility] [C42.696, L08.9]     Acute kidney injury superimposed on chronic kidney disease ([facility] [N17.9, N18.9]     Tobacco abuse [Z72.0] 07/29/2015       Type II DM w/ Diabetic Polyneuropathy and likely ischemic ulcer  -  Patient received empiric vancomycin and cefepime at [address] [place] [address].    -  No evidence of cellulitis or OM, so not further abx indicated. -  podiatry consulted and following.  -  Vascular consulted and following.  -  Planning for RLE angiogram today. DENIA on CKD3  -  baseline creatinine ~1.8. Probably type 2 hepatorenal.  -  Patient has not been taking diuretics for ascites management. He has been undergoing paracentesis every week at [address] [place] [address].   ~5L is removed each time and he is not receiving albumin afterward. Last paracentesis was Thursday 1/7/21.  -  s/p 50g 25% albumin 1/9.  ef  -  nephrology following  -  S/p vascath placement. Planning to start HD on 1/12/2021.   CM working on outpatient placement.     Systolic Heart Failure  -  LVEF = 35 - 40% by TTE 1/2019. No coronary angiogram could be found. SWMA's are present on TTE c/w prior RCA territory MI.  -  Continue home metoprolol XL. He is not prescribed an antiplatelet agent or statin. -  He has been taking hydralazine and isordil at home. Neither are a good idea in the setting of cirrhosis w/ portal HTN and ascites.      Alcoholic Cirrhosis w/ ascites  -  Patient has been abstinent from alcohol, but his cirrhosis is essentially not being treated aside from scheduled paracenteses. -  Aside from ascites, liver function seems well compensated. He is not encephalopathic at this time.     DM2  -  Hold all oral antidiabetic agents. Start s.c. Insulin regimen based on weight.     DVT Prophylaxis: heparin SQ  Diet: DIET CLEAR LIQUID;  Code Status: Full Code    PT/OT Eval Status: active and ongoing    Dispo - 2-3 days.      LARRY Wood - CNP

## 2021-01-12 NOTE — PROGRESS NOTES
Physical Therapy    Facility/Department: [place] [address] - MED SURG/ORTHO  Initial Assessment    NAME: Mariel Peraza  : 1960  MRN: [id number]    Date of Service: 2021    Discharge Recommendations:  Subacute/Skilled Nursing Facility   PT Equipment Recommendations  Equipment Needed: No  If pt discharges prior to next PT session this note will serve as discharge summary. Assessment   Body structures, Functions, Activity limitations: Decreased functional mobility ; Decreased safe awareness;Decreased balance;Decreased endurance;Decreased strength;Decreased posture  Assessment: 57 yo male adm with RLE foot ulcer. PMH: DM, polyneuropathy, acute on chronic Kidney disease, L BKA, alcoholic cirrhosis, EF 71-92%, dialysis. BP high since admission. PLOF: lives alone, indep amb and ADLs, drives when he can borrow son's car. Today functioning below baseline and demonstrates decreased safety awareness. Pt will benefit from skilled PT to address deficits. Recommend SNF at discharge  Treatment Diagnosis: decreased gait and endurance  Prognosis: Good  Decision Making: High Complexity  PT Education: Goals;Transfer Training;Disease Specific Education;PT Role;Functional Mobility Training;Plan of Care;General Safety;Home Exercise Program;Gait Training;Weight-bearing Education  Patient Education: pt educated in compensatory gait with less wt RLE due to R foot ulceration  Barriers to Learning: pt resistant to education, recommend reinforcement  REQUIRES PT FOLLOW UP: Yes  Activity Tolerance  Activity Tolerance: Patient Tolerated treatment well  Activity Tolerance: /68, HR 59 SpO2 95%       Patient Diagnosis(es): There were no encounter diagnoses. has a past medical history of Bacteremia, Cellulitis and abscess of foot, except toes, CHF (congestive heart failure) ([facility] [address] Diabetes mellitus ([facility] [address] [address] History of liver failure, Hypertension, Kidney disease, and MRSA (methicillin resistant staph aureus) culture positive.    has a Minutes 33         Timed Code Treatment Minutes: [id number] [facility] MIREYA Krishna

## 2021-01-12 NOTE — PROGRESS NOTES
Knabb NP aware of pt elevated BP, low HR, sometimes 30-40's and second degree heart block (on-going for pt), and abnormal electrolytes. Per NP no new orders for lab. Per NP, call if BP greater than 458 systolic or 367 diastolic and call if HR sustains for 1-2 minutes at or lower than 35 bpm. CMU updated.

## 2021-01-12 NOTE — PROGRESS NOTES
Progress Note    HISTORY     CC:   Foot Ulcer              We are following for renal failure      Subjective/   HPI:  To start HD today   He did get his TDC placed.     He is also getting vein mapping   For HD today ( first treatment)    ROS:  Constitutional:  No fevers, No Chills, + weakness  Cardiovascular:  No palpations  Respiratory:  No wheezing, no cough  Abd: + NAUSEA   Skin:  No rash, no itching  :  No hematuria, no dysuria     Social Hx:  No family at bedside     Past Medical and Surgical History:  - Reviewed, no changes     EXAM       Objective/     Vitals:    01/11/21 2245 01/12/21 0356 01/12/21 0400 01/12/21 0801   BP: (!) 155/84 (!) 184/93 (!) 168/101 (!) 181/95   Pulse: 54  58 57   Resp: 20  20 20   Temp: 97.7 °F (36.5 °C)  97.4 °F (36.3 °C) 97.5 °F (36.4 °C)   TempSrc: Oral  Oral Oral   SpO2: 97%  99% 98%   Weight:   250 lb 12.8 oz (113.8 kg)    Height:         24HR INTAKE/OUTPUT:      Intake/Output Summary (Last 24 hours) at 1/12/2021 0368  Last data filed at 1/12/2021 0402  Gross per 24 hour   Intake --   Output 1025 ml   Net -1025 ml     Constitutional:  Alert, awake, no apparent distress  Eyes:  Pupils reactive, sclera clear   Neck:  Normal thyroid, no masses   Cardiovascular:  Regular, no rub  Respiratory:  No distress, no wheezing  Psychiatry:  Appropriate mood/affect, alert  Abdomen: +bs, soft, nt, no masses   Musculoskeletal: No LE edema, no clubbing   Lymphatics:  No LAD in neck, no supraclavicular nodes       MEDICAL DECISION MAKING       Data/  Recent Labs     01/10/21  0656 01/11/21  0629 01/12/21  0524   WBC 5.8 5.8 6.6   HGB 8.9* 8.6* 8.7*   HCT 29.7* 28.5* 28.8*   MCV 78.4* 79.1* 78.8*    231 237     Recent Labs     01/10/21  0656 01/11/21  0629 01/12/21  0524   * 134* 134*   K 5.1 5.2* 5.2*    103 104   CO2 19* 22 23   GLUCOSE 96 124* 122*   PHOS  --  5.5*  --    MG 1.60* 1.70* 1.70*   BUN 38* 43* 45*   CREATININE 5.0* 5.4* 6.0*   LABGLOM 12* 11* 10* GFRAA 14* 13* 12*       Assessment/Plan     1. ESRD:  - Etiology:  Diabetic Nephropathy     - TDC placed 1/11/2021. Vein mapping on 1/11/2021   -for HD today    2. Systolic Heart Failure:  EF = 35% / Grade 2 diastolic   - Compensated     3. Hypertension:  Range is high     4. Anemia:  Due to anemia of chronic disease  HARPREET at HD    5. Hyperkalemia:  Stable    6. Alcoholic Cirrhosis/recurrent ascites:  - getting paracentesis at [facility]

## 2021-01-12 NOTE — PROGRESS NOTES
Occupational Therapy   Occupational Therapy Initial Assessment/Treatment   Date: 2021   Patient Name: Kristie Shukla  MRN: [id number]     : 1960    Date of Service: 2021    Discharge Recommendations:  [address]   Pending pt's progress while in hospital     Assessment   Performance deficits / Impairments: Decreased functional mobility ; Decreased safe awareness;Decreased balance;Decreased ADL status; Decreased cognition;Decreased endurance;Decreased strength;Decreased sensation  Assessment: yo male adm with RLE foot ulcer. PMH: DM, polyneuropathy, acute on chronic Kidney disease, L BKA, alcoholic cirrhosis, EF 04-54%, dialysis. BP high since admission. PLOF: lives alone, indep amb and ADLs, drives when he can borrow son's car. Today functioning below baseline and demonstrates decreased safety awareness. Pt will benefit from increased skilled OT in order to achieve therapy goals. Treatment Diagnosis: Weakness  Prognosis: Good  Decision Making: High Complexity  OT Education: OT Role;Transfer Training;Home Exercise Program;ADL Adaptive Strategies;Precautions;Plan of Care  Barriers to Learning: pain, agitation  REQUIRES OT FOLLOW UP: Yes  Activity Tolerance  Activity Tolerance: Treatment limited secondary to agitation  Activity Tolerance: Pt demos increased agitation during assessment of transfers and mobility. Pt apologized at end of session, stating he is not feeling well. Safety Devices  Safety Devices in place: Yes  Type of devices: Nurse notified; Bed alarm in place; Left in bed;Patient at risk for falls;Call light within reach           Patient Diagnosis(es): There were no encounter diagnoses. has a past medical history of Bacteremia, Cellulitis and abscess of foot, except toes, CHF (congestive heart failure) ([facility] Diabetes mellitus ([facility] [address] History of liver failure, Hypertension, Kidney disease, and MRSA (methicillin resistant staph aureus) culture positive.    has a past surgical history that includes Leg amputation below knee; vascular surgery; Upper gastrointestinal endoscopy (N/A, 1/29/2019); Paracentesis; and IR TUNNELED CVC PLACE WO SQ PORT/PUMP > 5 YEARS (1/11/2021). Treatment Diagnosis: Weakness      Restrictions  Restrictions/Precautions  Restrictions/Precautions: Fall Risk, General Precautions, Up as Tolerated    Subjective   General  Patient assessed for rehabilitation services?: Yes  Subjective  Subjective: pt agreeable to participate  Patient Currently in Pain: Yes  Pain Assessment  Pain Assessment: 0-10  Pain Level: 5  Pain Type: Chronic pain  Pain Location: Back; Foot  Pain Orientation: Right  Pain Descriptors: Aching  Pain Frequency: Continuous  Pain Onset: On-going  Clinical Progression: Not changed  Functional Pain Assessment: Prevents or interferes some active activities and ADLs  Non-Pharmaceutical Pain Intervention(s): Emotional support  Response to Pain Intervention: Patient Satisfied  Vital Signs  Patient Currently in Pain: Yes  Social/Functional History  Social/Functional History  Lives With: Alone  Type of Home: House  Home Layout: One level  Home Access: (one step to enter)  Bathroom Shower/Tub: Tub/Shower unit  Bathroom Toilet: Standard  Bathroom Equipment: Toilet raiser  Home Equipment: Standard walker, Cane, Wheelchair-manual(LLE prosthesis)  ADL Assistance: Independent(does sponge bath)  Ambulation Assistance: Independent(uses cane or walker)  Transfer Assistance: Independent  Active : Yes  Mode of Transportation: Van       Objective        Orientation  Overall Orientation Status: Within Functional Limits     Balance  Sitting Balance: Supervision(Upon therapy arrival pt sitting EOB)  Standing Balance:  Moderate assistance(MIN A x 2)  Standing Balance  Time: 2-3 minutes  Activity: ambulation, transfers, standing to lavon pants over hips  Comment: Pt unsteady, none compliant with therapist education regarding use of SW  Functional Mobility  Functional - Mobility Device: Standard Walker  Activity: To/from bathroom  Assist Level: Moderate assistance(MIN A x 2)  ADL  LE Dressing: Maximum assistance(Pt requires assistance to lavon sock. Pt able to lavon prosthesis)  Tone RUE  RUE Tone: Normotonic  Tone LUE  LUE Tone: Normotonic  Coordination  Movements Are Fluid And Coordinated: Yes        Transfers  Sit to stand: Contact guard assistance  Stand to sit: Contact guard assistance  Transfer Comments: Cues for hand placement, pt pulling on SW in order to stand     Cognition  Overall Cognitive Status: WFL        Sensation  Overall Sensation Status: Impaired(Numbness at BLEs)        LUE PROM (degrees)  LUE PROM: WFL  LUE AROM (degrees)  LUE AROM : WFL  Left Hand PROM (degrees)  Left Hand PROM: WFL  Left Hand AROM (degrees)  Left Hand AROM: WFL  RUE PROM (degrees)  RUE PROM: WFL  RUE AROM (degrees)  RUE AROM : WFL  Right Hand PROM (degrees)  Right Hand PROM: WFL  Right Hand AROM (degrees)  Right Hand AROM: WFL  LUE Strength  L Hand General: 3/5  RUE Strength  R Hand General: 3/5                   Plan   Plan  Times per week: 2-3x  Current Treatment Recommendations: Strengthening, Endurance Training, Neuromuscular Re-education, Self-Care / ADL, Balance Training, Functional Mobility Training, Safety Education & Training      AM-PAC Score        AM-[facility] Inpatient Daily Activity Raw Score: 15 (01/12/21 1034)  AM-PAC Inpatient ADL T-Scale Score : 34.69 (01/12/21 1034)  ADL Inpatient CMS 0-100% Score: 56.46 (01/12/21 1034)  ADL Inpatient CMS G-Code Modifier : CK (01/12/21 1034)    Goals  Short term goals  Time Frame for Short term goals: 7 days  Short term goal 1: Pt will complete toilet transfer with CGA  Short term goal 2: Pt will complete grooming standing at sink with CGA  Short term goal 3: Pt will be able to manage short household distance with AD with CGA  Patient Goals   Patient goals :  To be independent with all self care       Therapy Time   Individual Concurrent Group Co-treatment   Time In 0802         Time Out 0835         Minutes 33         Timed Code Treatment Minutes: 23 Minutes(10 minutes for eval)       Yovani Sutherland, OT

## 2021-01-12 NOTE — PROGRESS NOTES
Vascular    Angiogram which was scheduled for today cancelled due to persistent nausea and emesis and patient refusal.   Will try to reschedule tomorrow or Thursday of feeling better.

## 2021-01-12 NOTE — PROGRESS NOTES
solution  100 mL/hr Intravenous PRN Cl Sidhu MD         Allergies   Allergen Reactions    Pcn [Penicillins] Hives     Active Problems:    Tobacco abuse    Type 2 diabetes mellitus with left diabetic foot infection (HCC)    Acute kidney injury superimposed on chronic kidney disease (HCC)    Stage 3 chronic kidney disease    Chronic systolic heart failure (HCC)    Diabetic foot infection (HCC)    Alcoholic cirrhosis of liver with ascites ([address]  Resolved Problems:    * No resolved hospital problems. *    Blood pressure (!) 164/97, pulse 54, temperature 97.3 °F (36.3 °C), temperature source Oral, resp. rate 20, height 6' 1\" (1.854 m), weight 252 lb 11.2 oz (114.6 kg), SpO2 99 %. Subjective  Objective:  Vital signs: (most recent): Blood pressure (!) 164/97, pulse 54, temperature 97.3 °F (36.3 °C), temperature source Oral, resp. rate 20, height 6' 1\" (1.854 m), weight 252 lb 11.2 oz (114.6 kg), SpO2 99 %. Integument: Two full thickness ulcerations plantar to the right 1st MTP with fibrotic/necrotic base (1.5cm x 1.5cm x 0.2cm). Wound does not probe deep. No purulent drainage. No surrounding erythema, edema, or warmth. There is a 2.0cm x 0.5cm x 0.3cm wound to the dorsal lateral foot with fibrotic base. No SOI as noted above. Skin is overall thin, atrophic, and peeling. Neurologic:  Protective sensation is grossly diminished to light touch at the level of the toes, right  Vascular:  DP and PT pulses are non-palpable, right. .  No significant edema appreciated. Musculoskeletal:  Patient has 5/5 strength on inversion/everison/dorsiflexion/plantarflexion, right. Left BKA    SHIRLEY:        There is significant and bilateral aorto-iliac inflow disease within the    lower extremities.    There is moderate diffuse heterogeneous plaque noted throughout the    bilateral lower extremities. Impression/Recommendations:     The patient is a pleasant 56 y.o. male with:  1) Full thickness wound to fat right foot  -Aquacel AG DSD  -No urgent need for debridement  2) PVD  -Vascular consulted  -follow up angiogram  3) DM II with neuropathy  4) left BKA    Rupali Eric DPM  1/11/2021

## 2021-01-13 LAB
A/G RATIO: 0.9 (ref 1.1–2.2)
ALBUMIN SERPL-MCNC: 3 G/DL (ref 3.4–5)
ALP BLD-CCNC: 111 U/L (ref 40–129)
ALT SERPL-CCNC: 11 U/L (ref 10–40)
ANION GAP SERPL CALCULATED.3IONS-SCNC: 11 MMOL/L (ref 3–16)
AST SERPL-CCNC: 21 U/L (ref 15–37)
BASOPHILS ABSOLUTE: 0 K/UL (ref 0–0.2)
BASOPHILS RELATIVE PERCENT: 0.2 %
BILIRUB SERPL-MCNC: 0.3 MG/DL (ref 0–1)
BUN BLDV-MCNC: 32 MG/DL (ref 7–20)
CALCIUM SERPL-MCNC: 7.8 MG/DL (ref 8.3–10.6)
CHLORIDE BLD-SCNC: 102 MMOL/L (ref 99–110)
CO2: 25 MMOL/L (ref 21–32)
CREAT SERPL-MCNC: 4.1 MG/DL (ref 0.8–1.3)
EKG ATRIAL RATE: 63 BPM
EKG DIAGNOSIS: NORMAL
EKG Q-T INTERVAL: 474 MS
EKG QRS DURATION: 166 MS
EKG QTC CALCULATION (BAZETT): 504 MS
EKG R AXIS: -44 DEGREES
EKG T AXIS: 94 DEGREES
EKG VENTRICULAR RATE: 68 BPM
EOSINOPHILS ABSOLUTE: 0.1 K/UL (ref 0–0.6)
EOSINOPHILS RELATIVE PERCENT: 0.7 %
GFR AFRICAN AMERICAN: 18
GFR NON-AFRICAN AMERICAN: 15
GLOBULIN: 3.2 G/DL
GLUCOSE BLD-MCNC: 112 MG/DL (ref 70–99)
GLUCOSE BLD-MCNC: 130 MG/DL (ref 70–99)
GLUCOSE BLD-MCNC: 145 MG/DL (ref 70–99)
GLUCOSE BLD-MCNC: 151 MG/DL (ref 70–99)
HCT VFR BLD CALC: 32.2 % (ref 40.5–52.5)
HEMOGLOBIN: 9.9 G/DL (ref 13.5–17.5)
HEPATITIS B CORE TOTAL ANTIBODY: NEGATIVE
INR BLD: 1.19 (ref 0.86–1.14)
LYMPHOCYTES ABSOLUTE: 0.4 K/UL (ref 1–5.1)
LYMPHOCYTES RELATIVE PERCENT: 3.6 %
MAGNESIUM: 1.8 MG/DL (ref 1.8–2.4)
MCH RBC QN AUTO: 24 PG (ref 26–34)
MCHC RBC AUTO-ENTMCNC: 30.8 G/DL (ref 31–36)
MCV RBC AUTO: 77.9 FL (ref 80–100)
MONOCYTES ABSOLUTE: 0.4 K/UL (ref 0–1.3)
MONOCYTES RELATIVE PERCENT: 3.7 %
NEUTROPHILS ABSOLUTE: 9.2 K/UL (ref 1.7–7.7)
NEUTROPHILS RELATIVE PERCENT: 91.8 %
PDW BLD-RTO: 17.4 % (ref 12.4–15.4)
PERFORMED ON: ABNORMAL
PLATELET # BLD: 271 K/UL (ref 135–450)
PMV BLD AUTO: 7.4 FL (ref 5–10.5)
POTASSIUM SERPL-SCNC: 4.6 MMOL/L (ref 3.5–5.1)
PROTHROMBIN TIME: 13.8 SEC (ref 10–13.2)
RBC # BLD: 4.13 M/UL (ref 4.2–5.9)
SODIUM BLD-SCNC: 138 MMOL/L (ref 136–145)
T4 FREE: 1.5 NG/DL (ref 0.9–1.8)
TOTAL PROTEIN: 6.2 G/DL (ref 6.4–8.2)
TSH REFLEX FT4: 8.44 UIU/ML (ref 0.27–4.2)
WBC # BLD: 10 K/UL (ref 4–11)

## 2021-01-13 PROCEDURE — 99223 1ST HOSP IP/OBS HIGH 75: CPT | Performed by: INTERNAL MEDICINE

## 2021-01-13 PROCEDURE — 80053 COMPREHEN METABOLIC PANEL: CPT

## 2021-01-13 PROCEDURE — 6370000000 HC RX 637 (ALT 250 FOR IP): Performed by: NURSE PRACTITIONER

## 2021-01-13 PROCEDURE — 6360000002 HC RX W HCPCS: Performed by: INTERNAL MEDICINE

## 2021-01-13 PROCEDURE — 93010 ELECTROCARDIOGRAM REPORT: CPT | Performed by: INTERNAL MEDICINE

## 2021-01-13 PROCEDURE — 85025 COMPLETE CBC W/AUTO DIFF WBC: CPT

## 2021-01-13 PROCEDURE — 1200000000 HC SEMI PRIVATE

## 2021-01-13 PROCEDURE — 83735 ASSAY OF MAGNESIUM: CPT

## 2021-01-13 PROCEDURE — 6360000002 HC RX W HCPCS: Performed by: NURSE PRACTITIONER

## 2021-01-13 PROCEDURE — 36415 COLL VENOUS BLD VENIPUNCTURE: CPT

## 2021-01-13 PROCEDURE — 85610 PROTHROMBIN TIME: CPT

## 2021-01-13 PROCEDURE — 6370000000 HC RX 637 (ALT 250 FOR IP): Performed by: INTERNAL MEDICINE

## 2021-01-13 PROCEDURE — 84443 ASSAY THYROID STIM HORMONE: CPT

## 2021-01-13 PROCEDURE — 90935 HEMODIALYSIS ONE EVALUATION: CPT

## 2021-01-13 PROCEDURE — 84439 ASSAY OF FREE THYROXINE: CPT

## 2021-01-13 RX ORDER — LORAZEPAM 0.5 MG/1
0.5 TABLET ORAL EVERY 6 HOURS PRN
Status: DISCONTINUED | OUTPATIENT
Start: 2021-01-13 | End: 2021-01-16 | Stop reason: HOSPADM

## 2021-01-13 RX ORDER — CALCIUM CARBONATE 200(500)MG
1000 TABLET,CHEWABLE ORAL 2 TIMES DAILY PRN
Status: DISCONTINUED | OUTPATIENT
Start: 2021-01-13 | End: 2021-01-16 | Stop reason: HOSPADM

## 2021-01-13 RX ORDER — PROCHLORPERAZINE EDISYLATE 5 MG/ML
10 INJECTION INTRAMUSCULAR; INTRAVENOUS ONCE
Status: COMPLETED | OUTPATIENT
Start: 2021-01-13 | End: 2021-01-13

## 2021-01-13 RX ORDER — METOPROLOL SUCCINATE 50 MG/1
50 TABLET, EXTENDED RELEASE ORAL DAILY
Status: DISCONTINUED | OUTPATIENT
Start: 2021-01-13 | End: 2021-01-16 | Stop reason: HOSPADM

## 2021-01-13 RX ADMIN — LIDOCAINE HYDROCHLORIDE: 20 SOLUTION ORAL; TOPICAL at 04:38

## 2021-01-13 RX ADMIN — PROCHLORPERAZINE EDISYLATE 10 MG: 5 INJECTION INTRAMUSCULAR; INTRAVENOUS at 05:23

## 2021-01-13 RX ADMIN — METOPROLOL SUCCINATE 50 MG: 50 TABLET, EXTENDED RELEASE ORAL at 18:26

## 2021-01-13 RX ADMIN — ANTACID TABLETS 1000 MG: 500 TABLET, CHEWABLE ORAL at 02:09

## 2021-01-13 RX ADMIN — ANTACID TABLETS 1000 MG: 500 TABLET, CHEWABLE ORAL at 07:59

## 2021-01-13 RX ADMIN — INSULIN LISPRO 1 UNITS: 100 INJECTION, SOLUTION INTRAVENOUS; SUBCUTANEOUS at 13:03

## 2021-01-13 RX ADMIN — ONDANSETRON 4 MG: 2 INJECTION INTRAMUSCULAR; INTRAVENOUS at 00:31

## 2021-01-13 RX ADMIN — PROMETHAZINE HYDROCHLORIDE 12.5 MG: 25 TABLET ORAL at 10:16

## 2021-01-13 RX ADMIN — ATORVASTATIN CALCIUM 40 MG: 40 TABLET, FILM COATED ORAL at 20:18

## 2021-01-13 RX ADMIN — INSULIN LISPRO 1 UNITS: 100 INJECTION, SOLUTION INTRAVENOUS; SUBCUTANEOUS at 17:40

## 2021-01-13 RX ADMIN — LORAZEPAM 0.5 MG: 0.5 TABLET ORAL at 17:39

## 2021-01-13 RX ADMIN — ONDANSETRON 4 MG: 2 INJECTION INTRAMUSCULAR; INTRAVENOUS at 14:44

## 2021-01-13 RX ADMIN — LORAZEPAM 0.5 MG: 0.5 TABLET ORAL at 23:54

## 2021-01-13 ASSESSMENT — PAIN SCALES - GENERAL: PAINLEVEL_OUTOF10: 0

## 2021-01-13 NOTE — PLAN OF CARE
Palliative Care Progress Note  Palliative Care Admit date:    Plan:  F/u call w/ [facility] RN who states that she has rec'd a f/u call from pt and he has now decided he will go home w/ hospice. They have scheduled an appt in his home for 1/14 @ 1300. However, timing of d/c depends on when he can have VAS cath removed. Writer will keep hospice apprised of when pt actually discharges.            Reason for consult:  _X_ Advance Care Planning  ___ Transition of Care Planning  ___ Psychosocial/Spiritual Support  ___ Symptom Management                                                                                                                Nya Bryan RN

## 2021-01-13 NOTE — CONSULTS
Electrophysiology Consultation   Date: 1/13/2021  Admit Date:  1/9/2021  Reason for Consultation: Newly diagnosed atrial fibrillation  Consult Requesting Physician: Cooper Tsai MD     No chief complaint on file. HPI:   Mr. Soledad Ley is a pleasant 56year old male with a medical history significant for peripheral vascular disease, complicated diabetes mellitus type II, COPD with concurrent tobacco abuse, chronic renal insufficiency now on hemodialysis, hypertension, left knee amputation, peripheral  Vascular disease, complicated alcoholic cirrhosis, osteomyelitis, and chronically elevated troponin enzymes. Patient is pleasant but not the best historian and not interested in long discussion however we did have a fruitful interview. He explains that he was in his usual state of health until 5 days ago when he noted an ulcer on his right leg. He presented to [facility]. He was later transferred to [facility] for higher level of care with vascular evaluation and podiatry/orthopedic evaluation. Incidentally patient went into atrial fibrillation. Electrophysiology was consulted to assist with further management of his atrial fibrillation. He didn't notice any palpitations or chest pain. Of note, hospice was consulted.   Patient refusing angiogram.    Current rhythm: Atrial fibrillation  Known history of atrial fibrillation: no  Valvular disease: No  Associated symptoms: unclear  Heart rate is  controlled  Previous cardioversion and/or ablation: no  History of CAD: unknown but likely  History of sleep apnea: unknown  History of ETOH abuse/drug abuse: Yes  History of thyroid disease: Yes  Elevated BNP: Yes  Left atrial size is Abnormal  mildly dilated    Past Medical History:   Diagnosis Date    Bacteremia 11/18/2017    strep agalactiae (also cultured from foot)    Cellulitis and abscess of foot, except toes     CHF (congestive heart failure) ([facility] [address]     Diabetes mellitus [facility])     History of liver failure     Hypertension     Kidney disease     MRSA (methicillin resistant staph aureus) culture positive 4/17/18, 11/19/2017    left foot        Past Surgical History:   Procedure Laterality Date    IR TUNNELED CATHETER PLACEMENT GREATER THAN 5 YEARS  1/11/2021    IR TUNNELED CATHETER PLACEMENT GREATER THAN 5 YEARS 1/11/2021 Yancy Ram MD [facility] PROCEDURES    LEG AMPUTATION BELOW KNEE      PARACENTESIS      UPPER GASTROINTESTINAL ENDOSCOPY N/A 1/29/2019    EGD ESOPHAGOGASTRODUODENOSCOPY performed by Ruma Fry MD at [address]      Left BKA       Allergies   Allergen Reactions    Pcn [Penicillins] Hives       Social History:  Reviewed. reports that he has been smoking cigarettes. He has a 40.00 pack-year smoking history. He has quit using smokeless tobacco.  His smokeless tobacco use included chew. He reports current drug use. Drug: Marijuana. He reports that he does not drink alcohol. Family History:  Reviewed. family history includes Dementia in his mother; Diabetes in his mother; Heart Attack in his brother and father; Hypertension in his mother; No Known Problems in his maternal grandfather, maternal grandmother, paternal grandfather, and paternal grandmother; Stroke in his brother. No premature CAD. Review of System:  All other systems reviewed except for that noted above.  Pertinent negatives and positives are:     · General: negative for fever, chills   · Ophthalmic ROS: negative for - eye pain or loss of vision  · ENT ROS: negative for - headaches, sore throat   · Respiratory: negative for - cough, sputum  · Cardiovascular: Reviewed in HPI  · Gastrointestinal: negative for - abdominal pain, diarrhea, N/V  · Hematology: negative for - bleeding, blood clots, bruising or jaundice  · Genito-Urinary:  negative for - Dysuria or incontinence  · Musculoskeletal: negative for - Joint swelling, muscle pain  · Neurological: negative for - confusion, dizziness, headaches   · Psychiatric: No anxiety, no depression. · Dermatological: negative for - rash    Physical Examination:  Vitals:    21 1500   BP: (!) 195/84   Pulse: 78   Resp: 16   Temp: 97.8 °F (36.6 °C)   SpO2: 92%        Intake/Output Summary (Last 24 hours) at 2021 1710  Last data filed at 2021 1020  Gross per 24 hour   Intake 900 ml   Output 2995 ml   Net -2095 ml     In: 400   Out: 1495    Wt Readings from Last 3 Encounters:   21 255 lb 4.7 oz (115.8 kg)   20 230 lb (104.3 kg)   20 230 lb (104.3 kg)     Temp  Av.9 °F (36.6 °C)  Min: 97.4 °F (36.3 °C)  Max: 98.3 °F (36.8 °C)  Pulse  Av.9  Min: 57  Max: 81  BP  Min: 168/70  Max: 198/96  SpO2  Av.7 %  Min: 92 %  Max: 96 %    · Telemetry: Atrial fibrillation with controlled rates  · Constitutional: Alert. Oriented to person, place, and time. No distress. · Head: Normocephalic and atraumatic. · Neck: Neck supple. No lymphadenopathy. No rigidity. No JVD present. · Cardiovascular: Irregular rate and rhythm with grade I/VI systolic murmur. · Pulmonary/Chest: Bilateral respiratory sounds present. No respiratory accessory muscle use. · Abdominal: Soft. Normal bowel sounds present. No distension, No tenderness. No splenomegaly. No hernia. · Musculoskeletal: No tenderness. +1 edema right  · Neurological: Alert and oriented. Cranial nerve II-XII grossly intact. · Skin: Skin is warm and dry. No rash, lesions, ulcerations noted. Dry skin. · Psychiatric: No anxiety nor agitation. Labs:  Reviewed.    Recent Labs     21  0629 21  0524 21  0613   * 134* 138   K 5.2* 5.2* 4.6    104 102   CO2 22 23 25   PHOS 5.5*  --   --    BUN 43* 45* 32*   CREATININE 5.4* 6.0* 4.1*     Recent Labs     21  0629 21  0524 21  0613   WBC 5.8 6.6 10.0   HGB 8.6* 8.7* 9.9*   HCT 28.5* 28.8* 32.2*   MCV 79.1* 78.8* 77.9*    237 271     Lab Results Component Value Date    CKTOTAL 111 01/19/2019    TROPONINI 0.44 03/21/2020     No results found for: BNP  Lab Results   Component Value Date    PROTIME 13.8 01/13/2021    PROTIME 12.5 01/12/2021    PROTIME 12.2 01/11/2021    INR 1.19 01/13/2021    INR 1.08 01/12/2021    INR 1.05 01/11/2021     Lab Results   Component Value Date    CHOL 86 02/22/2019    HDL 28 02/22/2019    TRIG 88 02/22/2019       Diagnostic and imaging results reviewed. ECG: Atrial fibrillation without signs of ongoing ischemia. RBBB like. Prolonged QTc. Non-specific TW changes. Echo: 01/21/2019   Summary   The left ventricular systolic function is moderately reduced with an   ejection fraction of 35-40 %. Moderate concentric left ventricular hypertrophy. There is hypokinesis of the basal inferior, basal inferoseptal and   inferolateral walls. Grade II diastolic dysfunction with normal filing pressure. Mild bi-atrial enlargement. Mild mitral, pulmonic and tricuspid regurgitation. Systolic pulmonary artery pressure (SPAP) is elevated and estimated at 41   mmHg (right atrial pressure 3 mmHg) consistent with mild pulmonary   hypertension. There is noncoronary cusp calcification, consider SONG. These findings may be   degenerative, however, endocarditis cannot be excluded. There is a large pleural effusion. Last echo on 11/20/2017 showed EF of 35-40%. Cath: None. I independently reviewed the ECG and telemetry.     Scheduled Meds:   insulin lispro  0-6 Units Subcutaneous TID WC    insulin lispro  0-3 Units Subcutaneous Nightly    nicotine  1 patch Transdermal Daily    atorvastatin  40 mg Oral Nightly    [Held by provider] heparin (porcine)  5,000 Units Subcutaneous 3 times per day     Continuous Infusions:   dextrose       PRN Meds:.calcium carbonate, LORazepam, heparin (porcine), traMADol, ipratropium-albuterol, sodium chloride flush, promethazine **OR** ondansetron, acetaminophen **OR** acetaminophen, 01/08/2021    Chronic systolic heart failure (HCC) [I50.22]     Stage 3 chronic kidney disease [N18.30] 02/28/2018    Type [facility] [E11.628, L08.9]     Acute kidney injury superimposed on chronic kidney disease ([facility] [N17.9, N18.9]     Tobacco abuse [Z72.0] 07/29/2015       Mr. Ganesh Millan is a pleasant 56year old male with a medical history significant for peripheral vascular disease, complicated diabetes mellitus type II, COPD with concurrent tobacco abuse, anemia, chronic renal insufficiency now on hemodialysis, hypertension, left knee amputation, peripheral  Vascular disease, complicated alcoholic cirrhosis, osteomyelitis, and chronically elevated troponin enzymes. Problem List:  1. Atrial fibrillation. 2. Cardiomyopathy. 3. Peripheral vascular disease. 4. Osteomyelitis. 5. ESRD/chronic renal insufficiency. 6. Diabetes mellitus type II.  7. Anemia. 8. Hypertension. Assessment and Plan:  1. Atrial fibrillation. Patient is a pleasant and unfortunate 56year old male with a medical history significant for hypertension, anemia, chronic renal insufficiency, peripheral vascular disease, and cirrhosis who presents from home with right leg osteomyelitis. Patient's [id number] score is 4 with a stroke risk of 4.8%. We discussed oral anticoagulation to decrease the risk of thromboembolic events including stroke. Benefits and alternatives were discussed with patient. Risk of bleeding was discussed. Patient verbalized understanding. Different forms of anticoagulants including warfarin (Coumadin), Rivaroxaban (Xarelto), and Apixaban (Eliquis) were discussed. Patient would like to consider his options. Rate control strategy options including cardioversion, atrial fibrillation ablation, pacemaker with AVN ablation, anti-arrhythmic strategy, and rate control strategy were discussed with patient.  Risks, benefits and alternative of each treatment options were explained. All questions were answered. His rates are controlled. We will restart home metoprolol. - I will order thyroid function testing.  - I agree with echocardiogram order.   - Restart home metoprolol at 50 mg daily. - Two week monitor at time of discharge. - Consider warfarin > apixaban. - Consider outpatient ischemic cardiomyopathy evaluation given cardiomyopathy and chronically elevated troponin enzymes. - We will check with patient tomorrow and after his decision on anticoagulation we will sign off case. 2. Cardiomyopathy.  - Plan as per above. 3. Peripheral vascular disease.  - Per primary team.    4. Osteomyelitis. - Per primary team.    5. ESRD/chronic renal insufficiency. - Per primary team.    6. Diabetes mellitus type II.  - Per primary team.    7. Anemia. Long history of anemia down to 7 (Hgb). Likely multifactorial.  No history of calls. - Plan as per above and per nephrology. 8. Hypertension.  - Per primary team.    Thank you for allowing me to participate in the care of [facility] . If you have any questions/comments, please do not hesitate to contact us.     Mark Bess MD  Cardiac Electrophysiology  [address] [facility]  [phone] [facility]

## 2021-01-13 NOTE — PLAN OF CARE
Pt seen. Will let nurse if wants to start warfarin given anemia and CKD. Heart monitor at time of discharge. Follow up with me at next available.     Sofia Arango MD  Cardiac Electrophysiology  [address] [facility]  [phone] [facility]

## 2021-01-13 NOTE — PROGRESS NOTES
Occupational Therapy/Physical Therapy  OT/PT follow up attempted, pt adamantly refusing to participate at this time even seated level exercises. Pt reports moving around makes him throw up. Will continue to follow.   SHERRIE Barnes/BURTON

## 2021-01-13 NOTE — PROGRESS NOTES
Vascular    Patient refuses angiogram today, will see tomorrow am and attempt angio in afternoon if agreeable.

## 2021-01-13 NOTE — PROGRESS NOTES
Pt states he cannot go another day without eating, that the doctor told him his angiogram would be on Thursday. Asked pt if he would like the angiogram to be today (Wednesday) and that is what the note states, and he stated he could not go another day without eating. Will continue to monitor.

## 2021-01-13 NOTE — PROGRESS NOTES
Progress Note    HISTORY     CC:   Foot Ulcer              We are following for renal failure      Subjective/   HPI:    Advanced renal failure, was supposed to start HD 1/12; has TDC  Refused to run >2 hours yesterday, and today he requested to come off the HD after 1.5 hours    ROS:  Discussed at length with Mr Lynda Pang, charge nurse at bedside  He tells me he wants to go home TODAY, and he does not want to continue HD  Explained that with no HD at this point, he will not live much longer  He told us that he is OK with that    Social Hx:  No family at bedside ; he has an estranged son, no other family    Past Medical and Surgical History:  - Reviewed, no changes     EXAM       Objective/     Vitals:    01/13/21 0338 01/13/21 0805 01/13/21 1020 01/13/21 1030   BP: (!) 186/83 (!) 174/76 (!) 173/94 (!) 168/70   Pulse: 76 66 70 81   Resp: 18 18 16 18   Temp: 97.5 °F (36.4 °C) 98.2 °F (36.8 °C) 98.5 °F (36.9 °C) 97.9 °F (36.6 °C)   TempSrc:    Oral   SpO2: 96%      Weight:  256 lb 9.9 oz (116.4 kg) 255 lb 4.7 oz (115.8 kg)    Height:         24HR INTAKE/OUTPUT:      Intake/Output Summary (Last 24 hours) at 1/13/2021 1107  Last data filed at 1/13/2021 1020  Gross per 24 hour   Intake 900 ml   Output 2995 ml   Net -2095 ml     Constitutional:  NAD   Neck:  Normal thyroid, no masses   Cardiovascular:  Regular, no rub  Respiratory:  Normal respiratory effort, CTA  Abdomen: +bs, soft, nt, no masses   Musculoskeletal: No LE edema, no clubbing         MEDICAL DECISION MAKING       Data/  Recent Labs     01/11/21  0629 01/12/21  0524 01/13/21  0613   WBC 5.8 6.6 10.0   HGB 8.6* 8.7* 9.9*   HCT 28.5* 28.8* 32.2*   MCV 79.1* 78.8* 77.9*    237 271     Recent Labs     01/11/21  0629 01/12/21  0524 01/13/21  0613   * 134* 138   K 5.2* 5.2* 4.6    104 102   CO2 22 23 25   GLUCOSE 124* 122* 130*   PHOS 5.5*  --   --    MG 1.70* 1.70* 1.80   BUN 43* 45* 32*   CREATININE 5.4* 6.0* 4.1*   LABGLOM 11* 10* 15*   GFRAA 13* 12* 18*       Assessment/Plan     1. ESRD:  - Etiology:  Diabetic Nephropathy     - TDC placed 1/11/2021. Vein mapping on 1/11/2021   -refused to run >1.5 hr , again today  -long discussion about goals, he wants to go home  -he will need hospice care    2. Systolic Heart Failure:  EF = 35% / Grade 2 diastolic     3. Hypertension:  Range is high     4. Anemia:    -supposed to get HARPREET at HD    5. Alcoholic Cirrhosis/recurrent ascites:  - getting paracentesis at [facility]

## 2021-01-13 NOTE — PROGRESS NOTES
Alea Driver is a 56 y.o. male patient seen today states he is nauseated.  Denies pedal pain    Current Facility-Administered Medications   Medication Dose Route Frequency Provider Last Rate Last Admin    heparin (porcine) injection 3,200 Units  3,200 Units Intracatheter PRN Cleda Boas, MD        traMADol (ULTRAM) tablet 50 mg  50 mg Oral Q6H PRN Edilberto Vicku, APRN - CNP   50 mg at 01/12/21 0802    ipratropium-albuterol (DUONEB) nebulizer solution 1 ampule  1 ampule Inhalation Q4H PRN Teresa Asif MD        insulin lispro (HUMALOG) injection vial 0-6 Units  0-6 Units Subcutaneous TID WC Fransisco Factor, APRN - NP   1 Units at 01/10/21 1704    insulin lispro (HUMALOG) injection vial 0-3 Units  0-3 Units Subcutaneous Nightly Fransisco Factor, APRN - NP   1 Units at 01/11/21 2045    sodium chloride flush 0.9 % injection 10 mL  10 mL Intravenous PRN Lisa Sheets MD   10 mL at 01/12/21 2237    promethazine (PHENERGAN) tablet 12.5 mg  12.5 mg Oral Q6H PRN Lisa Sheets MD   12.5 mg at 01/09/21 0402    Or    ondansetron (ZOFRAN) injection 4 mg  4 mg Intravenous Q6H PRN Lisa Sheets MD   4 mg at 01/12/21 1808    acetaminophen (TYLENOL) tablet 650 mg  650 mg Oral Q6H PRN Lisa Sheets MD   650 mg at 01/12/21 0358    Or    acetaminophen (TYLENOL) suppository 650 mg  650 mg Rectal Q6H PRN Lisa Sheets MD        nicotine (NICODERM CQ) 7 MG/24HR 1 patch  1 patch Transdermal Daily Lisa Sheets MD   1 patch at 01/10/21 1136    atorvastatin (LIPITOR) tablet 40 mg  40 mg Oral Nightly Lisa Sheets MD   40 mg at 01/12/21 2237    melatonin tablet 3 mg  3 mg Oral Nightly PRN Lisa Sheets MD       [facility] by provider] heparin (porcine) injection 5,000 Units  5,000 Units Subcutaneous 3 times per day Lisa Sheets MD   Stopped at 01/12/21 0600    glucose (GLUTOSE) 40 % oral gel 15 g  15 g Oral PRN Lisa Sheets MD        dextrose 50 % IV solution  12.5 g Intravenous PRN Chyna Paez MD        glucagon (rDNA) injection 1 mg  1 mg Intramuscular PRN Chyna Paez MD        dextrose 5 % solution  100 mL/hr Intravenous PRN Chyna Paez MD         Allergies   Allergen Reactions    Pcn [Penicillins] Hives     Active Problems:    Tobacco abuse    Type 2 diabetes mellitus with left diabetic foot infection ([facility]    Acute kidney injury superimposed on chronic kidney disease ([facility]    Stage 3 chronic kidney disease    Chronic systolic heart failure (HCC)    Diabetic foot infection ([facility]    Alcoholic cirrhosis of liver with ascites ([facility]  Resolved Problems:    * No resolved hospital problems. *    Blood pressure (!) 176/81, pulse 68, temperature 97.9 °F (36.6 °C), resp. rate 16, height 6' 1\" (1.854 m), weight 257 lb 11.5 oz (116.9 kg), SpO2 96 %. Subjective  Objective:  Vital signs: (most recent): Blood pressure (!) 176/81, pulse 68, temperature 97.9 °F (36.6 °C), resp. rate 16, height 6' 1\" (1.854 m), weight 257 lb 11.5 oz (116.9 kg), SpO2 96 %. Integument: Two full thickness ulcerations plantar to the right 1st MTP with fibrotic/necrotic base (1.5cm x 1.5cm x 0.2cm). Wound does not probe deep. No purulent drainage. No surrounding erythema, edema, or warmth. There is a 2.0cm x 0.5cm x 0.3cm wound to the dorsal lateral foot with fibrotic base. No SOI as noted above. Skin is overall thin, atrophic, and peeling.  + serous drainage. Neurologic:  Protective sensation is grossly diminished to light touch at the level of the toes, right  Vascular:  DP and PT pulses are non-palpable, right. .  No significant edema appreciated. Musculoskeletal:  Patient has 5/5 strength on inversion/everison/dorsiflexion/plantarflexion, right. Left BKA    SHIRLEY:        There is significant and bilateral aorto-iliac inflow disease within the    lower extremities.    There is moderate diffuse heterogeneous plaque noted throughout the    bilateral lower extremities. Impression/Recommendations: The patient is a pleasant 56 y.o. male with:  1) Full thickness wound to fat right foot  -Aquacel AG DSD  -No changes, No urgent need for debridement  -will continue to monitor  2) PVD  -angiogram postponed.    3) DM II with neuropathy  4) left BKA    Devin [facility]  1/12/2021

## 2021-01-13 NOTE — CARE COORDINATION
CM spoke to Ashley and KAVITA in regard to pt withdrawing care. Palliative consulted. Pt wants to go home today. CM and NP educated pt on process and the need to get things in order for pt to discharge safely. Pt stated he does not want us to speak to family at this time.   Holly Colindres RN

## 2021-01-13 NOTE — CONSULTS
Palliative Care Initial Note  Palliative Care Admit date:  1/13/21  Reason for c/s:  Rec'd from  to support pts wish for EOL care @ home    Advance Directives:  Tari Perez has not executed AD's. In the absence of a HCPOA, his only child, Ingrid Atkinson, is viewed as his NOK. Lawson shared that pt \"has no one else. \"    Pt currently has a full code status which will be addressed in the process of arranging EOL care    Plan of care/goals:  reviewed chart, aware that pt has elected for forego HD and has been made aware by nephrology that his life expectancy will be short. In speaking w/ pt, it was clear that he didn't have a viable plan for how to continue care once he is too weak or becomes lethargic and cannot manage independently. He stated he was agreeable to having the support of hospice and we discussed the potential for moving him into the hospice IPU when necessary. Pt/family made aware of hospice provider options. Pt has asked that [facility] be called w/ referral (done). Writer spoke w/ son, Alfa Loya, who stated that pt had \"told me they said he needed to go home w/ hospice but he didn't tell me he was stopping dialysis. \"   Extensive d/w Lawson who is unable to provide the 24 hr care pt does/will need and he is very receptive to the involvement of hospice though understands their visits are intermittent in nature. Alfa Vincenzo is supportive of pts decision to forego dialysis and verb'd feeling his dad \"is just plain tired. \"  That said, Alfabonnie Loya is equally wary that pt is \"just wanting to get out of the hospital.\"   We discussed the commitment of pursuing dialysis and Lawson doesn't feel pt wants to [facility] through that. \"   Alfa Loya is very agreeable to being part of the mtg b/t pt and hospice today. Social/Spiritual:  Lawson shared that pt \"often times just drives around in his care and even sleeps in his care rather than in his home. \"    Alfa oLya felt pts home was ready for hospice to come in to support pt.     Plan: [facility],  will meet w/ pt and son today b/t 2 & 2:30        Reason for consult:  ___ Advance Care Planning  _X_ Transition of Care Planning  ___ Psychosocial/Spiritual Support  ___ Symptom Management                                                                                                                Emerson Paul RN

## 2021-01-13 NOTE — FLOWSHEET NOTE
Treatment time: 2.5 hours  Net UF: 1000 ml     Pre weight: 117.6 kg   Post weight: 116.9 kg  EDW: TBD     Access used: RCW CVC  Access function: Good with  ml/min     Medications or blood products given: None     Regular outpatient schedule: TBD     Summary of response to treatment: Tolerated tx well. No HD related complaints or complications.       Copy of dialysis treatment record placed in chart, to be scanned into EMR.       01/12/21 1615 01/12/21 1902   Treatment   Time On  --  1627   Time Off  --  1857   Vital Signs   BP (!) 201/110 (!) 198/96   Temp 97.6 °F (36.4 °C) 97.4 °F (36.3 °C)   Pulse 70 57   Resp 18 18   Dialysis Bath   K+ (Potassium) 2  --    Ca+ (Calcium) 2.5  --    Na+ (Sodium) 138  --    HCO3 (Bicarb) 32  --

## 2021-01-14 ENCOUNTER — APPOINTMENT (OUTPATIENT)
Dept: GENERAL RADIOLOGY | Age: 61
DRG: 380 | End: 2021-01-14
Attending: HOSPITALIST
Payer: MEDICAID

## 2021-01-14 LAB
A/G RATIO: 0.9 (ref 1.1–2.2)
ALBUMIN SERPL-MCNC: 2.6 G/DL (ref 3.4–5)
ALP BLD-CCNC: 87 U/L (ref 40–129)
ALT SERPL-CCNC: 11 U/L (ref 10–40)
ANION GAP SERPL CALCULATED.3IONS-SCNC: 6 MMOL/L (ref 3–16)
AST SERPL-CCNC: 22 U/L (ref 15–37)
BASOPHILS ABSOLUTE: 0 K/UL (ref 0–0.2)
BASOPHILS RELATIVE PERCENT: 0.4 %
BILIRUB SERPL-MCNC: <0.2 MG/DL (ref 0–1)
BUN BLDV-MCNC: 26 MG/DL (ref 7–20)
CALCIUM SERPL-MCNC: 7.7 MG/DL (ref 8.3–10.6)
CHLORIDE BLD-SCNC: 103 MMOL/L (ref 99–110)
CO2: 26 MMOL/L (ref 21–32)
CREAT SERPL-MCNC: 3.9 MG/DL (ref 0.8–1.3)
EOSINOPHILS ABSOLUTE: 0.1 K/UL (ref 0–0.6)
EOSINOPHILS RELATIVE PERCENT: 1 %
GFR AFRICAN AMERICAN: 19
GFR NON-AFRICAN AMERICAN: 16
GLOBULIN: 2.8 G/DL
GLUCOSE BLD-MCNC: 114 MG/DL (ref 70–99)
GLUCOSE BLD-MCNC: 115 MG/DL (ref 70–99)
GLUCOSE BLD-MCNC: 148 MG/DL (ref 70–99)
GLUCOSE BLD-MCNC: 154 MG/DL (ref 70–99)
HCT VFR BLD CALC: 27.8 % (ref 40.5–52.5)
HEMOGLOBIN: 8.7 G/DL (ref 13.5–17.5)
INR BLD: 1.14 (ref 0.86–1.14)
LYMPHOCYTES ABSOLUTE: 0.6 K/UL (ref 1–5.1)
LYMPHOCYTES RELATIVE PERCENT: 6 %
MAGNESIUM: 1.8 MG/DL (ref 1.8–2.4)
MCH RBC QN AUTO: 24.1 PG (ref 26–34)
MCHC RBC AUTO-ENTMCNC: 31.1 G/DL (ref 31–36)
MCV RBC AUTO: 77.6 FL (ref 80–100)
MONOCYTES ABSOLUTE: 0.7 K/UL (ref 0–1.3)
MONOCYTES RELATIVE PERCENT: 6.6 %
NEUTROPHILS ABSOLUTE: 8.6 K/UL (ref 1.7–7.7)
NEUTROPHILS RELATIVE PERCENT: 86 %
PDW BLD-RTO: 17.4 % (ref 12.4–15.4)
PERFORMED ON: ABNORMAL
PLATELET # BLD: 242 K/UL (ref 135–450)
PMV BLD AUTO: 7.3 FL (ref 5–10.5)
POTASSIUM SERPL-SCNC: 4.6 MMOL/L (ref 3.5–5.1)
PROTHROMBIN TIME: 13.2 SEC (ref 10–13.2)
RBC # BLD: 3.59 M/UL (ref 4.2–5.9)
SODIUM BLD-SCNC: 135 MMOL/L (ref 136–145)
T4 FREE: 1.3 NG/DL (ref 0.9–1.8)
TOTAL PROTEIN: 5.4 G/DL (ref 6.4–8.2)
TSH REFLEX FT4: 4.47 UIU/ML (ref 0.27–4.2)
WBC # BLD: 10 K/UL (ref 4–11)

## 2021-01-14 PROCEDURE — 90935 HEMODIALYSIS ONE EVALUATION: CPT

## 2021-01-14 PROCEDURE — 6370000000 HC RX 637 (ALT 250 FOR IP): Performed by: NURSE PRACTITIONER

## 2021-01-14 PROCEDURE — 1200000000 HC SEMI PRIVATE

## 2021-01-14 PROCEDURE — 99233 SBSQ HOSP IP/OBS HIGH 50: CPT | Performed by: NURSE PRACTITIONER

## 2021-01-14 PROCEDURE — 80053 COMPREHEN METABOLIC PANEL: CPT

## 2021-01-14 PROCEDURE — 85025 COMPLETE CBC W/AUTO DIFF WBC: CPT

## 2021-01-14 PROCEDURE — 6370000000 HC RX 637 (ALT 250 FOR IP): Performed by: INTERNAL MEDICINE

## 2021-01-14 PROCEDURE — 84443 ASSAY THYROID STIM HORMONE: CPT

## 2021-01-14 PROCEDURE — 6360000002 HC RX W HCPCS: Performed by: INTERNAL MEDICINE

## 2021-01-14 PROCEDURE — 2580000003 HC RX 258: Performed by: INTERNAL MEDICINE

## 2021-01-14 PROCEDURE — 36415 COLL VENOUS BLD VENIPUNCTURE: CPT

## 2021-01-14 PROCEDURE — 84439 ASSAY OF FREE THYROXINE: CPT

## 2021-01-14 PROCEDURE — 83735 ASSAY OF MAGNESIUM: CPT

## 2021-01-14 PROCEDURE — 97535 SELF CARE MNGMENT TRAINING: CPT

## 2021-01-14 PROCEDURE — 85610 PROTHROMBIN TIME: CPT

## 2021-01-14 PROCEDURE — 71045 X-RAY EXAM CHEST 1 VIEW: CPT

## 2021-01-14 PROCEDURE — 97110 THERAPEUTIC EXERCISES: CPT

## 2021-01-14 RX ADMIN — Medication 10 ML: at 08:54

## 2021-01-14 RX ADMIN — LORAZEPAM 0.5 MG: 0.5 TABLET ORAL at 06:29

## 2021-01-14 RX ADMIN — INSULIN LISPRO 1 UNITS: 100 INJECTION, SOLUTION INTRAVENOUS; SUBCUTANEOUS at 20:33

## 2021-01-14 RX ADMIN — LORAZEPAM 0.5 MG: 0.5 TABLET ORAL at 20:30

## 2021-01-14 RX ADMIN — METOPROLOL SUCCINATE 50 MG: 50 TABLET, EXTENDED RELEASE ORAL at 08:53

## 2021-01-14 RX ADMIN — LORAZEPAM 0.5 MG: 0.5 TABLET ORAL at 13:04

## 2021-01-14 RX ADMIN — ATORVASTATIN CALCIUM 40 MG: 40 TABLET, FILM COATED ORAL at 20:30

## 2021-01-14 RX ADMIN — HEPARIN SODIUM 3200 UNITS: 1000 INJECTION INTRAVENOUS; SUBCUTANEOUS at 14:01

## 2021-01-14 ASSESSMENT — PAIN SCALES - GENERAL: PAINLEVEL_OUTOF10: 0

## 2021-01-14 NOTE — PROGRESS NOTES
01/14/21 @ 0919 -Sent page out to Dr. Richards Whitlash with nephrology regarding hemodialysis.  Rn's number given for callback.    -Sathish Doimngo, PCA

## 2021-01-14 NOTE — PROGRESS NOTES
Comprehensive Nutrition Assessment    Type and Reason for Visit:  Initial(LOS)    Nutrition Recommendations/Plan:   1. Continue renal diet  2. Offer nutrition supplements if po intake less than 50% meals  3. Will monitor nutritional adequacy, nutrition-related labs, weights, BMs, and clinical progress     Nutrition Assessment:  Patient admitted with diabetic foot infection, podiatry and vascular following. ESRD, new HD. Currently on a renal diet eating % meals. Out of room at this time, in HD. Palliative care following, initially patient wanted to stop HD but has decided to continue treatment plans. Will continue to monitor nutrition progression, medical stability and plan of care. Malnutrition Assessment:  Malnutrition Status: At risk for malnutrition (Comment)      Estimated Daily Nutrient Needs:  Energy (kcal):  7253-6925; Weight Used for Energy Requirements:  Adjusted(178 lbs)     Protein (g):  105-121; Weight Used for Protein Requirements:  Adjusted(1.3-1.5)        Fluid (ml/day):   ; Method Used for Fluid Requirements:  Standard Renal      Nutrition Related Findings:  Last BM on 1/12/21      Wounds:  (right foot wound, scattered bruising)       Current Nutrition Therapies:    DIET RENAL; Anthropometric Measures:  · Height: 6' 1\" (185.4 cm)  · Current Body Weight: 249 lb 1 oz (113 kg)     · Ideal Body Weight: 184 lbs; % Ideal Body Weight     · BMI: 32.9  · Adjusted Body Weight: 263.8;  Amputation   · Adjusted BMI: 34.8    · BMI Categories: Obese Class 1 (BMI 30.0-34.9)(adjusted for BKA)       Nutrition Diagnosis:   · Increased nutrient needs related to increase demand for energy/nutrients as evidenced by wounds, dialysis    Nutrition Interventions:   Food and/or Nutrient Delivery:  Continue Current Diet  Nutrition Education/Counseling:  No recommendation at this time(out of room at this time, unable to address, please consult dietitian if education needed)   Coordination of Nutrition Care:  Continue to monitor while inpatient    Goals:  Patient will eat 50% or greater of meals.        Nutrition Monitoring and Evaluation:   Behavioral-Environmental Outcomes:      Food/Nutrient Intake Outcomes:  Food and Nutrient Intake  Physical Signs/Symptoms Outcomes:  Biochemical Data, Nutrition Focused Physical Findings     Discharge Planning:    Continue current diet     Electronically signed by Olga Carrion RD, LD on 1/14/21 at 2:09 PM EST    Contact: Office: [phone]; [address] [place] [address] Road: [phone]

## 2021-01-14 NOTE — PROGRESS NOTES
Progress Note    HISTORY     CC:   Foot Ulcer              We are following for renal failure      Subjective/   HPI:    Advanced renal failure, was supposed to start HD 1/12; has [address] 83  On 1/13 , he said he wanted to d/c HD support and go hospice . Upon further discussions with palliative care, he is now wishing to continue support    ROS:  No CP or SOB      Social Hx:  No family at bedside ; he has an estranged son, no other family    Past Medical and Surgical History:  - Reviewed, no changes     EXAM       Objective/     Vitals:    01/14/21 0314 01/14/21 0638 01/14/21 0845 01/14/21 0851   BP: (!) 156/76   (!) 176/89   Pulse: 67  74    Resp: 16  18    Temp: 97.8 °F (36.6 °C)  98.3 °F (36.8 °C)    TempSrc: Oral  Oral    SpO2: 94%  92%    Weight:  249 lb 5.4 oz (113.1 kg)     Height:         24HR INTAKE/OUTPUT:      Intake/Output Summary (Last 24 hours) at 1/14/2021 0958  Last data filed at 1/14/2021 0851  Gross per 24 hour   Intake 1830 ml   Output 2145 ml   Net -315 ml     Constitutional:  NAD   Neck:  Normal thyroid, no masses   Cardiovascular:  Regular, no rub  Respiratory:  Normal respiratory effort, CTA  Abdomen: +bs, soft, nt, no masses   Musculoskeletal: No LE edema, no clubbing         MEDICAL DECISION MAKING       Data/  Recent Labs     01/12/21  0524 01/13/21  0613 01/14/21  0814   WBC 6.6 10.0 10.0   HGB 8.7* 9.9* 8.7*   HCT 28.8* 32.2* 27.8*   MCV 78.8* 77.9* 77.6*    271 242     Recent Labs     01/12/21  0524 01/13/21  0613 01/14/21  0814   * 138 135*   K 5.2* 4.6 4.6    102 103   CO2 23 25 26   GLUCOSE 122* 130* 115*   MG 1.70* 1.80 1.80   BUN 45* 32* 26*   CREATININE 6.0* 4.1* 3.9*   LABGLOM 10* 15* 16*   GFRAA 12* 18* 19*       Assessment/Plan     1. ESRD:  - Etiology:  Diabetic Nephropathy     - TDC placed 1/11/2021. Vein mapping on 1/11/2021   -discussion about goals, he wants to continue HD support     -seen during HD today     2. Systolic Heart Failure:  EF = 35% / Grade 2 diastolic     3. Hypertension:  Range is high     4. Anemia:    -supposed to get HARPREET at HD    5. Alcoholic Cirrhosis/recurrent ascites:  - getting paracentesis at [facility]

## 2021-01-14 NOTE — PROGRESS NOTES
Hospitalist Progress Note      PCP: LARRY Knight - CNP    Date of Admission: 1/9/2021    Chief Complaint: Right Foot Ulcer    Hospital Course: Ernestina Pugh is a 56 y.o. male. He was accepted in transfer from [facility]. He presented from home for a right foot ulcer. He relates that about three weeks ago he awoke one morning to find \"my whole foot looked like on big blister. \"  Since that time there has been quite a bit of skin sloughing from the leg and foot. There has been significant tissue breakdown on the sole of the foot under his big toe. He has very poor sensation in the right leg.     Patient has not felt ill. He has a strong appetite. He denies rigors, sweats, and nausea.     He is taking isordil 20mg tid, hydralazine 25mg tid, metoprolol succinate 50mg daily, and sliding scale lantus. Subjective:  Refusing HD. Says he wants to stop HD and pursue Hospice care.   Refusing angiogram.    Medications:  Reviewed    Infusion Medications    dextrose       Scheduled Medications    metoprolol succinate  50 mg Oral Daily    insulin lispro  0-6 Units Subcutaneous TID WC    insulin lispro  0-3 Units Subcutaneous Nightly    nicotine  1 patch Transdermal Daily    atorvastatin  40 mg Oral Nightly    [Held by provider] heparin (porcine)  5,000 Units Subcutaneous 3 times per day     PRN Meds: calcium carbonate, LORazepam, heparin (porcine), traMADol, ipratropium-albuterol, sodium chloride flush, promethazine **OR** ondansetron, acetaminophen **OR** acetaminophen, melatonin, glucose, dextrose, glucagon (rDNA), dextrose      Intake/Output Summary (Last 24 hours) at 1/13/2021 2007  Last data filed at 1/13/2021 1823  Gross per 24 hour   Intake 400 ml   Output 1845 ml   Net -1445 ml       Physical Exam Performed:    BP (!) 172/84   Pulse 80   Temp 97.8 °F (36.6 °C) (Oral)   Resp 16   Ht 6' 1\" (1.854 m)   Wt 255 lb 4.7 oz (115.8 kg)   SpO2 92%   BMI 33.68 kg/m² General appearance: No apparent distress, appears stated age and cooperative. HEENT: Pupils equal, round, and reactive to light. Conjunctivae/corneas clear. Neck: Supple, with full range of motion. No jugular venous distention. Trachea midline. Respiratory:  Normal respiratory effort. Clear to auscultation, bilaterally without Rales/Wheezes/Rhonchi. Cardiovascular: Regular rate and rhythm with normal S1/S2 without murmurs, rubs or gallops. Abdomen: Soft, non-tender, non-distended with normal bowel sounds. Musculoskeletal: No clubbing, cyanosis or edema bilaterally. Full range of motion without deformity. Skin:  Patient is s/p L BKA. Right foot with dressing in place - did not not remove. Neurologic:  Neurovascularly intact without any focal sensory/motor deficits. Cranial nerves: II-XII intact, grossly non-focal.  Psychiatric: Alert and oriented, thought content appropriate, normal insight      Labs:   Recent Labs     01/11/21 0629 01/12/21 0524 01/13/21 0613   WBC 5.8 6.6 10.0   HGB 8.6* 8.7* 9.9*   HCT 28.5* 28.8* 32.2*    237 271     Recent Labs     01/11/21  0629 01/12/21 0524 01/13/21 0613   * 134* 138   K 5.2* 5.2* 4.6    104 102   CO2 22 23 25   BUN 43* 45* 32*   CREATININE 5.4* 6.0* 4.1*   CALCIUM 7.1* 7.1* 7.8*   PHOS 5.5*  --   --      Recent Labs     01/11/21 0629 01/12/21  0524 01/13/21  0613   AST 15 15 21   ALT 9* 7* 11   BILITOT <0.2 <0.2 0.3   ALKPHOS 93 95 111     Recent Labs     01/11/21 0629 01/12/21 0524 01/13/21  0613   INR 1.05 1.08 1.19*     No results for input(s): Teryl Drown in the last 72 hours.     Urinalysis:      Lab Results   Component Value Date    NITRU Negative 01/09/2021    WBCUA 0-2 01/09/2021    BACTERIA 3+ 01/09/2021    RBCUA 3-4 01/09/2021    BLOODU TRACE-INTACT 01/09/2021    SPECGRAV >=1.030 01/09/2021    GLUCOSEU Negative 01/09/2021       Radiology:  US RENAL COMPLETE   Final Result   Morphologically normal kidneys with no obstruction         VL DUP LOWER EXTREMITY ARTERIES BILATERAL   Final Result      VL PRE OP VEIN MAPPING   Final Result      IR TUNNELED CVC PLACE WO SQ PORT/PUMP > 5 YEARS   Final Result   Successful ultrasound and fluoroscopy guided tunneled catheter placement . XR FOOT RIGHT (MIN 3 VIEWS)   Final Result   Soft tissue irregularity adjacent to the 1st MTP joint, suspicious for   ulceration given patient history. Correlate with physical exam.  No   generalized erosive pattern is seen. If there remains strong clinical   concern for developing osteomyelitis, MR or three-phase bone scan could be   considered. Assessment/Plan:    Active Hospital Problems    Diagnosis Date Noted    Alcoholic cirrhosis of liver with ascites ([facility] [K70.31] 01/09/2021    Diabetic foot infection ([facility] [J29.988, L08.9] 01/08/2021    Chronic systolic heart failure (HCC) [I50.22]     Stage 3 chronic kidney disease [N18.30] 02/28/2018    Type 2 diabetes mellitus with left diabetic foot infection ([facility] [J21.225, L08.9]     [facility] [N17.9, N18.9]     Tobacco abuse [Z72.0] 07/29/2015       Type II DM w/ Diabetic Polyneuropathy and likely ischemic ulcer  -  Patient received empiric vancomycin and cefepime at [facility].    -  No evidence of cellulitis or OM, so not further abx indicated. -  podiatry consulted and following.  -  Vascular consulted and following.  -  Planning for RLE angiogram today. DENIA on CKD3  -  baseline creatinine ~1.8. Probably type 2 hepatorenal.  -  Patient has not been taking diuretics for ascites management. He has been undergoing paracentesis every week at [facility].   ~5L is removed each time and he is not receiving albumin afterward. Last paracentesis was Thursday 1/7/21.  -  s/p 50g 25% albumin 1/9.  ef  -  nephrology following  -  S/p vascath placement. Planning to start HD on 1/12/2021.   CM working on outpatient placement.     Systolic Heart Failure  -  LVEF = 35 - 40% by TTE 1/2019. No coronary angiogram could be found. SWMA's are present on TTE c/w prior RCA territory MI.  -  Continue home metoprolol XL. He is not prescribed an antiplatelet agent or statin. -  He has been taking hydralazine and isordil at home. Neither are a good idea in the setting of cirrhosis w/ portal HTN and ascites.      Alcoholic Cirrhosis w/ ascites  -  Patient has been abstinent from alcohol, but his cirrhosis is essentially not being treated aside from scheduled paracenteses. -  Aside from ascites, liver function seems well compensated. He is not encephalopathic at this time.     DM2  -  Hold all oral antidiabetic agents. Start s.c. Insulin regimen based on weight.     DVT Prophylaxis: heparin SQ  Diet: DIET RENAL;  Code Status: Full Code    PT/OT Eval Status: active and ongoing    Dispo - unclear - patient is considering hospice care. Please see ACP note.     LARRY Overton - CNP

## 2021-01-14 NOTE — PROGRESS NOTES
Vascular    Patient undecided regarding hospice and stopping dialysis. Will not perform angio of RLE at this time until patient decides. I gave him my office contact info and [facility] decides to continue care.

## 2021-01-14 NOTE — CARE COORDINATION
[facility]    Referral received from  to follow for home care services.      [facility] unable to staff timely  Request sent to [facility] awaiting response    Lico Norman RN, BSN CTN  [facility] [phone]

## 2021-01-14 NOTE — PROGRESS NOTES
Patient AxO. VSS except for elevated BP of 176/89. Assessment completed as charted. Patient denies pain on 0-10 scale. Patient verbalized to RN \"I dont think I want to do Hospice. \" RN asked patient if he would like to continue with dialysis and patient stated \"Well I dont know. \" Patient denies any other needs or requests at this time. Bed is in lowest locked position. Call light and bedside table is within reach. Will continue to monitor patient.

## 2021-01-14 NOTE — PROGRESS NOTES
[address]     Electrophysiology                                     Progress Note    Admission date:  2021    Reason for follow up visit: afib    HPI/CC: Kristie Shukla was transferred from [facility] on 2021 for a right foot ulcer. He has declined angiogram. Has also been treated for renal failure and afib. He is refusing dialysis and palliative care evaluation is ongoing. Rhythm has been sinus (PAF noted 2021). Subjective: He currently denies chest pain, palpitations, shortness of breath, and dizziness. Vitals:  Blood pressure (!) 176/89, pulse 74, temperature 98.3 °F (36.8 °C), temperature source Oral, resp. rate 18, height 6' 1\" (1.854 m), weight 249 lb 5.4 oz (113.1 kg), SpO2 92 %.   Temp  Av.9 °F (36.6 °C)  Min: 97.5 °F (36.4 °C)  Max: 98.3 °F (36.8 °C)  Pulse  Av.3  Min: 67  Max: 81  BP  Min: 156/76  Max: 195/84  SpO2  Av %  Min: 92 %  Max: 97 %    24 hour I/O    Intake/Output Summary (Last 24 hours) at 2021 0934  Last data filed at 2021 0851  Gross per 24 hour   Intake 1830 ml   Output 2145 ml   Net -315 ml     Current Facility-Administered Medications   Medication Dose Route Frequency Provider Last Rate Last Admin    calcium carbonate (TUMS) chewable tablet 1,000 mg  1,000 mg Oral BID PRN LARRY Murillo NP   1,000 mg at 21 0759    LORazepam (ATIVAN) tablet 0.5 mg  0.5 mg Oral Q6H PRN LARRY Doyle CNP   0.5 mg at 21 6517    metoprolol succinate (TOPROL XL) extended release tablet 50 mg  50 mg Oral Daily J Sherrine Klinefelter., MD   50 mg at 21 0853    heparin (porcine) injection 3,200 Units  3,200 Units Intracatheter PRN Vannessa Gong MD        traMADol (ULTRAM) tablet 50 mg  50 mg Oral Q6H PRN Marbin Adrianna, APRN - CNP   50 mg at 21 0802    ipratropium-albuterol (DUONEB) nebulizer solution 1 ampule  1 ampule Inhalation Q4H PRN Gerry Zayas MD        insulin lispro (HUMALOG) injection vial 0-6 Units  0-6 Units Subcutaneous TID WC Vimal Coughlin, APRN - NP   1 Units at 01/13/21 1740    insulin lispro (HUMALOG) injection vial 0-3 Units  0-3 Units Subcutaneous Nightly Vimal Coughlin APRN - NP   1 Units at 01/11/21 2045    sodium chloride flush 0.9 % injection 10 mL  10 mL Intravenous PRN Mian Goss MD   10 mL at 01/14/21 0854    promethazine (PHENERGAN) tablet 12.5 mg  12.5 mg Oral Q6H PRN Mian Goss MD   12.5 mg at 01/13/21 1016    Or    ondansetron (ZOFRAN) injection 4 mg  4 mg Intravenous Q6H PRN Mian Goss MD   4 mg at 01/13/21 1444    acetaminophen (TYLENOL) tablet 650 mg  650 mg Oral Q6H PRN Mian Goss MD   650 mg at 01/12/21 0358    Or    acetaminophen (TYLENOL) suppository 650 mg  650 mg Rectal Q6H PRN Mian Goss MD        nicotine (NICODERM CQ) 7 MG/24HR 1 patch  1 patch Transdermal Daily Mian Goss MD   1 patch at 01/14/21 0853    atorvastatin (LIPITOR) tablet 40 mg  40 mg Oral Nightly Mian Goss MD   40 mg at 01/13/21 2018    melatonin tablet 3 mg  3 mg Oral Nightly PRN Mian Goss MD       [facility] by provider] heparin (porcine) injection 5,000 Units  5,000 Units Subcutaneous 3 times per day Mian Goss MD   Stopped at 01/12/21 0600    glucose (GLUTOSE) 40 % oral gel 15 g  15 g Oral PRN Mian Goss MD        dextrose 50 % IV solution  12.5 g Intravenous PRN Mian Goss MD        glucagon (rDNA) injection 1 mg  1 mg Intramuscular PRN Mian Goss MD        dextrose 5 % solution  100 mL/hr Intravenous PRN Mian Goss MD           Objective:     Telemetry monitor: SR    Physical Exam:  Constitutional and general appearance: alert, cooperative, no distress, appears older than stated age and pale  HEENT: PERRL, no cervical lymphadenopathy. No masses palpable.  Normal oral mucosa  Respiratory:  · Normal excursion and expansion without use of accessory muscles  · Resp auscultation: Normal breath sounds without wheezing, rhonchi, and rales  Cardiovascular:  · The apical impulse is not displaced  · Heart tones are crisp and normal. regular S1 and S2.  · Jugular venous pulsation Normal  · The carotid upstroke is normal in amplitude and contour without delay or bruit  · Peripheral pulses are symmetrical and full   Abdomen:  · No masses or tenderness; + distended   · Bowel sounds present  Extremities:  ·  No cyanosis or clubbing  ·  No lower extremity edema; s/p LBKA  ·  Skin: warm and dry  Neurological:  · Alert and oriented  · Moves all extremities well  · No abnormalities of mood, affect, memory, mentation, or behavior are noted    Data  EKG 1/12/2021:   Atrial fibrillation HR 68    Echo 1/21/2019: The left ventricular systolic function is moderately reduced with an ejection fraction of 35-40 %. Moderate concentric left ventricular hypertrophy. There is hypokinesis of the basal inferior, basal inferoseptal and inferolateral walls. Grade II diastolic dysfunction with normal filing pressure. Mild bi-atrial enlargement. Mild mitral, pulmonic and tricuspid regurgitation. Systolic pulmonary artery pressure (SPAP) is elevated and estimated at 41 mmHg (right atrial pressure 3 mmHg) consistent with mild pulmonary hypertension. There is noncoronary cusp calcification, consider SONG. These findings may be degenerative, however, endocarditis cannot be excluded. There is a large pleural effusion. Last echo on 11/20/2017 showed EF of 35-40%. All labs and testing reviewed.   Lab Review     Renal Profile:   Lab Results   Component Value Date    CREATININE 3.9 01/14/2021    BUN 26 01/14/2021     01/14/2021    K 4.6 01/14/2021    K 5.2 03/21/2020     01/14/2021    CO2 26 01/14/2021     CBC:    Lab Results   Component Value Date    WBC 10.0 01/14/2021    RBC 3.59 01/14/2021    HGB 8.7 01/14/2021    HCT 27.8 01/14/2021    MCV 77.6 01/14/2021    RDW 17.4 01/14/2021     01/14/2021     BNP:  No results found for: BNP  Fasting Lipid Panel:    Lab Results   Component Value Date    CHOL 86 02/22/2019    HDL 28 02/22/2019    TRIG 88 02/22/2019     Cardiac Enzymes:  CK/MbTroponin  Lab Results   Component Value Date    CKTOTAL 111 01/19/2019    TROPONINI 0.44 03/21/2020     PT/ INR   Lab Results   Component Value Date    INR 1.14 01/14/2021    INR 1.19 01/13/2021    INR 1.08 01/12/2021    PROTIME 13.2 01/14/2021    PROTIME 13.8 01/13/2021    PROTIME 12.5 01/12/2021     PTT No results found for: PTT   Lab Results   Component Value Date    MG 1.80 01/14/2021      Lab Results   Component Value Date    TSH 14.94 02/21/2019       Assessment:  Paroxysmal atrial fibrillation: stable    -noted 1/13/2021 (currently in sinus)   -[id number] score 4 (CHF, vascular disease, HTN, DM)   -HASBLED risk of bleeding score 3 (renal failure, liver failure, HTN)  Renovascular HTN: uncontrolled   Ischemic cardiomyopathy   -EF 35-40% 1/2019  Presumed CAD   -unable to have [facility] historically for multiple reasons (bacteremia, renal failure, bleeding risk)  Chronic combined CHF: compensated   Chronic anemia   DM with neuropathy   ESRD: needs HD, refusing currently   Alcoholic cirrhosis   Ascites: requires paracentesis  History of LBKA   Right foot ulcer   PVD  Tobacco abuse: ongoing   -smoking cessation is recommended     Plan:   1. Continue Toprol  2. Patient is now willing to start anticoagulation. It is unclear if any procedures are upcoming. Start Eliquis 5mg po BID if no procedures are planned. 3. Patient is currently refusing HD and RLE angiogram  4. Palliative care evaluation ongoing. 5. Ischemia evaluation deferred (see above). Dr. Niranjan Jonas to continue to follow as outpatient. EP will sign off but remains available if needed. Follow up in 6 weeks (office to arrange).      LARRY Lopez-TIGRE  [facility], [place]  [phone]

## 2021-01-14 NOTE — PROGRESS NOTES
Hospitalist Progress Note      PCP: LARRY Knight - CNP    Date of Admission: 1/9/2021    Chief Complaint: Right Foot Ulcer    Hospital Course: Ernestina Pugh is a 56 y.o. male. He was accepted in transfer from [facility]. He presented from home for a right foot ulcer. He relates that about three weeks ago he awoke one morning to find \"my whole foot looked like on big blister. \"  Since that time there has been quite a bit of skin sloughing from the leg and foot. There has been significant tissue breakdown on the sole of the foot under his big toe. He has very poor sensation in the right leg.     Patient has not felt ill. He has a strong appetite. He denies rigors, sweats, and nausea.     He is taking isordil 20mg tid, hydralazine 25mg tid, metoprolol succinate 50mg daily, and sliding scale lantus. Subjective:  Now wants to pursue HD. Feels well.     Medications:  Reviewed    Infusion Medications    dextrose       Scheduled Medications    darbepoetin lillie-polysorbate  100 mcg Intravenous Weekly - Thursday    metoprolol succinate  50 mg Oral Daily    insulin lispro  0-6 Units Subcutaneous TID WC    insulin lispro  0-3 Units Subcutaneous Nightly    nicotine  1 patch Transdermal Daily    atorvastatin  40 mg Oral Nightly    [Held by provider] heparin (porcine)  5,000 Units Subcutaneous 3 times per day     PRN Meds: calcium carbonate, LORazepam, heparin (porcine), traMADol, ipratropium-albuterol, sodium chloride flush, promethazine **OR** ondansetron, acetaminophen **OR** acetaminophen, melatonin, glucose, dextrose, glucagon (rDNA), dextrose      Intake/Output Summary (Last 24 hours) at 1/14/2021 1854  Last data filed at 1/14/2021 1836  Gross per 24 hour   Intake 2010 ml   Output 2725 ml   Net -715 ml       Physical Exam Performed:    BP (!) 183/97   Pulse 66   Temp 98 °F (36.7 °C)   Resp 18   Ht 6' 1\" (1.854 m)   Wt 246 lb 0.5 oz (111.6 kg)   SpO2 92%   BMI 32.46 kg/m²     General appearance: No apparent distress, appears stated age and cooperative. HEENT: Pupils equal, round, and reactive to light. Conjunctivae/corneas clear. Neck: Supple, with full range of motion. No jugular venous distention. Trachea midline. Respiratory:  Normal respiratory effort. Clear to auscultation, bilaterally without Rales/Wheezes/Rhonchi. Cardiovascular: Regular rate and rhythm with normal S1/S2 without murmurs, rubs or gallops. Abdomen: Soft, non-tender, non-distended with normal bowel sounds. Musculoskeletal: No clubbing, cyanosis or edema bilaterally. Full range of motion without deformity. Skin:  Patient is s/p L BKA. Right foot with dressing in place - did not not remove. Neurologic:  Neurovascularly intact without any focal sensory/motor deficits. Cranial nerves: II-XII intact, grossly non-focal.  Psychiatric: Alert and oriented, thought content appropriate, normal insight      Labs:   Recent Labs     01/12/21 0524 01/13/21 0613 01/14/21  0814   WBC 6.6 10.0 10.0   HGB 8.7* 9.9* 8.7*   HCT 28.8* 32.2* 27.8*    271 242     Recent Labs     01/12/21  0524 01/13/21  0613 01/14/21  0814   * 138 135*   K 5.2* 4.6 4.6    102 103   CO2 23 25 26   BUN 45* 32* 26*   CREATININE 6.0* 4.1* 3.9*   CALCIUM 7.1* 7.8* 7.7*     Recent Labs     01/12/21 0524 01/13/21  0613 01/14/21  0814   AST 15 21 22   ALT 7* 11 11   BILITOT <0.2 0.3 <0.2   ALKPHOS 95 111 87     Recent Labs     01/12/21 0524 01/13/21  0613 01/14/21  0814   INR 1.08 1.19* 1.14     No results for input(s): CKTOTAL, TROPONINI in the last 72 hours. Urinalysis:      Lab Results   Component Value Date    NITRU Negative 01/09/2021    WBCUA 0-2 01/09/2021    BACTERIA 3+ 01/09/2021    RBCUA 3-4 01/09/2021    BLOODU TRACE-INTACT 01/09/2021    SPECGRAV >=1.030 01/09/2021    GLUCOSEU Negative 01/09/2021       Radiology:  XR CHEST PORTABLE   Final Result   1.  Right-sided central venous catheter terminating in the SVC   2. Left pleural effusion with adjacent left basilar opacity could represent   atelectasis or pneumonia         US RENAL COMPLETE   Final Result   Morphologically normal kidneys with no obstruction         VL DUP LOWER EXTREMITY ARTERIES BILATERAL   Final Result      VL PRE OP VEIN MAPPING   Final Result      IR TUNNELED CVC PLACE WO SQ PORT/PUMP > 5 YEARS   Final Result   Successful ultrasound and fluoroscopy guided tunneled catheter placement . XR FOOT RIGHT (MIN 3 VIEWS)   Final Result   Soft tissue irregularity adjacent to the 1st MTP joint, suspicious for   ulceration given patient history. Correlate with physical exam.  No   generalized erosive pattern is seen. If there remains strong clinical   concern for developing osteomyelitis, MR or three-phase bone scan could be   considered. Assessment/Plan:    Active Hospital Problems    Diagnosis Date Noted    Alcoholic cirrhosis of liver with ascites ([facility] [address] [K70.31] 01/09/2021    Di[facility] [K32.050, L08.9] 01/08/2021    Chronic systolic heart failure (HCC) [I50.22]     Stage 3 chronic kidney disease [N18.30] 02/28/2018    Type 2 diabetes mellitus with left diabetic foot infection ([facility] [address] [R13.315, L08.9]     Acute kidney injury superimposed on chronic kidney disease ([facility] [address] [N17.9, N18.9]     Tobacco abuse [Z72.0] 07/29/2015       Type II DM w/ Diabetic Polyneuropathy and likely ischemic ulcer  -  Patient received empiric vancomycin and cefepime at [facility].    -  No evidence of cellulitis or OM, so not further abx indicated. -  podiatry consulted and following.  -  Vascular consulted and following.  -  Planning for RLE angiogram today. DENIA on CKD3  -  baseline creatinine ~1.8. Probably type 2 hepatorenal.  -  Patient has not been taking diuretics for ascites management. He has been undergoing paracentesis every week at [facility].   ~5L is removed each time and he is not receiving albumin afterward.   Last paracentesis was Thursday 1/7/21.  -  s/p 50g 25% albumin 1/9.   -  nephrology following  -  S/p vascath placement. Planning to start HD on 1/12/2021. CM working on outpatient placement.     Systolic Heart Failure  -  LVEF = 35 - 40% by TTE 1/2019. No coronary angiogram could be found. SWMA's are present on TTE c/w prior RCA territory MI.  -  Continue home metoprolol XL. He is not prescribed an antiplatelet agent or statin. -  He has been taking hydralazine and isordil at home. Neither are a good idea in the setting of cirrhosis w/ portal HTN and ascites.      Alcoholic Cirrhosis w/ ascites  -  Patient has been abstinent from alcohol, but his cirrhosis is essentially not being treated aside from scheduled paracenteses. -  Aside from ascites, liver function seems well compensated. He is not encephalopathic at this time.     DM2  -  Hold all oral antidiabetic agents. Start s.c.  Insulin regimen based on weight.     DVT Prophylaxis: heparin SQ  Diet: DIET RENAL;  Code Status: Full Code    PT/OT Eval Status: active and ongoing    Dispo - hopefully in the next 1-2 days    LARRY De Anda - CNP

## 2021-01-14 NOTE — PROGRESS NOTES
Physical Therapy  Pt off floor for HD. Will follow up as schedule permits.      Janelle Valles, PT, DPT

## 2021-01-14 NOTE — PROGRESS NOTES
Chart reviewed. Patient has elected to go home with hospice. Recommendations for discharge made in chart. Please call for any change in plan, questions, or concerns.     Silvia Lloyd DPM  Office: [phone]

## 2021-01-14 NOTE — PROGRESS NOTES
Occupational Therapy  Facility/Department: [facility] C5 - MED SURG/ORTHO  Daily Treatment Note  NAME: Ethel Bean  : 1960  MRN: [id number]    Date of Service: 2021    Discharge Recommendations:  Subacute/Skilled Nursing Facility     Assessment   Performance deficits / Impairments: Decreased functional mobility ; Decreased safe awareness;Decreased balance;Decreased ADL status; Decreased cognition;Decreased endurance;Decreased strength;Decreased sensation  Assessment: Pt agreeable to sitting EOB for UE activities today. He declined standing or transfer to chair d/t R foot wound. Pt performed UE ADL task with supervision/set up. Recommend SNF at d/c. Cont skilled OT in acute care. Treatment Diagnosis: Weakness  Prognosis: Good  OT Education: OT Role;Transfer Training;Home Exercise Program;ADL Adaptive Strategies;Precautions;Plan of Care  Patient Education: disease specific ed:  role of OT, ADL skills, UE exer  REQUIRES OT FOLLOW UP: Yes  Activity Tolerance  Activity Tolerance: Patient Tolerated treatment well  Safety Devices  Type of devices: Nurse notified; Bed alarm in place; Left in bed;Patient at risk for falls;Call light within reach       Patient Diagnosis(es): There were no encounter diagnoses. has a past medical history of Bacteremia, Cellulitis and abscess of foot, except toes, CHF (congestive heart failure) ([facility] [address] Diabetes mellitus ([address] History of liver failure, Hypertension, Kidney disease, and MRSA (methicillin resistant staph aureus) culture positive. has a past surgical history that includes Leg amputation below knee; vascular surgery; Upper gastrointestinal endoscopy (N/A, 2019); Paracentesis; and IR TUNNELED CVC PLACE WO SQ PORT/PUMP > 5 YEARS (2021).     Restrictions  Restrictions/Precautions  Restrictions/Precautions: Fall Risk, General Precautions, Up as Tolerated(environmental precautions for bed bugs)  Subjective   General  Chart Reviewed: Yes  Patient assessed for rehabilitation services?: Yes  Family / Caregiver Present: No  Referring Practitioner: JACOBO Hutchison  Diagnosis: diabetic foot infection  Subjective  Subjective: Pt agreed to participate. \"As long as I don't have to stand on my foot. \"  General Comment  Comments: RN approved therapy      Orientation  Orientation  Overall Orientation Status: Within Functional Limits  Objective    ADL  Grooming: Setup(comb hair, wash face and brush teeth while seated EOB)  UE Bathing: Stand by assistance(seated EOB)  LE Bathing:  Moderate assistance  UE Dressing: Stand by assistance        Balance  Sitting Balance: Supervision  Standing Balance  Time: Sat EOB x10 min for ADLs  Bed mobility  Supine to Sit: Supervision(HOB elevated)      Cognition  Overall Cognitive Status: WFL      Type of ROM/Therapeutic Exercise  Type of ROM/Therapeutic Exercise: AROM  Comment: Seated EOB  Exercises  Shoulder Elevation: 10x  Shoulder Flexion: 15x  Horizontal ABduction: 15x  Horizontal ADduction: 15x  Elbow Flexion: 15x  Elbow Extension: 15x  Supination: 15x  Pronation: 15x  Grasp/Release: 15x       Plan   Plan  Times per week: 2-3x  Current Treatment Recommendations: Strengthening, Endurance Training, Neuromuscular Re-education, Self-Care / ADL, Balance Training, Functional Mobility Training, Safety Education & Training  AM-PAC Score   AM-[facility] Inpatient Daily Activity Raw Score: 16 (01/14/21 1052)  AM-PAC Inpatient ADL T-Scale Score : 35.96 (01/14/21 1052)  ADL Inpatient CMS 0-100% Score: 53.32 (01/14/21 1052)  ADL Inpatient CMS G-Code Modifier : CK (01/14/21 1052)  Goals  Short term goals  Time Frame for Short term goals: 7 days  Short term goal 1: Pt will complete toilet transfer with CGA-ongoing, pt refused  Short term goal 2: Pt will complete grooming standing at sink with CGA-ongoing, performed while seated 1/14  Short term goal 3: Pt will be able to manage short household distance with AD with CGA-ongoing, pt refused 1/14  Patient Goals   Patient goals : To be independent with all self care     Therapy Time   Individual Concurrent Group Co-treatment   Time In 0845         Time Out 0924         Minutes 39         Timed Code Treatment Minutes: 44 Minutes    If pt is discharged prior to next OT session, this note will serve as the discharge summary.   Sam Santana OT

## 2021-01-14 NOTE — ACP (ADVANCE CARE PLANNING)
Advance Care Planning Note    Name: Ernestina Pugh  YOB: 1960  MRN: [id number]  Admission Date: 1/9/2021  1:55 AM    Date of Discussion: 1/13/2021    Active Diagnoses:  ESRD on HD  Arterial insufficiency    These active diagnoses are of sufficient risk that focused discussion on advance care planning is indicated in order to allow the patient to thoughtfully consider personal goals of care; and, if situations arise that prevent the ability to personally give input, to ensure appropriate representation of their personal desires for different levels and agressiveness of care. Discussion:    Persons present and participating in discussion: Ernestina Pugh, Tianna Delgado. Patient's decisional capacity or surrogate:  decisional    Discussion in summary: The patient was recently started on HD. For the last 3 sessions, he has stopped it early, saying he can not tolerate sitting still for that long. He tells me repeatedly he wants to go home and stop HD. We discussed that he will die without HD. He verbalizes understanding, but in the next statement, says \"you all say I will die, but I don't really believe it. \"  I asked if I could call his son to discuss his plans to stop HD and he requested that I did not. He tells me that he doesn't have anyone and is ready to go. We discussed Hospice care and the philosophy. He is agreeable to a hospice consult. Saba Mireles, palliative care RN is also planning to have a conversation with the patient as well. Code status: Full    Time Spent: 8919 - 1216    Total time spent face-to-face in education and discussion: 20 minutes.

## 2021-01-15 LAB
A/G RATIO: 0.8 (ref 1.1–2.2)
ALBUMIN SERPL-MCNC: 2.6 G/DL (ref 3.4–5)
ALP BLD-CCNC: 90 U/L (ref 40–129)
ALT SERPL-CCNC: 11 U/L (ref 10–40)
ANION GAP SERPL CALCULATED.3IONS-SCNC: 5 MMOL/L (ref 3–16)
AST SERPL-CCNC: 22 U/L (ref 15–37)
BILIRUB SERPL-MCNC: <0.2 MG/DL (ref 0–1)
BUN BLDV-MCNC: 22 MG/DL (ref 7–20)
CALCIUM SERPL-MCNC: 7.6 MG/DL (ref 8.3–10.6)
CHLORIDE BLD-SCNC: 99 MMOL/L (ref 99–110)
CO2: 27 MMOL/L (ref 21–32)
CREAT SERPL-MCNC: 3.5 MG/DL (ref 0.8–1.3)
GFR AFRICAN AMERICAN: 22
GFR NON-AFRICAN AMERICAN: 18
GLOBULIN: 3.2 G/DL
GLUCOSE BLD-MCNC: 119 MG/DL (ref 70–99)
GLUCOSE BLD-MCNC: 120 MG/DL (ref 70–99)
GLUCOSE BLD-MCNC: 123 MG/DL (ref 70–99)
GLUCOSE BLD-MCNC: 134 MG/DL (ref 70–99)
GLUCOSE BLD-MCNC: 153 MG/DL (ref 70–99)
INR BLD: 1.07 (ref 0.86–1.14)
LV EF: 25 %
LVEF MODALITY: NORMAL
MAGNESIUM: 1.7 MG/DL (ref 1.8–2.4)
PERFORMED ON: ABNORMAL
POTASSIUM SERPL-SCNC: 4.7 MMOL/L (ref 3.5–5.1)
PROTHROMBIN TIME: 12.4 SEC (ref 10–13.2)
SARS-COV-2, NAAT: NOT DETECTED
SODIUM BLD-SCNC: 131 MMOL/L (ref 136–145)
TOTAL PROTEIN: 5.8 G/DL (ref 6.4–8.2)

## 2021-01-15 PROCEDURE — 85610 PROTHROMBIN TIME: CPT

## 2021-01-15 PROCEDURE — 97530 THERAPEUTIC ACTIVITIES: CPT

## 2021-01-15 PROCEDURE — U0002 COVID-19 LAB TEST NON-CDC: HCPCS

## 2021-01-15 PROCEDURE — 6370000000 HC RX 637 (ALT 250 FOR IP): Performed by: INTERNAL MEDICINE

## 2021-01-15 PROCEDURE — 6370000000 HC RX 637 (ALT 250 FOR IP): Performed by: NURSE PRACTITIONER

## 2021-01-15 PROCEDURE — 36415 COLL VENOUS BLD VENIPUNCTURE: CPT

## 2021-01-15 PROCEDURE — 83735 ASSAY OF MAGNESIUM: CPT

## 2021-01-15 PROCEDURE — 1200000000 HC SEMI PRIVATE

## 2021-01-15 PROCEDURE — 93306 TTE W/DOPPLER COMPLETE: CPT

## 2021-01-15 PROCEDURE — 97535 SELF CARE MNGMENT TRAINING: CPT

## 2021-01-15 PROCEDURE — 80053 COMPREHEN METABOLIC PANEL: CPT

## 2021-01-15 RX ADMIN — LORAZEPAM 0.5 MG: 0.5 TABLET ORAL at 08:34

## 2021-01-15 RX ADMIN — INSULIN LISPRO 1 UNITS: 100 INJECTION, SOLUTION INTRAVENOUS; SUBCUTANEOUS at 18:10

## 2021-01-15 RX ADMIN — LORAZEPAM 0.5 MG: 0.5 TABLET ORAL at 20:49

## 2021-01-15 RX ADMIN — ATORVASTATIN CALCIUM 40 MG: 40 TABLET, FILM COATED ORAL at 20:48

## 2021-01-15 RX ADMIN — LORAZEPAM 0.5 MG: 0.5 TABLET ORAL at 15:09

## 2021-01-15 RX ADMIN — METOPROLOL SUCCINATE 50 MG: 50 TABLET, EXTENDED RELEASE ORAL at 08:34

## 2021-01-15 ASSESSMENT — PAIN SCALES - GENERAL: PAINLEVEL_OUTOF10: 0

## 2021-01-15 ASSESSMENT — PAIN DESCRIPTION - PROGRESSION
CLINICAL_PROGRESSION: NOT CHANGED

## 2021-01-15 NOTE — PROGRESS NOTES
Hospitalist Progress Note      PCP: LARRY Max - CNP    Date of Admission: 1/9/2021    Chief Complaint: Right Foot Ulcer    Hospital Course: Radhika Stuart is a 56 y.o. male. He was accepted in transfer from [facility]. He presented from home for a right foot ulcer. He relates that about three weeks ago he awoke one morning to find \"my whole foot looked like on big blister. \"  Since that time there has been quite a bit of skin sloughing from the leg and foot. There has been significant tissue breakdown on the sole of the foot under his big toe. He has very poor sensation in the right leg.     Patient has not felt ill. He has a strong appetite. He denies rigors, sweats, and nausea.     He is taking isordil 20mg tid, hydralazine 25mg tid, metoprolol succinate 50mg daily, and sliding scale lantus. Subjective:  Feels very well today.       Medications:  Reviewed    Infusion Medications    dextrose       Scheduled Medications    darbepoetin lillie-polysorbate  100 mcg Intravenous Weekly - Thursday    metoprolol succinate  50 mg Oral Daily    insulin lispro  0-6 Units Subcutaneous TID WC    insulin lispro  0-3 Units Subcutaneous Nightly    nicotine  1 patch Transdermal Daily    atorvastatin  40 mg Oral Nightly    [Held by provider] heparin (porcine)  5,000 Units Subcutaneous 3 times per day     PRN Meds: calcium carbonate, LORazepam, heparin (porcine), traMADol, ipratropium-albuterol, sodium chloride flush, promethazine **OR** ondansetron, acetaminophen **OR** acetaminophen, melatonin, glucose, dextrose, glucagon (rDNA), dextrose      Intake/Output Summary (Last 24 hours) at 1/15/2021 1601  Last data filed at 1/14/2021 2315  Gross per 24 hour   Intake 960 ml   Output 450 ml   Net 510 ml       Physical Exam Performed:    BP (!) 187/92   Pulse 67   Temp 97.4 °F (36.3 °C) (Oral)   Resp 16   Ht 6' 1\" (1.854 m)   Wt 249 lb 12.5 oz (113.3 kg)   SpO2 96%   BMI 32.95 kg/m²     General appearance: No apparent distress, appears stated age and cooperative. HEENT: Pupils equal, round, and reactive to light. Conjunctivae/corneas clear. Neck: Supple, with full range of motion. No jugular venous distention. Trachea midline. Respiratory:  Normal respiratory effort. Clear to auscultation, bilaterally without Rales/Wheezes/Rhonchi. Cardiovascular: Regular rate and rhythm with normal S1/S2 without murmurs, rubs or gallops. Abdomen: Soft, non-tender, non-distended with normal bowel sounds. Musculoskeletal: No clubbing, cyanosis or edema bilaterally. Full range of motion without deformity. Skin:  Patient is s/p L BKA. Right foot with dressing in place - did not not remove. Neurologic:  Neurovascularly intact without any focal sensory/motor deficits. Cranial nerves: II-XII intact, grossly non-focal.  Psychiatric: Alert and oriented, thought content appropriate, normal insight      Labs:   Recent Labs     01/13/21  0613 01/14/21  0814   WBC 10.0 10.0   HGB 9.9* 8.7*   HCT 32.2* 27.8*    242     Recent Labs     01/13/21  0613 01/14/21  0814 01/15/21  1130    135* 131*   K 4.6 4.6 4.7    103 99   CO2 25 26 27   BUN 32* 26* 22*   CREATININE 4.1* 3.9* 3.5*   CALCIUM 7.8* 7.7* 7.6*     Recent Labs     01/13/21  0613 01/14/21  0814 01/15/21  1130   AST 21 22 22   ALT 11 11 11   BILITOT 0.3 <0.2 <0.2   ALKPHOS 111 87 90     Recent Labs     01/13/21  0613 01/14/21  0814 01/15/21  1130   INR 1.19* 1.14 1.07     No results for input(s): Tracy Izquierdo in the last 72 hours. Urinalysis:      Lab Results   Component Value Date    NITRU Negative 01/09/2021    WBCUA 0-2 01/09/2021    BACTERIA 3+ 01/09/2021    RBCUA 3-4 01/09/2021    BLOODU TRACE-INTACT 01/09/2021    SPECGRAV >=1.030 01/09/2021    GLUCOSEU Negative 01/09/2021       Radiology:  XR CHEST PORTABLE   Final Result   1. Right-sided central venous catheter terminating in the SVC   2.  Left pleural effusion with adjacent left basilar opacity could represent   atelectasis or pneumonia         US RENAL COMPLETE   Final Result   Morphologically normal kidneys with no obstruction         VL DUP LOWER EXTREMITY ARTERIES BILATERAL   Final Result      VL PRE OP VEIN MAPPING   Final Result      IR TUNNELED CVC PLACE WO SQ PORT/PUMP > 5 YEARS   Final Result   Successful ultrasound and fluoroscopy guided tunneled catheter placement . XR FOOT RIGHT (MIN 3 VIEWS)   Final Result   Soft tissue irregularity adjacent to the 1st MTP joint, suspicious for   ulceration given patient history. Correlate with physical exam.  No   generalized erosive pattern is seen. If there remains strong clinical   concern for developing osteomyelitis, MR or three-phase bone scan could be   considered. Assessment/Plan:    Active Hospital Problems    Diagnosis Date Noted    Alcoholic cirrhosis of liver with ascites ([facility] [K70.31] 01/09/2021    Diabetic foot infection ([facility] [I35.408, L08.9] 01/08/2021    Chronic systolic heart failure (HCC) [I50.22]     Stage 3 chronic kidney disease [N18.30] 02/28/2018    Type 2 diabetes mellitus with left diabetic foot infection ([facility] [T27.944, L08.9]     Acute kidney injury superimposed on chronic kidney disease ([facility] [N17.9, N18.9]     Tobacco abuse [Z72.0] 07/29/2015       Type II DM w/ Diabetic Polyneuropathy and likely ischemic ulcer  -  Patient received empiric vancomycin and cefepime at [facility].    -  No evidence of cellulitis or OM, so not further abx indicated. -  podiatry consulted and following.  -  Vascular consulted and following.  -  Planning for RLE angiogram today. DENIA on CKD3  -  baseline creatinine ~1.8. Probably type 2 hepatorenal.  -  Patient has not been taking diuretics for ascites management. He has been undergoing paracentesis every week at [facility].   ~5L is removed each time and he is not receiving albumin afterward.   Last paracentesis was Thursday 1/7/21.  -

## 2021-01-15 NOTE — CARE COORDINATION
Level of Care submitted to [facility] on Aging. Phone [phone]; fax [phone].   Kaylin Martines RN

## 2021-01-15 NOTE — PROGRESS NOTES
Progress Note    HISTORY     CC:   Foot Ulcer              We are following for renal failure , now on maintenance HD     Subjective/   HPI:    Advanced renal failure,  Started on  HD 1/12 this admission  Has TDC  Wants to continue RRT    ROS:  No CP or SOB  Eating well    Social Hx:  No family at bedside     Past Medical and Surgical History:  - Reviewed, no changes     EXAM       Objective/     Vitals:    01/14/21 2029 01/14/21 2315 01/15/21 0300 01/15/21 0614   BP: (!) 159/86 (!) 150/81 (!) 151/82    Pulse: 67 64 68    Resp:  18 16    Temp:  97.7 °F (36.5 °C) 97.9 °F (36.6 °C)    TempSrc:  Oral Oral    SpO2: 97% 95% 95%    Weight:    249 lb 12.5 oz (113.3 kg)   Height:         24HR INTAKE/OUTPUT:      Intake/Output Summary (Last 24 hours) at 1/15/2021 0840  Last data filed at 1/14/2021 2315  Gross per 24 hour   Intake 1410 ml   Output 2475 ml   Net -1065 ml     Constitutional:  NAD   Neck:  Normal thyroid, no masses   Cardiovascular:  Regular, no rub  Respiratory:  Normal respiratory effort, CTA  Abdomen: +bs, soft, nt, no masses   Musculoskeletal: No LE edema, no clubbing         MEDICAL DECISION MAKING       Data/  Recent Labs     01/13/21  0613 01/14/21  0814   WBC 10.0 10.0   HGB 9.9* 8.7*   HCT 32.2* 27.8*   MCV 77.9* 77.6*    242     Recent Labs     01/13/21  0613 01/14/21  0814    135*   K 4.6 4.6    103   CO2 25 26   GLUCOSE 130* 115*   MG 1.80 1.80   BUN 32* 26*   CREATININE 4.1* 3.9*   LABGLOM 15* 16*   GFRAA 18* 19*       Assessment/Plan     1. ESRD:  - Etiology:  Diabetic Nephropathy     - TDC placed 1/11/2021. Vein mapping on 1/11/2021   -HD initiated 1/12  Working on a spot at [employer]  -plan for HD tomorrow, then may be discharged from our standpoint    2. Systolic Heart Failure:  EF = 35% / Grade 2 diastolic     3. Hypertension:  UF at HD as tolerated    4. Anemia:    - HARPREET at HD    5. Alcoholic Cirrhosis/recurrent ascites:  - getting paracentesis at [facility]

## 2021-01-15 NOTE — CARE COORDINATION
Pt is now in agreement to SNF. Per Nephro, pt will have dialysis on Saturday and should be able to d/c after. CM spoke to [facility] who are setting up pt for transport for a MWF @ 0600 dialysis schedule at [facility] and pt to arrive to them either Saturday or Sunday. Charles Mcnulty w/[facility] will call w/confirmation on chair time. Rapid Covid ordered. Candice Mei, RN      Addendum:  Pt accepted to [facility] and they have pt and they have transport set up.   Candice Mei, ELVA      Addendum:  Charles Mcnulty at [address] accepted pt at [facility] MWF 0600 chair time

## 2021-01-15 NOTE — DISCHARGE INSTR - COC
Continuity of Care Form    Patient Name: Ayan Wallis   :  1960  MRN:  [id number]    Admit date:  2021  Discharge date:  2021    Code Status Order: Full Code   Advance Directives:   Advance Care Flowsheet Documentation       Date/Time Healthcare Directive Type of Healthcare Directive Copy in [address] Agent's Name Healthcare Agent's Phone Number    21 0036  No, patient does not have an advance directive for healthcare treatment -- -- -- -- --            Admitting Physician:  Trell Webb MD  PCP: LARRY Drummond CNP    Discharging Nurse: [facility] Unit/Room#: 2759/3047-21  Discharging Unit Phone Number: ***    Emergency Contact:   Extended Emergency Contact Information  Primary Emergency Contact: Lawson Ramos  Address: [facility] [address] [facility]           [place] [facility] [address] [place] [address] Phone: [phone]  Relation: Child    Past Surgical History:  Past Surgical History:   Procedure Laterality Date    IR TUNNELED CATHETER PLACEMENT GREATER THAN 5 YEARS  2021    IR TUNNELED CATHETER PLACEMENT GREATER THAN 5 YEARS 2021 Subhash Maradiaga MD [facility] SPECIAL PROCEDURES    LEG AMPUTATION BELOW KNEE      PARACENTESIS      UPPER GASTROINTESTINAL ENDOSCOPY N/A 2019    EGD ESOPHAGOGASTRODUODENOSCOPY performed by Mark Francois MD at [facility]      Left A       Immunization History: There is no immunization history on file for this patient.     Active Problems:  Patient Active Problem List   Diagnosis Code    Tobacco abuse Z72.0    Type 1 diabetes mellitus with neurological manifestations, uncontrolled (HCC) E10.49, E10.65    Diabetic ulcer of right foot ([address] E11.621, L97.519    Cellulitis L03.90    NSTEMI (non-ST elevated myocardial infarction) ([address] I21.4    Osteomyelitis of foot, left, acute ([facility] M86.172    Group B streptococcal bacteriuria R82.71    Type 2 diabetes mellitus with left diabetic foot infection (HCC) E11.628, L08.9    Septic arthritis of left foot ([facility] M00.9    Poorly controlled type 2 diabetes mellitus ([facility] E11.65    Elevated sed rate R70.0    Elevated C-reactive protein (CRP) R79.82    Acute kidney injury superimposed on chronic kidney disease (HCC) N17.9, N18.9    Hyponatremia E87.1    Diabetic polyneuropathy associated with type 2 diabetes mellitus ([facility] E11.42    Mixed hyperlipidemia E78.2    Tobacco abuse counseling Z71.6    Diabetes education, encounter for Z71.89    Abnormal nuclear stress test R94.39    Pain of left lower extremity M79.605    Stage 3 chronic kidney disease N18.30    Osteomyelitis ([facility] M86.9    Acute respiratory failure with hypoxia (HCC) J96.01    Acute respiratory failure (HCC) J96.00    Ascites R18.8    Chronic systolic CHF (congestive heart failure) (HCC) I50.22    Noncompliance Z91.19    Chronic systolic heart failure (HCC) I50.22    HTN (hypertension), malignant J39    Metabolic alkalosis U67.6    NSVT (nonsustained ventricular tachycardia) (HCC) I47.2    Moderate malnutrition (HCC) E44.0    Fluid overload E87.70    Abdominal distension R14.0    Hypoxia R09.02    Shortness of breath R06.02    Diabetic foot infection (HCC) E11.628, D25.2    Alcoholic cirrhosis of liver with ascites (HCC) K70.31       Isolation/Infection:   Isolation            No Isolation          Patient Infection Status       Infection Onset Added Last Indicated Last Indicated By Review Planned Expiration Resolved Resolved By    None active    Resolved    COVID-19 Rule Out 01/15/21 01/15/21 01/15/21 COVID-19 (Ordered)   01/15/21 Rule-Out Test Resulted    MRSA  11/15/17 11/15/17 Alisson Dorsey RN   02/16/19 Jen Walker RN            Nurse Assessment:  Last Vital Signs: BP (!) 187/92   Pulse 67   Temp 97.4 °F (36.3 °C) (Oral)   Resp 16   Ht 6' 1\" (1.854 m)   Wt 249 lb 12.5 oz (113.3 kg)   SpO2 96%   BMI 32.95 kg/m²     Last documented pain score (0-10 scale): Pain Level: 0  Last Weight:   Wt Readings from Last 1 Encounters:   01/15/21 249 lb 12.5 oz (113.3 kg)     Mental Status:  oriented and alert    IV Access:  - None    Nursing Mobility/ADLs:  Walking   Independent  Transfer  Independent  Bathing  Independent  Dressing  Independent  Toileting  Independent  Feeding  Independent  Med [address]  Med Delivery   whole    Wound Care Documentation and Therapy:        Elimination:  Continence:   · Bowel: Yes  · Bladder: Yes  Urinary Catheter: None   Colostomy/Ileostomy/Ileal Conduit: No       Date of Last BM: 1/16    Intake/Output Summary (Last 24 hours) at 1/15/2021 1600  Last data filed at 1/14/2021 2315  Gross per 24 hour   Intake 960 ml   Output 450 ml   Net 510 ml     I/O last 3 completed shifts: In: 12 [P.O.:960]  Out: 450 [Urine:450]    Safety Concerns:     None    Impairments/Disabilities:      Amputation - left bka    Nutrition Therapy:  Current Nutrition Therapy:   - Oral Diet:  General    Routes of Feeding: Oral  Liquids: No Restrictions  Daily Fluid Restriction: no  Last Modified Barium Swallow with Video (Video Swallowing Test): not done    Treatments at the Time of Hospital Discharge:   Respiratory Treatments: ***  Oxygen Therapy:  is not on home oxygen therapy.   Ventilator:    - No ventilator support    Rehab Therapies: Physical Therapy and Occupational Therapy  Weight Bearing Status/Restrictions: No weight bearing restirctions  Other Medical Equipment (for information only, NOT a DME order):  walker  Other Treatments: ***    Patient's personal belongings (please select all that are sent with patient):  None    RN SIGNATURE:  Electronically signed by Zana Cole RN on 1/16/21 at 9:36 AM EST    CASE MANAGEMENT/SOCIAL WORK SECTION    Inpatient Status Date: ***    Readmission Risk Assessment Score:  Readmission Risk              Risk of Unplanned Readmission:        20           Discharging to Facility/ Agency   · Name: Any  · Address:  · Phone:[phone]  · BTX:[phone]    Dialysis Facility (if applicable)   · [facility] [address]  · Address:  · Dialysis Schedule:  · Phone:  · Fax:    / signature: Electronically signed by Braulio Alfonso RN on 1/16/21 at 10:08 AM EST    PHYSICIAN SECTION    Prognosis: Good    Condition at Discharge: Stable    Rehab Potential (if transferring to Rehab): Good    Recommended Labs or Other Treatments After Discharge: Nursing care/PT/OT. Will need HD on Tuesday, Thursday, and Saturday. Physician Certification: I certify the above information and transfer of Joe Dotson  is necessary for the continuing treatment of the diagnosis listed and that he requires Bobo Henriquez for greater 30 days.      Update Admission H&P: No change in H&P    PHYSICIAN SIGNATURE:  Electronically signed by LARRY Vasques CNP on 1/16/21 at 0921 AM EST

## 2021-01-15 NOTE — PROGRESS NOTES
Adina Alvarado is a 56 y.o. male patient seen today resting comfortably. Denies pedal pain. States he was to continue with HD, but does not want to proceed with the angiogram at this time.     Current Facility-Administered Medications   Medication Dose Route Frequency Provider Last Rate Last Admin    darbepoetin lillie-polysorbate (ARANESP) injection 100 mcg  100 mcg Intravenous Weekly - Thursday Alexia Urrutia MD        calcium carbonate (TUMS) chewable tablet 1,000 mg  1,000 mg Oral BID PRN LARRY Romero NP   1,000 mg at 01/13/21 0759    LORazepam (ATIVAN) tablet 0.5 mg  0.5 mg Oral Q6H PRN LARRY Burgos CNP   0.5 mg at 01/14/21 2030    metoprolol succinate (TOPROL XL) extended release tablet 50 mg  50 mg Oral Daily J Domonique Jaquez MD   50 mg at 01/14/21 0853    heparin (porcine) injection 3,200 Units  3,200 Units Intracatheter PRN Alexia Urrutia MD   3,200 Units at 01/14/21 1401    traMADol (ULTRAM) tablet 50 mg  50 mg Oral Q6H PRN LARRY Burgos CNP   50 mg at 01/12/21 0802    ipratropium-albuterol (DUONEB) nebulizer solution 1 ampule  1 ampule Inhalation Q4H PRN Angela Martinez MD        insulin lispro (HUMALOG) injection vial 0-6 Units  0-6 Units Subcutaneous TID WC LARRY Romero NP   1 Units at 01/13/21 1740    insulin lispro (HUMALOG) injection vial 0-3 Units  0-3 Units Subcutaneous Nightly LARRY Romero NP   1 Units at 01/14/21 2033    sodium chloride flush 0.9 % injection 10 mL  10 mL Intravenous PRN Kurtis Medina MD   10 mL at 01/14/21 0854    promethazine (PHENERGAN) tablet 12.5 mg  12.5 mg Oral Q6H PRN Kurtis Medina MD   12.5 mg at 01/13/21 1016    Or    ondansetron (ZOFRAN) injection 4 mg  4 mg Intravenous Q6H PRN Kurtis Medina MD   4 mg at 01/13/21 1444    acetaminophen (TYLENOL) tablet 650 mg  650 mg Oral Q6H PRN Kurtis Medina MD   650 mg at 01/12/21 0358    Or    acetaminophen (TYLENOL) suppository 650 mg  650 mg Rectal Q6H PRN Rylan Elias MD        nicotine (NICODERM CQ) 7 MG/24HR 1 patch  1 patch Transdermal Daily Rylan Elias MD   1 patch at 01/14/21 0853    atorvastatin (LIPITOR) tablet 40 mg  40 mg Oral Nightly Rylan Elias MD   40 mg at 01/14/21 2030    melatonin tablet 3 mg  3 mg Oral Nightly PRN MD K[facility] by provider] heparin (porcine) injection 5,000 Units  5,000 Units Subcutaneous 3 times per day Rylan Elias MD   Stopped at 01/12/21 0600    glucose (GLUTOSE) 40 % oral gel 15 g  15 g Oral PRN Rylan Elias MD        dextrose 50 % IV solution  12.5 g Intravenous PRN Rylan Elias MD        glucagon (rDNA) injection 1 mg  1 mg Intramuscular PRN Rylan Elias MD        dextrose 5 % solution  100 mL/hr Intravenous PRN Rylan Elias MD         Allergies   Allergen Reactions    Pcn [Penicillins] Hives     Active Problems:    Tobacco abuse    Type 2 diabetes mellitus with left diabetic foot infection ([facility]    Acute kidney injury superimposed on chronic kidney disease ([facility]    Stage 3 chronic kidney disease    Chronic systolic heart failure (HCC)    Diabetic foot infection ([facility]    Alcoholic cirrhosis of liver with ascites ([facility]  Resolved Problems:    * No resolved hospital problems. *    Blood pressure (!) 150/81, pulse 64, temperature 97.7 °F (36.5 °C), temperature source Oral, resp. rate 18, height 6' 1\" (1.854 m), weight 246 lb 0.5 oz (111.6 kg), SpO2 95 %. Subjective  Objective:  Vital signs: (most recent): Blood pressure (!) 150/81, pulse 64, temperature 97.7 °F (36.5 °C), temperature source Oral, resp. rate 18, height 6' 1\" (1.854 m), weight 246 lb 0.5 oz (111.6 kg), SpO2 95 %. Integument: Two full thickness ulcerations plantar to the right 1st MTP with fibrotic/necrotic base (1.5cm x 1.5cm x 0.2cm). Wound does not probe deep. No purulent drainage. No surrounding erythema, edema, or warmth.  There is a 2.0cm x 0.5cm x 0.3cm wound to the dorsal lateral foot with fibrotic base. No SOI as noted above. Skin is overall thin, atrophic, and peeling.  + serous drainage. Neurologic:  Protective sensation is grossly diminished to light touch at the level of the toes, right  Vascular:  DP and PT pulses are non-palpable, right. .  No significant edema appreciated. Musculoskeletal:  Patient has 5/5 strength on inversion/everison/dorsiflexion/plantarflexion, right. Left BKA    SHIRLEY:        There is significant and bilateral aorto-iliac inflow disease within the    lower extremities.    There is moderate diffuse heterogeneous plaque noted throughout the    bilateral lower extremities. Impression/Recommendations: The patient is a pleasant 56 y.o. male with:  1) Full thickness wound to fat right foot  -Aquacel AG to redgranular wound, betadine to fibrotic wound DSD  -Dressing to be changed daily  -Surgical shoe for ambulation  -Follow up with patient's wound care center if desired  2) PVD  -angiogram postponed. 3) DM II with neuropathy  4) left BKA    I discussed with the patient that without the angiogram, the wounds on his feet are not likely  to heal and that he could become septic and die. Patient demonstrates understanding. He will consider his options. Podiatry to sign off. Please call for any concerns or changes.     Hale Dakin, DPM  1/14/2021

## 2021-01-15 NOTE — PROGRESS NOTES
Occupational Therapy  Facility/Department: [facility] C5 - MED SURG/ORTHO  Daily Treatment Note  NAME: Rakesh Myers  : 1960  MRN: [id number]    Date of Service: 1/15/2021    Discharge Recommendations:  Subacute/Skilled Nursing Facility     Assessment   Performance deficits / Impairments: Decreased functional mobility ; Decreased safe awareness;Decreased balance;Decreased ADL status; Decreased cognition;Decreased endurance;Decreased strength;Decreased sensation  Assessment: Pt demos improved tolerance for OT session this date. Session limited by pt reported \"missing insert\" for prosthesis causing prosthesis to fit too loosely for safe mobility (pt reports son is going to bring it up). Pt requires height of bed elevated to complete transfers CGA with verbal cues for safety. Pt would benefit from continued skilled OT to address the above noted occupational performance deficits. Prognosis: Good  OT Education: OT Role;Transfer Training;ADL Adaptive Strategies;Precautions;Plan of Care  Patient Education: disease specific ed:  role of OT, ADL skills  REQUIRES OT FOLLOW UP: Yes  Activity Tolerance  Activity Tolerance: Patient Tolerated treatment well  Safety Devices  Safety Devices in place: Yes  Type of devices: Left in chair;Chair alarm in place;Call light within reach;Nurse notified;Gait belt         Patient Diagnosis(es): There were no encounter diagnoses. has a past medical history of Bacteremia, Cellulitis and abscess of foot, except toes, CHF (congestive heart failure) ([address] Diabetes mellitus ([address] History of liver failure, Hypertension, Kidney disease, and MRSA (methicillin resistant staph aureus) culture positive. has a past surgical history that includes Leg amputation below knee; vascular surgery; Upper gastrointestinal endoscopy (N/A, 2019); Paracentesis; and IR TUNNELED CVC PLACE WO SQ PORT/PUMP > 5 YEARS (2021).     Restrictions  Restrictions/Precautions  Restrictions/Precautions: Fall Risk, General Precautions, Up as Tolerated(Enivronmental for bed bugs)  Position Activity Restriction  Other position/activity restrictions: LLE prosthesis (BKA)     Subjective   General  Chart Reviewed: Yes  Patient assessed for rehabilitation services?: Yes  Response to previous treatment: Patient with no complaints from previous session  Family / Caregiver Present: No  Referring Practitioner: JACOBO Hutchison  Diagnosis: diabetic foot infection    Subjective  Subjective: Pt resting in bed, pleasant and agreeable to OT treatment. Vital Signs  Patient Currently in Pain: Denies     Orientation  Orientation  Overall Orientation Status: Within Functional Limits     Objective    ADL  Grooming: Setup(seated to wash face, comb hair, brush teeth)  LE Dressing: Stand by assistance(to don L prosthesis)  Additional Comments: Pt missing insert for prosthesis, causing prosthesis to fit very loose, completed bed to chair transfers only and deferred standing grooming for pt safety. Balance  Sitting Balance: Supervision  Standing Balance: Stand by assistance(with SW)  Standing Balance  Activity: bed to chair stand step transfer with SW  Comment: Pt missing insert for prosthesis, causing prosthesis to fit very loose, completed bed to chair transfers only and deferred standing grooming for pt safety.     Bed mobility  Supine to Sit: Modified independent     Transfers  Sit to stand: Contact guard assistance  Stand to sit: Contact guard assistance     Cognition  Overall Cognitive Status: Exceptions  Safety Judgement: Decreased awareness of need for safety     Plan   Plan  Times per week: 2-3x  Current Treatment Recommendations: Strengthening, Endurance Training, Neuromuscular Re-education, Self-Care / ADL, Balance Training, Functional Mobility Training, Safety Education & Training    AM-PAC Score  [facility] Inpatient Daily Activity Raw Score: 16 (01/15/21 1150)  AM-PAC Inpatient ADL T-Scale Score : 35.96 (01/15/21 1150)  ADL Inpatient CMS 0-100% Score: 53.32 (01/15/21 1150)  ADL Inpatient CMS G-Code Modifier : CK (01/15/21 1150)    Goals  Short term goals  Time Frame for Short term goals: 7 days  Short term goal 1: Pt will complete toilet transfer with CGA-- ongoing 1/15/21  Short term goal 2: Pt will complete grooming standing at sink with CGA-- ongoing 1/15/21  Short term goal 3: Pt will be able to manage short household distance with AD with CGA-- ongoing 1/15/21  Patient Goals   Patient goals :  To be independent with all self care       Therapy Time   Individual Concurrent Group Co-treatment   Time In 1117         Time Out 1141         Minutes 1200 B. Oxana Bustamante, BALDERAS/L

## 2021-01-16 VITALS
SYSTOLIC BLOOD PRESSURE: 166 MMHG | WEIGHT: 240.3 LBS | RESPIRATION RATE: 18 BRPM | BODY MASS INDEX: 31.85 KG/M2 | HEART RATE: 58 BPM | OXYGEN SATURATION: 97 % | HEIGHT: 73 IN | TEMPERATURE: 98 F | DIASTOLIC BLOOD PRESSURE: 93 MMHG

## 2021-01-16 LAB
A/G RATIO: 0.9 (ref 1.1–2.2)
ALBUMIN SERPL-MCNC: 2.8 G/DL (ref 3.4–5)
ALP BLD-CCNC: 90 U/L (ref 40–129)
ALT SERPL-CCNC: 12 U/L (ref 10–40)
ANION GAP SERPL CALCULATED.3IONS-SCNC: 7 MMOL/L (ref 3–16)
AST SERPL-CCNC: 20 U/L (ref 15–37)
BASOPHILS ABSOLUTE: 0 K/UL (ref 0–0.2)
BASOPHILS RELATIVE PERCENT: 0.4 %
BILIRUB SERPL-MCNC: <0.2 MG/DL (ref 0–1)
BUN BLDV-MCNC: 28 MG/DL (ref 7–20)
CALCIUM SERPL-MCNC: 7.6 MG/DL (ref 8.3–10.6)
CHLORIDE BLD-SCNC: 102 MMOL/L (ref 99–110)
CO2: 26 MMOL/L (ref 21–32)
CREAT SERPL-MCNC: 4.2 MG/DL (ref 0.8–1.3)
EOSINOPHILS ABSOLUTE: 0.1 K/UL (ref 0–0.6)
EOSINOPHILS RELATIVE PERCENT: 2.1 %
GFR AFRICAN AMERICAN: 17
GFR NON-AFRICAN AMERICAN: 14
GLOBULIN: 3.2 G/DL
GLUCOSE BLD-MCNC: 117 MG/DL (ref 70–99)
GLUCOSE BLD-MCNC: 135 MG/DL (ref 70–99)
HCT VFR BLD CALC: 27.2 % (ref 40.5–52.5)
HEMOGLOBIN: 8.3 G/DL (ref 13.5–17.5)
INR BLD: 1.05 (ref 0.86–1.14)
LYMPHOCYTES ABSOLUTE: 0.5 K/UL (ref 1–5.1)
LYMPHOCYTES RELATIVE PERCENT: 8.3 %
MAGNESIUM: 1.7 MG/DL (ref 1.8–2.4)
MCH RBC QN AUTO: 24 PG (ref 26–34)
MCHC RBC AUTO-ENTMCNC: 30.5 G/DL (ref 31–36)
MCV RBC AUTO: 78.6 FL (ref 80–100)
MONOCYTES ABSOLUTE: 0.4 K/UL (ref 0–1.3)
MONOCYTES RELATIVE PERCENT: 7.4 %
NEUTROPHILS ABSOLUTE: 4.9 K/UL (ref 1.7–7.7)
NEUTROPHILS RELATIVE PERCENT: 81.8 %
PDW BLD-RTO: 18.5 % (ref 12.4–15.4)
PERFORMED ON: ABNORMAL
PLATELET # BLD: 211 K/UL (ref 135–450)
PMV BLD AUTO: 7.3 FL (ref 5–10.5)
POTASSIUM SERPL-SCNC: 5.1 MMOL/L (ref 3.5–5.1)
PROTHROMBIN TIME: 12.2 SEC (ref 10–13.2)
RBC # BLD: 3.46 M/UL (ref 4.2–5.9)
SODIUM BLD-SCNC: 135 MMOL/L (ref 136–145)
TOTAL PROTEIN: 6 G/DL (ref 6.4–8.2)
WBC # BLD: 6 K/UL (ref 4–11)

## 2021-01-16 PROCEDURE — 6370000000 HC RX 637 (ALT 250 FOR IP): Performed by: NURSE PRACTITIONER

## 2021-01-16 PROCEDURE — 6370000000 HC RX 637 (ALT 250 FOR IP): Performed by: INTERNAL MEDICINE

## 2021-01-16 PROCEDURE — 85610 PROTHROMBIN TIME: CPT

## 2021-01-16 PROCEDURE — 36415 COLL VENOUS BLD VENIPUNCTURE: CPT

## 2021-01-16 PROCEDURE — 90935 HEMODIALYSIS ONE EVALUATION: CPT

## 2021-01-16 PROCEDURE — 36556 INSERT NON-TUNNEL CV CATH: CPT

## 2021-01-16 PROCEDURE — 83735 ASSAY OF MAGNESIUM: CPT

## 2021-01-16 PROCEDURE — 80053 COMPREHEN METABOLIC PANEL: CPT

## 2021-01-16 PROCEDURE — 85025 COMPLETE CBC W/AUTO DIFF WBC: CPT

## 2021-01-16 RX ORDER — LORAZEPAM 0.5 MG/1
0.5 TABLET ORAL EVERY 6 HOURS PRN
Qty: 10 TABLET | Refills: 0 | Status: SHIPPED | OUTPATIENT
Start: 2021-01-16 | End: 2021-02-15

## 2021-01-16 RX ORDER — IPRATROPIUM BROMIDE AND ALBUTEROL SULFATE 2.5; .5 MG/3ML; MG/3ML
3 SOLUTION RESPIRATORY (INHALATION) EVERY 4 HOURS PRN
Qty: 360 ML | DISCHARGE
Start: 2021-01-16

## 2021-01-16 RX ADMIN — LORAZEPAM 0.5 MG: 0.5 TABLET ORAL at 08:27

## 2021-01-16 RX ADMIN — METOPROLOL SUCCINATE 50 MG: 50 TABLET, EXTENDED RELEASE ORAL at 08:27

## 2021-01-16 ASSESSMENT — PAIN SCALES - GENERAL
PAINLEVEL_OUTOF10: 0
PAINLEVEL_OUTOF10: 0

## 2021-01-16 NOTE — PROGRESS NOTES
Progress Note    HISTORY     CC:   Foot Ulcer              We are following for renal failure , now on maintenance HD     Subjective/   HPI:    Advanced renal failure,  Started on  HD 1/12 this admission  Has [facility]  Wants to continue RRT  Seen on dialysis. TDC is working well. Going for 2 kg. BP up, reviewed crit line monitoring. ROS:  No CP or SOB  Eating well    Social Hx:  No family at bedside     Past Medical and Surgical History:  - Reviewed, no changes     EXAM       Objective/     Vitals:    01/16/21 0231 01/16/21 0800 01/16/21 0910 01/16/21 1230   BP: (!) 159/80 (!) 190/99 (!) 171/95 (!) (P) 166/93   Pulse: 65 67 66 (P) 58   Resp: 18 16 16 (P) 18   Temp: 97.5 °F (36.4 °C) 97.7 °F (36.5 °C) 98.2 °F (36.8 °C) (P) 98 °F (36.7 °C)   TempSrc: Oral Oral     SpO2: 95% 97%     Weight: 251 lb 15.8 oz (114.3 kg)  245 lb 2.4 oz (111.2 kg) (P) 240 lb 4.8 oz (109 kg)   Height:         24HR INTAKE/OUTPUT:      Intake/Output Summary (Last 24 hours) at 1/16/2021 1304  Last data filed at 1/16/2021 1230  Gross per 24 hour   Intake 400 ml   Output 3375 ml   Net -2975 ml     Constitutional:  NAD   Neck:  Normal thyroid, no masses   Cardiovascular:  Regular, no rub  Respiratory:  Normal respiratory effort, CTA  Abdomen: +bs, soft, nt, no masses   Musculoskeletal: + LE edema, no clubbing   Access:  Right [facility]         MEDICAL DECISION MAKING       Data/  Recent Labs     01/14/21  0814 01/16/21  1020   WBC 10.0 6.0   HGB 8.7* 8.3*   HCT 27.8* 27.2*   MCV 77.6* 78.6*    211     Recent Labs     01/14/21  0814 01/15/21  1130 01/16/21  0606   * 131* 135*   K 4.6 4.7 5.1    99 102   CO2 26 27 26   GLUCOSE 115* 120* 135*   MG 1.80 1.70* 1.70*   BUN 26* 22* 28*   CREATININE 3.9* 3.5* 4.2*   LABGLOM 16* 18* 14*   GFRAA 19* 22* 17*       Assessment/Plan     1. ESRD:  - Etiology:  Diabetic Nephropathy     - TDC placed 1/11/2021.   Vein mapping on 1/11/2021   -HD initiated 1/12  Working on a spot at [facility]  HD today with 2 kg UF, then may be discharged from our standpoint    2. Systolic Heart Failure:  EF = 35% / Grade 2 diastolic     3. Hypertension:  UF at HD as tolerated    4. Anemia:    - HARPREET at HD    5. Alcoholic Cirrhosis/recurrent ascites:  - getting paracentesis at [facility]

## 2021-01-16 NOTE — CARE COORDINATION
CASE MANAGEMENT DISCHARGE SUMMARY      Discharge to: [facility]    Precertification completed: LOC complete  Hospital Exemption Notification (HENS) completed: yes    New Durable Medical Equipment ordered/agency: [facility] M[facility] 0600 EGS to tranpsort    Transportation:    Medical Transport explained to pt/family. Pt/family voice no agency preference. Agency used:[facility]    time:1345   Ambulance form completed: Yes    Confirmed discharge plan with:RN,KAVITA     Patient: yes     Family, name and contact number: confirmed plan with patients son, Bry Alvarado, name:  DILLAN/AVS faxed [phone]   Phone number for report to facility: [phone]     RN, name: Corona Lerma    Note: Discharging nurse to complete DILLAN, reconcile AVS, and place final copy with patient's discharge packet. RN to ensure that written prescriptions for  Level II medications are sent with patient to the facility as per protocol.

## 2021-01-16 NOTE — FLOWSHEET NOTE
Treatment time: 3.5 hours  Net UF: 2000 ml     Pre weight: 111.2 kg   Post weight: 109 kg  EDW: TBD     Access used: RCW CVC  Access function: Goood with  ml/min     Medications or blood products given: None     Regular outpatient schedule: TBD     Summary of response to treatment: Tolerated tx well.  No HD related complaints or complications.      Copy of dialysis treatment record placed in chart, to be scanned into EMR.       01/16/21 0910 01/16/21 1230   Treatment   Time On 0910  --    Time Off  --  1244   Vital Signs   BP (!) 171/95 (!) 166/93   Temp 98.2 °F (36.8 °C) 98 °F (36.7 °C)   Pulse 66 58   Resp 16 18   Dialysis Bath   K+ (Potassium) 2  --    Ca+ (Calcium) 2.5  --    Na+ (Sodium) 138  --    HCO3 (Bicarb) 32  --

## 2021-01-16 NOTE — CARE COORDINATION
LOC has been returned for intermediate stay and Franki Hammans states ok to come today after dialysis.

## 2021-01-28 ENCOUNTER — HOSPITAL ENCOUNTER (OUTPATIENT)
Dept: ULTRASOUND IMAGING | Age: 61
Discharge: HOME | End: 2021-01-28
Payer: MEDICAID

## 2021-01-28 ENCOUNTER — HOSPITAL ENCOUNTER (OUTPATIENT)
Dept: NURSING | Age: 61
Setting detail: INFUSION SERIES
End: 2021-01-28

## 2021-01-28 VITALS
DIASTOLIC BLOOD PRESSURE: 81 MMHG | HEART RATE: 76 BPM | OXYGEN SATURATION: 97 % | RESPIRATION RATE: 18 BRPM | SYSTOLIC BLOOD PRESSURE: 172 MMHG

## 2021-01-28 DIAGNOSIS — R18.8 OTHER ASCITES: ICD-10-CM

## 2021-01-28 PROCEDURE — 49083 ABD PARACENTESIS W/IMAGING: CPT

## 2021-01-29 NOTE — DISCHARGE SUMMARY
Hospital Medicine Discharge Summary    Patient ID: Ernestina Pugh      Patient's PCP: LARRY Knight CNP    Admit Date: 1/9/2021     Discharge Date: 1/16/2021      Admitting Physician: Luiza Murray MD     Discharge Physician: LARRY De Luna CNP     Discharge Diagnoses: Active Hospital Problems    Diagnosis    Alcoholic cirrhosis of liver with ascites ([facility] [K70.31]    Diabetic foot infection ([facility] [I06.389, L08.9]    Chronic systolic heart failure (HCC) [I50.22]    Stage 3 chronic kidney disease [N18.30]    Type 2 diabetes mellitus with left diabetic foot infection (HCC) [X21.149, L08.9]    Acute kidney injury superimposed on chronic kidney disease ([facility] [N17.9, N18.9]    Tobacco abuse [Z72.0]       The patient was seen and examined on day of discharge and this discharge summary is in conjunction with any daily progress note from day of discharge. Hospital Course:     Isis Jacobs a 56 y. o. male.   He was accepted in transfer from [facility]. Tiffany Fountain presented from home for a right foot ulcer. Tiffany Fountain relates that about three weeks ago he awoke one morning to find \"my whole foot looked like on big blister. \"  Since that time there has been quite a bit of skin sloughing from the leg and foot. Evelyn Mash has been significant tissue breakdown on the sole of the foot under his big toe.  He has very poor sensation in the right leg.     Patient has not felt ill.  He has a strong appetite. He denies rigors, sweats, and nausea.     He is taking isordil 20mg tid, hydralazine 25mg tid, metoprolol succinate 50mg daily, and sliding scale lantus. Type II DM w/ Diabetic Polyneuropathy and likely ischemic ulcer  -  Patient received empiric vancomycin and cefepime at [facility].    -  No evidence of cellulitis or OM, so not further abx indicated.   -  podiatry consulted and following.  -  Vascular consulted and following.  -  needs outpatient RLE angiogram     DENIA on CKD3  -  baseline creatinine ~1.8. Probably type 2 hepatorenal.  -  Patient has not been taking diuretics for ascites management.  He has been undergoing paracentesis every week at [facility].   ~5L is removed each time and he is not receiving albumin afterward.  Last paracentesis was Thursday 1/7/21.  -  s/p 50g 25% albumin 1/9. -  nephrology following  -  S/p vascath placement. Started on HD 3/11/8260.      Systolic Heart Failure  -  LVEF = 35 - 40% by TTE 1/2019.  No coronary angiogram could be found.  SWMA's are present on TTE c/w prior RCA territory MI.  -  Continue home metoprolol Wade Lunch is not prescribed an antiplatelet agent or statin. - Shamir Mosqueda has been taking hydralazine and isordil at home.       Alcoholic Cirrhosis w/ ascites  -  Patient has been abstinent from alcohol, but his cirrhosis is essentially not being treated aside from scheduled paracenteses. -  Aside from ascites, liver function seems well compensated.      DM2  -  Resume home meds. Physical Exam Performed:     BP (!) 166/93   Pulse 58   Temp 98 °F (36.7 °C)   Resp 18   Ht 6' 1\" (1.854 m)   Wt 240 lb 4.8 oz (109 kg)   SpO2 97%   BMI 31.70 kg/m²       General appearance:  No apparent distress, appears stated age and cooperative. HEENT:  Normal cephalic, atraumatic without obvious deformity. Pupils equal, round, and reactive to light. Extra ocular muscles intact. Conjunctivae/corneas clear. Neck: Supple, with full range of motion. No jugular venous distention. Trachea midline. Respiratory:  Normal respiratory effort. Clear to auscultation, bilaterally without Rales/Wheezes/Rhonchi. Cardiovascular:  Regular rate and rhythm with normal S1/S2 without murmurs, rubs or gallops. Abdomen: Soft, non-tender, non-distended with normal bowel sounds. Musculoskeletal:  No clubbing, cyanosis or edema bilaterally. Full range of motion without deformity. Skin: Skin color, texture, turgor normal.  No rashes or lesions.   Neurologic: Neurovascularly intact without any focal sensory/motor deficits. Cranial nerves: II-XII intact, grossly non-focal.  Psychiatric:  Alert and oriented, thought content appropriate, normal insight  Capillary Refill: Brisk,< 3 seconds   Peripheral Pulses: +2 palpable, equal bilaterally       Labs: For convenience and continuity at follow-up the following most recent labs are provided:      CBC:    Lab Results   Component Value Date    WBC 6.0 01/16/2021    HGB 8.3 01/16/2021    HCT 27.2 01/16/2021     01/16/2021       Renal:    Lab Results   Component Value Date     01/16/2021    K 5.1 01/16/2021    K 5.2 03/21/2020     01/16/2021    CO2 26 01/16/2021    BUN 28 01/16/2021    CREATININE 4.2 01/16/2021    CALCIUM 7.6 01/16/2021    PHOS 5.5 01/11/2021         Significant Diagnostic Studies    Radiology:   XR CHEST PORTABLE   Final Result   1. Right-sided central venous catheter terminating in the SVC   2. Left pleural effusion with adjacent left basilar opacity could represent   atelectasis or pneumonia         US RENAL COMPLETE   Final Result   Morphologically normal kidneys with no obstruction         VL DUP LOWER EXTREMITY ARTERIES BILATERAL   Final Result      VL PRE OP VEIN MAPPING   Final Result      IR TUNNELED CVC PLACE WO SQ PORT/PUMP > 5 YEARS   Final Result   Successful ultrasound and fluoroscopy guided tunneled catheter placement . XR FOOT RIGHT (MIN 3 VIEWS)   Final Result   Soft tissue irregularity adjacent to the 1st MTP joint, suspicious for   ulceration given patient history. Correlate with physical exam.  No   generalized erosive pattern is seen. If there remains strong clinical   concern for developing osteomyelitis, MR or three-phase bone scan could be   considered. Consults:     IP CONSULT TO PODIATRY  IP CONSULT TO NEPHROLOGY  IP CONSULT TO CASE MANAGEMENT  IP CONSULT TO VASCULAR SURGERY  IP CONSULT TO CARDIOLOGY  PALLIATIVE CARE EVAL    Disposition:   To [facility]    Condition at Discharge: Stable    Discharge Instructions/Follow-up:  F/u with physician at [facility]. F/u with nephrology in 1-2 weeks. Will need HD 3 times a week. F/u with vascular if would like to proceed with lower extremity angiogram as outpatient. Code Status:  Full    Activity: activity as tolerated    Diet: renal diet      Discharge Medications:     Discharge Medication List as of 1/16/2021 10:09 AM           Details   LORazepam (ATIVAN) 0.5 MG tablet Take 1 tablet by mouth every 6 hours as needed for Anxiety for up to 30 days. , Disp-10 tablet, R-0Print      ipratropium-albuterol (DUONEB) 0.5-2.5 (3) MG/3ML SOLN nebulizer solution Inhale 3 mLs into the lungs every 4 hours as needed for Shortness of Breath, Disp-360 mLDC to SNF      nicotine (NICODERM CQ) 7 MG/24HR Place 1 patch onto the skin daily, Disp-30 patch, R-3DC to SNF              Details   isosorbide-hydrALAZINE (BIDIL) 20-37.5 MG per tablet Take 1 tablet by mouth 3 times daily, Disp-90 tablet, R-3Normal      metoprolol succinate (TOPROL XL) 50 MG extended release tablet Take 1 tablet by mouth daily, Disp-30 tablet, R-3NO PRINT      insulin lispro (HUMALOG KWIKPEN) 100 UNIT/ML pen Glucose: Dose:   No Insulin 140-249 1 Unit 250-349 2 Units Over 350 -3 Units, Disp-2 pen, R-0Print      atorvastatin (LIPITOR) 40 MG tablet Take 1 tablet by mouth nightly, Disp-30 tablet, R-0Print      albuterol sulfate HFA (PROAIR HFA) 108 (90 Base) MCG/ACT inhaler Inhale 2 puffs into the lungs every 6 hours as needed for Wheezing, Disp-1 Inhaler, R-3Print             Time Spent on discharge is more than 30 minutes in the examination, evaluation, counseling and review of medications and discharge plan. Signed:    LARRY Durán CNP   1/29/2021      Thank you LARRY Rogel CNP for the opportunity to be involved in this patient's care.  If you have any questions or concerns please feel free to contact me at [phone].

## 2021-02-04 ENCOUNTER — HOSPITAL ENCOUNTER (OUTPATIENT)
Dept: ULTRASOUND IMAGING | Age: 61
Discharge: HOME | End: 2021-02-04
Payer: MEDICAID

## 2021-02-04 ENCOUNTER — HOSPITAL ENCOUNTER (OUTPATIENT)
Dept: NURSING | Age: 61
Setting detail: INFUSION SERIES
End: 2021-02-04
Payer: MEDICAID

## 2021-02-04 VITALS
SYSTOLIC BLOOD PRESSURE: 181 MMHG | HEART RATE: 77 BPM | RESPIRATION RATE: 15 BRPM | DIASTOLIC BLOOD PRESSURE: 79 MMHG | OXYGEN SATURATION: 94 %

## 2021-02-04 DIAGNOSIS — K70.31 ASCITES DUE TO ALCOHOLIC CIRRHOSIS (HCC): ICD-10-CM

## 2021-02-04 PROCEDURE — 49083 ABD PARACENTESIS W/IMAGING: CPT

## 2021-02-04 ASSESSMENT — PAIN - FUNCTIONAL ASSESSMENT: PAIN_FUNCTIONAL_ASSESSMENT: 0-10

## 2021-02-04 NOTE — PROGRESS NOTES
Image guided paracentesis completed by Miguelangel Zamora PA-C. Time out complete, VSS on RA. Patient tolerated well. 8 Liters of yellow fluid removed. Discharge instructions given, patient verbalized understanding. Patient discharged in stable condition, no needs at time of discharge.   Suzan DELATORRE RN

## 2021-02-04 NOTE — BRIEF OP NOTE
Brief Postoperative Note    Naila Brown  YOB: 1960  [id number]    Pre-operative Diagnosis: Ascites    Post-operative Diagnosis: Same    Procedure: US Guided Paracentesis    Anesthesia: Local    Surgeons/Assistants: MARKIE ROBERSON    Estimated Blood Loss: less than 5 mL    Complications: None    Specimens: Was Obtained: Ascitic Fluid    Findings: clear yellow fluid    Electronically signed by Tayla Grewal PA-C on 2/4/2021 at 10:30 AM

## 2021-02-11 ENCOUNTER — HOSPITAL ENCOUNTER (OUTPATIENT)
Dept: ULTRASOUND IMAGING | Age: 61
Discharge: HOME | End: 2021-02-11
Payer: MEDICAID

## 2021-02-11 VITALS
OXYGEN SATURATION: 96 % | SYSTOLIC BLOOD PRESSURE: 188 MMHG | DIASTOLIC BLOOD PRESSURE: 90 MMHG | RESPIRATION RATE: 15 BRPM | HEART RATE: 70 BPM

## 2021-02-11 DIAGNOSIS — K70.31 ASCITES DUE TO ALCOHOLIC CIRRHOSIS (HCC): ICD-10-CM

## 2021-02-11 PROCEDURE — 49083 ABD PARACENTESIS W/IMAGING: CPT

## 2021-02-11 NOTE — PROGRESS NOTES
Image guided paracentesis completed. Removed  6_L of clear yellow colored fluid removed. Pt tolerated procedure without any signs or symptoms of distress. Vital signs stable see flow sheets. Pt refused Albumin administration despite education. Discharge instructions provided and pt verbalized understanding. Pt left the department in stable condition via wheelchair.

## 2021-02-18 ENCOUNTER — HOSPITAL ENCOUNTER (OUTPATIENT)
Dept: ULTRASOUND IMAGING | Age: 61
Discharge: HOME | End: 2021-02-18
Payer: MEDICAID

## 2021-02-18 VITALS
TEMPERATURE: 97.8 F | RESPIRATION RATE: 16 BRPM | SYSTOLIC BLOOD PRESSURE: 183 MMHG | HEART RATE: 63 BPM | DIASTOLIC BLOOD PRESSURE: 90 MMHG | OXYGEN SATURATION: 100 %

## 2021-02-18 DIAGNOSIS — K74.60 CIRRHOSIS OF LIVER WITH ASCITES, UNSPECIFIED HEPATIC CIRRHOSIS TYPE (HCC): ICD-10-CM

## 2021-02-18 DIAGNOSIS — R18.8 CIRRHOSIS OF LIVER WITH ASCITES, UNSPECIFIED HEPATIC CIRRHOSIS TYPE (HCC): ICD-10-CM

## 2021-02-18 PROCEDURE — 49083 ABD PARACENTESIS W/IMAGING: CPT

## 2021-02-18 NOTE — PROGRESS NOTES
Image guided paracentesis completed by Dr. Liu Randall. Time out complete, VSS on RA. Patient tolerated well. 5.3Liters of yellow fluid removed. Patient refused Albumin. Discharge instructions given, patient verbalized understanding. Patient discharged in stable condition, no needs at time of discharge.   Suzan DELATORRE RN

## 2021-02-22 PROBLEM — I48.91 A-FIB (HCC): Status: ACTIVE | Noted: 2021-02-22

## 2021-02-25 ENCOUNTER — HOSPITAL ENCOUNTER (OUTPATIENT)
Dept: NURSING | Age: 61
Setting detail: INFUSION SERIES
End: 2021-02-25
Payer: MEDICAID

## 2021-02-25 ENCOUNTER — HOSPITAL ENCOUNTER (OUTPATIENT)
Dept: ULTRASOUND IMAGING | Age: 61
Discharge: HOME | End: 2021-02-25
Payer: MEDICAID

## 2021-02-25 VITALS
SYSTOLIC BLOOD PRESSURE: 193 MMHG | OXYGEN SATURATION: 100 % | RESPIRATION RATE: 15 BRPM | DIASTOLIC BLOOD PRESSURE: 97 MMHG | HEART RATE: 65 BPM

## 2021-02-25 DIAGNOSIS — K70.31 ASCITES DUE TO ALCOHOLIC CIRRHOSIS (HCC): ICD-10-CM

## 2021-02-25 PROCEDURE — 49083 ABD PARACENTESIS W/IMAGING: CPT

## 2021-02-25 NOTE — PROGRESS NOTES
Image guided paracentesis completed. Removed 5.1 _L of clear yellow colored fluid removed. Pt tolerated procedure without any signs or symptoms of distress. Vital signs stable see flow sheets. Pt refused Albumin administration despite education. Discharge instructions provided and pt verbalized understanding. Pt left the department in stable condition via wheelchair.

## 2021-03-11 ENCOUNTER — HOSPITAL ENCOUNTER (OUTPATIENT)
Age: 61
Discharge: HOME | End: 2021-03-11
Payer: MEDICAID

## 2021-03-11 ENCOUNTER — HOSPITAL ENCOUNTER (OUTPATIENT)
Dept: ULTRASOUND IMAGING | Age: 61
Discharge: HOME | End: 2021-03-11
Payer: MEDICAID

## 2021-03-11 ENCOUNTER — HOSPITAL ENCOUNTER (OUTPATIENT)
Dept: NURSING | Age: 61
Setting detail: INFUSION SERIES
End: 2021-03-11
Payer: MEDICAID

## 2021-03-11 DIAGNOSIS — K70.31 ASCITES DUE TO ALCOHOLIC CIRRHOSIS (HCC): ICD-10-CM

## 2021-03-11 LAB
INR BLD: 1.12 (ref 0.86–1.14)
PLATELET # BLD: 219 K/UL (ref 135–450)
PROTHROMBIN TIME: 13 SEC (ref 10–13.2)

## 2021-03-11 PROCEDURE — 49083 ABD PARACENTESIS W/IMAGING: CPT

## 2021-03-11 PROCEDURE — 36415 COLL VENOUS BLD VENIPUNCTURE: CPT

## 2021-03-11 PROCEDURE — 85049 AUTOMATED PLATELET COUNT: CPT

## 2021-03-11 PROCEDURE — 85610 PROTHROMBIN TIME: CPT

## 2021-03-11 NOTE — PROGRESS NOTES
Image guided paracentesis completed. Removed  _L of clear yellow colored fluid removed. Pt tolerated procedure without any signs or symptoms of distress. Vital signs stable see flow sheets. Pt refused Albumin administration despite education. Discharge instructions provided and pt verbalized understanding. Pt left the department in stable condition via wheelchair.

## 2021-03-18 ENCOUNTER — HOSPITAL ENCOUNTER (OUTPATIENT)
Dept: NURSING | Age: 61
Setting detail: INFUSION SERIES
End: 2021-03-18
Payer: MEDICAID

## 2021-03-18 ENCOUNTER — HOSPITAL ENCOUNTER (OUTPATIENT)
Dept: ULTRASOUND IMAGING | Age: 61
Discharge: HOME | End: 2021-03-18
Payer: MEDICAID

## 2021-03-18 DIAGNOSIS — K70.31 ASCITES DUE TO ALCOHOLIC CIRRHOSIS (HCC): ICD-10-CM

## 2021-03-18 PROCEDURE — 49083 ABD PARACENTESIS W/IMAGING: CPT

## 2021-03-18 NOTE — BRIEF OP NOTE
Brief Postoperative Note    Ethel Bean  YOB: 1960  [id number]    Pre-operative Diagnosis: ascites    Post-operative Diagnosis: Same    Procedure: US-guided paracentesis    Anesthesia: Local    Surgeons: Conor Su MD    Estimated Blood Loss: Less than 5 mL    Complications: None    Specimens: ascites drained    Findings: Successful US-guided paracentesis.     Electronically signed by Conor Su MD on 3/18/2021 at 11:02 AM

## 2021-04-01 ENCOUNTER — HOSPITAL ENCOUNTER (OUTPATIENT)
Dept: INTERVENTIONAL RADIOLOGY/VASCULAR | Age: 61
Discharge: HOME | End: 2021-04-01
Payer: MEDICAID

## 2021-04-01 VITALS
HEART RATE: 76 BPM | RESPIRATION RATE: 15 BRPM | OXYGEN SATURATION: 98 % | SYSTOLIC BLOOD PRESSURE: 166 MMHG | DIASTOLIC BLOOD PRESSURE: 69 MMHG

## 2021-04-01 DIAGNOSIS — K70.31 ASCITES DUE TO ALCOHOLIC CIRRHOSIS (HCC): ICD-10-CM

## 2021-04-01 PROCEDURE — 49083 ABD PARACENTESIS W/IMAGING: CPT

## 2021-04-01 ASSESSMENT — PAIN - FUNCTIONAL ASSESSMENT: PAIN_FUNCTIONAL_ASSESSMENT: 0-10

## 2021-04-01 NOTE — PROGRESS NOTES
Image guided paracentesis completed by Dr. Dawn Gillis. Time out complete, VSS on RA. 5250 ml of yellow fluid removed. Patient tolerated well. Patient refuses Albumin administration. Discharge instructions given, patient verbalized understanding. Patient discharged in stable condition, no needs at time of discharge.   Suzan DELATORRE RN

## 2021-04-13 ENCOUNTER — HOSPITAL ENCOUNTER (OUTPATIENT)
Dept: ULTRASOUND IMAGING | Age: 61
Discharge: HOME | End: 2021-04-13
Payer: MEDICAID

## 2021-04-13 DIAGNOSIS — K70.31 ASCITES DUE TO ALCOHOLIC CIRRHOSIS (HCC): ICD-10-CM

## 2021-04-13 PROCEDURE — 49083 ABD PARACENTESIS W/IMAGING: CPT

## 2021-04-13 NOTE — BRIEF OP NOTE
Brief Postoperative Note    Misael Shine  YOB: 1960  [id number]    Pre-operative Diagnosis: ascites    Post-operative Diagnosis: Same    Procedure: US-guided paracentesis    Anesthesia: Local    Surgeons: Simin Nagel MD    Estimated Blood Loss: Less than 5 mL    Complications: None    Specimens: ascites drained    Findings: Successful US-guided paracentesis.     Electronically signed by Simin Nagel MD on 4/13/2021 at 12:18 PM

## 2021-04-20 ENCOUNTER — HOSPITAL ENCOUNTER (OUTPATIENT)
Dept: ULTRASOUND IMAGING | Age: 61
Discharge: HOME | End: 2021-04-20
Payer: MEDICAID

## 2021-04-20 DIAGNOSIS — K70.31 ASCITES DUE TO ALCOHOLIC CIRRHOSIS (HCC): ICD-10-CM

## 2021-04-20 PROCEDURE — 49083 ABD PARACENTESIS W/IMAGING: CPT

## 2021-04-27 ENCOUNTER — HOSPITAL ENCOUNTER (OUTPATIENT)
Dept: ULTRASOUND IMAGING | Age: 61
Discharge: HOME | End: 2021-04-27
Payer: MEDICAID

## 2021-04-27 ENCOUNTER — HOSPITAL ENCOUNTER (OUTPATIENT)
Dept: NURSING | Age: 61
Setting detail: INFUSION SERIES
End: 2021-04-27
Payer: MEDICAID

## 2021-04-27 DIAGNOSIS — K70.31 ASCITES DUE TO ALCOHOLIC CIRRHOSIS (HCC): ICD-10-CM

## 2021-04-27 PROCEDURE — 49083 ABD PARACENTESIS W/IMAGING: CPT

## 2021-05-04 ENCOUNTER — HOSPITAL ENCOUNTER (OUTPATIENT)
Dept: NURSING | Age: 61
Setting detail: INFUSION SERIES
End: 2021-05-04
Payer: MEDICAID

## 2021-05-11 ENCOUNTER — HOSPITAL ENCOUNTER (OUTPATIENT)
Dept: NURSING | Age: 61
Setting detail: INFUSION SERIES
End: 2021-05-11
Payer: MEDICAID

## 2021-05-18 ENCOUNTER — HOSPITAL ENCOUNTER (OUTPATIENT)
Dept: ULTRASOUND IMAGING | Age: 61
Discharge: HOME | End: 2021-05-18
Payer: MEDICAID

## 2021-05-18 DIAGNOSIS — K70.31 ASCITES DUE TO ALCOHOLIC CIRRHOSIS (HCC): ICD-10-CM

## 2021-05-18 PROCEDURE — 49083 ABD PARACENTESIS W/IMAGING: CPT

## 2021-05-26 DIAGNOSIS — N18.6 END-STAGE RENAL DISEASE (ESRD) (HCC): Primary | ICD-10-CM

## 2021-06-04 ENCOUNTER — HOSPITAL ENCOUNTER (OUTPATIENT)
Dept: VASCULAR LAB | Age: 61
Discharge: HOME | End: 2021-06-04
Payer: MEDICAID

## 2021-06-04 ENCOUNTER — HOSPITAL ENCOUNTER (OUTPATIENT)
Dept: ULTRASOUND IMAGING | Age: 61
Discharge: HOME | End: 2021-06-04
Payer: MEDICAID

## 2021-06-04 ENCOUNTER — HOSPITAL ENCOUNTER (OUTPATIENT)
Dept: NURSING | Age: 61
Setting detail: INFUSION SERIES
End: 2021-06-04
Payer: MEDICAID

## 2021-06-04 VITALS
HEART RATE: 71 BPM | OXYGEN SATURATION: 98 % | SYSTOLIC BLOOD PRESSURE: 215 MMHG | DIASTOLIC BLOOD PRESSURE: 109 MMHG | RESPIRATION RATE: 16 BRPM

## 2021-06-04 DIAGNOSIS — K70.31 ASCITES DUE TO ALCOHOLIC CIRRHOSIS (HCC): ICD-10-CM

## 2021-06-04 DIAGNOSIS — N18.6 END-STAGE RENAL DISEASE (ESRD) (HCC): ICD-10-CM

## 2021-06-04 PROCEDURE — 93986 DUP-SCAN HEMO COMPL UNI STD: CPT

## 2021-06-04 PROCEDURE — 49083 ABD PARACENTESIS W/IMAGING: CPT

## 2021-06-04 NOTE — PROGRESS NOTES
Image guided paracentesis completed. 2.3 liters of clear yellow fluid removed. Patient tolerated procedure without any signs or symptoms of distress. Discharge instructions provided and explained. All questions answered. Pt verbalizes understanding of the discharge instructions and states no more questions. Pt discharged in stable condition with all documented belongings.

## 2021-06-04 NOTE — BRIEF OP NOTE
Brief Postoperative Note    Mahin Boudreaux  YOB: 1960  [id number]      Pre-operative Diagnosis: ascites    Post-operative Diagnosis: Same    Procedure: US guided paracentesis    Anesthesia: Local    Surgeons/Assistants: Dr. Nanda Lopez    Estimated Blood Loss: less than 5ml     Complications: None    Specimens: Was Obtained: RLQ    Findings: clear yellow fluid      Electronically signed by Hermes Lay MD on 6/4/2021 at 10:39 AM

## 2021-06-10 ENCOUNTER — HOSPITAL ENCOUNTER (OUTPATIENT)
Dept: ULTRASOUND IMAGING | Age: 61
Discharge: HOME | End: 2021-06-10
Payer: MEDICAID

## 2021-06-10 VITALS
OXYGEN SATURATION: 98 % | DIASTOLIC BLOOD PRESSURE: 95 MMHG | HEART RATE: 75 BPM | SYSTOLIC BLOOD PRESSURE: 199 MMHG | RESPIRATION RATE: 16 BRPM

## 2021-06-10 DIAGNOSIS — K70.31 ALCOHOLIC CIRRHOSIS OF LIVER WITH ASCITES (HCC): ICD-10-CM

## 2021-06-10 PROCEDURE — 76705 ECHO EXAM OF ABDOMEN: CPT

## 2021-06-17 ENCOUNTER — HOSPITAL ENCOUNTER (OUTPATIENT)
Dept: ULTRASOUND IMAGING | Age: 61
Discharge: HOME | End: 2021-06-17
Payer: MEDICAID

## 2021-06-17 ENCOUNTER — HOSPITAL ENCOUNTER (OUTPATIENT)
Age: 61
Discharge: HOME | End: 2021-06-17
Payer: MEDICAID

## 2021-06-17 VITALS
RESPIRATION RATE: 16 BRPM | DIASTOLIC BLOOD PRESSURE: 115 MMHG | OXYGEN SATURATION: 98 % | HEART RATE: 67 BPM | SYSTOLIC BLOOD PRESSURE: 219 MMHG

## 2021-06-17 DIAGNOSIS — K70.31 ASCITES DUE TO ALCOHOLIC CIRRHOSIS (HCC): ICD-10-CM

## 2021-06-17 PROCEDURE — 49083 ABD PARACENTESIS W/IMAGING: CPT

## 2021-06-17 NOTE — BRIEF OP NOTE
Brief Postoperative Note    Sergio Chavis  YOB: 1960  [id number]    Pre-operative Diagnosis: ascites    Post-operative Diagnosis: Same    Procedure: US-guided paracentesis    Anesthesia: Local    Surgeons: Canelo Frazier MD    Estimated Blood Loss: Less than 5 mL    Complications: None    Specimens: ascites drained    Findings: Successful US-guided paracentesis.     Electronically signed by Canelo Frazier MD on 6/17/2021 at 12:48 PM Fall precautions maintained. Pt free from injuries/fall.  Repositions and ambulates independently. Has ambulated twice today.   C/o mild abdominal pain; PRN meds available.  Bed locked and low, side rails up x 2, phone and call light w/in reach.   POC and meds discussed, pt verbalized understanding.   Chart check done. Will cont to monitor.

## 2021-06-24 ENCOUNTER — HOSPITAL ENCOUNTER (OUTPATIENT)
Age: 61
Discharge: HOME | End: 2021-06-24
Payer: MEDICAID

## 2021-06-24 ENCOUNTER — HOSPITAL ENCOUNTER (OUTPATIENT)
Dept: ULTRASOUND IMAGING | Age: 61
Discharge: HOME | End: 2021-06-24
Payer: MEDICAID

## 2021-06-24 DIAGNOSIS — R18.8 OTHER ASCITES: ICD-10-CM

## 2021-06-24 DIAGNOSIS — R18.8 OTHER ASCITES: Primary | ICD-10-CM

## 2021-06-24 LAB
INR BLD: 0.99 (ref 0.86–1.14)
PLATELET # BLD: 150 K/UL (ref 135–450)
PROTHROMBIN TIME: 11.5 SEC (ref 10–13.2)

## 2021-06-24 PROCEDURE — 76705 ECHO EXAM OF ABDOMEN: CPT

## 2021-06-24 PROCEDURE — 85610 PROTHROMBIN TIME: CPT

## 2021-06-24 PROCEDURE — 85049 AUTOMATED PLATELET COUNT: CPT

## 2021-06-24 PROCEDURE — 36415 COLL VENOUS BLD VENIPUNCTURE: CPT

## 2021-07-01 ENCOUNTER — HOSPITAL ENCOUNTER (OUTPATIENT)
Dept: NURSING | Age: 61
Setting detail: INFUSION SERIES
End: 2021-07-01
Payer: COMMERCIAL

## 2021-07-01 ENCOUNTER — HOSPITAL ENCOUNTER (OUTPATIENT)
Dept: ULTRASOUND IMAGING | Age: 61
Discharge: HOME | End: 2021-07-01
Payer: COMMERCIAL

## 2021-07-01 DIAGNOSIS — K70.31 ASCITES DUE TO ALCOHOLIC CIRRHOSIS (HCC): ICD-10-CM

## 2021-07-01 PROCEDURE — 76705 ECHO EXAM OF ABDOMEN: CPT

## 2021-07-01 PROCEDURE — 49083 ABD PARACENTESIS W/IMAGING: CPT

## 2021-07-01 NOTE — BRIEF OP NOTE
Brief Postoperative Note    Alden Bright  YOB: 1960  [id number]    Pre-operative Diagnosis: Ascites    Post-operative Diagnosis: Same    Procedure: US Guided Paracentesis    Anesthesia: Local    Surgeons/Assistants: KIM LING PA-C    Estimated Blood Loss: less than 5 mL    Complications: None    Specimens: Was not obtained. No paracentesis performed. Findings: No paracentesis performed today due to low volume asciets.      Electronically signed by Chuyita Reyes PA-C on 7/1/2021 at 2:36 PM

## 2021-07-15 ENCOUNTER — HOSPITAL ENCOUNTER (OUTPATIENT)
Dept: NURSING | Age: 61
Setting detail: INFUSION SERIES
End: 2021-07-15
Payer: COMMERCIAL

## 2021-07-15 ENCOUNTER — HOSPITAL ENCOUNTER (OUTPATIENT)
Dept: ULTRASOUND IMAGING | Age: 61
Discharge: HOME | End: 2021-07-15
Payer: COMMERCIAL

## 2021-07-15 DIAGNOSIS — K70.31 ASCITES DUE TO ALCOHOLIC CIRRHOSIS (HCC): ICD-10-CM

## 2021-07-15 PROCEDURE — 76705 ECHO EXAM OF ABDOMEN: CPT

## 2021-08-18 PROBLEM — I25.10 CAD (CORONARY ARTERY DISEASE): Status: ACTIVE | Noted: 2021-01-01

## 2021-11-22 NOTE — BRIEF OP NOTE
Brief Postoperative Note    Davey Hill  YOB: 1960  [id number]    Pre-operative Diagnosis: ascites    Post-operative Diagnosis: Same    Procedure: US-guided paracentesis    Anesthesia: Local    Surgeons: Sierra Castanon MD    Estimated Blood Loss: Less than 5 mL    Complications: None    Specimens: ascites drained    Findings: Successful US-guided paracentesis.     Electronically signed by Sierra Castanon MD on 11/22/2021 at 3:23 PM

## 2021-11-22 NOTE — PROGRESS NOTES
Image guided paracentesis completed by Dr. Deejay Gore Time out complete, VSS on RA. Patient tolerated well. 5 Liters of yellow fluid removed. Patient refused Albumin administration. Discharge instructions given, patient verbalized understanding. Patient discharged in stable condition, no needs at time of discharge.   Matthew Schmidt RN

## 2021-12-06 NOTE — BRIEF OP NOTE
Brief Postoperative Note    Mckenna Sample  YOB: 1960  [id number]    Pre-operative Diagnosis: Ascites    Post-operative Diagnosis: Same    Procedure: US Guided Paracentesis    Anesthesia: Local    Surgeons/Assistants: Alex Delacruz MD    Estimated Blood Loss: less than 5 mL    Complications: None    Specimens: Was Obtained: serous Ascitic Fluid    Findings: Technically successful US guided paracentesis, 5L removed    Electronically signed by Alxe Delacruz MD on 12/6/2021 at 3:01 PM

## 2021-12-20 NOTE — PROGRESS NOTES
Pt arrived for image guided paracentesis. Procedure explained including the risk and benefits of the procedure. All questions answered. Pt verbalizes understanding of the of procedure and states no more questions. Consent signed. Labs, allergies, medications, and code status reviewed. No contraindications noted.        Allergies  Pcn [penicillins]    Labs  Lab Results   Component Value Date    INR 1.15 (H) 11/22/2021    PROTIME 13.1 (H) 11/22/2021       Lab Results   Component Value Date    CREATININE 4.2 (H) 01/16/2021    BUN 28 (H) 01/16/2021     (L) 01/16/2021    K 5.1 01/16/2021     01/16/2021    CO2 26 01/16/2021       Lab Results   Component Value Date    WBC 6.0 01/16/2021    HGB 8.3 (L) 01/16/2021    HCT 27.2 (L) 01/16/2021    MCV 78.6 (L) 01/16/2021     11/22/2021

## 2022-01-01 ENCOUNTER — HOSPITAL ENCOUNTER (INPATIENT)
Age: 62
LOS: 7 days | Discharge: NURSING, REHAB OR LONG-TERM CARE | DRG: 425 | End: 2022-07-15
Attending: EMERGENCY MEDICINE | Admitting: INTERNAL MEDICINE
Payer: COMMERCIAL

## 2022-01-01 ENCOUNTER — HOSPITAL ENCOUNTER (OUTPATIENT)
Dept: ULTRASOUND IMAGING | Age: 62
Discharge: HOME | DRG: 756 | End: 2022-04-25
Payer: COMMERCIAL

## 2022-01-01 ENCOUNTER — HOSPITAL ENCOUNTER (OUTPATIENT)
Dept: GENERAL RADIOLOGY | Age: 62
Discharge: HOME | End: 2022-07-18
Payer: COMMERCIAL

## 2022-01-01 ENCOUNTER — HOSPITAL ENCOUNTER (OUTPATIENT)
Dept: ULTRASOUND IMAGING | Age: 62
Discharge: HOME | End: 2022-05-23
Payer: COMMERCIAL

## 2022-01-01 ENCOUNTER — APPOINTMENT (OUTPATIENT)
Dept: GENERAL RADIOLOGY | Age: 62
End: 2022-01-01
Payer: COMMERCIAL

## 2022-01-01 ENCOUNTER — HOSPITAL ENCOUNTER (OUTPATIENT)
Dept: ULTRASOUND IMAGING | Age: 62
Discharge: HOME | End: 2022-04-11
Payer: COMMERCIAL

## 2022-01-01 ENCOUNTER — HOSPITAL ENCOUNTER (OUTPATIENT)
Age: 62
Discharge: HOME | End: 2022-06-13
Payer: COMMERCIAL

## 2022-01-01 ENCOUNTER — APPOINTMENT (OUTPATIENT)
Dept: GENERAL RADIOLOGY | Age: 62
DRG: 425 | End: 2022-01-01
Payer: COMMERCIAL

## 2022-01-01 ENCOUNTER — HOSPITAL ENCOUNTER (EMERGENCY)
Age: 62
Discharge: HOME | End: 2022-05-25

## 2022-01-01 ENCOUNTER — HOSPITAL ENCOUNTER (EMERGENCY)
Age: 62
End: 2022-07-24
Attending: EMERGENCY MEDICINE
Payer: COMMERCIAL

## 2022-01-01 ENCOUNTER — HOSPITAL ENCOUNTER (OUTPATIENT)
Age: 62
Discharge: HOME | End: 2022-07-18
Payer: COMMERCIAL

## 2022-01-01 ENCOUNTER — HOSPITAL ENCOUNTER (OUTPATIENT)
Dept: ULTRASOUND IMAGING | Age: 62
Discharge: HOME | End: 2022-01-12
Payer: COMMERCIAL

## 2022-01-01 ENCOUNTER — HOSPITAL ENCOUNTER (INPATIENT)
Age: 62
LOS: 7 days | Discharge: LEFT BEFORE DISCHARGE | DRG: 194 | End: 2022-02-07
Attending: STUDENT IN AN ORGANIZED HEALTH CARE EDUCATION/TRAINING PROGRAM | Admitting: INTERNAL MEDICINE
Payer: COMMERCIAL

## 2022-01-01 ENCOUNTER — HOSPITAL ENCOUNTER (OUTPATIENT)
Dept: ULTRASOUND IMAGING | Age: 62
Discharge: HOME | End: 2022-06-06
Payer: COMMERCIAL

## 2022-01-01 ENCOUNTER — HOSPITAL ENCOUNTER (OUTPATIENT)
Age: 62
Discharge: HOME | End: 2022-01-12
Payer: COMMERCIAL

## 2022-01-01 ENCOUNTER — HOSPITAL ENCOUNTER (OUTPATIENT)
Dept: ULTRASOUND IMAGING | Age: 62
Discharge: HOME | End: 2022-01-17
Payer: COMMERCIAL

## 2022-01-01 ENCOUNTER — HOSPITAL ENCOUNTER (OUTPATIENT)
Dept: NURSING | Age: 62
Setting detail: INFUSION SERIES
End: 2022-01-01

## 2022-01-01 ENCOUNTER — APPOINTMENT (OUTPATIENT)
Dept: NUCLEAR MEDICINE | Age: 62
DRG: 756 | End: 2022-01-01
Payer: COMMERCIAL

## 2022-01-01 ENCOUNTER — HOSPITAL ENCOUNTER (OUTPATIENT)
Dept: ULTRASOUND IMAGING | Age: 62
Discharge: HOME | End: 2022-05-09
Payer: COMMERCIAL

## 2022-01-01 ENCOUNTER — HOSPITAL ENCOUNTER (OUTPATIENT)
Dept: ULTRASOUND IMAGING | Age: 62
Discharge: HOME | End: 2022-05-02
Payer: COMMERCIAL

## 2022-01-01 ENCOUNTER — HOSPITAL ENCOUNTER (OUTPATIENT)
Age: 62
Discharge: HOME | End: 2022-03-07
Payer: COMMERCIAL

## 2022-01-01 ENCOUNTER — HOSPITAL ENCOUNTER (OUTPATIENT)
Dept: ULTRASOUND IMAGING | Age: 62
Discharge: HOME | End: 2022-05-16
Payer: COMMERCIAL

## 2022-01-01 ENCOUNTER — HOSPITAL ENCOUNTER (OUTPATIENT)
Dept: ULTRASOUND IMAGING | Age: 62
Discharge: HOME | End: 2022-06-20
Payer: COMMERCIAL

## 2022-01-01 ENCOUNTER — HOSPITAL ENCOUNTER (OUTPATIENT)
Dept: ULTRASOUND IMAGING | Age: 62
Discharge: HOME | End: 2022-01-03
Payer: COMMERCIAL

## 2022-01-01 ENCOUNTER — APPOINTMENT (OUTPATIENT)
Dept: VASCULAR LAB | Age: 62
DRG: 425 | End: 2022-01-01
Payer: COMMERCIAL

## 2022-01-01 ENCOUNTER — HOSPITAL ENCOUNTER (OUTPATIENT)
Dept: ULTRASOUND IMAGING | Age: 62
Discharge: HOME | DRG: 425 | End: 2022-07-05
Payer: COMMERCIAL

## 2022-01-01 ENCOUNTER — HOSPITAL ENCOUNTER (OUTPATIENT)
Dept: ULTRASOUND IMAGING | Age: 62
Discharge: HOME | End: 2022-07-18
Payer: COMMERCIAL

## 2022-01-01 ENCOUNTER — HOSPITAL ENCOUNTER (OUTPATIENT)
Dept: NURSING | Age: 62
Setting detail: INFUSION SERIES
Discharge: HOME | End: 2022-06-13

## 2022-01-01 ENCOUNTER — HOSPITAL ENCOUNTER (OUTPATIENT)
Dept: ULTRASOUND IMAGING | Age: 62
Discharge: HOME | End: 2022-04-04
Payer: COMMERCIAL

## 2022-01-01 ENCOUNTER — CARE COORDINATION (OUTPATIENT)
Dept: CASE MANAGEMENT | Age: 62
End: 2022-01-01

## 2022-01-01 ENCOUNTER — HOSPITAL ENCOUNTER (OUTPATIENT)
Dept: ULTRASOUND IMAGING | Age: 62
Discharge: HOME | End: 2022-03-23
Payer: COMMERCIAL

## 2022-01-01 ENCOUNTER — HOSPITAL ENCOUNTER (OUTPATIENT)
Dept: ULTRASOUND IMAGING | Age: 62
Discharge: HOME | End: 2022-06-13
Payer: COMMERCIAL

## 2022-01-01 ENCOUNTER — HOSPITAL ENCOUNTER (OUTPATIENT)
Dept: ULTRASOUND IMAGING | Age: 62
Discharge: HOME | End: 2022-03-28
Payer: COMMERCIAL

## 2022-01-01 ENCOUNTER — HOSPITAL ENCOUNTER (OUTPATIENT)
Dept: ULTRASOUND IMAGING | Age: 62
Discharge: HOME | End: 2022-03-07
Payer: COMMERCIAL

## 2022-01-01 ENCOUNTER — HOSPITAL ENCOUNTER (OUTPATIENT)
Dept: ULTRASOUND IMAGING | Age: 62
Discharge: HOME | End: 2022-02-28
Payer: COMMERCIAL

## 2022-01-01 ENCOUNTER — HOSPITAL ENCOUNTER (OUTPATIENT)
Dept: ULTRASOUND IMAGING | Age: 62
Discharge: HOME | End: 2022-05-31
Payer: COMMERCIAL

## 2022-01-01 ENCOUNTER — APPOINTMENT (OUTPATIENT)
Dept: CT IMAGING | Age: 62
End: 2022-01-01
Payer: COMMERCIAL

## 2022-01-01 ENCOUNTER — HOSPITAL ENCOUNTER (OUTPATIENT)
Dept: ULTRASOUND IMAGING | Age: 62
Discharge: HOME | End: 2022-06-27
Payer: COMMERCIAL

## 2022-01-01 ENCOUNTER — APPOINTMENT (OUTPATIENT)
Dept: GENERAL RADIOLOGY | Age: 62
DRG: 756 | End: 2022-01-01
Payer: COMMERCIAL

## 2022-01-01 ENCOUNTER — HOSPITAL ENCOUNTER (OUTPATIENT)
Dept: ULTRASOUND IMAGING | Age: 62
Discharge: HOME | End: 2022-03-14
Payer: COMMERCIAL

## 2022-01-01 ENCOUNTER — HOSPITAL ENCOUNTER (INPATIENT)
Age: 62
LOS: 1 days | Discharge: HOME | DRG: 756 | End: 2022-04-26
Attending: INTERNAL MEDICINE | Admitting: INTERNAL MEDICINE
Payer: COMMERCIAL

## 2022-01-01 ENCOUNTER — APPOINTMENT (OUTPATIENT)
Dept: GENERAL RADIOLOGY | Age: 62
DRG: 194 | End: 2022-01-01
Payer: COMMERCIAL

## 2022-01-01 ENCOUNTER — HOSPITAL ENCOUNTER (OUTPATIENT)
Dept: ULTRASOUND IMAGING | Age: 62
Discharge: HOME | End: 2022-04-18
Payer: COMMERCIAL

## 2022-01-01 ENCOUNTER — APPOINTMENT (OUTPATIENT)
Dept: ULTRASOUND IMAGING | Age: 62
DRG: 425 | End: 2022-01-01
Payer: COMMERCIAL

## 2022-01-01 ENCOUNTER — HOSPITAL ENCOUNTER (OUTPATIENT)
Dept: ULTRASOUND IMAGING | Age: 62
Discharge: HOME | End: 2022-01-24
Payer: COMMERCIAL

## 2022-01-01 ENCOUNTER — APPOINTMENT (OUTPATIENT)
Dept: ULTRASOUND IMAGING | Age: 62
DRG: 756 | End: 2022-01-01
Payer: COMMERCIAL

## 2022-01-01 VITALS
TEMPERATURE: 98.7 F | HEIGHT: 73 IN | SYSTOLIC BLOOD PRESSURE: 109 MMHG | OXYGEN SATURATION: 98 % | BODY MASS INDEX: 27.14 KG/M2 | DIASTOLIC BLOOD PRESSURE: 57 MMHG | RESPIRATION RATE: 15 BRPM | HEART RATE: 92 BPM | WEIGHT: 204.81 LBS

## 2022-01-01 VITALS — DIASTOLIC BLOOD PRESSURE: 87 MMHG | OXYGEN SATURATION: 94 % | HEART RATE: 74 BPM | SYSTOLIC BLOOD PRESSURE: 156 MMHG

## 2022-01-01 VITALS
BODY MASS INDEX: 32.43 KG/M2 | OXYGEN SATURATION: 92 % | TEMPERATURE: 97.4 F | WEIGHT: 245.81 LBS | RESPIRATION RATE: 18 BRPM | DIASTOLIC BLOOD PRESSURE: 83 MMHG | HEART RATE: 56 BPM | SYSTOLIC BLOOD PRESSURE: 172 MMHG

## 2022-01-01 VITALS
WEIGHT: 245 LBS | HEART RATE: 81 BPM | TEMPERATURE: 97.6 F | RESPIRATION RATE: 25 BRPM | HEIGHT: 73 IN | OXYGEN SATURATION: 100 % | DIASTOLIC BLOOD PRESSURE: 74 MMHG | SYSTOLIC BLOOD PRESSURE: 103 MMHG | BODY MASS INDEX: 32.47 KG/M2

## 2022-01-01 VITALS
SYSTOLIC BLOOD PRESSURE: 102 MMHG | OXYGEN SATURATION: 97 % | RESPIRATION RATE: 15 BRPM | DIASTOLIC BLOOD PRESSURE: 58 MMHG

## 2022-01-01 VITALS — HEART RATE: 74 BPM | OXYGEN SATURATION: 96 % | SYSTOLIC BLOOD PRESSURE: 173 MMHG | DIASTOLIC BLOOD PRESSURE: 92 MMHG

## 2022-01-01 VITALS
HEIGHT: 73 IN | DIASTOLIC BLOOD PRESSURE: 86 MMHG | WEIGHT: 231.04 LBS | RESPIRATION RATE: 16 BRPM | OXYGEN SATURATION: 95 % | HEART RATE: 64 BPM | SYSTOLIC BLOOD PRESSURE: 154 MMHG | BODY MASS INDEX: 30.62 KG/M2 | TEMPERATURE: 96.2 F

## 2022-01-01 VITALS
DIASTOLIC BLOOD PRESSURE: 90 MMHG | HEART RATE: 71 BPM | DIASTOLIC BLOOD PRESSURE: 95 MMHG | OXYGEN SATURATION: 93 % | SYSTOLIC BLOOD PRESSURE: 185 MMHG | SYSTOLIC BLOOD PRESSURE: 176 MMHG

## 2022-01-01 VITALS — SYSTOLIC BLOOD PRESSURE: 139 MMHG | DIASTOLIC BLOOD PRESSURE: 74 MMHG | OXYGEN SATURATION: 90 %

## 2022-01-01 VITALS — DIASTOLIC BLOOD PRESSURE: 89 MMHG | HEART RATE: 150 BPM | SYSTOLIC BLOOD PRESSURE: 167 MMHG | OXYGEN SATURATION: 95 %

## 2022-01-01 VITALS — SYSTOLIC BLOOD PRESSURE: 143 MMHG | DIASTOLIC BLOOD PRESSURE: 82 MMHG | OXYGEN SATURATION: 90 %

## 2022-01-01 DIAGNOSIS — R07.9 CHEST PAIN, UNSPECIFIED TYPE: Primary | ICD-10-CM

## 2022-01-01 DIAGNOSIS — K70.31 ASCITES DUE TO ALCOHOLIC CIRRHOSIS (HCC): ICD-10-CM

## 2022-01-01 DIAGNOSIS — J96.01 ACUTE RESPIRATORY FAILURE WITH HYPOXIA (HCC): ICD-10-CM

## 2022-01-01 DIAGNOSIS — Z91.199 MEDICALLY NONCOMPLIANT: ICD-10-CM

## 2022-01-01 DIAGNOSIS — K70.31 ALCOHOLIC CIRRHOSIS OF LIVER WITH ASCITES (HCC): ICD-10-CM

## 2022-01-01 DIAGNOSIS — N18.9 CHRONIC KIDNEY DISEASE, UNSPECIFIED CKD STAGE: ICD-10-CM

## 2022-01-01 DIAGNOSIS — N18.9 ACUTE KIDNEY INJURY SUPERIMPOSED ON CKD (HCC): ICD-10-CM

## 2022-01-01 DIAGNOSIS — M79.605 LEFT LEG PAIN: ICD-10-CM

## 2022-01-01 DIAGNOSIS — Z99.2 END STAGE RENAL DISEASE ON DIALYSIS (HCC): Primary | ICD-10-CM

## 2022-01-01 DIAGNOSIS — N17.9 ACUTE KIDNEY INJURY SUPERIMPOSED ON CKD (HCC): ICD-10-CM

## 2022-01-01 DIAGNOSIS — I50.9 MULTI-ORGAN FAILURE WITH HEART FAILURE (HCC): ICD-10-CM

## 2022-01-01 DIAGNOSIS — N18.6 END STAGE RENAL DISEASE ON DIALYSIS (HCC): Primary | ICD-10-CM

## 2022-01-01 DIAGNOSIS — E87.70 HYPERVOLEMIA, UNSPECIFIED HYPERVOLEMIA TYPE: Primary | ICD-10-CM

## 2022-01-01 DIAGNOSIS — N17.9 ACUTE KIDNEY INJURY SUPERIMPOSED ON CHRONIC KIDNEY DISEASE (HCC): ICD-10-CM

## 2022-01-01 DIAGNOSIS — R10.84 GENERALIZED ABDOMINAL PAIN: ICD-10-CM

## 2022-01-01 DIAGNOSIS — I46.9 CARDIAC ARREST (HCC): Primary | ICD-10-CM

## 2022-01-01 DIAGNOSIS — R18.8 OTHER ASCITES: ICD-10-CM

## 2022-01-01 DIAGNOSIS — E87.70 HYPERVOLEMIA, UNSPECIFIED HYPERVOLEMIA TYPE: ICD-10-CM

## 2022-01-01 DIAGNOSIS — E87.5 HYPERKALEMIA: ICD-10-CM

## 2022-01-01 DIAGNOSIS — M79.89 SWELLING OF LIMB: ICD-10-CM

## 2022-01-01 DIAGNOSIS — K70.31 ASCITES DUE TO ALCOHOLIC CIRRHOSIS (HCC): Primary | ICD-10-CM

## 2022-01-01 DIAGNOSIS — I50.9 ACUTE ON CHRONIC CONGESTIVE HEART FAILURE, UNSPECIFIED HEART FAILURE TYPE (HCC): ICD-10-CM

## 2022-01-01 DIAGNOSIS — U07.1 COVID: ICD-10-CM

## 2022-01-01 DIAGNOSIS — L03.115 CELLULITIS OF RIGHT LOWER EXTREMITY: ICD-10-CM

## 2022-01-01 DIAGNOSIS — J90 PLEURAL EFFUSION: ICD-10-CM

## 2022-01-01 DIAGNOSIS — N18.9 ACUTE KIDNEY INJURY SUPERIMPOSED ON CHRONIC KIDNEY DISEASE (HCC): ICD-10-CM

## 2022-01-01 DIAGNOSIS — R60.1 ANASARCA: ICD-10-CM

## 2022-01-01 DIAGNOSIS — R79.89 ELEVATED D-DIMER: ICD-10-CM

## 2022-01-01 DIAGNOSIS — R18.8 OTHER ASCITES: Primary | ICD-10-CM

## 2022-01-01 LAB
A/G RATIO: 0.6 (ref 1.1–2.2)
A/G RATIO: 0.7 (ref 1.1–2.2)
A/G RATIO: 0.8 (ref 1.1–2.2)
A/G RATIO: 0.9 (ref 1.1–2.2)
A/G RATIO: 0.9 (ref 1.1–2.2)
A/G RATIO: 1.2 (ref 1.1–2.2)
ACETAMINOPHEN LEVEL: <5 UG/ML (ref 10–30)
ALBUMIN SERPL-MCNC: 2 G/DL (ref 3.4–5)
ALBUMIN SERPL-MCNC: 2.1 G/DL (ref 3.4–5)
ALBUMIN SERPL-MCNC: 2.2 G/DL (ref 3.4–5)
ALBUMIN SERPL-MCNC: 2.3 G/DL (ref 3.4–5)
ALBUMIN SERPL-MCNC: 2.4 G/DL (ref 3.4–5)
ALBUMIN SERPL-MCNC: 2.5 G/DL (ref 3.4–5)
ALBUMIN SERPL-MCNC: 2.6 G/DL (ref 3.4–5)
ALBUMIN SERPL-MCNC: 2.8 G/DL (ref 3.4–5)
ALBUMIN SERPL-MCNC: 2.9 G/DL (ref 3.4–5)
ALBUMIN SERPL-MCNC: 3 G/DL (ref 3.4–5)
ALP BLD-CCNC: 199 U/L (ref 40–129)
ALP BLD-CCNC: 62 U/L (ref 40–129)
ALP BLD-CCNC: 64 U/L (ref 40–129)
ALP BLD-CCNC: 66 U/L (ref 40–129)
ALP BLD-CCNC: 68 U/L (ref 40–129)
ALP BLD-CCNC: 69 U/L (ref 40–129)
ALP BLD-CCNC: 76 U/L (ref 40–129)
ALP BLD-CCNC: 80 U/L (ref 40–129)
ALP BLD-CCNC: 81 U/L (ref 40–129)
ALP BLD-CCNC: 85 U/L (ref 40–129)
ALP BLD-CCNC: 85 U/L (ref 40–129)
ALP BLD-CCNC: 94 U/L (ref 40–129)
ALT SERPL-CCNC: 10 U/L (ref 10–40)
ALT SERPL-CCNC: 10 U/L (ref 10–40)
ALT SERPL-CCNC: 11 U/L (ref 10–40)
ALT SERPL-CCNC: 11 U/L (ref 10–40)
ALT SERPL-CCNC: 12 U/L (ref 10–40)
ALT SERPL-CCNC: 16 U/L (ref 10–40)
ALT SERPL-CCNC: 18 U/L (ref 10–40)
ALT SERPL-CCNC: 19 U/L (ref 10–40)
ALT SERPL-CCNC: 19 U/L (ref 10–40)
ALT SERPL-CCNC: 7 U/L (ref 10–40)
AMMONIA: 28 UMOL/L (ref 16–60)
AMPHETAMINE SCREEN, URINE: NORMAL
ANION GAP SERPL CALCULATED.3IONS-SCNC: 10 MMOL/L (ref 3–16)
ANION GAP SERPL CALCULATED.3IONS-SCNC: 11 MMOL/L (ref 3–16)
ANION GAP SERPL CALCULATED.3IONS-SCNC: 11 MMOL/L (ref 3–16)
ANION GAP SERPL CALCULATED.3IONS-SCNC: 13 MMOL/L (ref 3–16)
ANION GAP SERPL CALCULATED.3IONS-SCNC: 15 MMOL/L (ref 3–16)
ANION GAP SERPL CALCULATED.3IONS-SCNC: 16 MMOL/L (ref 3–16)
ANION GAP SERPL CALCULATED.3IONS-SCNC: 17 MMOL/L (ref 3–16)
ANION GAP SERPL CALCULATED.3IONS-SCNC: 18 MMOL/L (ref 3–16)
ANION GAP SERPL CALCULATED.3IONS-SCNC: 18 MMOL/L (ref 3–16)
ANION GAP SERPL CALCULATED.3IONS-SCNC: 19 MMOL/L (ref 3–16)
ANION GAP SERPL CALCULATED.3IONS-SCNC: 22 MMOL/L (ref 3–16)
ANION GAP SERPL CALCULATED.3IONS-SCNC: 23 MMOL/L (ref 3–16)
ANTI-XA UNFRAC HEPARIN: 0.15 IU/ML (ref 0.3–0.7)
ANTI-XA UNFRAC HEPARIN: 0.26 IU/ML (ref 0.3–0.7)
ANTI-XA UNFRAC HEPARIN: 0.28 IU/ML (ref 0.3–0.7)
ANTI-XA UNFRAC HEPARIN: 0.3 IU/ML (ref 0.3–0.7)
ANTI-XA UNFRAC HEPARIN: 0.33 IU/ML (ref 0.3–0.7)
ANTI-XA UNFRAC HEPARIN: 0.34 IU/ML (ref 0.3–0.7)
ANTI-XA UNFRAC HEPARIN: 0.43 IU/ML (ref 0.3–0.7)
ANTI-XA UNFRAC HEPARIN: <0.04 IU/ML (ref 0.3–0.7)
AST SERPL-CCNC: 14 U/L (ref 15–37)
AST SERPL-CCNC: 19 U/L (ref 15–37)
AST SERPL-CCNC: 20 U/L (ref 15–37)
AST SERPL-CCNC: 22 U/L (ref 15–37)
AST SERPL-CCNC: 24 U/L (ref 15–37)
AST SERPL-CCNC: 25 U/L (ref 15–37)
AST SERPL-CCNC: 26 U/L (ref 15–37)
AST SERPL-CCNC: 26 U/L (ref 15–37)
AST SERPL-CCNC: 27 U/L (ref 15–37)
AST SERPL-CCNC: 29 U/L (ref 15–37)
AST SERPL-CCNC: 30 U/L (ref 15–37)
AST SERPL-CCNC: 34 U/L (ref 15–37)
BARBITURATE SCREEN URINE: NORMAL
BASE EXCESS VENOUS: -12.2 MMOL/L (ref -3–3)
BASE EXCESS VENOUS: -8.2 MMOL/L (ref -3–3)
BASE EXCESS VENOUS: 0.1 MMOL/L (ref -3–3)
BASOPHILS ABSOLUTE: 0 K/UL (ref 0–0.2)
BASOPHILS ABSOLUTE: 0.1 K/UL (ref 0–0.2)
BASOPHILS RELATIVE PERCENT: 0.2 %
BASOPHILS RELATIVE PERCENT: 0.2 %
BASOPHILS RELATIVE PERCENT: 0.3 %
BASOPHILS RELATIVE PERCENT: 0.4 %
BASOPHILS RELATIVE PERCENT: 0.5 %
BASOPHILS RELATIVE PERCENT: 0.6 %
BASOPHILS RELATIVE PERCENT: 0.8 %
BENZODIAZEPINE SCREEN, URINE: NORMAL
BILIRUB SERPL-MCNC: 0.3 MG/DL (ref 0–1)
BILIRUB SERPL-MCNC: <0.2 MG/DL (ref 0–1)
BLOOD CULTURE, ROUTINE: NORMAL
BLOOD CULTURE, ROUTINE: NORMAL
BUN BLDV-MCNC: 102 MG/DL (ref 7–20)
BUN BLDV-MCNC: 105 MG/DL (ref 7–20)
BUN BLDV-MCNC: 111 MG/DL (ref 7–20)
BUN BLDV-MCNC: 137 MG/DL (ref 7–20)
BUN BLDV-MCNC: 39 MG/DL (ref 7–20)
BUN BLDV-MCNC: 42 MG/DL (ref 7–20)
BUN BLDV-MCNC: 47 MG/DL (ref 7–20)
BUN BLDV-MCNC: 47 MG/DL (ref 7–20)
BUN BLDV-MCNC: 50 MG/DL (ref 7–20)
BUN BLDV-MCNC: 56 MG/DL (ref 7–20)
BUN BLDV-MCNC: 57 MG/DL (ref 7–20)
BUN BLDV-MCNC: 59 MG/DL (ref 7–20)
BUN BLDV-MCNC: 59 MG/DL (ref 7–20)
BUN BLDV-MCNC: 60 MG/DL (ref 7–20)
BUN BLDV-MCNC: 63 MG/DL (ref 7–20)
BUN BLDV-MCNC: 66 MG/DL (ref 7–20)
BUN BLDV-MCNC: 78 MG/DL (ref 7–20)
BUN BLDV-MCNC: 84 MG/DL (ref 7–20)
BUN BLDV-MCNC: 86 MG/DL (ref 7–20)
BUN BLDV-MCNC: 90 MG/DL (ref 7–20)
CALCIUM IONIZED: 0.73 MMOL/L (ref 1.12–1.32)
CALCIUM IONIZED: 0.81 MMOL/L (ref 1.12–1.32)
CALCIUM SERPL-MCNC: 5.5 MG/DL (ref 8.3–10.6)
CALCIUM SERPL-MCNC: 5.6 MG/DL (ref 8.3–10.6)
CALCIUM SERPL-MCNC: 5.6 MG/DL (ref 8.3–10.6)
CALCIUM SERPL-MCNC: 5.7 MG/DL (ref 8.3–10.6)
CALCIUM SERPL-MCNC: 5.7 MG/DL (ref 8.3–10.6)
CALCIUM SERPL-MCNC: 5.8 MG/DL (ref 8.3–10.6)
CALCIUM SERPL-MCNC: 5.8 MG/DL (ref 8.3–10.6)
CALCIUM SERPL-MCNC: 5.9 MG/DL (ref 8.3–10.6)
CALCIUM SERPL-MCNC: 6 MG/DL (ref 8.3–10.6)
CALCIUM SERPL-MCNC: 6 MG/DL (ref 8.3–10.6)
CALCIUM SERPL-MCNC: 6.1 MG/DL (ref 8.3–10.6)
CALCIUM SERPL-MCNC: 6.3 MG/DL (ref 8.3–10.6)
CALCIUM SERPL-MCNC: 6.5 MG/DL (ref 8.3–10.6)
CALCIUM SERPL-MCNC: 6.6 MG/DL (ref 8.3–10.6)
CALCIUM SERPL-MCNC: 6.7 MG/DL (ref 8.3–10.6)
CALCIUM SERPL-MCNC: 6.7 MG/DL (ref 8.3–10.6)
CALCIUM SERPL-MCNC: 8.8 MG/DL (ref 8.3–10.6)
CANNABINOID SCREEN URINE: NORMAL
CARBOXYHEMOGLOBIN: 1.9 % (ref 0–1.5)
CARBOXYHEMOGLOBIN: 2.3 % (ref 0–1.5)
CARBOXYHEMOGLOBIN: 5.3 % (ref 0–1.5)
CHLORIDE BLD-SCNC: 100 MMOL/L (ref 99–110)
CHLORIDE BLD-SCNC: 101 MMOL/L (ref 99–110)
CHLORIDE BLD-SCNC: 102 MMOL/L (ref 99–110)
CHLORIDE BLD-SCNC: 103 MMOL/L (ref 99–110)
CHLORIDE BLD-SCNC: 104 MMOL/L (ref 99–110)
CHLORIDE BLD-SCNC: 85 MMOL/L (ref 99–110)
CHLORIDE BLD-SCNC: 90 MMOL/L (ref 99–110)
CHLORIDE BLD-SCNC: 92 MMOL/L (ref 99–110)
CHLORIDE BLD-SCNC: 93 MMOL/L (ref 99–110)
CHLORIDE BLD-SCNC: 95 MMOL/L (ref 99–110)
CHLORIDE BLD-SCNC: 97 MMOL/L (ref 99–110)
CHLORIDE BLD-SCNC: 98 MMOL/L (ref 99–110)
CHLORIDE BLD-SCNC: 98 MMOL/L (ref 99–110)
CHLORIDE BLD-SCNC: 99 MMOL/L (ref 99–110)
CHOLESTEROL, TOTAL: 96 MG/DL (ref 0–199)
CO2: 12 MMOL/L (ref 21–32)
CO2: 15 MMOL/L (ref 21–32)
CO2: 16 MMOL/L (ref 21–32)
CO2: 17 MMOL/L (ref 21–32)
CO2: 18 MMOL/L (ref 21–32)
CO2: 19 MMOL/L (ref 21–32)
CO2: 20 MMOL/L (ref 21–32)
CO2: 21 MMOL/L (ref 21–32)
CO2: 22 MMOL/L (ref 21–32)
CO2: 24 MMOL/L (ref 21–32)
CO2: 25 MMOL/L (ref 21–32)
COCAINE METABOLITE SCREEN URINE: NORMAL
CREAT SERPL-MCNC: 5 MG/DL (ref 0.8–1.3)
CREAT SERPL-MCNC: 5.4 MG/DL (ref 0.8–1.3)
CREAT SERPL-MCNC: 5.7 MG/DL (ref 0.8–1.3)
CREAT SERPL-MCNC: 5.7 MG/DL (ref 0.8–1.3)
CREAT SERPL-MCNC: 5.9 MG/DL (ref 0.8–1.3)
CREAT SERPL-MCNC: 6 MG/DL (ref 0.8–1.3)
CREAT SERPL-MCNC: 6.2 MG/DL (ref 0.8–1.3)
CREAT SERPL-MCNC: 6.3 MG/DL (ref 0.8–1.3)
CREAT SERPL-MCNC: 6.4 MG/DL (ref 0.8–1.3)
CREAT SERPL-MCNC: 6.5 MG/DL (ref 0.8–1.3)
CREAT SERPL-MCNC: 6.5 MG/DL (ref 0.8–1.3)
CREAT SERPL-MCNC: 6.6 MG/DL (ref 0.8–1.3)
CREAT SERPL-MCNC: 6.6 MG/DL (ref 0.8–1.3)
CREAT SERPL-MCNC: 7.1 MG/DL (ref 0.8–1.3)
CREAT SERPL-MCNC: 7.4 MG/DL (ref 0.8–1.3)
CREAT SERPL-MCNC: 7.5 MG/DL (ref 0.8–1.3)
CREAT SERPL-MCNC: 8 MG/DL (ref 0.8–1.3)
CREAT SERPL-MCNC: 8.6 MG/DL (ref 0.8–1.3)
CREAT SERPL-MCNC: 9.1 MG/DL (ref 0.8–1.3)
CREAT SERPL-MCNC: 9.8 MG/DL (ref 0.8–1.3)
CULTURE, BLOOD 2: NORMAL
CULTURE, BLOOD 2: NORMAL
D DIMER: 936 NG/ML DDU (ref 0–229)
EKG ATRIAL RATE: 100 BPM
EKG ATRIAL RATE: 100 BPM
EKG ATRIAL RATE: 61 BPM
EKG ATRIAL RATE: 72 BPM
EKG ATRIAL RATE: 76 BPM
EKG ATRIAL RATE: 98 BPM
EKG DIAGNOSIS: NORMAL
EKG P AXIS: 126 DEGREES
EKG P AXIS: 22 DEGREES
EKG P AXIS: 34 DEGREES
EKG P AXIS: 57 DEGREES
EKG P-R INTERVAL: 114 MS
EKG P-R INTERVAL: 80 MS
EKG Q-T INTERVAL: 178 MS
EKG Q-T INTERVAL: 420 MS
EKG Q-T INTERVAL: 446 MS
EKG Q-T INTERVAL: 466 MS
EKG Q-T INTERVAL: 466 MS
EKG Q-T INTERVAL: 500 MS
EKG QRS DURATION: 124 MS
EKG QRS DURATION: 126 MS
EKG QRS DURATION: 136 MS
EKG QRS DURATION: 158 MS
EKG QRS DURATION: 174 MS
EKG QRS DURATION: 80 MS
EKG QTC CALCULATION (BAZETT): 254 MS
EKG QTC CALCULATION (BAZETT): 459 MS
EKG QTC CALCULATION (BAZETT): 469 MS
EKG QTC CALCULATION (BAZETT): 488 MS
EKG QTC CALCULATION (BAZETT): 513 MS
EKG QTC CALCULATION (BAZETT): 645 MS
EKG R AXIS: -11 DEGREES
EKG R AXIS: -21 DEGREES
EKG R AXIS: -4 DEGREES
EKG R AXIS: -66 DEGREES
EKG R AXIS: 243 DEGREES
EKG R AXIS: 260 DEGREES
EKG T AXIS: -19 DEGREES
EKG T AXIS: -28 DEGREES
EKG T AXIS: -52 DEGREES
EKG T AXIS: 164 DEGREES
EKG T AXIS: 201 DEGREES
EKG T AXIS: 64 DEGREES
EKG VENTRICULAR RATE: 100 BPM
EKG VENTRICULAR RATE: 123 BPM
EKG VENTRICULAR RATE: 61 BPM
EKG VENTRICULAR RATE: 72 BPM
EKG VENTRICULAR RATE: 72 BPM
EKG VENTRICULAR RATE: 73 BPM
EOSINOPHILS ABSOLUTE: 0 K/UL (ref 0–0.6)
EOSINOPHILS ABSOLUTE: 0.1 K/UL (ref 0–0.6)
EOSINOPHILS RELATIVE PERCENT: 0.2 %
EOSINOPHILS RELATIVE PERCENT: 0.5 %
EOSINOPHILS RELATIVE PERCENT: 0.5 %
EOSINOPHILS RELATIVE PERCENT: 0.6 %
EOSINOPHILS RELATIVE PERCENT: 0.8 %
EOSINOPHILS RELATIVE PERCENT: 0.9 %
EOSINOPHILS RELATIVE PERCENT: 1.8 %
EOSINOPHILS RELATIVE PERCENT: 2 %
EOSINOPHILS RELATIVE PERCENT: 2.5 %
EOSINOPHILS RELATIVE PERCENT: 2.9 %
ESTIMATED AVERAGE GLUCOSE: 116.9 MG/DL
ESTIMATED AVERAGE GLUCOSE: 116.9 MG/DL
ETHANOL: NORMAL MG/DL (ref 0–0.08)
GFR AFRICAN AMERICAN: 10
GFR AFRICAN AMERICAN: 10
GFR AFRICAN AMERICAN: 11
GFR AFRICAN AMERICAN: 12
GFR AFRICAN AMERICAN: 13
GFR AFRICAN AMERICAN: 14
GFR AFRICAN AMERICAN: 7
GFR AFRICAN AMERICAN: 7
GFR AFRICAN AMERICAN: 8
GFR AFRICAN AMERICAN: 8
GFR AFRICAN AMERICAN: 9
GFR NON-AFRICAN AMERICAN: 10
GFR NON-AFRICAN AMERICAN: 11
GFR NON-AFRICAN AMERICAN: 12
GFR NON-AFRICAN AMERICAN: 5
GFR NON-AFRICAN AMERICAN: 6
GFR NON-AFRICAN AMERICAN: 6
GFR NON-AFRICAN AMERICAN: 7
GFR NON-AFRICAN AMERICAN: 8
GFR NON-AFRICAN AMERICAN: 9
GLUCOSE BLD-MCNC: 100 MG/DL (ref 70–99)
GLUCOSE BLD-MCNC: 102 MG/DL (ref 70–99)
GLUCOSE BLD-MCNC: 103 MG/DL (ref 70–99)
GLUCOSE BLD-MCNC: 103 MG/DL (ref 70–99)
GLUCOSE BLD-MCNC: 105 MG/DL (ref 70–99)
GLUCOSE BLD-MCNC: 108 MG/DL (ref 70–99)
GLUCOSE BLD-MCNC: 111 MG/DL (ref 70–99)
GLUCOSE BLD-MCNC: 113 MG/DL (ref 70–99)
GLUCOSE BLD-MCNC: 114 MG/DL (ref 70–99)
GLUCOSE BLD-MCNC: 114 MG/DL (ref 70–99)
GLUCOSE BLD-MCNC: 115 MG/DL (ref 70–99)
GLUCOSE BLD-MCNC: 116 MG/DL (ref 70–99)
GLUCOSE BLD-MCNC: 116 MG/DL (ref 70–99)
GLUCOSE BLD-MCNC: 118 MG/DL (ref 70–99)
GLUCOSE BLD-MCNC: 119 MG/DL (ref 70–99)
GLUCOSE BLD-MCNC: 120 MG/DL (ref 70–99)
GLUCOSE BLD-MCNC: 121 MG/DL (ref 70–99)
GLUCOSE BLD-MCNC: 121 MG/DL (ref 70–99)
GLUCOSE BLD-MCNC: 122 MG/DL (ref 70–99)
GLUCOSE BLD-MCNC: 124 MG/DL (ref 70–99)
GLUCOSE BLD-MCNC: 125 MG/DL (ref 70–99)
GLUCOSE BLD-MCNC: 128 MG/DL (ref 70–99)
GLUCOSE BLD-MCNC: 128 MG/DL (ref 70–99)
GLUCOSE BLD-MCNC: 130 MG/DL (ref 70–99)
GLUCOSE BLD-MCNC: 131 MG/DL (ref 70–99)
GLUCOSE BLD-MCNC: 133 MG/DL (ref 70–99)
GLUCOSE BLD-MCNC: 136 MG/DL (ref 70–99)
GLUCOSE BLD-MCNC: 138 MG/DL (ref 70–99)
GLUCOSE BLD-MCNC: 139 MG/DL (ref 70–99)
GLUCOSE BLD-MCNC: 140 MG/DL (ref 70–99)
GLUCOSE BLD-MCNC: 146 MG/DL (ref 70–99)
GLUCOSE BLD-MCNC: 148 MG/DL (ref 70–99)
GLUCOSE BLD-MCNC: 151 MG/DL (ref 70–99)
GLUCOSE BLD-MCNC: 154 MG/DL (ref 70–99)
GLUCOSE BLD-MCNC: 156 MG/DL (ref 70–99)
GLUCOSE BLD-MCNC: 158 MG/DL (ref 70–99)
GLUCOSE BLD-MCNC: 158 MG/DL (ref 70–99)
GLUCOSE BLD-MCNC: 160 MG/DL (ref 70–99)
GLUCOSE BLD-MCNC: 164 MG/DL (ref 70–99)
GLUCOSE BLD-MCNC: 169 MG/DL (ref 70–99)
GLUCOSE BLD-MCNC: 172 MG/DL (ref 70–99)
GLUCOSE BLD-MCNC: 185 MG/DL (ref 70–99)
GLUCOSE BLD-MCNC: 209 MG/DL (ref 70–99)
GLUCOSE BLD-MCNC: 57 MG/DL (ref 70–99)
GLUCOSE BLD-MCNC: 61 MG/DL (ref 70–99)
GLUCOSE BLD-MCNC: 68 MG/DL (ref 70–99)
GLUCOSE BLD-MCNC: 71 MG/DL (ref 70–99)
GLUCOSE BLD-MCNC: 73 MG/DL (ref 70–99)
GLUCOSE BLD-MCNC: 73 MG/DL (ref 70–99)
GLUCOSE BLD-MCNC: 74 MG/DL (ref 70–99)
GLUCOSE BLD-MCNC: 75 MG/DL (ref 70–99)
GLUCOSE BLD-MCNC: 75 MG/DL (ref 70–99)
GLUCOSE BLD-MCNC: 77 MG/DL (ref 70–99)
GLUCOSE BLD-MCNC: 77 MG/DL (ref 70–99)
GLUCOSE BLD-MCNC: 78 MG/DL (ref 70–99)
GLUCOSE BLD-MCNC: 78 MG/DL (ref 70–99)
GLUCOSE BLD-MCNC: 79 MG/DL (ref 70–99)
GLUCOSE BLD-MCNC: 79 MG/DL (ref 70–99)
GLUCOSE BLD-MCNC: 80 MG/DL (ref 70–99)
GLUCOSE BLD-MCNC: 81 MG/DL (ref 70–99)
GLUCOSE BLD-MCNC: 81 MG/DL (ref 70–99)
GLUCOSE BLD-MCNC: 82 MG/DL (ref 70–99)
GLUCOSE BLD-MCNC: 84 MG/DL (ref 70–99)
GLUCOSE BLD-MCNC: 85 MG/DL (ref 70–99)
GLUCOSE BLD-MCNC: 86 MG/DL (ref 70–99)
GLUCOSE BLD-MCNC: 88 MG/DL (ref 70–99)
GLUCOSE BLD-MCNC: 88 MG/DL (ref 70–99)
GLUCOSE BLD-MCNC: 90 MG/DL (ref 70–99)
GLUCOSE BLD-MCNC: 90 MG/DL (ref 70–99)
GLUCOSE BLD-MCNC: 91 MG/DL (ref 70–99)
GLUCOSE BLD-MCNC: 91 MG/DL (ref 70–99)
GLUCOSE BLD-MCNC: 92 MG/DL (ref 70–99)
GLUCOSE BLD-MCNC: 93 MG/DL (ref 70–99)
GLUCOSE BLD-MCNC: 93 MG/DL (ref 70–99)
GLUCOSE BLD-MCNC: 94 MG/DL (ref 70–99)
GLUCOSE BLD-MCNC: 95 MG/DL (ref 70–99)
GLUCOSE BLD-MCNC: 95 MG/DL (ref 70–99)
GLUCOSE BLD-MCNC: 98 MG/DL (ref 70–99)
GLUCOSE BLD-MCNC: 99 MG/DL (ref 70–99)
HAV IGM SER IA-ACNC: NORMAL
HBA1C MFR BLD: 5.7 %
HBA1C MFR BLD: 5.7 %
HBV SURFACE AB TITR SER: 66.71 MIU/ML
HBV SURFACE AB TITR SER: 74.62 MIU/ML
HCO3 VENOUS: 15.9 MMOL/L (ref 23–29)
HCO3 VENOUS: 18.2 MMOL/L (ref 23–29)
HCO3 VENOUS: 24.3 MMOL/L (ref 23–29)
HCT VFR BLD CALC: 26.5 % (ref 40.5–52.5)
HCT VFR BLD CALC: 26.9 % (ref 40.5–52.5)
HCT VFR BLD CALC: 27.3 % (ref 40.5–52.5)
HCT VFR BLD CALC: 27.9 % (ref 40.5–52.5)
HCT VFR BLD CALC: 28.2 % (ref 40.5–52.5)
HCT VFR BLD CALC: 28.3 % (ref 40.5–52.5)
HCT VFR BLD CALC: 28.3 % (ref 40.5–52.5)
HCT VFR BLD CALC: 28.4 % (ref 40.5–52.5)
HCT VFR BLD CALC: 29.3 % (ref 40.5–52.5)
HCT VFR BLD CALC: 29.4 % (ref 40.5–52.5)
HCT VFR BLD CALC: 30.1 % (ref 40.5–52.5)
HCT VFR BLD CALC: 30.3 % (ref 40.5–52.5)
HCT VFR BLD CALC: 30.5 % (ref 40.5–52.5)
HCT VFR BLD CALC: 30.7 % (ref 40.5–52.5)
HCT VFR BLD CALC: 30.9 % (ref 40.5–52.5)
HCT VFR BLD CALC: 31.1 % (ref 40.5–52.5)
HCT VFR BLD CALC: 32.2 % (ref 40.5–52.5)
HCT VFR BLD CALC: 33.3 % (ref 40.5–52.5)
HCT VFR BLD CALC: 35.7 % (ref 40.5–52.5)
HDLC SERPL-MCNC: 35 MG/DL (ref 40–60)
HEMOGLOBIN: 10.1 G/DL (ref 13.5–17.5)
HEMOGLOBIN: 10.2 G/DL (ref 13.5–17.5)
HEMOGLOBIN: 10.4 G/DL (ref 13.5–17.5)
HEMOGLOBIN: 10.5 G/DL (ref 13.5–17.5)
HEMOGLOBIN: 10.8 G/DL (ref 13.5–17.5)
HEMOGLOBIN: 11.5 G/DL (ref 13.5–17.5)
HEMOGLOBIN: 8.5 G/DL (ref 13.5–17.5)
HEMOGLOBIN: 8.6 G/DL (ref 13.5–17.5)
HEMOGLOBIN: 8.8 G/DL (ref 13.5–17.5)
HEMOGLOBIN: 8.9 G/DL (ref 13.5–17.5)
HEMOGLOBIN: 9.2 G/DL (ref 13.5–17.5)
HEMOGLOBIN: 9.2 G/DL (ref 13.5–17.5)
HEMOGLOBIN: 9.3 G/DL (ref 13.5–17.5)
HEMOGLOBIN: 9.4 G/DL (ref 13.5–17.5)
HEMOGLOBIN: 9.4 G/DL (ref 13.5–17.5)
HEMOGLOBIN: 9.5 G/DL (ref 13.5–17.5)
HEMOGLOBIN: 9.5 G/DL (ref 13.5–17.5)
HEMOGLOBIN: 9.7 G/DL (ref 13.5–17.5)
HEMOGLOBIN: 9.9 G/DL (ref 13.5–17.5)
HEPATITIS B CORE IGM ANTIBODY: NORMAL
HEPATITIS B CORE TOTAL ANTIBODY: NEGATIVE
HEPATITIS B SURFACE ANTIGEN INTERPRETATION: NORMAL
HEPATITIS C ANTIBODY INTERPRETATION: NORMAL
INFLUENZA A: NOT DETECTED
INFLUENZA B: NOT DETECTED
INR BLD: 1.15 (ref 0.88–1.12)
INR BLD: 1.16 (ref 0.88–1.12)
INR BLD: 1.2 (ref 0.88–1.12)
INR BLD: 1.3 (ref 0.87–1.14)
LACTIC ACID, SEPSIS: 0.6 MMOL/L (ref 0.4–1.9)
LACTIC ACID, SEPSIS: 0.6 MMOL/L (ref 0.4–1.9)
LACTIC ACID, SEPSIS: 1.2 MMOL/L (ref 0.4–1.9)
LACTIC ACID: 0.9 MMOL/L (ref 0.4–2)
LACTIC ACID: 1.6 MMOL/L (ref 0.4–2)
LDL CHOLESTEROL CALCULATED: 46 MG/DL
LIPASE: 7 U/L (ref 13–60)
LYMPHOCYTES ABSOLUTE: 0.2 K/UL (ref 1–5.1)
LYMPHOCYTES ABSOLUTE: 0.3 K/UL (ref 1–5.1)
LYMPHOCYTES ABSOLUTE: 0.4 K/UL (ref 1–5.1)
LYMPHOCYTES ABSOLUTE: 0.5 K/UL (ref 1–5.1)
LYMPHOCYTES RELATIVE PERCENT: 1.8 %
LYMPHOCYTES RELATIVE PERCENT: 10.4 %
LYMPHOCYTES RELATIVE PERCENT: 2.5 %
LYMPHOCYTES RELATIVE PERCENT: 2.8 %
LYMPHOCYTES RELATIVE PERCENT: 2.9 %
LYMPHOCYTES RELATIVE PERCENT: 3.1 %
LYMPHOCYTES RELATIVE PERCENT: 3.6 %
LYMPHOCYTES RELATIVE PERCENT: 4.1 %
LYMPHOCYTES RELATIVE PERCENT: 4.3 %
LYMPHOCYTES RELATIVE PERCENT: 4.3 %
LYMPHOCYTES RELATIVE PERCENT: 5.6 %
LYMPHOCYTES RELATIVE PERCENT: 7.2 %
LYMPHOCYTES RELATIVE PERCENT: 9.2 %
Lab: NORMAL
MAGNESIUM: 1.4 MG/DL (ref 1.8–2.4)
MAGNESIUM: 1.7 MG/DL (ref 1.8–2.4)
MAGNESIUM: 2.1 MG/DL (ref 1.8–2.4)
MCH RBC QN AUTO: 28.6 PG (ref 26–34)
MCH RBC QN AUTO: 28.8 PG (ref 26–34)
MCH RBC QN AUTO: 28.9 PG (ref 26–34)
MCH RBC QN AUTO: 29.3 PG (ref 26–34)
MCH RBC QN AUTO: 29.3 PG (ref 26–34)
MCH RBC QN AUTO: 29.5 PG (ref 26–34)
MCH RBC QN AUTO: 29.6 PG (ref 26–34)
MCH RBC QN AUTO: 29.6 PG (ref 26–34)
MCH RBC QN AUTO: 29.8 PG (ref 26–34)
MCH RBC QN AUTO: 29.9 PG (ref 26–34)
MCH RBC QN AUTO: 30 PG (ref 26–34)
MCH RBC QN AUTO: 30.2 PG (ref 26–34)
MCH RBC QN AUTO: 30.2 PG (ref 26–34)
MCH RBC QN AUTO: 30.4 PG (ref 26–34)
MCH RBC QN AUTO: 30.4 PG (ref 26–34)
MCHC RBC AUTO-ENTMCNC: 31.3 G/DL (ref 31–36)
MCHC RBC AUTO-ENTMCNC: 31.5 G/DL (ref 31–36)
MCHC RBC AUTO-ENTMCNC: 31.5 G/DL (ref 31–36)
MCHC RBC AUTO-ENTMCNC: 31.8 G/DL (ref 31–36)
MCHC RBC AUTO-ENTMCNC: 31.9 G/DL (ref 31–36)
MCHC RBC AUTO-ENTMCNC: 32.1 G/DL (ref 31–36)
MCHC RBC AUTO-ENTMCNC: 32.3 G/DL (ref 31–36)
MCHC RBC AUTO-ENTMCNC: 32.4 G/DL (ref 31–36)
MCHC RBC AUTO-ENTMCNC: 32.4 G/DL (ref 31–36)
MCHC RBC AUTO-ENTMCNC: 32.5 G/DL (ref 31–36)
MCHC RBC AUTO-ENTMCNC: 32.6 G/DL (ref 31–36)
MCHC RBC AUTO-ENTMCNC: 32.6 G/DL (ref 31–36)
MCHC RBC AUTO-ENTMCNC: 32.8 G/DL (ref 31–36)
MCHC RBC AUTO-ENTMCNC: 32.8 G/DL (ref 31–36)
MCHC RBC AUTO-ENTMCNC: 33 G/DL (ref 31–36)
MCHC RBC AUTO-ENTMCNC: 33.1 G/DL (ref 31–36)
MCHC RBC AUTO-ENTMCNC: 33.4 G/DL (ref 31–36)
MCV RBC AUTO: 90.1 FL (ref 80–100)
MCV RBC AUTO: 90.4 FL (ref 80–100)
MCV RBC AUTO: 90.5 FL (ref 80–100)
MCV RBC AUTO: 90.8 FL (ref 80–100)
MCV RBC AUTO: 90.9 FL (ref 80–100)
MCV RBC AUTO: 91.1 FL (ref 80–100)
MCV RBC AUTO: 91.1 FL (ref 80–100)
MCV RBC AUTO: 91.5 FL (ref 80–100)
MCV RBC AUTO: 91.5 FL (ref 80–100)
MCV RBC AUTO: 91.6 FL (ref 80–100)
MCV RBC AUTO: 91.7 FL (ref 80–100)
MCV RBC AUTO: 92 FL (ref 80–100)
MCV RBC AUTO: 92 FL (ref 80–100)
MCV RBC AUTO: 92.3 FL (ref 80–100)
MCV RBC AUTO: 92.3 FL (ref 80–100)
MCV RBC AUTO: 92.7 FL (ref 80–100)
MCV RBC AUTO: 94.3 FL (ref 80–100)
METHADONE SCREEN, URINE: NORMAL
METHEMOGLOBIN VENOUS: 0.3 %
MONOCYTES ABSOLUTE: 0.3 K/UL (ref 0–1.3)
MONOCYTES ABSOLUTE: 0.5 K/UL (ref 0–1.3)
MONOCYTES ABSOLUTE: 0.6 K/UL (ref 0–1.3)
MONOCYTES ABSOLUTE: 0.7 K/UL (ref 0–1.3)
MONOCYTES ABSOLUTE: 0.8 K/UL (ref 0–1.3)
MONOCYTES ABSOLUTE: 1.1 K/UL (ref 0–1.3)
MONOCYTES RELATIVE PERCENT: 3 %
MONOCYTES RELATIVE PERCENT: 5.2 %
MONOCYTES RELATIVE PERCENT: 5.6 %
MONOCYTES RELATIVE PERCENT: 6.1 %
MONOCYTES RELATIVE PERCENT: 6.4 %
MONOCYTES RELATIVE PERCENT: 6.5 %
MONOCYTES RELATIVE PERCENT: 6.9 %
MONOCYTES RELATIVE PERCENT: 7.1 %
MONOCYTES RELATIVE PERCENT: 7.3 %
MONOCYTES RELATIVE PERCENT: 7.3 %
MONOCYTES RELATIVE PERCENT: 8 %
MONOCYTES RELATIVE PERCENT: 8.8 %
MONOCYTES RELATIVE PERCENT: 9.6 %
NEUTROPHILS ABSOLUTE: 10 K/UL (ref 1.7–7.7)
NEUTROPHILS ABSOLUTE: 10 K/UL (ref 1.7–7.7)
NEUTROPHILS ABSOLUTE: 10.3 K/UL (ref 1.7–7.7)
NEUTROPHILS ABSOLUTE: 19.4 K/UL (ref 1.7–7.7)
NEUTROPHILS ABSOLUTE: 2.9 K/UL (ref 1.7–7.7)
NEUTROPHILS ABSOLUTE: 2.9 K/UL (ref 1.7–7.7)
NEUTROPHILS ABSOLUTE: 3.3 K/UL (ref 1.7–7.7)
NEUTROPHILS ABSOLUTE: 5.1 K/UL (ref 1.7–7.7)
NEUTROPHILS ABSOLUTE: 7.1 K/UL (ref 1.7–7.7)
NEUTROPHILS ABSOLUTE: 8.7 K/UL (ref 1.7–7.7)
NEUTROPHILS ABSOLUTE: 8.8 K/UL (ref 1.7–7.7)
NEUTROPHILS ABSOLUTE: 8.9 K/UL (ref 1.7–7.7)
NEUTROPHILS ABSOLUTE: 9.4 K/UL (ref 1.7–7.7)
NEUTROPHILS RELATIVE PERCENT: 78.4 %
NEUTROPHILS RELATIVE PERCENT: 79.1 %
NEUTROPHILS RELATIVE PERCENT: 82.6 %
NEUTROPHILS RELATIVE PERCENT: 86.4 %
NEUTROPHILS RELATIVE PERCENT: 86.6 %
NEUTROPHILS RELATIVE PERCENT: 87.5 %
NEUTROPHILS RELATIVE PERCENT: 87.5 %
NEUTROPHILS RELATIVE PERCENT: 88.4 %
NEUTROPHILS RELATIVE PERCENT: 89.5 %
NEUTROPHILS RELATIVE PERCENT: 91.3 %
NEUTROPHILS RELATIVE PERCENT: 94.5 %
O2 SAT, VEN: 77 %
O2 SAT, VEN: 97 %
O2 SAT, VEN: 97 %
O2 THERAPY: ABNORMAL
OPIATE SCREEN URINE: NORMAL
OXYCODONE URINE: NORMAL
PARATHYROID HORMONE INTACT: 310.4 PG/ML (ref 14–72)
PCO2, VEN: 37.3 MMHG (ref 40–50)
PCO2, VEN: 41 MMHG (ref 40–50)
PCO2, VEN: 45.2 MMHG (ref 40–50)
PDW BLD-RTO: 14.5 % (ref 12.4–15.4)
PDW BLD-RTO: 14.6 % (ref 12.4–15.4)
PDW BLD-RTO: 14.7 % (ref 12.4–15.4)
PDW BLD-RTO: 14.8 % (ref 12.4–15.4)
PDW BLD-RTO: 14.9 % (ref 12.4–15.4)
PDW BLD-RTO: 15 % (ref 12.4–15.4)
PDW BLD-RTO: 15.1 % (ref 12.4–15.4)
PDW BLD-RTO: 15.4 % (ref 12.4–15.4)
PDW BLD-RTO: 15.5 % (ref 12.4–15.4)
PDW BLD-RTO: 15.5 % (ref 12.4–15.4)
PDW BLD-RTO: 15.7 % (ref 12.4–15.4)
PDW BLD-RTO: 15.8 % (ref 12.4–15.4)
PDW BLD-RTO: 15.8 % (ref 12.4–15.4)
PDW BLD-RTO: 15.9 % (ref 12.4–15.4)
PDW BLD-RTO: 15.9 % (ref 12.4–15.4)
PERFORMED ON: ABNORMAL
PERFORMED ON: NORMAL
PH UA: 6
PH VENOUS: 7.16 (ref 7.35–7.45)
PH VENOUS: 7.27 (ref 7.35–7.45)
PH VENOUS: 7.41 (ref 7.35–7.45)
PH VENOUS: 7.43 (ref 7.35–7.45)
PH VENOUS: 7.44 (ref 7.35–7.45)
PHENCYCLIDINE SCREEN URINE: NORMAL
PHOSPHORUS: 5.5 MG/DL (ref 2.5–4.9)
PHOSPHORUS: 6.8 MG/DL (ref 2.5–4.9)
PHOSPHORUS: 6.8 MG/DL (ref 2.5–4.9)
PLATELET # BLD: 129 K/UL (ref 135–450)
PLATELET # BLD: 135 K/UL (ref 135–450)
PLATELET # BLD: 137 K/UL (ref 135–450)
PLATELET # BLD: 137 K/UL (ref 135–450)
PLATELET # BLD: 139 K/UL (ref 135–450)
PLATELET # BLD: 139 K/UL (ref 135–450)
PLATELET # BLD: 141 K/UL (ref 135–450)
PLATELET # BLD: 142 K/UL (ref 135–450)
PLATELET # BLD: 142 K/UL (ref 135–450)
PLATELET # BLD: 147 K/UL (ref 135–450)
PLATELET # BLD: 148 K/UL (ref 135–450)
PLATELET # BLD: 151 K/UL (ref 135–450)
PLATELET # BLD: 152 K/UL (ref 135–450)
PLATELET # BLD: 154 K/UL (ref 135–450)
PLATELET # BLD: 161 K/UL (ref 135–450)
PLATELET # BLD: 168 K/UL (ref 135–450)
PLATELET # BLD: 179 K/UL (ref 135–450)
PLATELET # BLD: 188 K/UL (ref 135–450)
PLATELET # BLD: 213 K/UL (ref 135–450)
PLATELET # BLD: 225 K/UL (ref 135–450)
PMV BLD AUTO: 7.6 FL (ref 5–10.5)
PMV BLD AUTO: 7.9 FL (ref 5–10.5)
PMV BLD AUTO: 8 FL (ref 5–10.5)
PMV BLD AUTO: 8.1 FL (ref 5–10.5)
PMV BLD AUTO: 8.1 FL (ref 5–10.5)
PMV BLD AUTO: 8.2 FL (ref 5–10.5)
PMV BLD AUTO: 8.3 FL (ref 5–10.5)
PMV BLD AUTO: 8.3 FL (ref 5–10.5)
PMV BLD AUTO: 8.4 FL (ref 5–10.5)
PMV BLD AUTO: 8.6 FL (ref 5–10.5)
PMV BLD AUTO: 8.6 FL (ref 5–10.5)
PMV BLD AUTO: 8.7 FL (ref 5–10.5)
PO2, VEN: 52 MMHG (ref 25–40)
PO2, VEN: 83.8 MMHG (ref 25–40)
PO2, VEN: 99.2 MMHG (ref 25–40)
POTASSIUM REFLEX MAGNESIUM: 4 MMOL/L (ref 3.5–5.1)
POTASSIUM REFLEX MAGNESIUM: 4 MMOL/L (ref 3.5–5.1)
POTASSIUM REFLEX MAGNESIUM: 4.2 MMOL/L (ref 3.5–5.1)
POTASSIUM REFLEX MAGNESIUM: 4.3 MMOL/L (ref 3.5–5.1)
POTASSIUM REFLEX MAGNESIUM: 4.5 MMOL/L (ref 3.5–5.1)
POTASSIUM REFLEX MAGNESIUM: 4.5 MMOL/L (ref 3.5–5.1)
POTASSIUM REFLEX MAGNESIUM: 4.6 MMOL/L (ref 3.5–5.1)
POTASSIUM REFLEX MAGNESIUM: 4.7 MMOL/L (ref 3.5–5.1)
POTASSIUM REFLEX MAGNESIUM: 4.7 MMOL/L (ref 3.5–5.1)
POTASSIUM REFLEX MAGNESIUM: 4.8 MMOL/L (ref 3.5–5.1)
POTASSIUM REFLEX MAGNESIUM: 4.8 MMOL/L (ref 3.5–5.1)
POTASSIUM REFLEX MAGNESIUM: 5.1 MMOL/L (ref 3.5–5.1)
POTASSIUM REFLEX MAGNESIUM: 5.4 MMOL/L (ref 3.5–5.1)
POTASSIUM REFLEX MAGNESIUM: 5.5 MMOL/L (ref 3.5–5.1)
POTASSIUM REFLEX MAGNESIUM: 5.9 MMOL/L (ref 3.5–5.1)
POTASSIUM REFLEX MAGNESIUM: 6.1 MMOL/L (ref 3.5–5.1)
POTASSIUM REFLEX MAGNESIUM: 6.3 MMOL/L (ref 3.5–5.1)
POTASSIUM REFLEX MAGNESIUM: 7.7 MMOL/L (ref 3.5–5.1)
POTASSIUM SERPL-SCNC: 4.9 MMOL/L (ref 3.5–5.1)
POTASSIUM SERPL-SCNC: 5.5 MMOL/L (ref 3.5–5.1)
POTASSIUM SERPL-SCNC: 8 MMOL/L (ref 3.5–5.1)
PRO-BNP: ABNORMAL PG/ML (ref 0–124)
PRO-BNP: ABNORMAL PG/ML (ref 0–124)
PROCALCITONIN: 0.18 NG/ML (ref 0–0.15)
PROPOXYPHENE SCREEN: NORMAL
PROTHROMBIN TIME: 13.1 SEC (ref 9.9–12.7)
PROTHROMBIN TIME: 13.2 SEC (ref 9.9–12.7)
PROTHROMBIN TIME: 13.7 SEC (ref 9.9–12.7)
PROTHROMBIN TIME: 16 SEC (ref 11.7–14.5)
RBC # BLD: 2.9 M/UL (ref 4.2–5.9)
RBC # BLD: 2.96 M/UL (ref 4.2–5.9)
RBC # BLD: 2.98 M/UL (ref 4.2–5.9)
RBC # BLD: 3.07 M/UL (ref 4.2–5.9)
RBC # BLD: 3.08 M/UL (ref 4.2–5.9)
RBC # BLD: 3.1 M/UL (ref 4.2–5.9)
RBC # BLD: 3.13 M/UL (ref 4.2–5.9)
RBC # BLD: 3.13 M/UL (ref 4.2–5.9)
RBC # BLD: 3.2 M/UL (ref 4.2–5.9)
RBC # BLD: 3.21 M/UL (ref 4.2–5.9)
RBC # BLD: 3.28 M/UL (ref 4.2–5.9)
RBC # BLD: 3.29 M/UL (ref 4.2–5.9)
RBC # BLD: 3.31 M/UL (ref 4.2–5.9)
RBC # BLD: 3.36 M/UL (ref 4.2–5.9)
RBC # BLD: 3.41 M/UL (ref 4.2–5.9)
RBC # BLD: 3.41 M/UL (ref 4.2–5.9)
RBC # BLD: 3.5 M/UL (ref 4.2–5.9)
RBC # BLD: 3.54 M/UL (ref 4.2–5.9)
RBC # BLD: 3.85 M/UL (ref 4.2–5.9)
REASON FOR REJECTION: NORMAL
REJECTED TEST: NORMAL
SALICYLATE, SERUM: <0.3 MG/DL (ref 15–30)
SARS-COV-2 RNA, RT PCR: DETECTED
SARS-COV-2 RNA, RT PCR: NOT DETECTED
SARS-COV-2 RNA, RT PCR: NOT DETECTED
SODIUM BLD-SCNC: 126 MMOL/L (ref 136–145)
SODIUM BLD-SCNC: 127 MMOL/L (ref 136–145)
SODIUM BLD-SCNC: 130 MMOL/L (ref 136–145)
SODIUM BLD-SCNC: 131 MMOL/L (ref 136–145)
SODIUM BLD-SCNC: 131 MMOL/L (ref 136–145)
SODIUM BLD-SCNC: 132 MMOL/L (ref 136–145)
SODIUM BLD-SCNC: 133 MMOL/L (ref 136–145)
SODIUM BLD-SCNC: 135 MMOL/L (ref 136–145)
SODIUM BLD-SCNC: 136 MMOL/L (ref 136–145)
SODIUM BLD-SCNC: 137 MMOL/L (ref 136–145)
SODIUM BLD-SCNC: 139 MMOL/L (ref 136–145)
SODIUM BLD-SCNC: 140 MMOL/L (ref 136–145)
TCO2 CALC VENOUS: 17 MMOL/L
TCO2 CALC VENOUS: 20 MMOL/L
TCO2 CALC VENOUS: 25 MMOL/L
TOTAL PROTEIN: 5.1 G/DL (ref 6.4–8.2)
TOTAL PROTEIN: 5.3 G/DL (ref 6.4–8.2)
TOTAL PROTEIN: 5.4 G/DL (ref 6.4–8.2)
TOTAL PROTEIN: 5.6 G/DL (ref 6.4–8.2)
TOTAL PROTEIN: 5.7 G/DL (ref 6.4–8.2)
TOTAL PROTEIN: 6 G/DL (ref 6.4–8.2)
TOTAL PROTEIN: 6.5 G/DL (ref 6.4–8.2)
TRIGL SERPL-MCNC: 77 MG/DL (ref 0–150)
TROPONIN: 0.46 NG/ML
TROPONIN: 0.48 NG/ML
TROPONIN: 0.5 NG/ML
TROPONIN: 0.51 NG/ML
TROPONIN: 0.54 NG/ML
TROPONIN: 0.62 NG/ML
TROPONIN: 0.64 NG/ML
TROPONIN: 0.66 NG/ML
TROPONIN: 0.66 NG/ML
TROPONIN: 0.68 NG/ML
TROPONIN: 0.7 NG/ML
VANCOMYCIN RANDOM: 12.7 UG/ML
VANCOMYCIN RANDOM: 13 UG/ML
VANCOMYCIN RANDOM: 15.2 UG/ML
VANCOMYCIN TROUGH: 12.7 UG/ML (ref 10–20)
VITAMIN D 25-HYDROXY: 11.1 NG/ML
VLDLC SERPL CALC-MCNC: 15 MG/DL
WBC # BLD: 10.2 K/UL (ref 4–11)
WBC # BLD: 10.5 K/UL (ref 4–11)
WBC # BLD: 11.3 K/UL (ref 4–11)
WBC # BLD: 11.5 K/UL (ref 4–11)
WBC # BLD: 11.5 K/UL (ref 4–11)
WBC # BLD: 20.5 K/UL (ref 4–11)
WBC # BLD: 3.7 K/UL (ref 4–11)
WBC # BLD: 3.8 K/UL (ref 4–11)
WBC # BLD: 4 K/UL (ref 4–11)
WBC # BLD: 4.5 K/UL (ref 4–11)
WBC # BLD: 4.6 K/UL (ref 4–11)
WBC # BLD: 5 K/UL (ref 4–11)
WBC # BLD: 5 K/UL (ref 4–11)
WBC # BLD: 5.8 K/UL (ref 4–11)
WBC # BLD: 5.9 K/UL (ref 4–11)
WBC # BLD: 6.2 K/UL (ref 4–11)
WBC # BLD: 8.1 K/UL (ref 4–11)
WBC # BLD: 9.5 K/UL (ref 4–11)
WBC # BLD: 9.8 K/UL (ref 4–11)

## 2022-01-01 PROCEDURE — 36415 COLL VENOUS BLD VENIPUNCTURE: CPT

## 2022-01-01 PROCEDURE — 94761 N-INVAS EAR/PLS OXIMETRY MLT: CPT

## 2022-01-01 PROCEDURE — 2700000000 HC OXYGEN THERAPY PER DAY

## 2022-01-01 PROCEDURE — 82140 ASSAY OF AMMONIA: CPT

## 2022-01-01 PROCEDURE — 2580000003 HC RX 258: Performed by: INTERNAL MEDICINE

## 2022-01-01 PROCEDURE — 99223 1ST HOSP IP/OBS HIGH 75: CPT | Performed by: INTERNAL MEDICINE

## 2022-01-01 PROCEDURE — 99232 SBSQ HOSP IP/OBS MODERATE 35: CPT | Performed by: INTERNAL MEDICINE

## 2022-01-01 PROCEDURE — 80053 COMPREHEN METABOLIC PANEL: CPT

## 2022-01-01 PROCEDURE — 85520 HEPARIN ASSAY: CPT

## 2022-01-01 PROCEDURE — 82803 BLOOD GASES ANY COMBINATION: CPT

## 2022-01-01 PROCEDURE — 6360000002 HC RX W HCPCS: Performed by: INTERNAL MEDICINE

## 2022-01-01 PROCEDURE — 49083 ABD PARACENTESIS W/IMAGING: CPT

## 2022-01-01 PROCEDURE — 84145 PROCALCITONIN (PCT): CPT

## 2022-01-01 PROCEDURE — 78582 LUNG VENTILAT&PERFUS IMAGING: CPT

## 2022-01-01 PROCEDURE — 80202 ASSAY OF VANCOMYCIN: CPT

## 2022-01-01 PROCEDURE — 83605 ASSAY OF LACTIC ACID: CPT

## 2022-01-01 PROCEDURE — 82077 ASSAY SPEC XCP UR&BREATH IA: CPT

## 2022-01-01 PROCEDURE — 5A1D70Z PERFORMANCE OF URINARY FILTRATION, INTERMITTENT, LESS THAN 6 HOURS PER DAY: ICD-10-PCS | Performed by: INTERNAL MEDICINE

## 2022-01-01 PROCEDURE — 85025 COMPLETE CBC W/AUTO DIFF WBC: CPT

## 2022-01-01 PROCEDURE — 99223 1ST HOSP IP/OBS HIGH 75: CPT | Performed by: PHYSICIAN ASSISTANT

## 2022-01-01 PROCEDURE — 85049 AUTOMATED PLATELET COUNT: CPT

## 2022-01-01 PROCEDURE — 93010 ELECTROCARDIOGRAM REPORT: CPT | Performed by: INTERNAL MEDICINE

## 2022-01-01 PROCEDURE — 2060000000 HC ICU INTERMEDIATE R&B

## 2022-01-01 PROCEDURE — 2500000003 HC RX 250 WO HCPCS: Performed by: EMERGENCY MEDICINE

## 2022-01-01 PROCEDURE — 99233 SBSQ HOSP IP/OBS HIGH 50: CPT | Performed by: INTERNAL MEDICINE

## 2022-01-01 PROCEDURE — 83735 ASSAY OF MAGNESIUM: CPT

## 2022-01-01 PROCEDURE — G0378 HOSPITAL OBSERVATION PER HR: HCPCS

## 2022-01-01 PROCEDURE — A9558 XE133 XENON 10MCI: HCPCS | Performed by: NURSE PRACTITIONER

## 2022-01-01 PROCEDURE — 96374 THER/PROPH/DIAG INJ IV PUSH: CPT

## 2022-01-01 PROCEDURE — 6370000000 HC RX 637 (ALT 250 FOR IP): Performed by: INTERNAL MEDICINE

## 2022-01-01 PROCEDURE — 93922 UPR/L XTREMITY ART 2 LEVELS: CPT

## 2022-01-01 PROCEDURE — 96365 THER/PROPH/DIAG IV INF INIT: CPT

## 2022-01-01 PROCEDURE — 80048 BASIC METABOLIC PNL TOTAL CA: CPT

## 2022-01-01 PROCEDURE — 2580000003 HC RX 258: Performed by: EMERGENCY MEDICINE

## 2022-01-01 PROCEDURE — 31500 INSERT EMERGENCY AIRWAY: CPT

## 2022-01-01 PROCEDURE — 94640 AIRWAY INHALATION TREATMENT: CPT

## 2022-01-01 PROCEDURE — 82330 ASSAY OF CALCIUM: CPT

## 2022-01-01 PROCEDURE — 83880 ASSAY OF NATRIURETIC PEPTIDE: CPT

## 2022-01-01 PROCEDURE — 93005 ELECTROCARDIOGRAM TRACING: CPT | Performed by: INTERNAL MEDICINE

## 2022-01-01 PROCEDURE — 96372 THER/PROPH/DIAG INJ SC/IM: CPT

## 2022-01-01 PROCEDURE — 97166 OT EVAL MOD COMPLEX 45 MIN: CPT

## 2022-01-01 PROCEDURE — 97530 THERAPEUTIC ACTIVITIES: CPT

## 2022-01-01 PROCEDURE — 71045 X-RAY EXAM CHEST 1 VIEW: CPT

## 2022-01-01 PROCEDURE — 36556 INSERT NON-TUNNEL CV CATH: CPT

## 2022-01-01 PROCEDURE — 80143 DRUG ASSAY ACETAMINOPHEN: CPT

## 2022-01-01 PROCEDURE — 99283 EMERGENCY DEPT VISIT LOW MDM: CPT

## 2022-01-01 PROCEDURE — 83036 HEMOGLOBIN GLYCOSYLATED A1C: CPT

## 2022-01-01 PROCEDURE — 94660 CPAP INITIATION&MGMT: CPT

## 2022-01-01 PROCEDURE — 85610 PROTHROMBIN TIME: CPT

## 2022-01-01 PROCEDURE — 73630 X-RAY EXAM OF FOOT: CPT

## 2022-01-01 PROCEDURE — 84484 ASSAY OF TROPONIN QUANT: CPT

## 2022-01-01 PROCEDURE — 99285 EMERGENCY DEPT VISIT HI MDM: CPT

## 2022-01-01 PROCEDURE — 87040 BLOOD CULTURE FOR BACTERIA: CPT

## 2022-01-01 PROCEDURE — 80307 DRUG TEST PRSMV CHEM ANLYZR: CPT

## 2022-01-01 PROCEDURE — 85027 COMPLETE CBC AUTOMATED: CPT

## 2022-01-01 PROCEDURE — 87340 HEPATITIS B SURFACE AG IA: CPT

## 2022-01-01 PROCEDURE — 90935 HEMODIALYSIS ONE EVALUATION: CPT

## 2022-01-01 PROCEDURE — 85379 FIBRIN DEGRADATION QUANT: CPT

## 2022-01-01 PROCEDURE — 96366 THER/PROPH/DIAG IV INF ADDON: CPT

## 2022-01-01 PROCEDURE — 86704 HEP B CORE ANTIBODY TOTAL: CPT

## 2022-01-01 PROCEDURE — 80179 DRUG ASSAY SALICYLATE: CPT

## 2022-01-01 PROCEDURE — 86706 HEP B SURFACE ANTIBODY: CPT

## 2022-01-01 PROCEDURE — 6360000002 HC RX W HCPCS: Performed by: PHYSICIAN ASSISTANT

## 2022-01-01 PROCEDURE — 87636 SARSCOV2 & INF A&B AMP PRB: CPT

## 2022-01-01 PROCEDURE — 96375 TX/PRO/DX INJ NEW DRUG ADDON: CPT

## 2022-01-01 PROCEDURE — 6370000000 HC RX 637 (ALT 250 FOR IP): Performed by: EMERGENCY MEDICINE

## 2022-01-01 PROCEDURE — 0W9G3ZZ DRAINAGE OF PERITONEAL CAVITY, PERCUTANEOUS APPROACH: ICD-10-PCS | Performed by: STUDENT IN AN ORGANIZED HEALTH CARE EDUCATION/TRAINING PROGRAM

## 2022-01-01 PROCEDURE — 99214 OFFICE O/P EST MOD 30 MIN: CPT | Performed by: INTERNAL MEDICINE

## 2022-01-01 PROCEDURE — 83970 ASSAY OF PARATHORMONE: CPT

## 2022-01-01 PROCEDURE — 80074 ACUTE HEPATITIS PANEL: CPT

## 2022-01-01 PROCEDURE — 2580000003 HC RX 258

## 2022-01-01 PROCEDURE — 6360000002 HC RX W HCPCS: Performed by: EMERGENCY MEDICINE

## 2022-01-01 PROCEDURE — 80061 LIPID PANEL: CPT

## 2022-01-01 PROCEDURE — 96368 THER/DIAG CONCURRENT INF: CPT

## 2022-01-01 PROCEDURE — 84100 ASSAY OF PHOSPHORUS: CPT

## 2022-01-01 PROCEDURE — A9540 TC99M MAA: HCPCS | Performed by: NURSE PRACTITIONER

## 2022-01-01 PROCEDURE — 93005 ELECTROCARDIOGRAM TRACING: CPT | Performed by: PHYSICIAN ASSISTANT

## 2022-01-01 PROCEDURE — 0W9G3ZZ DRAINAGE OF PERITONEAL CAVITY, PERCUTANEOUS APPROACH: ICD-10-PCS | Performed by: INTERNAL MEDICINE

## 2022-01-01 PROCEDURE — 83690 ASSAY OF LIPASE: CPT

## 2022-01-01 PROCEDURE — 80069 RENAL FUNCTION PANEL: CPT

## 2022-01-01 PROCEDURE — 71046 X-RAY EXAM CHEST 2 VIEWS: CPT

## 2022-01-01 PROCEDURE — 6370000000 HC RX 637 (ALT 250 FOR IP): Performed by: PHYSICIAN ASSISTANT

## 2022-01-01 PROCEDURE — 92950 HEART/LUNG RESUSCITATION CPR: CPT

## 2022-01-01 PROCEDURE — 93005 ELECTROCARDIOGRAM TRACING: CPT | Performed by: EMERGENCY MEDICINE

## 2022-01-01 PROCEDURE — 97162 PT EVAL MOD COMPLEX 30 MIN: CPT

## 2022-01-01 PROCEDURE — 97110 THERAPEUTIC EXERCISES: CPT

## 2022-01-01 PROCEDURE — 2580000003 HC RX 258: Performed by: PHYSICIAN ASSISTANT

## 2022-01-01 PROCEDURE — 3430000000 HC RX DIAGNOSTIC RADIOPHARMACEUTICAL: Performed by: NURSE PRACTITIONER

## 2022-01-01 PROCEDURE — 71250 CT THORAX DX C-: CPT

## 2022-01-01 PROCEDURE — 99239 HOSP IP/OBS DSCHRG MGMT >30: CPT | Performed by: INTERNAL MEDICINE

## 2022-01-01 PROCEDURE — 97535 SELF CARE MNGMENT TRAINING: CPT

## 2022-01-01 PROCEDURE — 73562 X-RAY EXAM OF KNEE 3: CPT

## 2022-01-01 PROCEDURE — 82306 VITAMIN D 25 HYDROXY: CPT

## 2022-01-01 PROCEDURE — 6370000000 HC RX 637 (ALT 250 FOR IP): Performed by: NURSE PRACTITIONER

## 2022-01-01 PROCEDURE — 99222 1ST HOSP IP/OBS MODERATE 55: CPT

## 2022-01-01 RX ORDER — CALCIUM CARBONATE 200(500)MG
1000 TABLET,CHEWABLE ORAL
Qty: 180 TABLET | Refills: 0 | Status: SHIPPED | OUTPATIENT
Start: 2022-01-01 | End: 2022-01-01

## 2022-01-01 RX ORDER — HYDROXYZINE HYDROCHLORIDE 25 MG/1
25 TABLET, FILM COATED ORAL ONCE
Status: COMPLETED | OUTPATIENT
Start: 2022-01-01 | End: 2022-01-01

## 2022-01-01 RX ORDER — ONDANSETRON 4 MG/1
4 TABLET, ORALLY DISINTEGRATING ORAL EVERY 8 HOURS PRN
Status: DISCONTINUED | OUTPATIENT
Start: 2022-01-01 | End: 2022-01-01 | Stop reason: HOSPADM

## 2022-01-01 RX ORDER — HEPARIN SODIUM 10000 [USP'U]/100ML
1000 INJECTION, SOLUTION INTRAVENOUS CONTINUOUS
Status: DISCONTINUED | OUTPATIENT
Start: 2022-01-01 | End: 2022-01-01

## 2022-01-01 RX ORDER — HEPARIN SODIUM 1000 [USP'U]/ML
2000 INJECTION, SOLUTION INTRAVENOUS; SUBCUTANEOUS ONCE
Status: COMPLETED | OUTPATIENT
Start: 2022-01-01 | End: 2022-01-01

## 2022-01-01 RX ORDER — ISOSORBIDE DINITRATE AND HYDRALAZINE HYDROCHLORIDE 37.5; 2 MG/1; MG/1
1 TABLET ORAL 3 TIMES DAILY
Status: DISCONTINUED | OUTPATIENT
Start: 2022-01-01 | End: 2022-01-01

## 2022-01-01 RX ORDER — MECOBALAMIN 5000 MCG
5 TABLET,DISINTEGRATING ORAL NIGHTLY PRN
Status: DISCONTINUED | OUTPATIENT
Start: 2022-01-01 | End: 2022-01-01 | Stop reason: HOSPADM

## 2022-01-01 RX ORDER — SODIUM CHLORIDE 9 MG/ML
25 INJECTION, SOLUTION INTRAVENOUS PRN
Status: DISCONTINUED | OUTPATIENT
Start: 2022-01-01 | End: 2022-01-01 | Stop reason: HOSPADM

## 2022-01-01 RX ORDER — ALBUTEROL SULFATE 90 UG/1
2 AEROSOL, METERED RESPIRATORY (INHALATION) EVERY 6 HOURS PRN
Status: DISCONTINUED | OUTPATIENT
Start: 2022-01-01 | End: 2022-01-01

## 2022-01-01 RX ORDER — DEXTROSE MONOHYDRATE 50 MG/ML
100 INJECTION, SOLUTION INTRAVENOUS PRN
Status: DISCONTINUED | OUTPATIENT
Start: 2022-01-01 | End: 2022-01-01 | Stop reason: HOSPADM

## 2022-01-01 RX ORDER — ONDANSETRON 2 MG/ML
4 INJECTION INTRAMUSCULAR; INTRAVENOUS EVERY 6 HOURS PRN
Status: DISCONTINUED | OUTPATIENT
Start: 2022-01-01 | End: 2022-01-01 | Stop reason: HOSPADM

## 2022-01-01 RX ORDER — HYDRALAZINE HYDROCHLORIDE 20 MG/ML
10 INJECTION INTRAMUSCULAR; INTRAVENOUS EVERY 6 HOURS PRN
Status: DISCONTINUED | OUTPATIENT
Start: 2022-01-01 | End: 2022-01-01 | Stop reason: HOSPADM

## 2022-01-01 RX ORDER — METOPROLOL SUCCINATE 25 MG/1
25 TABLET, EXTENDED RELEASE ORAL DAILY
Status: DISCONTINUED | OUTPATIENT
Start: 2022-01-01 | End: 2022-01-01 | Stop reason: HOSPADM

## 2022-01-01 RX ORDER — FUROSEMIDE 10 MG/ML
80 INJECTION INTRAMUSCULAR; INTRAVENOUS ONCE
Status: COMPLETED | OUTPATIENT
Start: 2022-01-01 | End: 2022-01-01

## 2022-01-01 RX ORDER — MORPHINE SULFATE 4 MG/ML
4 INJECTION, SOLUTION INTRAMUSCULAR; INTRAVENOUS ONCE
Status: COMPLETED | OUTPATIENT
Start: 2022-01-01 | End: 2022-01-01

## 2022-01-01 RX ORDER — ONDANSETRON 2 MG/ML
4 INJECTION INTRAMUSCULAR; INTRAVENOUS ONCE
Status: COMPLETED | OUTPATIENT
Start: 2022-01-01 | End: 2022-01-01

## 2022-01-01 RX ORDER — HEPARIN SODIUM 1000 [USP'U]/ML
4000 INJECTION, SOLUTION INTRAVENOUS; SUBCUTANEOUS PRN
Status: DISCONTINUED | OUTPATIENT
Start: 2022-01-01 | End: 2022-01-01 | Stop reason: HOSPADM

## 2022-01-01 RX ORDER — SODIUM CHLORIDE 0.9 % (FLUSH) 0.9 %
5-40 SYRINGE (ML) INJECTION PRN
Status: DISCONTINUED | OUTPATIENT
Start: 2022-01-01 | End: 2022-01-01 | Stop reason: HOSPADM

## 2022-01-01 RX ORDER — ATORVASTATIN CALCIUM 40 MG/1
40 TABLET, FILM COATED ORAL NIGHTLY
Status: DISCONTINUED | OUTPATIENT
Start: 2022-01-01 | End: 2022-01-01 | Stop reason: HOSPADM

## 2022-01-01 RX ORDER — ASPIRIN 81 MG/1
81 TABLET, CHEWABLE ORAL DAILY
Qty: 30 TABLET | Refills: 0 | Status: SHIPPED | OUTPATIENT
Start: 2022-01-01

## 2022-01-01 RX ORDER — HYDRALAZINE HYDROCHLORIDE 25 MG/1
25 TABLET, FILM COATED ORAL
Status: ON HOLD | COMMUNITY
Start: 2020-02-21 | End: 2022-01-01 | Stop reason: HOSPADM

## 2022-01-01 RX ORDER — METOPROLOL SUCCINATE 25 MG/1
25 TABLET, EXTENDED RELEASE ORAL DAILY
Qty: 30 TABLET | Refills: 0 | Status: SHIPPED | OUTPATIENT
Start: 2022-01-01

## 2022-01-01 RX ORDER — EPINEPHRINE 0.1 MG/ML
SYRINGE (ML) INJECTION DAILY PRN
Status: COMPLETED | OUTPATIENT
Start: 2022-01-01 | End: 2022-01-01

## 2022-01-01 RX ORDER — ACETAMINOPHEN 325 MG/1
650 TABLET ORAL EVERY 6 HOURS PRN
Status: DISCONTINUED | OUTPATIENT
Start: 2022-01-01 | End: 2022-01-01 | Stop reason: HOSPADM

## 2022-01-01 RX ORDER — DEXAMETHASONE 1 MG
TABLET ORAL
Qty: 30 TABLET | Refills: 0 | Status: ON HOLD
Start: 2022-01-01 | End: 2022-01-01 | Stop reason: HOSPADM

## 2022-01-01 RX ORDER — LORAZEPAM 1 MG/1
1 TABLET ORAL 3 TIMES DAILY
Status: DISCONTINUED | OUTPATIENT
Start: 2022-01-01 | End: 2022-01-01 | Stop reason: HOSPADM

## 2022-01-01 RX ORDER — ASPIRIN 81 MG/1
81 TABLET, CHEWABLE ORAL DAILY
Status: DISCONTINUED | OUTPATIENT
Start: 2022-01-01 | End: 2022-01-01 | Stop reason: HOSPADM

## 2022-01-01 RX ORDER — ACETAMINOPHEN 650 MG/1
650 SUPPOSITORY RECTAL EVERY 6 HOURS PRN
Status: DISCONTINUED | OUTPATIENT
Start: 2022-01-01 | End: 2022-01-01 | Stop reason: HOSPADM

## 2022-01-01 RX ORDER — ISOSORBIDE DINITRATE AND HYDRALAZINE HYDROCHLORIDE 37.5; 2 MG/1; MG/1
1 TABLET ORAL 3 TIMES DAILY
Status: DISCONTINUED | OUTPATIENT
Start: 2022-01-01 | End: 2022-01-01 | Stop reason: SDUPTHER

## 2022-01-01 RX ORDER — DEXTROSE MONOHYDRATE 25 G/50ML
25 INJECTION, SOLUTION INTRAVENOUS PRN
Status: DISCONTINUED | OUTPATIENT
Start: 2022-01-01 | End: 2022-01-01 | Stop reason: ALTCHOICE

## 2022-01-01 RX ORDER — MAGNESIUM SULFATE IN WATER 40 MG/ML
2000 INJECTION, SOLUTION INTRAVENOUS ONCE
Status: COMPLETED | OUTPATIENT
Start: 2022-01-01 | End: 2022-01-01

## 2022-01-01 RX ORDER — ALBUTEROL SULFATE 90 UG/1
2 AEROSOL, METERED RESPIRATORY (INHALATION) 2 TIMES DAILY
Status: DISCONTINUED | OUTPATIENT
Start: 2022-01-01 | End: 2022-01-01 | Stop reason: HOSPADM

## 2022-01-01 RX ORDER — IPRATROPIUM BROMIDE AND ALBUTEROL SULFATE 2.5; .5 MG/3ML; MG/3ML
3 SOLUTION RESPIRATORY (INHALATION) EVERY 4 HOURS PRN
Status: DISCONTINUED | OUTPATIENT
Start: 2022-01-01 | End: 2022-01-01 | Stop reason: HOSPADM

## 2022-01-01 RX ORDER — CALCIUM CARBONATE 200(500)MG
1000 TABLET,CHEWABLE ORAL
Status: DISCONTINUED | OUTPATIENT
Start: 2022-01-01 | End: 2022-01-01 | Stop reason: HOSPADM

## 2022-01-01 RX ORDER — METOPROLOL SUCCINATE 50 MG/1
50 TABLET, EXTENDED RELEASE ORAL DAILY
Status: DISCONTINUED | OUTPATIENT
Start: 2022-01-01 | End: 2022-01-01 | Stop reason: HOSPADM

## 2022-01-01 RX ORDER — MECOBALAMIN 5000 MCG
5 TABLET,DISINTEGRATING ORAL NIGHTLY PRN
Qty: 1 TABLET | Refills: 0
Start: 2022-01-01

## 2022-01-01 RX ORDER — IPRATROPIUM BROMIDE AND ALBUTEROL SULFATE 2.5; .5 MG/3ML; MG/3ML
1 SOLUTION RESPIRATORY (INHALATION) ONCE
Status: COMPLETED | OUTPATIENT
Start: 2022-01-01 | End: 2022-01-01

## 2022-01-01 RX ORDER — HEPARIN SODIUM 1000 [USP'U]/ML
2000 INJECTION, SOLUTION INTRAVENOUS; SUBCUTANEOUS PRN
Status: DISCONTINUED | OUTPATIENT
Start: 2022-01-01 | End: 2022-01-01 | Stop reason: HOSPADM

## 2022-01-01 RX ORDER — DIAZEPAM 2 MG/1
2 TABLET ORAL PRN
Status: ON HOLD | COMMUNITY
Start: 2022-01-01 | End: 2022-01-01 | Stop reason: HOSPADM

## 2022-01-01 RX ORDER — POLYETHYLENE GLYCOL 3350 17 G/17G
17 POWDER, FOR SOLUTION ORAL DAILY PRN
Status: DISCONTINUED | OUTPATIENT
Start: 2022-01-01 | End: 2022-01-01 | Stop reason: HOSPADM

## 2022-01-01 RX ORDER — METOPROLOL SUCCINATE 50 MG/1
50 TABLET, EXTENDED RELEASE ORAL DAILY
Status: DISCONTINUED | OUTPATIENT
Start: 2022-01-01 | End: 2022-01-01

## 2022-01-01 RX ORDER — CALCIUM GLUCONATE 94 MG/ML
1000 INJECTION, SOLUTION INTRAVENOUS ONCE
Status: DISCONTINUED | OUTPATIENT
Start: 2022-01-01 | End: 2022-01-01

## 2022-01-01 RX ORDER — CALCIUM GLUCONATE 20 MG/ML
1000 INJECTION, SOLUTION INTRAVENOUS ONCE
Status: COMPLETED | OUTPATIENT
Start: 2022-01-01 | End: 2022-01-01

## 2022-01-01 RX ORDER — ALBUTEROL SULFATE 90 UG/1
2 AEROSOL, METERED RESPIRATORY (INHALATION) EVERY 6 HOURS PRN
Status: DISCONTINUED | OUTPATIENT
Start: 2022-01-01 | End: 2022-01-01 | Stop reason: HOSPADM

## 2022-01-01 RX ORDER — DEXTROSE MONOHYDRATE 25 G/50ML
INJECTION, SOLUTION INTRAVENOUS DAILY PRN
Status: COMPLETED | OUTPATIENT
Start: 2022-01-01 | End: 2022-01-01

## 2022-01-01 RX ORDER — SODIUM CHLORIDE 0.9 % (FLUSH) 0.9 %
5-40 SYRINGE (ML) INJECTION EVERY 12 HOURS SCHEDULED
Status: DISCONTINUED | OUTPATIENT
Start: 2022-01-01 | End: 2022-01-01 | Stop reason: HOSPADM

## 2022-01-01 RX ORDER — POLYETHYLENE GLYCOL 3350 17 G/17G
17 POWDER, FOR SOLUTION ORAL DAILY PRN
Qty: 527 G | Refills: 1 | Status: SHIPPED | OUTPATIENT
Start: 2022-01-01 | End: 2022-01-01

## 2022-01-01 RX ORDER — DEXTROSE MONOHYDRATE 25 G/50ML
25 INJECTION, SOLUTION INTRAVENOUS ONCE
Status: DISCONTINUED | OUTPATIENT
Start: 2022-01-01 | End: 2022-01-01 | Stop reason: ALTCHOICE

## 2022-01-01 RX ORDER — AMIODARONE HYDROCHLORIDE 50 MG/ML
INJECTION, SOLUTION INTRAVENOUS DAILY PRN
Status: COMPLETED | OUTPATIENT
Start: 2022-01-01 | End: 2022-01-01

## 2022-01-01 RX ORDER — DEXTROSE MONOHYDRATE 25 G/50ML
12.5 INJECTION, SOLUTION INTRAVENOUS PRN
Status: DISCONTINUED | OUTPATIENT
Start: 2022-01-01 | End: 2022-01-01 | Stop reason: HOSPADM

## 2022-01-01 RX ORDER — HEPARIN SODIUM 1000 [USP'U]/ML
3600 INJECTION, SOLUTION INTRAVENOUS; SUBCUTANEOUS PRN
Status: DISCONTINUED | OUTPATIENT
Start: 2022-01-01 | End: 2022-01-01 | Stop reason: HOSPADM

## 2022-01-01 RX ORDER — INSULIN LISPRO 100 [IU]/ML
0-3 INJECTION, SOLUTION INTRAVENOUS; SUBCUTANEOUS NIGHTLY
Status: DISCONTINUED | OUTPATIENT
Start: 2022-01-01 | End: 2022-01-01 | Stop reason: HOSPADM

## 2022-01-01 RX ORDER — DOXYCYCLINE HYCLATE 100 MG
100 TABLET ORAL 2 TIMES DAILY
Qty: 14 TABLET | Refills: 0 | Status: SHIPPED | OUTPATIENT
Start: 2022-01-01 | End: 2022-01-01

## 2022-01-01 RX ORDER — HEPARIN SODIUM 5000 [USP'U]/ML
5000 INJECTION, SOLUTION INTRAVENOUS; SUBCUTANEOUS EVERY 8 HOURS SCHEDULED
Status: DISCONTINUED | OUTPATIENT
Start: 2022-01-01 | End: 2022-01-01 | Stop reason: HOSPADM

## 2022-01-01 RX ORDER — ISOSORBIDE DINITRATE 20 MG/1
20 TABLET ORAL 3 TIMES DAILY
Status: DISCONTINUED | OUTPATIENT
Start: 2022-01-01 | End: 2022-01-01 | Stop reason: HOSPADM

## 2022-01-01 RX ORDER — DEXTROSE MONOHYDRATE 25 G/50ML
12.5 INJECTION, SOLUTION INTRAVENOUS PRN
Status: DISCONTINUED | OUTPATIENT
Start: 2022-01-01 | End: 2022-01-01 | Stop reason: CLARIF

## 2022-01-01 RX ORDER — ALBUTEROL SULFATE 2.5 MG/3ML
2.5 SOLUTION RESPIRATORY (INHALATION) ONCE
Status: COMPLETED | OUTPATIENT
Start: 2022-01-01 | End: 2022-01-01

## 2022-01-01 RX ORDER — HEPARIN SODIUM 1000 [USP'U]/ML
4000 INJECTION, SOLUTION INTRAVENOUS; SUBCUTANEOUS ONCE
Status: COMPLETED | OUTPATIENT
Start: 2022-01-01 | End: 2022-01-01

## 2022-01-01 RX ORDER — DEXTROSE MONOHYDRATE 25 G/50ML
12.5 INJECTION, SOLUTION INTRAVENOUS ONCE
Status: DISCONTINUED | OUTPATIENT
Start: 2022-01-01 | End: 2022-01-01 | Stop reason: ALTCHOICE

## 2022-01-01 RX ORDER — CEFDINIR 300 MG/1
300 CAPSULE ORAL 2 TIMES DAILY
Qty: 14 CAPSULE | Refills: 0 | Status: SHIPPED | OUTPATIENT
Start: 2022-01-01 | End: 2022-01-01

## 2022-01-01 RX ORDER — INSULIN LISPRO 100 [IU]/ML
0-6 INJECTION, SOLUTION INTRAVENOUS; SUBCUTANEOUS
Status: DISCONTINUED | OUTPATIENT
Start: 2022-01-01 | End: 2022-01-01 | Stop reason: HOSPADM

## 2022-01-01 RX ORDER — HYDRALAZINE HYDROCHLORIDE 25 MG/1
37.5 TABLET, FILM COATED ORAL 3 TIMES DAILY
Status: DISCONTINUED | OUTPATIENT
Start: 2022-01-01 | End: 2022-01-01 | Stop reason: HOSPADM

## 2022-01-01 RX ORDER — ISOSORBIDE DINITRATE AND HYDRALAZINE HYDROCHLORIDE 37.5; 2 MG/1; MG/1
1 TABLET ORAL 3 TIMES DAILY
Qty: 90 TABLET | Refills: 0 | Status: ON HOLD
Start: 2022-01-01 | End: 2022-01-01 | Stop reason: HOSPADM

## 2022-01-01 RX ORDER — ACETAMINOPHEN 650 MG
TABLET, EXTENDED RELEASE ORAL DAILY
Status: DISCONTINUED | OUTPATIENT
Start: 2022-01-01 | End: 2022-01-01 | Stop reason: HOSPADM

## 2022-01-01 RX ORDER — CHOLECALCIFEROL (VITAMIN D3) 125 MCG
2000 CAPSULE ORAL DAILY
COMMUNITY
Start: 2022-01-01

## 2022-01-01 RX ORDER — ISOSORBIDE DINITRATE AND HYDRALAZINE HYDROCHLORIDE 37.5; 2 MG/1; MG/1
1 TABLET ORAL 3 TIMES DAILY
Status: DISCONTINUED | OUTPATIENT
Start: 2022-01-01 | End: 2022-01-01 | Stop reason: CLARIF

## 2022-01-01 RX ORDER — LORAZEPAM 1 MG/1
1 TABLET ORAL EVERY 6 HOURS PRN
Status: DISCONTINUED | OUTPATIENT
Start: 2022-01-01 | End: 2022-01-01 | Stop reason: HOSPADM

## 2022-01-01 RX ORDER — NICOTINE POLACRILEX 4 MG
15 LOZENGE BUCCAL PRN
Status: DISCONTINUED | OUTPATIENT
Start: 2022-01-01 | End: 2022-01-01 | Stop reason: HOSPADM

## 2022-01-01 RX ORDER — XENON XE-133 10 MCI/1
11.9 GAS RESPIRATORY (INHALATION)
Status: COMPLETED | OUTPATIENT
Start: 2022-01-01 | End: 2022-01-01

## 2022-01-01 RX ORDER — FUROSEMIDE 10 MG/ML
40 INJECTION INTRAMUSCULAR; INTRAVENOUS ONCE
Status: COMPLETED | OUTPATIENT
Start: 2022-01-01 | End: 2022-01-01

## 2022-01-01 RX ORDER — MECOBALAMIN 5000 MCG
10 TABLET,DISINTEGRATING ORAL NIGHTLY
Status: DISCONTINUED | OUTPATIENT
Start: 2022-01-01 | End: 2022-01-01 | Stop reason: HOSPADM

## 2022-01-01 RX ORDER — FUROSEMIDE 10 MG/ML
120 INJECTION INTRAMUSCULAR; INTRAVENOUS ONCE
Status: COMPLETED | OUTPATIENT
Start: 2022-01-01 | End: 2022-01-01

## 2022-01-01 RX ORDER — POLYETHYLENE GLYCOL 3350 17 G/17G
17 POWDER, FOR SOLUTION ORAL DAILY PRN
Qty: 527 G | Refills: 1
Start: 2022-01-01 | End: 2022-08-13

## 2022-01-01 RX ORDER — SODIUM CHLORIDE 9 MG/ML
INJECTION, SOLUTION INTRAVENOUS PRN
Status: DISCONTINUED | OUTPATIENT
Start: 2022-01-01 | End: 2022-01-01 | Stop reason: HOSPADM

## 2022-01-01 RX ORDER — HYDRALAZINE HYDROCHLORIDE 25 MG/1
37.5 TABLET, FILM COATED ORAL EVERY 8 HOURS SCHEDULED
Status: DISCONTINUED | OUTPATIENT
Start: 2022-01-01 | End: 2022-01-01

## 2022-01-01 RX ORDER — LORAZEPAM 1 MG/1
1 TABLET ORAL 3 TIMES DAILY
Status: ON HOLD | COMMUNITY
Start: 2022-01-01 | End: 2022-01-01

## 2022-01-01 RX ORDER — HEPARIN SODIUM 5000 [USP'U]/ML
5000 INJECTION, SOLUTION INTRAVENOUS; SUBCUTANEOUS EVERY 8 HOURS SCHEDULED
Status: DISCONTINUED | OUTPATIENT
Start: 2022-01-01 | End: 2022-01-01

## 2022-01-01 RX ORDER — FUROSEMIDE 10 MG/ML
20 INJECTION INTRAMUSCULAR; INTRAVENOUS ONCE
Status: COMPLETED | OUTPATIENT
Start: 2022-01-01 | End: 2022-01-01

## 2022-01-01 RX ORDER — INSULIN GLARGINE 100 [IU]/ML
0.25 INJECTION, SOLUTION SUBCUTANEOUS NIGHTLY
Status: DISCONTINUED | OUTPATIENT
Start: 2022-01-01 | End: 2022-01-01 | Stop reason: HOSPADM

## 2022-01-01 RX ORDER — ISOSORBIDE DINITRATE 20 MG/1
20 TABLET ORAL 3 TIMES DAILY
Status: DISCONTINUED | OUTPATIENT
Start: 2022-01-01 | End: 2022-01-01

## 2022-01-01 RX ORDER — DEXAMETHASONE SODIUM PHOSPHATE 10 MG/ML
6 INJECTION, SOLUTION INTRAMUSCULAR; INTRAVENOUS DAILY
Status: DISCONTINUED | OUTPATIENT
Start: 2022-01-01 | End: 2022-01-01 | Stop reason: HOSPADM

## 2022-01-01 RX ORDER — CALCIUM CHLORIDE 100 MG/ML
INJECTION INTRAVENOUS; INTRAVENTRICULAR DAILY PRN
Status: COMPLETED | OUTPATIENT
Start: 2022-01-01 | End: 2022-01-01

## 2022-01-01 RX ORDER — ASPIRIN 325 MG
325 TABLET ORAL ONCE
Status: COMPLETED | OUTPATIENT
Start: 2022-01-01 | End: 2022-01-01

## 2022-01-01 RX ADMIN — ISOSORBIDE DINITRATE 20 MG: 20 TABLET ORAL at 13:18

## 2022-01-01 RX ADMIN — HYDRALAZINE HYDROCHLORIDE 37.5 MG: 25 TABLET, FILM COATED ORAL at 14:33

## 2022-01-01 RX ADMIN — SODIUM CHLORIDE, PRESERVATIVE FREE 10 ML: 5 INJECTION INTRAVENOUS at 20:26

## 2022-01-01 RX ADMIN — ONDANSETRON 4 MG: 4 TABLET, ORALLY DISINTEGRATING ORAL at 02:31

## 2022-01-01 RX ADMIN — HYDRALAZINE HYDROCHLORIDE 37.5 MG: 25 TABLET, FILM COATED ORAL at 21:32

## 2022-01-01 RX ADMIN — HEPARIN SODIUM 5000 UNITS: 5000 INJECTION INTRAVENOUS; SUBCUTANEOUS at 05:22

## 2022-01-01 RX ADMIN — CEFEPIME 1000 MG: 1 INJECTION, POWDER, FOR SOLUTION INTRAMUSCULAR; INTRAVENOUS at 11:31

## 2022-01-01 RX ADMIN — VANCOMYCIN HYDROCHLORIDE 750 MG: 750 INJECTION, POWDER, LYOPHILIZED, FOR SOLUTION INTRAVENOUS at 14:57

## 2022-01-01 RX ADMIN — APIXABAN 5 MG: 5 TABLET, FILM COATED ORAL at 21:00

## 2022-01-01 RX ADMIN — METOPROLOL SUCCINATE 50 MG: 50 TABLET, EXTENDED RELEASE ORAL at 09:13

## 2022-01-01 RX ADMIN — ATORVASTATIN CALCIUM 40 MG: 40 TABLET, FILM COATED ORAL at 22:04

## 2022-01-01 RX ADMIN — Medication: at 12:29

## 2022-01-01 RX ADMIN — Medication 2 PUFF: at 22:58

## 2022-01-01 RX ADMIN — LORAZEPAM 1 MG: 1 TABLET ORAL at 15:16

## 2022-01-01 RX ADMIN — LORAZEPAM 1 MG: 1 TABLET ORAL at 21:28

## 2022-01-01 RX ADMIN — LORAZEPAM 1 MG: 1 TABLET ORAL at 21:32

## 2022-01-01 RX ADMIN — HEPARIN SODIUM 5000 UNITS: 5000 INJECTION INTRAVENOUS; SUBCUTANEOUS at 05:15

## 2022-01-01 RX ADMIN — SODIUM CHLORIDE, PRESERVATIVE FREE 10 ML: 5 INJECTION INTRAVENOUS at 22:05

## 2022-01-01 RX ADMIN — ASPIRIN 81 MG 81 MG: 81 TABLET ORAL at 08:15

## 2022-01-01 RX ADMIN — INSULIN GLARGINE 25 UNITS: 100 INJECTION, SOLUTION SUBCUTANEOUS at 21:01

## 2022-01-01 RX ADMIN — Medication 2 PUFF: at 07:30

## 2022-01-01 RX ADMIN — HEPARIN SODIUM 2000 UNITS: 1000 INJECTION, SOLUTION INTRAVENOUS; SUBCUTANEOUS at 14:06

## 2022-01-01 RX ADMIN — CALCIUM GLUCONATE 4000 MG: 98 INJECTION, SOLUTION INTRAVENOUS at 11:52

## 2022-01-01 RX ADMIN — DEXTROSE MONOHYDRATE 250 ML: 100 INJECTION, SOLUTION INTRAVENOUS at 00:15

## 2022-01-01 RX ADMIN — DEXAMETHASONE SODIUM PHOSPHATE 6 MG: 10 INJECTION INTRAMUSCULAR; INTRAVENOUS at 12:30

## 2022-01-01 RX ADMIN — ISOSORBIDE DINITRATE 20 MG: 20 TABLET ORAL at 21:28

## 2022-01-01 RX ADMIN — ASPIRIN 81 MG: 81 TABLET, CHEWABLE ORAL at 10:09

## 2022-01-01 RX ADMIN — HYDRALAZINE HYDROCHLORIDE 37.5 MG: 25 TABLET, FILM COATED ORAL at 09:41

## 2022-01-01 RX ADMIN — Medication 2 PUFF: at 15:51

## 2022-01-01 RX ADMIN — ASPIRIN 81 MG: 81 TABLET, CHEWABLE ORAL at 13:11

## 2022-01-01 RX ADMIN — HYDRALAZINE HYDROCHLORIDE 37.5 MG: 25 TABLET, FILM COATED ORAL at 20:28

## 2022-01-01 RX ADMIN — HYDRALAZINE HYDROCHLORIDE 37.5 MG: 25 TABLET, FILM COATED ORAL at 14:50

## 2022-01-01 RX ADMIN — ATORVASTATIN CALCIUM 40 MG: 40 TABLET, FILM COATED ORAL at 20:51

## 2022-01-01 RX ADMIN — CEFEPIME HYDROCHLORIDE 1000 MG: 1 INJECTION, POWDER, FOR SOLUTION INTRAMUSCULAR; INTRAVENOUS at 21:01

## 2022-01-01 RX ADMIN — SODIUM CHLORIDE, PRESERVATIVE FREE 10 ML: 5 INJECTION INTRAVENOUS at 08:36

## 2022-01-01 RX ADMIN — CEFEPIME HYDROCHLORIDE 1000 MG: 1 INJECTION, POWDER, FOR SOLUTION INTRAMUSCULAR; INTRAVENOUS at 21:36

## 2022-01-01 RX ADMIN — ISOSORBIDE DINITRATE 20 MG: 20 TABLET ORAL at 12:20

## 2022-01-01 RX ADMIN — HYDRALAZINE HYDROCHLORIDE 37.5 MG: 25 TABLET, FILM COATED ORAL at 08:04

## 2022-01-01 RX ADMIN — ASPIRIN 81 MG: 81 TABLET, CHEWABLE ORAL at 08:46

## 2022-01-01 RX ADMIN — HEPARIN SODIUM 5000 UNITS: 5000 INJECTION INTRAVENOUS; SUBCUTANEOUS at 14:00

## 2022-01-01 RX ADMIN — HEPARIN SODIUM 5000 UNITS: 5000 INJECTION INTRAVENOUS; SUBCUTANEOUS at 20:55

## 2022-01-01 RX ADMIN — HEPARIN SODIUM 4000 UNITS: 1000 INJECTION, SOLUTION INTRAVENOUS; SUBCUTANEOUS at 17:07

## 2022-01-01 RX ADMIN — ISOSORBIDE DINITRATE 20 MG: 20 TABLET ORAL at 09:13

## 2022-01-01 RX ADMIN — SODIUM CHLORIDE, PRESERVATIVE FREE 5 ML: 5 INJECTION INTRAVENOUS at 21:03

## 2022-01-01 RX ADMIN — SODIUM CHLORIDE, PRESERVATIVE FREE 10 ML: 5 INJECTION INTRAVENOUS at 13:00

## 2022-01-01 RX ADMIN — ATORVASTATIN CALCIUM 40 MG: 40 TABLET, FILM COATED ORAL at 21:27

## 2022-01-01 RX ADMIN — Medication: at 08:46

## 2022-01-01 RX ADMIN — ASPIRIN 325 MG: 325 TABLET ORAL at 20:16

## 2022-01-01 RX ADMIN — ISOSORBIDE DINITRATE 20 MG: 20 TABLET ORAL at 10:12

## 2022-01-01 RX ADMIN — SODIUM CHLORIDE, PRESERVATIVE FREE 10 ML: 5 INJECTION INTRAVENOUS at 21:36

## 2022-01-01 RX ADMIN — SODIUM CHLORIDE, PRESERVATIVE FREE 10 ML: 5 INJECTION INTRAVENOUS at 10:12

## 2022-01-01 RX ADMIN — ATORVASTATIN CALCIUM 40 MG: 40 TABLET, FILM COATED ORAL at 20:28

## 2022-01-01 RX ADMIN — ONDANSETRON HYDROCHLORIDE 4 MG: 2 INJECTION, SOLUTION INTRAMUSCULAR; INTRAVENOUS at 02:37

## 2022-01-01 RX ADMIN — HEPARIN SODIUM 5000 UNITS: 5000 INJECTION INTRAVENOUS; SUBCUTANEOUS at 20:43

## 2022-01-01 RX ADMIN — Medication: at 09:39

## 2022-01-01 RX ADMIN — HEPARIN SODIUM 5000 UNITS: 5000 INJECTION, SOLUTION INTRAVENOUS; SUBCUTANEOUS at 13:18

## 2022-01-01 RX ADMIN — HYDRALAZINE HYDROCHLORIDE 37.5 MG: 25 TABLET, FILM COATED ORAL at 21:26

## 2022-01-01 RX ADMIN — EPINEPHRINE 0.5 MG: 0.1 INJECTION, SOLUTION ENDOTRACHEAL; INTRACARDIAC; INTRAVENOUS at 04:21

## 2022-01-01 RX ADMIN — CEFEPIME 1000 MG: 1 INJECTION, POWDER, FOR SOLUTION INTRAMUSCULAR; INTRAVENOUS at 09:06

## 2022-01-01 RX ADMIN — EPINEPHRINE 1 MG: 0.1 INJECTION, SOLUTION ENDOTRACHEAL; INTRACARDIAC; INTRAVENOUS at 04:34

## 2022-01-01 RX ADMIN — SODIUM ZIRCONIUM CYCLOSILICATE 5 G: 5 POWDER, FOR SUSPENSION ORAL at 18:16

## 2022-01-01 RX ADMIN — HYDRALAZINE HYDROCHLORIDE 37.5 MG: 25 TABLET, FILM COATED ORAL at 06:24

## 2022-01-01 RX ADMIN — LORAZEPAM 1 MG: 1 TABLET ORAL at 08:58

## 2022-01-01 RX ADMIN — CALCIUM CARBONATE (ANTACID) CHEW TAB 500 MG 1000 MG: 500 CHEW TAB at 08:59

## 2022-01-01 RX ADMIN — INSULIN GLARGINE 25 UNITS: 100 INJECTION, SOLUTION SUBCUTANEOUS at 21:04

## 2022-01-01 RX ADMIN — HYDRALAZINE HYDROCHLORIDE 37.5 MG: 25 TABLET, FILM COATED ORAL at 08:57

## 2022-01-01 RX ADMIN — ISOSORBIDE DINITRATE 20 MG: 20 TABLET ORAL at 09:41

## 2022-01-01 RX ADMIN — CALCIUM GLUCONATE 2000 MG: 98 INJECTION, SOLUTION INTRAVENOUS at 12:15

## 2022-01-01 RX ADMIN — HYDRALAZINE HYDROCHLORIDE 37.5 MG: 25 TABLET, FILM COATED ORAL at 13:01

## 2022-01-01 RX ADMIN — ISOSORBIDE DINITRATE 20 MG: 20 TABLET ORAL at 14:50

## 2022-01-01 RX ADMIN — HYDRALAZINE HYDROCHLORIDE 37.5 MG: 25 TABLET, FILM COATED ORAL at 13:55

## 2022-01-01 RX ADMIN — ATORVASTATIN CALCIUM 40 MG: 40 TABLET, FILM COATED ORAL at 20:43

## 2022-01-01 RX ADMIN — METOPROLOL SUCCINATE 50 MG: 50 TABLET, EXTENDED RELEASE ORAL at 08:58

## 2022-01-01 RX ADMIN — ISOSORBIDE DINITRATE 20 MG: 20 TABLET ORAL at 20:46

## 2022-01-01 RX ADMIN — SODIUM CHLORIDE, PRESERVATIVE FREE 10 ML: 5 INJECTION INTRAVENOUS at 20:29

## 2022-01-01 RX ADMIN — ISOSORBIDE DINITRATE 20 MG: 20 TABLET ORAL at 21:51

## 2022-01-01 RX ADMIN — Medication 2 PUFF: at 07:42

## 2022-01-01 RX ADMIN — ONDANSETRON 4 MG: 4 TABLET, ORALLY DISINTEGRATING ORAL at 02:04

## 2022-01-01 RX ADMIN — METOPROLOL SUCCINATE 25 MG: 25 TABLET, FILM COATED, EXTENDED RELEASE ORAL at 13:11

## 2022-01-01 RX ADMIN — SODIUM ZIRCONIUM CYCLOSILICATE 10 G: 10 POWDER, FOR SUSPENSION ORAL at 10:11

## 2022-01-01 RX ADMIN — SODIUM CHLORIDE, PRESERVATIVE FREE 10 ML: 5 INJECTION INTRAVENOUS at 09:00

## 2022-01-01 RX ADMIN — Medication 2 PUFF: at 08:08

## 2022-01-01 RX ADMIN — DEXAMETHASONE SODIUM PHOSPHATE 6 MG: 10 INJECTION INTRAMUSCULAR; INTRAVENOUS at 11:37

## 2022-01-01 RX ADMIN — HEPARIN SODIUM 2000 UNITS: 1000 INJECTION, SOLUTION INTRAVENOUS; SUBCUTANEOUS at 21:46

## 2022-01-01 RX ADMIN — INSULIN GLARGINE 25 UNITS: 100 INJECTION, SOLUTION SUBCUTANEOUS at 21:24

## 2022-01-01 RX ADMIN — LORAZEPAM 1 MG: 1 TABLET ORAL at 21:51

## 2022-01-01 RX ADMIN — ISOSORBIDE DINITRATE 20 MG: 20 TABLET ORAL at 08:58

## 2022-01-01 RX ADMIN — IPRATROPIUM BROMIDE AND ALBUTEROL SULFATE 3 ML: 2.5; .5 SOLUTION RESPIRATORY (INHALATION) at 21:51

## 2022-01-01 RX ADMIN — DEXAMETHASONE SODIUM PHOSPHATE 6 MG: 10 INJECTION INTRAMUSCULAR; INTRAVENOUS at 09:39

## 2022-01-01 RX ADMIN — VANCOMYCIN HYDROCHLORIDE 750 MG: 750 INJECTION, POWDER, LYOPHILIZED, FOR SOLUTION INTRAVENOUS at 11:40

## 2022-01-01 RX ADMIN — Medication: at 10:12

## 2022-01-01 RX ADMIN — ACETAMINOPHEN 650 MG: 325 TABLET ORAL at 13:18

## 2022-01-01 RX ADMIN — Medication 2 PUFF: at 20:11

## 2022-01-01 RX ADMIN — Medication 5 MG: at 00:32

## 2022-01-01 RX ADMIN — SODIUM CHLORIDE, PRESERVATIVE FREE 10 ML: 5 INJECTION INTRAVENOUS at 20:44

## 2022-01-01 RX ADMIN — EPOETIN ALFA-EPBX 10000 UNITS: 10000 INJECTION, SOLUTION INTRAVENOUS; SUBCUTANEOUS at 11:19

## 2022-01-01 RX ADMIN — ISOSORBIDE DINITRATE 20 MG: 20 TABLET ORAL at 10:10

## 2022-01-01 RX ADMIN — Medication: at 18:31

## 2022-01-01 RX ADMIN — VANCOMYCIN HYDROCHLORIDE 750 MG: 750 INJECTION, POWDER, LYOPHILIZED, FOR SOLUTION INTRAVENOUS at 13:15

## 2022-01-01 RX ADMIN — SODIUM CHLORIDE, PRESERVATIVE FREE 10 ML: 5 INJECTION INTRAVENOUS at 21:01

## 2022-01-01 RX ADMIN — HYDRALAZINE HYDROCHLORIDE 37.5 MG: 25 TABLET, FILM COATED ORAL at 13:18

## 2022-01-01 RX ADMIN — Medication: at 07:59

## 2022-01-01 RX ADMIN — ATORVASTATIN CALCIUM 40 MG: 40 TABLET, FILM COATED ORAL at 20:45

## 2022-01-01 RX ADMIN — DEXAMETHASONE SODIUM PHOSPHATE 6 MG: 10 INJECTION INTRAMUSCULAR; INTRAVENOUS at 13:03

## 2022-01-01 RX ADMIN — INSULIN LISPRO 1 UNITS: 100 INJECTION, SOLUTION INTRAVENOUS; SUBCUTANEOUS at 17:19

## 2022-01-01 RX ADMIN — ISOSORBIDE DINITRATE 20 MG: 20 TABLET ORAL at 21:32

## 2022-01-01 RX ADMIN — SODIUM BICARBONATE 50 MEQ: 84 INJECTION, SOLUTION INTRAVENOUS at 04:00

## 2022-01-01 RX ADMIN — ASPIRIN 325 MG: 325 TABLET, COATED ORAL at 18:24

## 2022-01-01 RX ADMIN — FUROSEMIDE 80 MG: 10 INJECTION, SOLUTION INTRAVENOUS at 22:58

## 2022-01-01 RX ADMIN — HEPARIN SODIUM 5000 UNITS: 5000 INJECTION INTRAVENOUS; SUBCUTANEOUS at 04:57

## 2022-01-01 RX ADMIN — SODIUM CHLORIDE, PRESERVATIVE FREE 10 ML: 5 INJECTION INTRAVENOUS at 20:30

## 2022-01-01 RX ADMIN — EPOETIN ALFA-EPBX 6000 UNITS: 10000 INJECTION, SOLUTION INTRAVENOUS; SUBCUTANEOUS at 09:26

## 2022-01-01 RX ADMIN — EPINEPHRINE 1 MG: 0.1 INJECTION, SOLUTION ENDOTRACHEAL; INTRACARDIAC; INTRAVENOUS at 03:47

## 2022-01-01 RX ADMIN — SODIUM CHLORIDE, PRESERVATIVE FREE 10 ML: 5 INJECTION INTRAVENOUS at 12:22

## 2022-01-01 RX ADMIN — HYDRALAZINE HYDROCHLORIDE 37.5 MG: 25 TABLET, FILM COATED ORAL at 15:18

## 2022-01-01 RX ADMIN — ISOSORBIDE DINITRATE 20 MG: 20 TABLET ORAL at 15:05

## 2022-01-01 RX ADMIN — CALCIUM GLUCONATE 4000 MG: 98 INJECTION, SOLUTION INTRAVENOUS at 13:25

## 2022-01-01 RX ADMIN — Medication 2 PUFF: at 23:24

## 2022-01-01 RX ADMIN — APIXABAN 5 MG: 5 TABLET, FILM COATED ORAL at 21:28

## 2022-01-01 RX ADMIN — CALCIUM CARBONATE (ANTACID) CHEW TAB 500 MG 1000 MG: 500 CHEW TAB at 11:41

## 2022-01-01 RX ADMIN — METOPROLOL SUCCINATE 25 MG: 25 TABLET, FILM COATED, EXTENDED RELEASE ORAL at 10:14

## 2022-01-01 RX ADMIN — Medication 6.8 MILLICURIE: at 00:43

## 2022-01-01 RX ADMIN — CEFEPIME 2000 MG: 2 INJECTION, POWDER, FOR SOLUTION INTRAVENOUS at 19:53

## 2022-01-01 RX ADMIN — HEPARIN SODIUM 1000 UNITS/HR: 10000 INJECTION INTRAVENOUS; SUBCUTANEOUS at 17:11

## 2022-01-01 RX ADMIN — MAGNESIUM SULFATE IN WATER 2000 MG: 40 INJECTION, SOLUTION INTRAVENOUS at 09:20

## 2022-01-01 RX ADMIN — HYDRALAZINE HYDROCHLORIDE 37.5 MG: 25 TABLET, FILM COATED ORAL at 21:00

## 2022-01-01 RX ADMIN — INSULIN GLARGINE 25 UNITS: 100 INJECTION, SOLUTION SUBCUTANEOUS at 21:52

## 2022-01-01 RX ADMIN — SODIUM CHLORIDE, PRESERVATIVE FREE 10 ML: 5 INJECTION INTRAVENOUS at 21:52

## 2022-01-01 RX ADMIN — ISOSORBIDE DINITRATE 20 MG: 20 TABLET ORAL at 21:00

## 2022-01-01 RX ADMIN — HYDRALAZINE HYDROCHLORIDE 37.5 MG: 25 TABLET, FILM COATED ORAL at 04:57

## 2022-01-01 RX ADMIN — HEPARIN SODIUM 5000 UNITS: 5000 INJECTION INTRAVENOUS; SUBCUTANEOUS at 20:47

## 2022-01-01 RX ADMIN — METOPROLOL SUCCINATE 50 MG: 50 TABLET, EXTENDED RELEASE ORAL at 12:29

## 2022-01-01 RX ADMIN — HEPARIN SODIUM 5000 UNITS: 5000 INJECTION INTRAVENOUS; SUBCUTANEOUS at 15:18

## 2022-01-01 RX ADMIN — APIXABAN 5 MG: 5 TABLET, FILM COATED ORAL at 08:00

## 2022-01-01 RX ADMIN — ISOSORBIDE DINITRATE 20 MG: 20 TABLET ORAL at 13:55

## 2022-01-01 RX ADMIN — EPOETIN ALFA-EPBX 6000 UNITS: 3000 INJECTION, SOLUTION INTRAVENOUS; SUBCUTANEOUS at 10:14

## 2022-01-01 RX ADMIN — ASPIRIN 81 MG: 81 TABLET, CHEWABLE ORAL at 10:12

## 2022-01-01 RX ADMIN — ALBUTEROL SULFATE 2.5 MG: 2.5 SOLUTION RESPIRATORY (INHALATION) at 22:36

## 2022-01-01 RX ADMIN — SODIUM CHLORIDE, PRESERVATIVE FREE 10 ML: 5 INJECTION INTRAVENOUS at 11:45

## 2022-01-01 RX ADMIN — HYDRALAZINE HYDROCHLORIDE 37.5 MG: 25 TABLET, FILM COATED ORAL at 13:27

## 2022-01-01 RX ADMIN — HEPARIN SODIUM 5000 UNITS: 5000 INJECTION INTRAVENOUS; SUBCUTANEOUS at 05:16

## 2022-01-01 RX ADMIN — EPINEPHRINE 0.5 MG: 0.1 INJECTION, SOLUTION ENDOTRACHEAL; INTRACARDIAC; INTRAVENOUS at 04:02

## 2022-01-01 RX ADMIN — HYDRALAZINE HYDROCHLORIDE 37.5 MG: 25 TABLET, FILM COATED ORAL at 08:15

## 2022-01-01 RX ADMIN — SODIUM CHLORIDE, PRESERVATIVE FREE 5 ML: 5 INJECTION INTRAVENOUS at 00:59

## 2022-01-01 RX ADMIN — Medication 5 MG: at 22:40

## 2022-01-01 RX ADMIN — METOPROLOL SUCCINATE 25 MG: 25 TABLET, FILM COATED, EXTENDED RELEASE ORAL at 12:20

## 2022-01-01 RX ADMIN — ISOSORBIDE DINITRATE 20 MG: 20 TABLET ORAL at 18:56

## 2022-01-01 RX ADMIN — SODIUM CHLORIDE, PRESERVATIVE FREE 10 ML: 5 INJECTION INTRAVENOUS at 21:05

## 2022-01-01 RX ADMIN — ACETAMINOPHEN 325MG 650 MG: 325 TABLET ORAL at 06:21

## 2022-01-01 RX ADMIN — EPINEPHRINE 0.5 MG: 0.1 INJECTION, SOLUTION ENDOTRACHEAL; INTRACARDIAC; INTRAVENOUS at 04:10

## 2022-01-01 RX ADMIN — Medication 2 PUFF: at 07:23

## 2022-01-01 RX ADMIN — HEPARIN SODIUM 5000 UNITS: 5000 INJECTION INTRAVENOUS; SUBCUTANEOUS at 20:26

## 2022-01-01 RX ADMIN — SODIUM CHLORIDE, PRESERVATIVE FREE 10 ML: 5 INJECTION INTRAVENOUS at 20:51

## 2022-01-01 RX ADMIN — CEFEPIME HYDROCHLORIDE 1000 MG: 1 INJECTION, POWDER, FOR SOLUTION INTRAMUSCULAR; INTRAVENOUS at 21:29

## 2022-01-01 RX ADMIN — ATORVASTATIN CALCIUM 40 MG: 40 TABLET, FILM COATED ORAL at 20:55

## 2022-01-01 RX ADMIN — APIXABAN 5 MG: 5 TABLET, FILM COATED ORAL at 08:58

## 2022-01-01 RX ADMIN — ISOSORBIDE DINITRATE 20 MG: 20 TABLET ORAL at 21:24

## 2022-01-01 RX ADMIN — Medication 2 PUFF: at 07:10

## 2022-01-01 RX ADMIN — Medication 10 MG: at 02:56

## 2022-01-01 RX ADMIN — HYDRALAZINE HYDROCHLORIDE 37.5 MG: 25 TABLET, FILM COATED ORAL at 15:05

## 2022-01-01 RX ADMIN — ISOSORBIDE DINITRATE 20 MG: 20 TABLET ORAL at 13:27

## 2022-01-01 RX ADMIN — HEPARIN SODIUM 5000 UNITS: 5000 INJECTION INTRAVENOUS; SUBCUTANEOUS at 15:39

## 2022-01-01 RX ADMIN — HYDRALAZINE HYDROCHLORIDE 37.5 MG: 25 TABLET, FILM COATED ORAL at 08:00

## 2022-01-01 RX ADMIN — HEPARIN SODIUM 1570 UNITS/HR: 10000 INJECTION INTRAVENOUS; SUBCUTANEOUS at 14:36

## 2022-01-01 RX ADMIN — INSULIN GLARGINE 25 UNITS: 100 INJECTION, SOLUTION SUBCUTANEOUS at 21:36

## 2022-01-01 RX ADMIN — ATORVASTATIN CALCIUM 40 MG: 40 TABLET, FILM COATED ORAL at 21:24

## 2022-01-01 RX ADMIN — ASPIRIN 81 MG: 81 TABLET, CHEWABLE ORAL at 13:03

## 2022-01-01 RX ADMIN — LORAZEPAM 1 MG: 1 TABLET ORAL at 17:07

## 2022-01-01 RX ADMIN — ONDANSETRON HYDROCHLORIDE 4 MG: 2 INJECTION, SOLUTION INTRAMUSCULAR; INTRAVENOUS at 00:52

## 2022-01-01 RX ADMIN — ISOSORBIDE DINITRATE 20 MG: 20 TABLET ORAL at 08:15

## 2022-01-01 RX ADMIN — METOPROLOL SUCCINATE 25 MG: 25 TABLET, EXTENDED RELEASE ORAL at 15:36

## 2022-01-01 RX ADMIN — FUROSEMIDE 40 MG: 10 INJECTION, SOLUTION INTRAMUSCULAR; INTRAVENOUS at 18:18

## 2022-01-01 RX ADMIN — HEPARIN SODIUM 5000 UNITS: 5000 INJECTION INTRAVENOUS; SUBCUTANEOUS at 15:36

## 2022-01-01 RX ADMIN — ASPIRIN 81 MG 81 MG: 81 TABLET ORAL at 09:13

## 2022-01-01 RX ADMIN — HYDRALAZINE HYDROCHLORIDE 37.5 MG: 25 TABLET, FILM COATED ORAL at 15:16

## 2022-01-01 RX ADMIN — Medication 5 MG: at 20:43

## 2022-01-01 RX ADMIN — HEPARIN SODIUM 5000 UNITS: 5000 INJECTION INTRAVENOUS; SUBCUTANEOUS at 15:05

## 2022-01-01 RX ADMIN — ALTEPLASE 3.6 MG: 2.2 INJECTION, POWDER, LYOPHILIZED, FOR SOLUTION INTRAVENOUS at 02:43

## 2022-01-01 RX ADMIN — ASPIRIN 81 MG: 81 TABLET, CHEWABLE ORAL at 11:45

## 2022-01-01 RX ADMIN — ONDANSETRON HYDROCHLORIDE 4 MG: 2 INJECTION, SOLUTION INTRAMUSCULAR; INTRAVENOUS at 20:30

## 2022-01-01 RX ADMIN — FUROSEMIDE 20 MG: 10 INJECTION, SOLUTION INTRAMUSCULAR; INTRAVENOUS at 22:55

## 2022-01-01 RX ADMIN — LORAZEPAM 1 MG: 1 TABLET ORAL at 13:27

## 2022-01-01 RX ADMIN — ISOSORBIDE DINITRATE 20 MG: 20 TABLET ORAL at 15:19

## 2022-01-01 RX ADMIN — MORPHINE SULFATE 4 MG: 4 INJECTION, SOLUTION INTRAMUSCULAR; INTRAVENOUS at 02:38

## 2022-01-01 RX ADMIN — HEPARIN SODIUM 3600 UNITS: 1000 INJECTION INTRAVENOUS; SUBCUTANEOUS at 11:35

## 2022-01-01 RX ADMIN — HYDRALAZINE HYDROCHLORIDE 37.5 MG: 25 TABLET, FILM COATED ORAL at 15:36

## 2022-01-01 RX ADMIN — SODIUM CHLORIDE, PRESERVATIVE FREE 10 ML: 5 INJECTION INTRAVENOUS at 09:17

## 2022-01-01 RX ADMIN — EPOETIN ALFA-EPBX 6000 UNITS: 10000 INJECTION, SOLUTION INTRAVENOUS; SUBCUTANEOUS at 09:28

## 2022-01-01 RX ADMIN — METOPROLOL SUCCINATE 50 MG: 50 TABLET, EXTENDED RELEASE ORAL at 11:36

## 2022-01-01 RX ADMIN — Medication: at 08:57

## 2022-01-01 RX ADMIN — Medication 2 PUFF: at 18:56

## 2022-01-01 RX ADMIN — DEXTROSE MONOHYDRATE 250 ML: 100 INJECTION, SOLUTION INTRAVENOUS at 23:16

## 2022-01-01 RX ADMIN — ISOSORBIDE DINITRATE 20 MG: 20 TABLET ORAL at 15:36

## 2022-01-01 RX ADMIN — CALCIUM CHLORIDE 1000 MG: 100 INJECTION, SOLUTION INTRAVENOUS; INTRAVENTRICULAR at 03:46

## 2022-01-01 RX ADMIN — METOPROLOL SUCCINATE 25 MG: 25 TABLET, FILM COATED, EXTENDED RELEASE ORAL at 11:45

## 2022-01-01 RX ADMIN — ACETAMINOPHEN 325MG 650 MG: 325 TABLET ORAL at 20:42

## 2022-01-01 RX ADMIN — HEPARIN SODIUM 3600 UNITS: 1000 INJECTION INTRAVENOUS; SUBCUTANEOUS at 09:26

## 2022-01-01 RX ADMIN — SODIUM ZIRCONIUM CYCLOSILICATE 5 G: 5 POWDER, FOR SUSPENSION ORAL at 02:59

## 2022-01-01 RX ADMIN — ISOSORBIDE DINITRATE 20 MG: 20 TABLET ORAL at 14:33

## 2022-01-01 RX ADMIN — ATORVASTATIN CALCIUM 40 MG: 40 TABLET, FILM COATED ORAL at 21:51

## 2022-01-01 RX ADMIN — HYDROXYZINE HYDROCHLORIDE 25 MG: 25 TABLET ORAL at 20:36

## 2022-01-01 RX ADMIN — APIXABAN 5 MG: 5 TABLET, FILM COATED ORAL at 21:52

## 2022-01-01 RX ADMIN — SODIUM CHLORIDE, PRESERVATIVE FREE 10 ML: 5 INJECTION INTRAVENOUS at 08:06

## 2022-01-01 RX ADMIN — METOPROLOL SUCCINATE 25 MG: 25 TABLET, FILM COATED, EXTENDED RELEASE ORAL at 13:03

## 2022-01-01 RX ADMIN — ASPIRIN 81 MG: 81 TABLET, CHEWABLE ORAL at 12:20

## 2022-01-01 RX ADMIN — SODIUM CHLORIDE, PRESERVATIVE FREE 10 ML: 5 INJECTION INTRAVENOUS at 09:38

## 2022-01-01 RX ADMIN — CEFEPIME HYDROCHLORIDE 1000 MG: 1 INJECTION, POWDER, FOR SOLUTION INTRAMUSCULAR; INTRAVENOUS at 21:33

## 2022-01-01 RX ADMIN — METOPROLOL SUCCINATE 25 MG: 25 TABLET, FILM COATED, EXTENDED RELEASE ORAL at 10:09

## 2022-01-01 RX ADMIN — CALCIUM GLUCONATE 1000 MG: 98 INJECTION, SOLUTION INTRAVENOUS at 18:23

## 2022-01-01 RX ADMIN — Medication: at 08:48

## 2022-01-01 RX ADMIN — ACETAMINOPHEN 325MG 650 MG: 325 TABLET ORAL at 01:30

## 2022-01-01 RX ADMIN — EPINEPHRINE 1 MG: 0.1 INJECTION, SOLUTION ENDOTRACHEAL; INTRACARDIAC; INTRAVENOUS at 03:43

## 2022-01-01 RX ADMIN — LORAZEPAM 1 MG: 1 TABLET ORAL at 08:00

## 2022-01-01 RX ADMIN — LORAZEPAM 1 MG: 1 TABLET ORAL at 13:02

## 2022-01-01 RX ADMIN — VANCOMYCIN HYDROCHLORIDE 1250 MG: 10 INJECTION, POWDER, LYOPHILIZED, FOR SOLUTION INTRAVENOUS at 04:51

## 2022-01-01 RX ADMIN — Medication: at 12:21

## 2022-01-01 RX ADMIN — HYDRALAZINE HYDROCHLORIDE 37.5 MG: 25 TABLET, FILM COATED ORAL at 21:51

## 2022-01-01 RX ADMIN — ATORVASTATIN CALCIUM 40 MG: 40 TABLET, FILM COATED ORAL at 21:00

## 2022-01-01 RX ADMIN — HEPARIN SODIUM 5000 UNITS: 5000 INJECTION, SOLUTION INTRAVENOUS; SUBCUTANEOUS at 00:53

## 2022-01-01 RX ADMIN — HYDRALAZINE HYDROCHLORIDE 37.5 MG: 25 TABLET, FILM COATED ORAL at 21:24

## 2022-01-01 RX ADMIN — HEPARIN SODIUM 5000 UNITS: 5000 INJECTION INTRAVENOUS; SUBCUTANEOUS at 22:05

## 2022-01-01 RX ADMIN — ACETAMINOPHEN 325MG 650 MG: 325 TABLET ORAL at 23:31

## 2022-01-01 RX ADMIN — Medication: at 10:11

## 2022-01-01 RX ADMIN — XENON XE-133 11.9 MILLICURIE: 10 GAS RESPIRATORY (INHALATION) at 00:43

## 2022-01-01 RX ADMIN — SODIUM BICARBONATE 50 MEQ: 84 INJECTION, SOLUTION INTRAVENOUS at 03:48

## 2022-01-01 RX ADMIN — APIXABAN 5 MG: 5 TABLET, FILM COATED ORAL at 13:01

## 2022-01-01 RX ADMIN — Medication 2 PUFF: at 19:07

## 2022-01-01 RX ADMIN — ISOSORBIDE DINITRATE 20 MG: 20 TABLET ORAL at 08:05

## 2022-01-01 RX ADMIN — LORAZEPAM 1 MG: 1 TABLET ORAL at 21:24

## 2022-01-01 RX ADMIN — HEPARIN SODIUM 3600 UNITS: 1000 INJECTION INTRAVENOUS; SUBCUTANEOUS at 05:35

## 2022-01-01 RX ADMIN — ACETAMINOPHEN 650 MG: 325 TABLET ORAL at 20:28

## 2022-01-01 RX ADMIN — ISOSORBIDE DINITRATE 20 MG: 20 TABLET ORAL at 20:55

## 2022-01-01 RX ADMIN — Medication 2 PUFF: at 15:00

## 2022-01-01 RX ADMIN — HYDRALAZINE HYDROCHLORIDE 37.5 MG: 25 TABLET, FILM COATED ORAL at 13:52

## 2022-01-01 RX ADMIN — Medication 2 PUFF: at 08:10

## 2022-01-01 RX ADMIN — IPRATROPIUM BROMIDE AND ALBUTEROL SULFATE 3 ML: 2.5; .5 SOLUTION RESPIRATORY (INHALATION) at 21:20

## 2022-01-01 RX ADMIN — ATORVASTATIN CALCIUM 40 MG: 40 TABLET, FILM COATED ORAL at 20:42

## 2022-01-01 RX ADMIN — METOPROLOL SUCCINATE 50 MG: 50 TABLET, EXTENDED RELEASE ORAL at 08:05

## 2022-01-01 RX ADMIN — ISOSORBIDE DINITRATE 20 MG: 20 TABLET ORAL at 20:25

## 2022-01-01 RX ADMIN — ATORVASTATIN CALCIUM 40 MG: 40 TABLET, FILM COATED ORAL at 20:25

## 2022-01-01 RX ADMIN — Medication 2 PUFF: at 19:26

## 2022-01-01 RX ADMIN — SODIUM CHLORIDE, PRESERVATIVE FREE 10 ML: 5 INJECTION INTRAVENOUS at 20:57

## 2022-01-01 RX ADMIN — IPRATROPIUM BROMIDE AND ALBUTEROL SULFATE 1 AMPULE: 2.5; .5 SOLUTION RESPIRATORY (INHALATION) at 03:07

## 2022-01-01 RX ADMIN — LORAZEPAM 1 MG: 1 TABLET ORAL at 21:00

## 2022-01-01 RX ADMIN — LORAZEPAM 1 MG: 1 TABLET ORAL at 20:57

## 2022-01-01 RX ADMIN — HEPARIN SODIUM 5000 UNITS: 5000 INJECTION, SOLUTION INTRAVENOUS; SUBCUTANEOUS at 06:30

## 2022-01-01 RX ADMIN — DEXTROSE MONOHYDRATE 25 G: 25 INJECTION, SOLUTION INTRAVENOUS at 04:35

## 2022-01-01 RX ADMIN — ATORVASTATIN CALCIUM 40 MG: 40 TABLET, FILM COATED ORAL at 20:29

## 2022-01-01 RX ADMIN — Medication 2 PUFF: at 20:29

## 2022-01-01 RX ADMIN — HEPARIN SODIUM 5000 UNITS: 5000 INJECTION INTRAVENOUS; SUBCUTANEOUS at 15:59

## 2022-01-01 RX ADMIN — Medication 2 PUFF: at 07:43

## 2022-01-01 RX ADMIN — METOPROLOL SUCCINATE 25 MG: 25 TABLET, FILM COATED, EXTENDED RELEASE ORAL at 15:39

## 2022-01-01 RX ADMIN — DEXAMETHASONE SODIUM PHOSPHATE 6 MG: 10 INJECTION INTRAMUSCULAR; INTRAVENOUS at 08:00

## 2022-01-01 RX ADMIN — Medication 2 PUFF: at 07:51

## 2022-01-01 RX ADMIN — Medication: at 11:42

## 2022-01-01 RX ADMIN — HYDRALAZINE HYDROCHLORIDE 37.5 MG: 25 TABLET, FILM COATED ORAL at 18:56

## 2022-01-01 RX ADMIN — HYDRALAZINE HYDROCHLORIDE 37.5 MG: 25 TABLET, FILM COATED ORAL at 20:25

## 2022-01-01 RX ADMIN — Medication 10 MG: at 20:45

## 2022-01-01 RX ADMIN — DEXAMETHASONE SODIUM PHOSPHATE 6 MG: 10 INJECTION INTRAMUSCULAR; INTRAVENOUS at 08:59

## 2022-01-01 RX ADMIN — ISOSORBIDE DINITRATE 20 MG: 20 TABLET ORAL at 13:10

## 2022-01-01 RX ADMIN — Medication 2 PUFF: at 20:15

## 2022-01-01 RX ADMIN — Medication: at 13:03

## 2022-01-01 RX ADMIN — HEPARIN SODIUM 5000 UNITS: 5000 INJECTION INTRAVENOUS; SUBCUTANEOUS at 08:18

## 2022-01-01 RX ADMIN — SODIUM CHLORIDE, PRESERVATIVE FREE 10 ML: 5 INJECTION INTRAVENOUS at 08:17

## 2022-01-01 RX ADMIN — SODIUM CHLORIDE, PRESERVATIVE FREE 10 ML: 5 INJECTION INTRAVENOUS at 13:26

## 2022-01-01 RX ADMIN — HEPARIN SODIUM 1380 UNITS/HR: 10000 INJECTION INTRAVENOUS; SUBCUTANEOUS at 19:49

## 2022-01-01 RX ADMIN — SODIUM CHLORIDE, PRESERVATIVE FREE 10 ML: 5 INJECTION INTRAVENOUS at 08:01

## 2022-01-01 RX ADMIN — ISOSORBIDE DINITRATE 20 MG: 20 TABLET ORAL at 15:16

## 2022-01-01 RX ADMIN — SODIUM ZIRCONIUM CYCLOSILICATE 5 G: 5 POWDER, FOR SUSPENSION ORAL at 10:06

## 2022-01-01 RX ADMIN — HEPARIN SODIUM 5000 UNITS: 5000 INJECTION INTRAVENOUS; SUBCUTANEOUS at 14:36

## 2022-01-01 RX ADMIN — HYDRALAZINE HYDROCHLORIDE 37.5 MG: 25 TABLET, FILM COATED ORAL at 20:57

## 2022-01-01 RX ADMIN — LORAZEPAM 1 MG: 1 TABLET ORAL at 14:47

## 2022-01-01 RX ADMIN — SODIUM CHLORIDE, PRESERVATIVE FREE 5 ML: 5 INJECTION INTRAVENOUS at 11:49

## 2022-01-01 RX ADMIN — HYDRALAZINE HYDROCHLORIDE 37.5 MG: 25 TABLET, FILM COATED ORAL at 09:13

## 2022-01-01 RX ADMIN — HEPARIN SODIUM 5000 UNITS: 5000 INJECTION INTRAVENOUS; SUBCUTANEOUS at 15:08

## 2022-01-01 RX ADMIN — SODIUM BICARBONATE 50 MEQ: 84 INJECTION INTRAVENOUS at 00:15

## 2022-01-01 RX ADMIN — ONDANSETRON 4 MG: 4 TABLET, ORALLY DISINTEGRATING ORAL at 06:34

## 2022-01-01 RX ADMIN — LORAZEPAM 1 MG: 1 TABLET ORAL at 12:30

## 2022-01-01 RX ADMIN — HEPARIN SODIUM 2000 UNITS: 1000 INJECTION, SOLUTION INTRAVENOUS; SUBCUTANEOUS at 04:02

## 2022-01-01 RX ADMIN — FUROSEMIDE 120 MG: 10 INJECTION, SOLUTION INTRAVENOUS at 19:41

## 2022-01-01 RX ADMIN — WATER 20 ML: 1 INJECTION INTRAMUSCULAR; INTRAVENOUS; SUBCUTANEOUS at 02:43

## 2022-01-01 RX ADMIN — LORAZEPAM 1 MG: 1 TABLET ORAL at 09:39

## 2022-01-01 RX ADMIN — LORAZEPAM 1 MG: 1 TABLET ORAL at 13:53

## 2022-01-01 RX ADMIN — METOPROLOL SUCCINATE 25 MG: 25 TABLET, EXTENDED RELEASE ORAL at 15:18

## 2022-01-01 RX ADMIN — HYDRALAZINE HYDROCHLORIDE 37.5 MG: 25 TABLET, FILM COATED ORAL at 20:44

## 2022-01-01 RX ADMIN — ASPIRIN 81 MG: 81 TABLET, CHEWABLE ORAL at 13:00

## 2022-01-01 RX ADMIN — AMIODARONE HYDROCHLORIDE 300 MG: 50 INJECTION, SOLUTION INTRAVENOUS at 04:08

## 2022-01-01 RX ADMIN — SODIUM CHLORIDE, PRESERVATIVE FREE 10 ML: 5 INJECTION INTRAVENOUS at 08:07

## 2022-01-01 RX ADMIN — CALCIUM GLUCONATE 1000 MG: 20 INJECTION, SOLUTION INTRAVENOUS at 22:55

## 2022-01-01 RX ADMIN — INSULIN HUMAN 5 UNITS: 100 INJECTION, SOLUTION PARENTERAL at 00:22

## 2022-01-01 RX ADMIN — ATORVASTATIN CALCIUM 40 MG: 40 TABLET, FILM COATED ORAL at 20:57

## 2022-01-01 RX ADMIN — ONDANSETRON 4 MG: 4 TABLET, ORALLY DISINTEGRATING ORAL at 23:04

## 2022-01-01 RX ADMIN — HEPARIN SODIUM 5000 UNITS: 5000 INJECTION INTRAVENOUS; SUBCUTANEOUS at 06:50

## 2022-01-01 RX ADMIN — ATORVASTATIN CALCIUM 40 MG: 40 TABLET, FILM COATED ORAL at 21:32

## 2022-01-01 RX ADMIN — ISOSORBIDE DINITRATE 20 MG: 20 TABLET ORAL at 20:57

## 2022-01-01 RX ADMIN — SODIUM CHLORIDE, PRESERVATIVE FREE 10 ML: 5 INJECTION INTRAVENOUS at 21:24

## 2022-01-01 RX ADMIN — HYDRALAZINE HYDROCHLORIDE 37.5 MG: 25 TABLET, FILM COATED ORAL at 05:14

## 2022-01-01 RX ADMIN — HEPARIN SODIUM 5000 UNITS: 5000 INJECTION INTRAVENOUS; SUBCUTANEOUS at 20:28

## 2022-01-01 RX ADMIN — ISOSORBIDE DINITRATE 20 MG: 20 TABLET ORAL at 13:02

## 2022-01-01 ASSESSMENT — PAIN SCALES - GENERAL
PAINLEVEL_OUTOF10: 0
PAINLEVEL_OUTOF10: 10
PAINLEVEL_OUTOF10: 0
PAINLEVEL_OUTOF10: 0
PAINLEVEL_OUTOF10: 2
PAINLEVEL_OUTOF10: 0
PAINLEVEL_OUTOF10: 4
PAINLEVEL_OUTOF10: 0
PAINLEVEL_OUTOF10: 5
PAINLEVEL_OUTOF10: 7

## 2022-01-01 ASSESSMENT — ENCOUNTER SYMPTOMS
SORE THROAT: 0
ABDOMINAL DISTENTION: 1
CHEST TIGHTNESS: 0
ABDOMINAL PAIN: 1
RHINORRHEA: 0
FACIAL SWELLING: 0
ABDOMINAL PAIN: 0
COLOR CHANGE: 0
SHORTNESS OF BREATH: 0
SHORTNESS OF BREATH: 1
NAUSEA: 1
BACK PAIN: 1
VOMITING: 0

## 2022-01-01 ASSESSMENT — PAIN DESCRIPTION - FREQUENCY: FREQUENCY: CONTINUOUS

## 2022-01-01 ASSESSMENT — PAIN - FUNCTIONAL ASSESSMENT
PAIN_FUNCTIONAL_ASSESSMENT: ACTIVITIES ARE NOT PREVENTED
PAIN_FUNCTIONAL_ASSESSMENT: 0-10
PAIN_FUNCTIONAL_ASSESSMENT: 0-10
PAIN_FUNCTIONAL_ASSESSMENT: ACTIVITIES ARE NOT PREVENTED
PAIN_FUNCTIONAL_ASSESSMENT: NONE - DENIES PAIN

## 2022-01-01 ASSESSMENT — PAIN DESCRIPTION - ORIENTATION
ORIENTATION: MID
ORIENTATION: MID;LOWER
ORIENTATION: MID

## 2022-01-01 ASSESSMENT — PAIN DESCRIPTION - LOCATION
LOCATION: ABDOMEN
LOCATION: HEAD;THROAT
LOCATION: HEAD;THROAT
LOCATION: CHEST

## 2022-01-01 ASSESSMENT — PAIN DESCRIPTION - PAIN TYPE: TYPE: ACUTE PAIN

## 2022-01-01 ASSESSMENT — PAIN DESCRIPTION - DESCRIPTORS
DESCRIPTORS: DISCOMFORT;ACHING
DESCRIPTORS: ACHING;SORE
DESCRIPTORS: ACHING;SORE

## 2022-01-01 ASSESSMENT — PAIN SCALES - WONG BAKER: WONGBAKER_NUMERICALRESPONSE: 2

## 2022-01-01 ASSESSMENT — PAIN DESCRIPTION - ONSET: ONSET: AWAKENED FROM SLEEP

## 2022-01-24 NOTE — BRIEF OP NOTE
Brief Postoperative Note    Moraima Jackson  YOB: 1960  [id number]    Pre-operative Diagnosis: Ascites    Post-operative Diagnosis: Same    Procedure: US Guided Paracentesis    Anesthesia: Local    Surgeons/Assistants: Baldomero Woody MD    Estimated Blood Loss: less than 5 mL    Complications: None    Specimens: Was Obtained: serous Ascitic Fluid    Findings: Technically successful US guided paracentesis, 4.5L removed    Electronically signed by Baldomero Woody MD on 1/24/2022 at 2:06 PM

## 2022-01-31 PROBLEM — N18.6 ESRD NEEDING DIALYSIS (HCC): Status: ACTIVE | Noted: 2022-01-01

## 2022-01-31 PROBLEM — Z99.2 ESRD NEEDING DIALYSIS (HCC): Status: ACTIVE | Noted: 2022-01-01

## 2022-01-31 NOTE — ED NOTES
[facility] serve sent to Dr. Carlos Enrique Sam  01/31/22 Krystle Ortega - Dr. Zaheer Saavedra called back.       Omayra Tavarez  01/31/22 5600

## 2022-01-31 NOTE — ED PROVIDER NOTES
I independently examined and evaluated Stephanie Fan. All diagnostic, treatment, and disposition decisions were made by myself in conjunction with the advanced practice provider. For all further details of the patient's emergency department visit, please see the advanced practice provider's documentation. I personally saw the patient and performed a substantive portion of the visit including all aspects of the medical decision making      Primary Care Physician: Benny Robb MD    History: This is a 59 y.o. male who presents to the Emergency Department with complaint of complaining of leg swelling. Patient has a history of a BKA on left, presents with worsening leg swelling over the last 1 week, history of dialysis however has not been dialyzed for over 1month. Does complain of shortness of breath he is tachypneic in room, maintaining SPO2 currently. Right foot appears erythematous with superficial wounds to the plantar surface of the digits. MDM patient presenting with lower leg swelling, up to thighs bilaterally, history of CHF, dialysis, has not been dialyzed in 1 month. Is potassium is elevated greater than 5 today, will treat with calcium gluconate, insulin, dextrose, Lokelma, nephrology consult. His initial EKG appears to be sinus rhythm regular rate just under 100. No active chest pain however his troponin is elevated likely due to chronic troponinemia with missed dialysis and worsening creatinine. Physical:     weight is 220 lb (99.8 kg). His oral temperature is 98.6 °F (37 °C). His blood pressure is 194/102 (abnormal) and his pulse is 93. His respiration is 20 and oxygen saturation is 95%.    59 y.o. male   Alert oriented  Heart slight tachycardic regular  Lung fields with coarse respirations  Abdomen soft nontender  Right lower extremity foot is erythematous superficial wounds to the plantar surface. No crepitus. Patient has edema up to proximal thighs bilaterally.     Impression: Volume overload, hyperkalemia    Plan: Admit, nephro consult            CRITICAL CARE: There was a high probability of clinically significant/life threatening deterioration in this patient's condition which required my urgent intervention. Total critical care time was 0 minutes. This excludes any time for separately reportable procedures. Maggy Yeager DO  Emergency Physician        Comment: Please note this report has been produced using speech recognition software and may contain errors related to that system including errors in grammar, punctuation, and spelling, as well as words and phrases that may be inappropriate. If there are any questions or concerns please feel free to contact the dictating provider for clarification.           Maggy Yeager DO  01/31/22 2957

## 2022-01-31 NOTE — ED PROVIDER NOTES
[address] ED  EMERGENCY DEPARTMENT ENCOUNTER        Pt Name: Stephanie Fan  MRN: [id number]  [place] 1960  Date of evaluation: 1/31/2022  Provider: KONSTANTIN Avery  PCP: Benny Robb MD    This patient was seen and evaluated by the attending physician Inocencio Pruitt, [address] [facility]       Chief Complaint   Patient presents with    Leg Swelling     pt states swelling in legs started a couple weeks ago, pt was doing dialysis and then stopped a month ago. HISTORY OF PRESENT ILLNESS   (Location/Symptom, Timing/Onset, Context/Setting, Quality, Duration, Modifying Factors, Severity)  Note limiting factors. Stephanie Fan is a 59 y.o. male with past medical history of alcoholic cirrhosis of liver with ascites, diabetes with history of r left BKA from infected foot infection, chronic systolic heart failure, stage III chronic kidney disease, tobacco abuse who presents via private vehicle from his home with his son for evaluation of leg swelling. ED Course as of 01/31/22 Inder Pitts Jan 31, 2022   1556 He notes bilateral leg swelling x 1 week. He has had this before. He has been rx lasix in the past but is no longer taking it. He notes chronic SOB at baseline. He denies chest pain. He has some abdominal swelling. He denies fevers. He has chronic cough. He is a smoker he has been smoking less over the past two weeks. He denies nausea, vomiting abdominal pain. His son notes he has been nauseated and vomiting. He gets weekly paracentesis for the last three years. He does not drink etoh. He does have a PCP. Per son: he has BKA. He has been acting the way he was right before he needed BKA. He is a dialysis patient but he quit about a month ago. He has not had bloodwork since then. He did not like going to [facility] in [employer], he believes he was given a flu shot in his IV (beause everytime he gets flu shot he gets sick and he got sick after his last dialysis).  Son notes patient responds to \"about a week\" when asked about any timeline. Son notes his foot has been red and drianing for a couple of weeks but has gotten really bad in the last week. His cough has been at baseline but his breathing has been getting worse, he can hear rattiling in the chest.    [CS]                   . Nursing Notes were all reviewed and agreed with or any disagreements were addressed  in the HPI. Pt was seen during the [place] 19 pandemic. Appropriate PPE worn by ME during patient encounters. Pt seen during a time with constrained hospital bed capacity and other potential inpatient and outpatient resources were constrained due to the viral pandemic. REVIEW OF SYSTEMS    (2-9 systems for level 4, 10 or more for level 5)     Review of Systems    Positives and Pertinent negatives as per HPI. Except as noted abovein the ROS, all other systems were reviewed and negative.        PAST MEDICAL HISTORY     Past Medical History:   Diagnosis Date    Bacteremia 11/18/2017    strep agalactiae (also cultured from foot)    CAD (coronary artery disease) 8/18/2021    Cellulitis and abscess of foot, except toes     CHF (congestive heart failure) (HCC)     Diabetes mellitus ([facility]     History of liver failure     Hypertension     Kidney disease     MRSA (methicillin resistant staph aureus) culture positive 4/17/18, 11/19/2017    left foot         SURGICAL HISTORY     Past Surgical History:   Procedure Laterality Date    IR TUNNELED CATHETER PLACEMENT GREATER THAN 5 YEARS  1/11/2021    IR TUNNELED CATHETER PLACEMENT GREATER THAN 5 YEARS 1/11/2021 Nathaly Cameron MD [facility] [place] SPECIAL PROCEDURES    LEG AMPUTATION BELOW KNEE      PARACENTESIS      UPPER GASTROINTESTINAL ENDOSCOPY N/A 1/29/2019    EGD ESOPHAGOGASTRODUODENOSCOPY performed by Lis Andrews MD at [address] [place] [address]      Left BKA         CURRENTMEDICATIONS       Previous Medications    ALBUTEROL SULFATE HFA (PROAIR HFA) 108 (90 BASE) MCG/ACT INHALER    Inhale 2 puffs into the lungs every 6 hours as needed for Wheezing    ATORVASTATIN (LIPITOR) 40 MG TABLET    Take 1 tablet by mouth nightly    INSULIN LISPRO (HUMALOG KWIKPEN) 100 UNIT/ML PEN    Glucose: Dose:   No Insulin 140-249 1 Unit 250-349 2 Units Over 350 -3 Units    IPRATROPIUM-ALBUTEROL (DUONEB) 0.5-2.5 (3) MG/3ML SOLN NEBULIZER SOLUTION    Inhale 3 mLs into the lungs every 4 hours as needed for Shortness of Breath    ISOSORBIDE-HYDRALAZINE (BIDIL) 20-37.5 MG PER TABLET    Take 1 tablet by mouth 3 times daily    METOPROLOL SUCCINATE (TOPROL XL) 50 MG EXTENDED RELEASE TABLET    Take 1 tablet by mouth daily    NICOTINE (NICODERM CQ) 7 MG/24HR    Place 1 patch onto the skin daily         ALLERGIES     Pcn [penicillins]    FAMILYHISTORY       Family History   Problem Relation Age of Onset    Diabetes Mother     Hypertension Mother     Dementia Mother     Heart Attack Father     Stroke Brother     No Known Problems Maternal Grandmother     No Known Problems Maternal Grandfather     No Known Problems Paternal Grandmother     No Known Problems Paternal Grandfather     Heart Attack Brother           SOCIAL HISTORY       Social History     Socioeconomic History    Marital status:       Spouse name: None    Number of children: None    Years of education: None    Highest education level: None   Occupational History    Occupation: unemployed   Tobacco Use    Smoking status: Current Some Day Smoker     Packs/day: 1.00     Years: 40.00     Pack years: 40.00     Types: Cigarettes    Smokeless tobacco: Former User     Types: Chew    Tobacco comment: attempting to quit at this time   Vaping Use    Vaping Use: Never used   Substance and Sexual Activity    Alcohol use: No     Comment: none    Drug use: Yes     Types: Marijuana Camillachel Bal)     Comment: occassionally     Sexual activity: Yes     Partners: Female   Other Topics Concern    None   Social History Narrative  None     Social Determinants of Health     Financial Resource Strain:     Difficulty of Paying Living Expenses: Not on file   Food Insecurity:     Worried About Running Out of Food in the Last Year: Not on file    Julissa of Food in the Last Year: Not on file   Transportation Needs:     Lack of Transportation (Medical): Not on file    Lack of Transportation (Non-Medical): Not on file   Physical Activity:     Days of Exercise per Week: Not on file    Minutes of Exercise per Session: Not on file   Stress:     Feeling of Stress : Not on file   Social Connections:     Frequency of Communication with Friends and Family: Not on file    Frequency of Social Gatherings with Friends and Family: Not on file    Attends [employer] Services: Not on file    Active Member of [address] [place] [address] [employer] or Organizations: Not on file    Attends Club or Organization Meetings: Not on file    Marital Status: Not on file   Intimate Partner Violence:     Fear of Current or Ex-Partner: Not on file    Emotionally Abused: Not on file    Physically Abused: Not on file    Sexually Abused: Not on file   Housing Stability:     Unable to Pay for Housing in the Last Year: Not on file    Number of [place] in the Last Year: Not on file    Unstable Housing in the Last Year: Not on file       SCREENINGS             PHYSICAL EXAM    (up to 7 for level 4, 8 or more for level 5)     ED Triage Vitals [01/31/22 1513]   BP Temp Temp Source Pulse Resp SpO2 Height Weight   (!) 194/102 98.6 °F (37 °C) Oral 93 20 95 % -- 220 lb (99.8 kg)       Physical Exam  PHYSICAL EXAM  BP (!) 145/126   Pulse 95   Temp 98.6 °F (37 °C) (Oral)   Resp (!) 32   Wt 220 lb (99.8 kg)   SpO2 95%   BMI 29.03 kg/m²   GENERAL APPEARANCE: Awake and alert. Cooperative. Ill-appearing adult male sitting upright in exam bed in room 11, nondiaphoretic breathing comfortably on room air showing no sign of acute respiratory distress. HEAD: Normocephalic. Atraumatic. EYES: PERRL. EOM's grossly intact. ENT: Mucous membranes are dry. Oropharynx nonerythematous nonedematous uvula midline. [place] NECK: Supple. No gross JVD. HEART: RRR. No murmurs. Radial pulses 2+, symmetric. PT DP pulses appreciated on the right lower extremity via Doppler, rate around 88 with excellent biphasic flow. LUNGS: Intermittent tachypnea. . Intermittent nonproductive cough. Loud rhonchorous breath sounds appreciated without auscultation. Loud crackles bilaterally. Good air exchange. ABDOMEN: Soft. Protuberant. Non-tender. No masses. No organomegaly. No guarding or rebound. EXTREMITIES: Right lower extremity pitting edema with overlying cellulitis. The right foot is very malodorous, with moist lesions. Moves all extremities equally. All extremities neurovascularly intact. SKIN: Warm and dry. No acute rashes. NEUROLOGICAL: Alert and oriented. CN grossly intact. No gross facial drooping. Power intact to UE and LE,         sensation intact x 4. No tremors or ataxia. Gait intact. PSYCHIATRIC: Normal mood and affect.     DIAGNOSTIC RESULTS   LABS:    Labs Reviewed   COVID-19 & INFLUENZA COMBO - Abnormal; Notable for the following components:       Result Value    SARS-CoV-2 RNA, RT PCR DETECTED (*)     All other components within normal limits    Narrative:     Performed at:  [facility] [employer],  [place], [facility]   Phone [phone]   CBC WITH AUTO DIFFERENTIAL - Abnormal; Notable for the following components:    RBC 3.85 (*)     Hemoglobin 11.5 (*)     Hematocrit 35.7 (*)     Lymphocytes Absolute 0.3 (*)     All other components within normal limits    Narrative:     Performed at:  [facility] [address],  [place] [employer], [facility]   Phone [phone]   COMPREHENSIVE METABOLIC PANEL W/ REFLEX TO MG FOR LOW K - Abnormal; Notable for the following components:    Potassium reflex Magnesium 5.4 (*)     CO2 20 (*)     BUN 60 (*) CREATININE 6.4 (*)     GFR Non- 9 (*)     GFR  11 (*)     Calcium 5.6 (*)     Albumin 2.9 (*)     Albumin/Globulin Ratio 0.8 (*)     All other components within normal limits    Narrative:     Lucyevon Celisanne-marie UREÑA tel. [phone],  Chemistry results called to and read back by Romeo Tobar RN, 01/31/2022  16:28, by Lisa Asencio  Performed at:  [facility],  [place], [facility]   Phone [phone]   TROPONIN - Abnormal; Notable for the following components:    Troponin 0.66 (*)     All other components within normal limits    Narrative:     Moe Cheney tel. [phone],  Chemistry results called to and read back by Romeo Tobar RN, 01/31/2022  16:28, by Lisa Asencio  Performed at:  [place] [address],  [place], [facility]   Phone [phone]   BRAIN NATRIURETIC PEPTIDE - Abnormal; Notable for the following components:    Pro-BNP >70,000 (*)     All other components within normal limits    Narrative:     Moe Hipps tel. [phone],  Chemistry results called to and read back by Romeo Tobar RN, 01/31/2022  16:28, by Lisa Asencio  Performed at:  [facility] [address],  [place], [facility]   Phone [phone]   CULTURE, BLOOD 1   CULTURE, BLOOD 2   LACTATE, SEPSIS    Narrative:     Performed at:  [facility]) [facility],  [place], [facility]   Phone [phone]   LACTATE, SEPSIS   PROCALCITONIN   TROPONIN       All other labs were within normal range or not returned as of this dictation. EKG: All EKG's are interpreted by the Emergency Department Physician who either signs orCo-signs this chart in the absence of a cardiologist.  Please see their note for interpretation of EKG.       RADIOLOGY:   Non-plain film images such as CT, Ultrasound and MRI are read by the radiologist. Plain radiographic images are visualized andpreliminarily interpreted by the  ED Provider with the below findings:        Interpretation perthe Radiologist below, if available at the time of this note:    XR FOOT RIGHT (MIN 3 VIEWS)   Final Result   Circumferential soft tissue swelling, greater dorsally. No radiographic evidence of osteomyelitis. No soft tissue gas is seen. XR CHEST (2 VW)   Final Result   Left-sided pleural effusion with left mid and bilateral lower lung opacities   that could represent atelectasis or infection           XR CHEST (2 VW)    Result Date: 1/31/2022  EXAMINATION: TWO XRAY VIEWS OF THE CHEST 1/31/2022 3:31 pm COMPARISON: 01/14/2021 HISTORY: ORDERING SYSTEM PROVIDED HISTORY: fluid overload TECHNOLOGIST PROVIDED HISTORY: Reason for exam:->fluid overload Reason for Exam: fluid overload FINDINGS: Right-sided central venous catheter remains in place. Heart size stable. Left-sided pleural effusion with left mid and bilateral lower lung opacities. No pneumothorax. Left-sided pleural effusion with left mid and bilateral lower lung opacities that could represent atelectasis or infection     XR FOOT RIGHT (MIN 3 VIEWS)    Result Date: 1/31/2022  EXAMINATION: 3 XRAY VIEWS OF THE RIGHT FOOT 1/31/2022 1:23 pm COMPARISON: 01/09/2021 HISTORY: ORDERING SYSTEM PROVIDED HISTORY: concern gangrene TECHNOLOGIST PROVIDED HISTORY: Reason for exam:->concern gangrene Reason for Exam: concern gangrene FINDINGS: Circumferential soft tissue swelling is noted, greater dorsally. No stress or traumatic fractures are detected within the right foot. No osteolysis or periosteal reaction is identified to suggest osteomyelitis. No soft tissue gas is found. No radiopaque foreign body. Circumferential soft tissue swelling, greater dorsally. No radiographic evidence of osteomyelitis. No soft tissue gas is seen.           PROCEDURES   Unless otherwise noted below, none     Procedures    CRITICAL CARE TIME   I personally saw the patient and independently provided 45 minutes of non-concurrent critical care out of the total shared critical care time provided. This excludes time spent doing separately billable procedures. This includes time at the bedside, data interpretation, medication management, obtaining critical history from collateral sources if the patient is unable to provide it directly, and physician consultation. Specifics of interventions taken and potentially life-threatening diagnostic considerations are listed above in the medical decision making. If this was a shared visit with a Physician the time in this attestation is non-concurrent critical care time out of the total shared critical care time provided by the Physician and myself.       CONSULTS:  IP CONSULT TO NEPHROLOGY  IP CONSULT TO HOSPITALIST      EMERGENCY DEPARTMENT COURSE and DIFFERENTIALDIAGNOSIS/MDM:   Vitals:    Vitals:    01/31/22 1513 01/31/22 1702 01/31/22 1827   BP: (!) 194/102 (!) 179/108 (!) 145/126   Pulse: 93 96 95   Resp: 20 29 (!) 32   Temp: 98.6 °F (37 °C)     TempSrc: Oral     SpO2: 95% 92% 95%   Weight: 220 lb (99.8 kg)         Patient was given thefollowing medications:  Medications   calcium gluconate 1,000 mg in dextrose 5 % 100 mL IVPB (1,000 mg IntraVENous New Bag 1/31/22 1823)   furosemide (LASIX) injection 120 mg (has no administration in time range)   furosemide (LASIX) injection 40 mg (40 mg IntraVENous Given 1/31/22 1818)   aspirin EC tablet 325 mg (325 mg Oral Given 1/31/22 1824)   sodium zirconium cyclosilicate (LOKELMA) oral suspension 5 g (5 g Oral Given 1/31/22 1816)       PDMP Monitoring:    Last PDMP Hoda Sepulveda as Reviewed [facility]):  Review User Review Instant Review Result            Urine Drug Screenings (1 yr)     Urine Drug Screen  Collected: 1/9/2021  5:50 PM (Final result)   Narrative: Performed at:  [address] [employer]   Phone [phone]     Complete Results Medication Contract and Consent for Opioid Use Documents Filed      No documents found                MDM:   Patient seen and evaluated. Old records reviewed. Diagnostic testing reviewed and results discussed. 26-year-old male presents for evaluation of leg swelling. Patient found to be significantly volume overloaded. Patient started on diuretiic, S[place] Room recommendation per nephrology. Patient started on [facility] for hyperkalemia. Patient started on calcium for hypocalcemia. Patient started on cefepime for lower extremity cellulitis. X-ray negative for sign of osteomyelitis. Patient is Covid positive, unvaccinated however he is not hypoxic at this point in time. Ultimately patient will require admission for diuresis. All information including ED workup, results, treatment, diagnosis has been reviewed and discussed with ED attending physician and directly discussed with Hospitalist who is the admitting physician. Pt will be admitted in stable condition. Pt advised of admission and is in full agreement. FINAL IMPRESSION      1. Hypervolemia, unspecified hypervolemia type    2. Acute kidney injury superimposed on CKD ([address]    3. Pleural effusion    4. Cellulitis of right lower extremity    5. COVID    6. Acute on chronic congestive heart failure, unspecified heart failure type ([address]          DISPOSITION/PLAN   DISPOSITION        PATIENT REFERREDTO:  No follow-up provider specified.     DISCHARGE MEDICATIONS:  New Prescriptions    No medications on file       DISCONTINUED MEDICATIONS:  Discontinued Medications    No medications on file              (Please note that portions ofthis note were completed with a voice recognition program.  Efforts were made to edit the dictations but occasionally words are mis-transcribed.)    Edison Bo (electronically signed)       Edison Bo  01/31/22 0402

## 2022-01-31 NOTE — ED NOTES
1726 - Called a/s for Nephrology. Dr. Felicitas Alvarado on call. Rubia Arellnao  01/31/22 1730    1819 - Called Nephrology a/s again     dipti [place]field  01/31/22 Marge Rosario - Dr. Felicitas Alvarado called back.       [facility]  01/31/22 1831

## 2022-01-31 NOTE — PLAN OF CARE
78-year-old male with history of alcoholic cirrhosis and end-stage renal disease. On hemodialysis. Patient has been feeling ill for a month and has not gone for dialysis. Now presenting with fluid overload. Feeling ill, complaining of a cough. Covid positive. O2 sats stable on room air. BNP elevated over 70,000. troponin elevated at 0.6. Blood pressure elevated    Nephrology contacted from ED-needs dialysis resumed. Nephrologist has recommended a total of 160 mg of IV Lasix given in the ED. Lokelma and calcium supplementation given in the ED. Patient also with history of diabetes mellitus with left BKA, currently with right foot infection.  Started on IV antibiotics and podiatry consult

## 2022-02-01 PROBLEM — U07.1 COVID-19: Status: ACTIVE | Noted: 2022-01-01

## 2022-02-01 NOTE — CARE COORDINATION
Case Management Assessment  Initial Evaluation      Patient Name: Percy Cabrera  YOB: 1960  Diagnosis: Pleural effusion [J90]  ESRD needing dialysis ([facility] [N18.6, Z99.2]  Cellulitis of right lower extremity [W10.264]  Hypervolemia, unspecified hypervolemia type [E87.70]  Acute kidney injury superimposed on CKD ([facility] [N17.9, N18.9]  Acute on chronic congestive heart failure, unspecified heart failure type ([facility] [I50.9]  COVID [U07.1]  Date / Time: 1/31/2022  3:18 PM    Admission status/Date:INPT 1/31/22  Chart Reviewed: Yes      Patient Interviewed: Yes   Family Interviewed:  No      Hospitalization in the last 30 days:  No      Health Care Decision Maker :   Primary Decision Maker: Lawson Ramos - [phone]    (CM - must 1st enter selection under Navigator - emergency contact- [place] Relationship and pick relationship)   Who do you trust or have selected to make healthcare decisions for you      Met with: pt via telephone  Interview conducted  (bedside/phone):    Current PCP: Tashi Hernandez required for SNF : Y, N          3 night stay required - [employer]  Support Systems/Care Needs:    Transportation: self    Meal Preparation: self    Housing  Living Arrangements: home alone.   Steps: n/a  Intent for return to present living arrangements: Yes  Identified Issues: concerns for transportation/gas money issues to HD    Home Care Information  Active with [address] : No Agency:(Services)     Passport/Waiver : No  :                      Phone Number:    Passport/Waiver Services: -          Durable Medical Equiptment   DME Provider: -  Equipment: -  Walker_X__Cane_X__RTS_X__ BSC___Shower Chair___Hospital Bed___W/C__X__Other________  02 at ____Liter(s)---wears(frequency)_______ HHN _X__ CPAP___ BiPap___   N/A____      Home O2 Use :  No    If No for home O2---if presently on O2 during hospitalization:  No  if yes CM to follow for potential DC O2 need  Informed of need for care provider to bring portable home O2 tank on day of discharge for nursing to connect prior to leaving:   Not Indicated  Verbalized agreement/Understanding:   Not Indicated    Community Service Affiliation  Dialysis:  Yes - [facility] (has missed 1-2 months)    · Agency:  · Location:  · Dialysis Schedule:  · Phone:   · Fax: Other Community Services: (ex:PT/OT,Mental Health,Wound Clinic, Cardio/Pul [address])    DISCHARGE PLAN: Explained Case Management role/services. CM reviewed chart and spoke with pt via telephone due to COVID-19 precautions. Pt reports to live at home alone and states that he is mostly IPTA. Pt states that he had been active with [facility] in [facility], however, has missed treatments for last 1-2 months. Pt states that he would like to change HD facilities, if possible, to [place]. Pt also states concern with getting to and from HD. Pt states that he has a car but often gas money is an issue. CM called [facility] to inquire about chair status and left vmm. Await return call. Pt states that he would like [facility] to follow at discharge for SN and SW. Referral called to [facility] per pt request and left vmm for Mallory. Await return call. CM following .

## 2022-02-01 NOTE — FLOWSHEET NOTE
01/31/22 2236   Vital Signs   Temp 99.2 °F (37.3 °C)  (Notifed RN)   Temp Source Oral   Pulse 92   Heart Rate Source Monitor   Resp 22   BP (!) 152/78   Patient Position High fowlers   Level of Consciousness Alert (0)   MEWS Score 2   Patient Currently in Pain Denies   Pain Assessment   Pain Assessment 0-10   Pain Level 0   Patient's Stated Pain Goal No pain   Oxygen Therapy   SpO2 91 %   O2 Device None (Room air)   Height and Weight   Weight 255 lb 1.6 oz (115.7 kg)   Weight Method Actual;Bed scale   BMI (Calculated) 0     Patient admitted to room 310 from ED. Patient oriented to room, call light, bed rails, phone, lights and bathroom. Patient instructed about the schedule of the day including: vital sign frequency, lab draws, possible tests, frequency of MD and staff rounds, daily weights, I &O's and prescribed diet. Bed alarm in place, patient aware of placement and reason. Telemetry box in place, patient aware of placement and reason. Bed locked, in lowest position, side rails up 2/4, call light within reach. Recliner Assessment  Patient is able to demonstrate the ability to move from a reclining position to an upright position within the recliner. 4 Eyes Skin Assessment     The patient is being assess for   Admission    I agree that 2 RN's have performed a thorough Head to Toe Skin Assessment on the patient. ALL assessment sites listed below have been assessed.       Areas assessed for pressure by both nurses:   [x]   Head, Face, and Ears   []   Shoulders, Back, and Chest, Abdomen - pt refused  [x]   Arms, Elbows, and Hands    []   Coccyx, Sacrum, and Ischium - pt refused  [x]   Legs, Feet, and Heels - wounds        Skin Assessed Under all Medical Devices by both nurses:  NA              All Mepilex Borders were peeled back and area peeked at by both nurses:  No: NA  Please list where Mepilex Borders are located:  NA             **SHARE this note so that the co-signing nurse is able to place an eSignature**    Co-signer eSignature: Electronically signed by Yves Gary RN on 2/1/22 at 12:35 AM EST    Does the Patient have Skin Breakdown related to pressure? No     Wounds present due to infection of diabetic foot ulcers, not pressure injuries.                                  Gabriel Prevention initiated:  Yes   Wound Care Orders initiated:  Yes      [id number] nurse consulted for Pressure Injury (Stage 3,4, Unstageable, DTI, NWPT, Complex wounds)and New or Established Ostomies:  Yes      Primary Nurse eSignature: Electronically signed by Kayden Gutierrez RN on 2/1/22 at 12:31 AM EST

## 2022-02-01 NOTE — CARE COORDINATION
[facility]  Received referral regarding HC services from Emelyn Matamoros. [facility] is not in network with pt's insurance plan. Liaison to assist  with arranging [facility]. services at discharge.       Electronically signed by Faustino Morales RN on 2/1/2022 at 4:48 PM

## 2022-02-01 NOTE — CONSULTS
[facility] Consult Note           Reason for Consult:  End stage kidney disease  Requesting Physician:  Dr. Zaheer Saavedra    Chief Complaint:  Feeling ill  History Obtained From:  patient, electronic medical record    History of Present Ilness:    59year old male with ESKD previously on dialysis admitted with feeling ill. We have been asked to assist in further dialysis care. Last had HD around [facility] last year. Stopped showing up at dialysis and was not returning phone calls. Has been feeling ill with fatigue, cough. Found to be COVID+. No fevers, intake adequate, no shortness of breath, no weakness. Past Medical History:        Diagnosis Date    Bacteremia 11/18/2017    strep agalactiae (also cultured from foot)    CAD (coronary artery disease) 8/18/2021    Cellulitis and abscess of foot, except toes     CHF (congestive heart failure) (HCC)     Diabetes mellitus ([facility]     History of liver failure     Hypertension     Kidney disease     MRSA (methicillin resistant staph aureus) culture positive 4/17/18, 11/19/2017    left foot       Past Surgical History:        Procedure Laterality Date    IR TUNNELED CATHETER PLACEMENT GREATER THAN 5 YEARS  1/11/2021    IR TUNNELED CATHETER PLACEMENT GREATER THAN 5 YEARS 1/11/2021 Gordy Galvez MD [facility] SPECIAL PROCEDURES    LEG AMPUTATION BELOW KNEE      PARACENTESIS      UPPER GASTROINTESTINAL ENDOSCOPY N/A 1/29/2019    EGD ESOPHAGOGASTRODUODENOSCOPY performed by Gemini Norman MD at [address] [place] [address]       Home Medications:    No current facility-administered medications on file prior to encounter. Current Outpatient Medications on File Prior to Encounter   Medication Sig Dispense Refill    LORazepam (ATIVAN) 1 MG tablet Take 1 mg by mouth 3 times daily.       ipratropium-albuterol (DUONEB) 0.5-2.5 (3) MG/3ML SOLN nebulizer solution Inhale 3 mLs into the lungs every 4 hours as needed for Shortness of Breath 360 mL     insulin lispro (HUMALOG KWIKPEN) 100 UNIT/ML pen Glucose: Dose:   No Insulin 140-249 1 Unit 250-349 2 Units Over 350 -3 Units 2 pen 0    albuterol sulfate HFA (PROAIR HFA) 108 (90 Base) MCG/ACT inhaler Inhale 2 puffs into the lungs every 6 hours as needed for Wheezing 1 Inhaler 3    nicotine (NICODERM CQ) 7 MG/24HR Place 1 patch onto the skin daily (Patient taking differently: Place 1 patch onto the skin daily Not taking) 30 patch 3    isosorbide-hydrALAZINE (BIDIL) 20-37.5 MG per tablet Take 1 tablet by mouth 3 times daily (Patient taking differently: Take 1 tablet by mouth 3 times daily Not taking) 90 tablet 3    metoprolol succinate (TOPROL XL) 50 MG extended release tablet Take 1 tablet by mouth daily (Patient taking differently: Take 50 mg by mouth daily Not taking) 30 tablet 3    atorvastatin (LIPITOR) 40 MG tablet Take 1 tablet by mouth nightly (Patient taking differently: Take 40 mg by mouth nightly Not taking) 30 tablet 0       Allergies:  Pcn [penicillins]    Social History:    Social History     Socioeconomic History    Marital status:       Spouse name: Not on file    Number of children: Not on file    Years of education: Not on file    Highest education level: Not on file   Occupational History    Occupation: unemployed   Tobacco Use    Smoking status: Former Smoker     Packs/day: 1.00     Years: 40.00     Pack years: 40.00     Types: Cigarettes     Quit date: 1/30/2022    Smokeless tobacco: Former User     Types: Chew    Tobacco comment: attempting to quit at this time   Vaping Use    Vaping Use: Never used   Substance and Sexual Activity    Alcohol use: No     Comment: none    Drug use: Yes     Types: Marijuana Jyame Brim)     Comment: occassionally     Sexual activity: Yes     Partners: Female   Other Topics Concern    Not on file   Social History Narrative    Not on file     Social Determinants of [address] [place] [address] Resource Strain:     Difficulty of Paying Living Expenses: Not on file   Food Insecurity:     Worried About Running Out of Food in the Last Year: Not on file    Julissa of Food in the Last Year: Not on file   Transportation Needs:     Lack of Transportation (Medical): Not on file    Lack of Transportation (Non-Medical): Not on file   Physical Activity:     Days of Exercise per Week: Not on file    Minutes of Exercise per Session: Not on file   Stress:     Feeling of Stress : Not on file   Social Connections:     Frequency of Communication with Friends and Family: Not on file    Frequency of Social Gatherings with Friends and Family: Not on file    Attends Zoroastrianism Services: Not on file    Active Member of [address] [place] [address] [employer] or Organizations: Not on file    Attends Club or Organization Meetings: Not on file    Marital Status: Not on file   Intimate Partner Violence:     Fear of Current or Ex-Partner: Not on file    Emotionally Abused: Not on file    Physically Abused: Not on file    Sexually Abused: Not on file   Housing Stability:     Unable to Pay for Housing in the Last Year: Not on file    Number of [place] in the Last Year: Not on file    Unstable Housing in the Last Year: Not on file       Family History:   Family History   Problem Relation Age of Onset    Diabetes Mother     Hypertension Mother     Dementia Mother     Heart Attack Father     Stroke Brother     No Known Problems Maternal Grandmother     No Known Problems Maternal Grandfather     No Known Problems Paternal Grandmother     No Known Problems Paternal Grandfather     Heart Attack Brother        Review of Systems:   Deferred as means to protect PPE due to shortage & due to CORONAVIRUS risk.     Physical exam:   Constitutional:  VITALS:  BP (!) 155/91   Pulse 77   Temp 97 °F (36.1 °C) (Axillary)   Resp 20   Wt 255 lb 4.8 oz (115.8 kg)   SpO2 92%   BMI 33.68 kg/m²   Access: [facility]  Exam deferred as means to protect PPE due to shortage & due to CORONAVIRUS risk. Data/  CBC:   Lab Results   Component Value Date    WBC 3.8 02/01/2022    RBC 3.54 02/01/2022    HGB 10.8 02/01/2022    HCT 33.3 02/01/2022    MCV 94.3 02/01/2022    MCH 30.4 02/01/2022    MCHC 32.3 02/01/2022    RDW 15.0 02/01/2022     02/01/2022    MPV 8.1 02/01/2022     BMP:    Lab Results   Component Value Date     02/01/2022    K 4.5 02/01/2022     02/01/2022    CO2 19 02/01/2022    BUN 59 02/01/2022    LABALBU 2.4 02/01/2022    CREATININE 6.6 02/01/2022    CALCIUM 5.5 02/01/2022    GFRAA 10 02/01/2022    LABGLOM 9 02/01/2022    GLUCOSE 79 02/01/2022         Assessment/    - ESKD on dialysis though has been off since end of Nov 2021 due to patient choice    - COVID+    - Right foot ulcer/cellulitis - on vancomycin/cefepime    - Anemia of chronic disease    - Hypertension    - Hyperkalemia    - Anion-gap metabolic acidosis    - Atrial fibrillation    - Alcoholic liver cirrhosis - requires frequent paracentesis    - DM2 c/b L BKA      Plan/    - Hemodialysis  - Trend labs, bp's, & urine output  - Needs outpatient HD placement - d/w     Case d/w Nursing    Thank you for the consultation. Please do not hesitate to call with questions. ____________________________________  Melanie Marmolejo MD  [facility]  [email or web address]  Office: [phone]

## 2022-02-01 NOTE — FLOWSHEET NOTE
02/01/22 0745   Vital Signs   Temp 97 °F (36.1 °C)   Temp Source Axillary   Pulse 77   Heart Rate Source Monitor   Resp 20   BP (!) 155/91   BP Location Left upper arm   Patient Position Sitting   Level of Consciousness Alert (0)   MEWS Score 1   Oxygen Therapy   SpO2 92 %   Pulse Oximeter Device Mode Continuous   Pulse Oximeter Device Location Finger   O2 Device None (Room air)     Shift assessment complete- see flow sheet, A&Ox4, resting RA 92% with exertion O2 drops to 87%, pt. Refusing to wear oxygen and intermittently removes tele leads, this RN discussed with pt. The importance of keeping both the monitor and oxygen on and pt. Replies with \" I am a full code and you can revive me\", pt complains of nausea and continues to eat his AM meal, refused nicotine patch, fall precautions in place, pt denies needs, call light within reach.

## 2022-02-01 NOTE — PROGRESS NOTES
Pharmacy to Manage Heparin Infusion per [facility] CLINICS    Dx: Afib  Pt wt = _94__ (will use adjusted wt if actual body weight > 120% ideal body weight). Baseline anti-Xa and/or aPTT =    Oral factor Xa-inhibitors may alter and elevate anti-Xa levels used for unfractionated heparin monitoring. As a result, anti-Xa monitoring is not accurate while Xa-inhibitor activity is detectable. Utilize aPTT monitoring when patient received an oral factor Xa-inhibitor (apixaban, betrixaban, edoxaban or rivaroxaban) within 72 hours prior to admission (please document last administration time). The goal is to allow a washout of oral factor Xa-inhibitors by using aPTT for 72 hours, then change to ant-Xa levels for UFH. Low Dose Heparin Infusion  Heparin 60 units/kg IVP bolus  (4000 units) followed by Heparin infusion at 12 units/kg/hr ( 1000 units/hr) (recommended initial max dose 1000 units./hr).     Goal anti-Xa 0.3-0.7 IU/mL  Anti-Xa @ 2200

## 2022-02-01 NOTE — FLOWSHEET NOTE
02/01/22 0315   Oxygen Therapy   SpO2 98 %   O2 Device Nasal cannula   O2 Flow Rate (L/min) 4 L/min     Pt requiring 4L NC to maintain SpO2 >90%, but refusing to wear nasal cannula consistently. Pt repeatedly removing NC and stating he does not want to wear it. RN explained to pt the consequences of low SpO2 and pt expressed understanding. Pt also refusing to be repositioned to ease work of breathing and consequently opened a wound on his R great toe from constant contact with end of bed, bandage applied to prevent bleeding and further degradation of skin. Will continue to monitor and educate pt.

## 2022-02-01 NOTE — H&P
1/11/2021 Dasha Arnold MD [facility] SPECIAL PROCEDURES    LEG AMPUTATION BELOW KNEE      PARACENTESIS      UPPER GASTROINTESTINAL ENDOSCOPY N/A 1/29/2019    EGD ESOPHAGOGASTRODUODENOSCOPY performed by Anca Johnson MD at [address] [place] [address]       Medications Prior to Admission:    Prior to Admission medications    Medication Sig Start Date End Date Taking? Authorizing Provider   LORazepam (ATIVAN) 1 MG tablet Take 1 mg by mouth 3 times daily. 1/14/22  Yes Historical Provider, MD   ipratropium-albuterol (DUONEB) 0.5-2.5 (3) MG/3ML SOLN nebulizer solution Inhale 3 mLs into the lungs every 4 hours as needed for Shortness of Breath 1/16/21  Yes LARRY Godoy CNP   insulin lispro (HUMALOG KWIKPEN) 100 UNIT/ML pen Glucose: Dose:   No Insulin 140-249 1 Unit 250-349 2 Units Over 350 -3 Units 2/23/19  Yes Keli Sandoval MD   albuterol sulfate HFA (PROAIR HFA) 108 (90 Base) MCG/ACT inhaler Inhale 2 puffs into the lungs every 6 hours as needed for Wheezing 1/1/18  Yes Phillip Gomes MD   nicotine (NICODERM CQ) 7 MG/24HR Place 1 patch onto the skin daily  Patient taking differently: Place 1 patch onto the skin daily Not taking 1/17/21   LARRY Godoy CNP   isosorbide-hydrALAZINE (BIDIL) 20-37.5 MG per tablet Take 1 tablet by mouth 3 times daily  Patient taking differently: Take 1 tablet by mouth 3 times daily Not taking 2/27/19   LARRY Jalloh CNP   metoprolol succinate (TOPROL XL) 50 MG extended release tablet Take 1 tablet by mouth daily  Patient taking differently: Take 50 mg by mouth daily Not taking 2/23/19   Keli Sandoval MD   atorvastatin (LIPITOR) 40 MG tablet Take 1 tablet by mouth nightly  Patient taking differently: Take 40 mg by mouth nightly Not taking 1/29/19 3/21/20  Bj Menard MD       Allergies:  Pcn [penicillins]    Social History:  The patient currently lives at home     TOBACCO:   reports that he quit smoking 2 days ago.  His some wound dehiscence at prior surgical site. Foul smell   M/S: No cyanosis. No joint deformity. No clubbing. Neuro: Awake. Grossly nonfocal    Psych: Oriented to person, place, month. Could not name year or president. No anxiety or agitation. CBC:   Recent Labs     01/31/22  1558 02/01/22  0441   WBC 4.0 3.8*   HGB 11.5* 10.8*   HCT 35.7* 33.3*   MCV 92.7 94.3    135     BMP:   Recent Labs     01/31/22  1558 02/01/22  0441    137   K 5.4* 4.5    103   CO2 20* 19*   BUN 60* 59*   CREATININE 6.4* 6.6*     LIVER PROFILE:   Recent Labs     01/31/22  1558 02/01/22  0441   AST 25 22   ALT 12 10   BILITOT 0.3 0.3   ALKPHOS 94 81       CARDIAC ENZYMES  Recent Labs     01/31/22  1823 01/31/22  2252 02/01/22  0441   TROPONINI 0.64* 0.62* 0.66*     CULTURES    COVID 19 PCR: detected  Influenza A/B: neg/neg  Blood: pending     EKG:   Atrial fibrillation with rapid ventricular response  Low voltage QRS Right bundle branch block  inferior wall infarct , age undetermined  Abnormal ECG  No significant change was found  When compared with ECG of  1.12.21  Confirmed by Bhargavi Rose MD, [facility]  Echo 1/5/2021  Definity contrast could not be used due to lack of IV access. Technically difficult examination. The left ventricular systolic function is moderate to severely reduced with   an ejection fraction of 25 %. There is global hypokinesis with regional variation. Normal left ventricular size with mild concentric left ventricular   hypertrophy. Grade I diastolic dysfunction with normal filling pressure. Compared to previous study from 1- no changes noted in left   ventricular function. Mild mitral and tricuspid regurgitation. Moderate Bi-atrial enlargement. The right ventricle is moderately enlarged. Right ventricular systolic function is moderately reduced .    Systolic pulmonic artery pressure (SPAP) is estimated at 57 mmHg consistent   with moderate pulmonary hypertension (Right atrial pressure of 8 mmHg). EGD 1/2019  Findings[de-identified]   Esophagus: Erosive esophagitis in distal third. The findings do not support a diagnosis of Presley's Esophagus or varices. Stomach: Increase gastric content c/w diabetic GP  Duodenum: normal  Recommendations: -Acid suppression with a proton pump inhibitor. ASSESSMENT/PLAN:    #COVID 19 infection  - droplet plus isolation in place   - vaccinated with 2 shot series, no booster   - symptom onset unclear- he states he has been coughing for months, he is a poor historian   - does not use home O2,  Stable on RA at rest  - not meeting criteria for therapeutics at this time   - supportive care    #ESRD  #Hyperkalemia   #Metabolic acidosis   - unclear patient's last dialysis session- he tells me it has been at least a month  - hyperkalemia improved with medical treatment  - Nephro c/s for HD  - he has TDC in the right chest wall   - he is requesting a new dialysis center at discharge - refusing to go back to his prior dialysis center     #Metabolic encephalopathy  - suspect related to missed dialysis, acute infection, also concern for hepatic encephalopathy with cirrhosis history  - oriented to self, place, month. Neuro exam not focal  - checked ammonia- not elevated   - cont treatment for other acute medical issues as listed     #Right diabetic foot ulcer- great toe  #Right foot cellulitis   #Right foot blister   - podiatry c/s  - vanc and cefepime D#1      #History of left BKA   - mild wound dehiscence noted left stump- will ask podiatry to eval    #Acute on Chronic systolic CHF  - IV lasix per nephro, fluid removal with HD   - cont toprol XL    #Atrial fibrillation   - currently with controlled rates   - not on [facility] [place] - unclear why- he was seen by EP during Jan 2021 admit to [facility] and NOAC recommended but he was ultimately not discharged with anticoagulation. He refused anticoagulation periodically throughout that admission. CHA2ds2vasc=3.   I discussed AC with him and the risk of CVA with atrial fib, he is agreeable to heparin gtt for now. Will need to consider oral AC on discharge- need to consider risks with cirrhosis history, his last EGD for varices surveillance to review was 1/2019 and was negative for varices. He has chronic anemia which is better now than his baseline. Will monitor closely for bleeding   - cont toprol, hydralazine, isordil      #Elevated troponin   - flat elevation, likely related to CHF, fluid overload, ESRD. No ischemia on EKG. Denies CP. Low suspicion ACS    #Alcoholic cirrhosis   - he gets periodic paracentesis with IR per chart review- most recently 1/24/22  - checked ammonia with confusion- not elevated at 28    #HTN  - controlled, cont home meds     #Lymphocytopenia   - suspect with COVID 19, monitor CBC    #DM2  - cont Lantus and use SSI     DVT Prophylaxis: Heparin   Diet: ADULT DIET; Regular; 4 carb choices (60 gm/meal);  Low Potassium (Less than 3000 mg/day)  Code Status: Full Code    Daris Rinne PA-C  2/1/2022 9:11 AM

## 2022-02-01 NOTE — CONSULTS
Pharmacy Note  Vancomycin Consult    Kaylen Loza is a 59 y.o. male started on Vancomycin for cellulitis; consult received from Dr. Avila Carey to manage therapy. Also receiving the following antibiotics: cefepime.     Patient Active Problem List   Diagnosis    Tobacco abuse    Type 1 diabetes mellitus with neurological manifestations, uncontrolled ([address]    Diabetic ulcer of right foot ([address]    Cellulitis    NSTEMI (non-ST elevated myocardial infarction) ([address]    Osteomyelitis of foot, left, acute ([address]    Group B streptococcal bacteriuria    Type 2 diabetes mellitus with left diabetic foot infection ([address]    Septic arthritis of left foot (HCC)    Poorly controlled type 2 diabetes mellitus (HCC)    Elevated sed rate    Elevated C-reactive protein (CRP)    Acute kidney injury superimposed on chronic kidney disease ([address]    Hyponatremia    Diabetic polyneuropathy associated with type 2 diabetes mellitus ([address]    Mixed hyperlipidemia    Tobacco abuse counseling    Diabetes education, encounter for    Cardiomyopathy ([address]    Abnormal nuclear stress test    Pain of left lower extremity    Stage 3 chronic kidney disease    Osteomyelitis ([address]    Acute respiratory failure with hypoxia ([address]    Acute respiratory failure (HCC)    Ascites    Chronic systolic CHF (congestive heart failure) (HCC)    Noncompliance    Chronic systolic heart failure (HCC)    HTN (hypertension), malignant    Metabolic alkalosis    NSVT (nonsustained ventricular tachycardia) (HCC)    Moderate malnutrition (HCC)    Fluid overload    Abdominal distension    Hypoxia    Shortness of breath    Diabetic foot infection ([address]    Alcoholic cirrhosis of liver with ascites ([address]    A-fib ([address]    CAD (coronary artery disease)    ESRD needing dialysis ([address]     Allergies:  Pcn [penicillins]     Temp max:     Recent Labs     01/31/22  1558   BUN 60*   CREATININE 6.4*   WBC 4.0       Intake/Output Summary (Last 24 hours) at 2/1/2022 9131  Last data filed at 2/1/2022 0140  Gross per 24 hour   Intake --   Output 550 ml   Net -550 ml     Culture Date      Source                       Results      Ht Readings from Last 1 Encounters:   01/09/21 6' 1\" (1.854 m)        Wt Readings from Last 1 Encounters:   02/01/22 255 lb 4.8 oz (115.8 kg)       Body mass index is 33.68 kg/m². CrCl cannot be calculated (Unknown ideal weight.). Goal Trough Level:  mcg/mL    Assessment/Plan:  Please give vancomycin 1250mg ivpb x1 dose now. Please check vancomycin trough 2-2-22 at 0600. Thank you for the consult. Will continue to follow. [address]. Ph.  2/1/2022 3:34 AM

## 2022-02-01 NOTE — CONSULTS
at 2/1/2022 0328  Last data filed at 2/1/2022 0140  Gross per 24 hour   Intake --   Output 550 ml   Net -550 ml     Culture Date      Source                       Results      Ht Readings from Last 1 Encounters:   01/09/21 6' 1\" (1.854 m)        Wt Readings from Last 1 Encounters:   02/01/22 255 lb 4.8 oz (115.8 kg)       Body mass index is 33.68 kg/m². CrCl cannot be calculated (Unknown ideal weight.). Goal Trough Level: 15-20 mcg/mL    Assessment/Plan:  Please give vancomycin 1250mg ivpb x1 dose now. Please check vancomycin trough 2-2-22 at 0600. Thank you for the consult. Will continue to follow. [address] Ph.  2/1/2022 3:30 AM

## 2022-02-01 NOTE — PROGRESS NOTES
RT Inhaler-Nebulizer Bronchodilator Protocol Note    There is a bronchodilator order in the chart from a provider indicating to follow the RT Bronchodilator Protocol and there is an Initiate RT Inhaler-Nebulizer Bronchodilator Protocol order as well (see protocol at bottom of note). CXR Findings:  No results found. The findings from the last RT Protocol Assessment were as follows:   History Pulmonary Disease: Smoker 15 pack years or more  Respiratory Pattern: Dyspnea on exertion or RR 21-25 bpm  Breath Sounds: Slightly diminished and/or crackles  Cough: Strong, spontaneous, non-productive  Indication for Bronchodilator Therapy: Decreased or absent breath sounds  Bronchodilator Assessment Score: 5    Aerosolized bronchodilator medication orders have been revised according to the RT Inhaler-Nebulizer Bronchodilator Protocol below. Respiratory Therapist to perform RT Therapy Protocol Assessment initially then follow the protocol. Repeat RT Therapy Protocol Assessment PRN for score 0-3 or on second treatment, BID, and PRN for scores above 3. No Indications - adjust the frequency to every 6 hours PRN wheezing or bronchospasm, if no treatments needed after 48 hours then discontinue using Per Protocol order mode. If indication present, adjust the RT bronchodilator orders based on the Bronchodilator Assessment Score as indicated below. Use Inhaler orders unless patient has one or more of the following: on home nebulizer, not able to hold breath for 10 seconds, is not alert and oriented, cannot activate and use MDI correctly, or respiratory rate 25 breaths per minute or more, then use the equivalent nebulizer order(s) with same Frequency and PRN reasons based on the score. If a patient is on this medication at home then do not decrease Frequency below that used at home.     0-3 - enter or revise RT bronchodilator order(s) to equivalent RT Bronchodilator order with Frequency of every 4 hours PRN for wheezing or increased work of breathing using Per Protocol order mode. 4-6 - enter or revise RT Bronchodilator order(s) to two equivalent RT bronchodilator orders with one order with BID Frequency and one order with Frequency of every 4 hours PRN wheezing or increased work of breathing using Per Protocol order mode. 7-10 - enter or revise RT Bronchodilator order(s) to two equivalent RT bronchodilator orders with one order with TID Frequency and one order with Frequency of every 4 hours PRN wheezing or increased work of breathing using Per Protocol order mode. 11-13 - enter or revise RT Bronchodilator order(s) to one equivalent RT bronchodilator order with QID Frequency and an Albuterol order with Frequency of every 4 hours PRN wheezing or increased work of breathing using Per Protocol order mode. Greater than 13 - enter or revise RT Bronchodilator order(s) to one equivalent RT bronchodilator order with every 4 hours Frequency and an Albuterol order with Frequency of every 2 hours PRN wheezing or increased work of breathing using Per Protocol order mode.          Electronically signed by Maida Bailon RCP on 2/1/2022 at 12:13 AM

## 2022-02-01 NOTE — CONSULTS
[facility] Wound Ostomy Continence Nurse  Consult Note       NAME:  Kelly Zhong  MEDICAL RECORD NUMBER:  [id number]  AGE: 59 y.o. GENDER: male  : 1960  TODAY'S DATE:  2022    Subjective Pt lying in bed, watching TV. Alert and oriented at this time   Reason for WOCN Evaluation and Assessment: RLMIRNA Zhong is a 59 y.o. male referred by:   [x] Physician  [x] Nursing  [] Other:     Wound Identification:  Wound Type: arterial  Contributing Factors: arterial insufficiency    Pt seen for wound care to the RLE and foot. The patient is a 59 y.o. male with end-stage renal disease on hemodialysis, chronic systolic heart failure, alcoholic cirrhosis, diabetes mellitus, status post left BKA who presented to [facility] ED with complaint of leg swelling and feeling generally unwell. Patient is a poor historian. He states he has not been to dialysis in at least a month. He states he feels like the people at his dialysis center were lying to him so he stopped going. Over the past few weeks he has noticed swelling in his right lower extremity as well as a wound and blister on the right foot. He is also had a cough and felt generally fatigued and unwell. Came to the ER for evaluation where he was found to be hyperkalemic and acidotic in need of dialysis treatment. He was also found to be COVID-19 positive. He states he does not see a podiatrist or MD for his right foot. Upone enetering room, moderate amount of dried blood noted on footboard, likely from right great toe. Pt has refused dressings from staff RN's. Dr Michael Alexandre had been consulted    Patient Goal of Care:  Unclear  [] Wound Healing  [] Odor Control  [] Palliative Care  [] Pain Control   [] Other:         PAST MEDICAL HISTORY        Diagnosis Date    Bacteremia 2017    strep agalactiae (also cultured from foot)    CAD (coronary artery disease) 2021    Cellulitis and abscess of foot, except toes     [facility]) [facility]     Diabetes mellitus ([facility]     History of liver failure     Hypertension     Kidney disease     MRSA (methicillin resistant staph aureus) culture positive 4/17/18, 11/19/2017    left foot       PAST SURGICAL HISTORY    Past Surgical History:   Procedure Laterality Date    IR TUNNELED CATHETER PLACEMENT GREATER THAN 5 YEARS  1/11/2021    IR TUNNELED CATHETER PLACEMENT GREATER THAN 5 YEARS 1/11/2021 Harrie Leyden, MD [place] [facility]    LEG AMPUTATION BELOW KNEE      PARACENTESIS      UPPER GASTROINTESTINAL ENDOSCOPY N/A 1/29/2019    EGD ESOPHAGOGASTRODUODENOSCOPY performed by Susy Roche MD at [address]      Left BKA       FAMILY HISTORY    Family History   Problem Relation Age of Onset    Diabetes Mother     Hypertension Mother     Dementia Mother     Heart Attack Father     Stroke Brother     No Known Problems Maternal Grandmother     No Known Problems Maternal Grandfather     No Known Problems Paternal Grandmother     No Known Problems Paternal Grandfather     Heart Attack Brother        SOCIAL HISTORY    Social History     Tobacco Use    Smoking status: Former Smoker     Packs/day: 1.00     Years: 40.00     Pack years: 40.00     Types: Cigarettes     Quit date: 1/30/2022    Smokeless tobacco: Former User     Types: Chew    Tobacco comment: attempting to quit at this time   Vaping Use    Vaping Use: Never used   Substance Use Topics    Alcohol use: No     Comment: none    Drug use: Yes     Types: Marijuana (Weed)     Comment: occassionally        ALLERGIES    Allergies   Allergen Reactions    Pcn [Penicillins] Hives       MEDICATIONS    No current facility-administered medications on file prior to encounter. Current Outpatient Medications on File Prior to Encounter   Medication Sig Dispense Refill    LORazepam (ATIVAN) 1 MG tablet Take 1 mg by mouth 3 times daily.       ipratropium-albuterol (DUONEB) 0.5-2.5 (3) MG/3ML SOLN nebulizer solution Inhale 3 mLs into the lungs every 4 hours as needed for Shortness of Breath 360 mL     insulin lispro (HUMALOG KWIKPEN) 100 UNIT/ML pen Glucose: Dose:   No Insulin 140-249 1 Unit 250-349 2 Units Over 350 -3 Units 2 pen 0    albuterol sulfate HFA (PROAIR HFA) 108 (90 Base) MCG/ACT inhaler Inhale 2 puffs into the lungs every 6 hours as needed for Wheezing 1 Inhaler 3    nicotine (NICODERM CQ) 7 MG/24HR Place 1 patch onto the skin daily (Patient taking differently: Place 1 patch onto the skin daily Not taking) 30 patch 3    isosorbide-hydrALAZINE (BIDIL) 20-37.5 MG per tablet Take 1 tablet by mouth 3 times daily (Patient taking differently: Take 1 tablet by mouth 3 times daily Not taking) 90 tablet 3    metoprolol succinate (TOPROL XL) 50 MG extended release tablet Take 1 tablet by mouth daily (Patient taking differently: Take 50 mg by mouth daily Not taking) 30 tablet 3    atorvastatin (LIPITOR) 40 MG tablet Take 1 tablet by mouth nightly (Patient taking differently: Take 40 mg by mouth nightly Not taking) 30 tablet 0       Objective    /70   Pulse 58   Temp 99 °F (37.2 °C) (Oral)   Resp 18   Wt 255 lb 4.8 oz (115.8 kg)   SpO2 94%   BMI 33.68 kg/m²     LABS:  WBC:    Lab Results   Component Value Date    WBC 3.8 02/01/2022     H/H:    Lab Results   Component Value Date    HGB 10.8 02/01/2022    HCT 33.3 02/01/2022     PTT:    Lab Results   Component Value Date    APTT 28.0 03/21/2020   [APTT}  PT/INR:    Lab Results   Component Value Date    PROTIME 13.1 01/12/2022    INR 1.15 01/12/2022     HgBA1c:    Lab Results   Component Value Date    LABA1C 5.7 01/31/2022       Assessment   Gabriel Risk Score: Gabriel Scale Score: 19    Patient Active Problem List   Diagnosis Code    Tobacco abuse Z72.0    Type 1 diabetes mellitus with neurological manifestations, uncontrolled (HCC) E10.49, E10.65    Diabetic ulcer of right foot (HCC) E11.621, L97.519    Cellulitis L03.90    NSTEMI (non-ST elevated myocardial infarction) ([facility] I21.4    Osteomyelitis of foot, left, acute ([facility]) M86.172    Group B streptococcal bacteriuria R82.71    Type 2 diabetes mellitus with left diabetic foot infection (HCC) E11.628, L08.9    Septic arthritis of left foot (HCC) M00.9    Poorly controlled type 2 diabetes mellitus (HCC) E11.65    Elevated sed rate R70.0    Elevated C-reactive protein (CRP) R79.82    Acute kidney injury superimposed on chronic kidney disease (HCC) N17.9, N18.9    Hyponatremia E87.1    Diabetic polyneuropathy associated with type 2 diabetes mellitus (HCC) E11.42    Mixed hyperlipidemia E78.2    Tobacco abuse counseling Z71.6    Diabetes education, encounter for Z71.89    Cardiomyopathy ([facility]) I42.9    Abnormal nuclear stress test R94.39    Pain of left lower extremity M79.605    Stage 3 chronic kidney disease N18.30    Osteomyelitis ([facility]) M86.9    Acute respiratory failure with hypoxia ([facility]) J96.01    Acute respiratory failure ([facility]) J96.00    Ascites R18.8    Chronic systolic CHF (congestive heart failure) ([facility]) I50.22    Noncompliance Z91.19    Chronic systolic heart failure ([facility]) I50.22    HTN (hypertension), malignant F17    Metabolic alkalosis D99.1    NSVT (nonsustained ventricular tachycardia) ([facility]) I47.2    Moderate malnutrition ([facility]) E44.0    Fluid overload E87.70    Abdominal distension R14.0    Hypoxia R09.02    Shortness of breath R06.02    Diabetic foot infection (HCC) E11.628, C64.3    Alcoholic cirrhosis of liver with ascites ([facility]) K70.31    A-fib ([facility]) I48.91    CAD (coronary artery disease) I25.10    ESRD needing dialysis ([facility]) N18.6, Z99.2    COVID-19 U07.1       Right great toe:  Distal tip open, pink and moist.  Toenail not present. 4th plantar toe with black stable eschar. Loose skin removed from toenail, +odor, dissipated after removal and cleansing. RLE:  Dry flaky skin with scattered black scabs, skin is red, edematous and taut. Right medial foot with ruptured bulla, 20% dusky base, slight periwound redness. Right knee:  Brown and red dry scab, pt states he picks at it. No soi          Response to treatment:  With complaints of pain. when cleansing right knee. Plan  Right medial foot, scabs to RLE  and right great toe:  Cleanse wounds with NS, pat dry. Cleanse and dry between toes. Apply Xeroform, cover with dry gauze, secure with roll gauze and tape. Change daily. Right plantar 4th toe:  Paint with Betadine daily, leave TIM, keep dry. Float right heel off of bed at all times. Plan of Care:      Specialty Bed Required : No   [] Low Air Loss   [] Pressure Redistribution  [] Fluid Immersion  [] Bariatric  [] Total Pressure Relief  [] Other:     Current Diet: ADULT DIET; Regular; 4 carb choices (60 gm/meal);  Low Potassium (Less than 3000 mg/day)  Dietician consult:  Yes    Discharge Plan:  Placement for patient upon discharge: TBD  Patient appropriate for Outpatient [address]: Yes    Referrals:  []   [] [facility]  [] Supplies  [] Other    Patient/Caregiver Teaching:  Level of patient/caregiver understanding able to:   [] Indicates understanding       [x] Needs reinforcement  [] Unsuccessful      [x] Verbal Understanding  [] Demonstrated understanding       [] No evidence of learning  [] Refused teaching         [] N/A       Electronically signed by Sumanth Brumfield RN, CWOCN on 2/1/2022 at 3:11 PM

## 2022-02-01 NOTE — PROGRESS NOTES
RT Inhaler-Nebulizer Bronchodilator Protocol Note    There is a bronchodilator order in the chart from a provider indicating to follow the RT Bronchodilator Protocol and there is an Initiate RT Inhaler-Nebulizer Bronchodilator Protocol order as well (see protocol at bottom of note). CXR Findings:  No results found. The findings from the last RT Protocol Assessment were as follows:   History Pulmonary Disease: Smoker 15 pack years or more  Respiratory Pattern: Dyspnea on exertion or RR 21-25 bpm  Breath Sounds: Slightly diminished and/or crackles  Cough: Strong, spontaneous, non-productive  Indication for Bronchodilator Therapy: Decreased or absent breath sounds  Bronchodilator Assessment Score: 5    Aerosolized bronchodilator medication orders have been revised according to the RT Inhaler-Nebulizer Bronchodilator Protocol below. Respiratory Therapist to perform RT Therapy Protocol Assessment initially then follow the protocol. Repeat RT Therapy Protocol Assessment PRN for score 0-3 or on second treatment, BID, and PRN for scores above 3. No Indications - adjust the frequency to every 6 hours PRN wheezing or bronchospasm, if no treatments needed after 48 hours then discontinue using Per Protocol order mode. If indication present, adjust the RT bronchodilator orders based on the Bronchodilator Assessment Score as indicated below. Use Inhaler orders unless patient has one or more of the following: on home nebulizer, not able to hold breath for 10 seconds, is not alert and oriented, cannot activate and use MDI correctly, or respiratory rate 25 breaths per minute or more, then use the equivalent nebulizer order(s) with same Frequency and PRN reasons based on the score. If a patient is on this medication at home then do not decrease Frequency below that used at home.     0-3 - enter or revise RT bronchodilator order(s) to equivalent RT Bronchodilator order with Frequency of every 4 hours PRN for wheezing or increased work of breathing using Per Protocol order mode. 4-6 - enter or revise RT Bronchodilator order(s) to two equivalent RT bronchodilator orders with one order with BID Frequency and one order with Frequency of every 4 hours PRN wheezing or increased work of breathing using Per Protocol order mode. 7-10 - enter or revise RT Bronchodilator order(s) to two equivalent RT bronchodilator orders with one order with TID Frequency and one order with Frequency of every 4 hours PRN wheezing or increased work of breathing using Per Protocol order mode. 11-13 - enter or revise RT Bronchodilator order(s) to one equivalent RT bronchodilator order with QID Frequency and an Albuterol order with Frequency of every 4 hours PRN wheezing or increased work of breathing using Per Protocol order mode. Greater than 13 - enter or revise RT Bronchodilator order(s) to one equivalent RT bronchodilator order with every 4 hours Frequency and an Albuterol order with Frequency of every 2 hours PRN wheezing or increased work of breathing using Per Protocol order mode.          Electronically signed by Zoe Lees RCP on 2/1/2022 at 2:10 PM

## 2022-02-01 NOTE — ACP (ADVANCE CARE PLANNING)
Advance Care Planning   Healthcare Decision Maker:    Primary Decision Maker: [place] - [phone]    Click here to complete Healthcare Decision Makers including selection of the Healthcare Decision Maker Relationship (ie \"Primary\").

## 2022-02-02 NOTE — PROGRESS NOTES
Bedside report and transfer of care given to Jazmin Mar [facility]. Pt currently resting in bed with the call light within reach. Pt denies any other care needs at this time. Pt stable at this time.

## 2022-02-02 NOTE — PROGRESS NOTES
RT Inhaler-Nebulizer Bronchodilator Protocol Note    There is a bronchodilator order in the chart from a provider indicating to follow the RT Bronchodilator Protocol and there is an Initiate RT Inhaler-Nebulizer Bronchodilator Protocol order as well (see protocol at bottom of note). CXR Findings:  No results found. The findings from the last RT Protocol Assessment were as follows:   History Pulmonary Disease: Smoker 15 pack years or more  Respiratory Pattern: Dyspnea on exertion or RR 21-25 bpm  Breath Sounds: Slightly diminished and/or crackles  Cough: Strong, spontaneous, non-productive  Indication for Bronchodilator Therapy: Decreased or absent breath sounds  Bronchodilator Assessment Score: 5    Aerosolized bronchodilator medication orders have been revised according to the RT Inhaler-Nebulizer Bronchodilator Protocol below. Respiratory Therapist to perform RT Therapy Protocol Assessment initially then follow the protocol. Repeat RT Therapy Protocol Assessment PRN for score 0-3 or on second treatment, BID, and PRN for scores above 3. No Indications - adjust the frequency to every 6 hours PRN wheezing or bronchospasm, if no treatments needed after 48 hours then discontinue using Per Protocol order mode. If indication present, adjust the RT bronchodilator orders based on the Bronchodilator Assessment Score as indicated below. Use Inhaler orders unless patient has one or more of the following: on home nebulizer, not able to hold breath for 10 seconds, is not alert and oriented, cannot activate and use MDI correctly, or respiratory rate 25 breaths per minute or more, then use the equivalent nebulizer order(s) with same Frequency and PRN reasons based on the score. If a patient is on this medication at home then do not decrease Frequency below that used at home.     0-3 - enter or revise RT bronchodilator order(s) to equivalent RT Bronchodilator order with Frequency of every 4 hours PRN for

## 2022-02-02 NOTE — PROGRESS NOTES
Pharmacy: Low Dose Heparin Drip  Current rate: 1190 units/hr (11.9 ml/hr)  Anti- Xa @ 1230= 0.26  Goal Xa= 0.3-0.7  Per protocol, 2000 unit bolus and increase drip to 1380 units/hr  Anti-Xa @ [address] [facility] 2/2/20221:18 PM  .

## 2022-02-02 NOTE — FLOWSHEET NOTE
02/01/22 2115   Vital Signs   Temp 98.8 °F (37.1 °C)   Temp Source Oral   Pulse 59   Heart Rate Source Monitor   Resp 18   BP (!) 142/80   BP Location Left upper arm   Patient Position Semi fowlers   Level of Consciousness Alert (0)   MEWS Score 1   Patient Currently in Pain Denies   Pain Assessment   Pain Assessment 0-10   Pain Level 0   Patient's Stated Pain Goal No pain   Oxygen Therapy   SpO2 92 %   O2 Device None (Room air)   O2 Flow Rate (L/min) 0 L/min       Shift assessment completed, see flow sheet. Patient is A&O x4. Medications administered. Patient denies further needs at this time. Call light within reach. Will continue to monitor.

## 2022-02-02 NOTE — CARE COORDINATION
Collaborating with CM to assist in arranging [facility] services for pt at [place]. Telephone call to [facility]. unable to staff. [facility] unable to staff. [facility] unable to staff. [facility] unable to staff. [facility] not in network. [facility] not in network. [facility] not in network. [facility] not in network. [facility] not in network. Telephone call to CM. LM for Tali LOCK Informed unable to locate [facility]. agency able to service pt for [facility]. needs.       Electronically signed by Harjeet Clarke RN on 2/2/2022 at 2:26 PM

## 2022-02-02 NOTE — PROGRESS NOTES
[facility] Progress Note           Subjective/   59y.o. year old male who we are seeing in consultation for ESRD on HD. HPI:  Seen on dialysis. Orders confirmed. Pre-weight at HD today 115 kg. ROS:  Intake adequate, no shortness of breath. Objective/   GEN:  Chronically ill, /65   Pulse 74   Temp 97.9 °F (36.6 °C) (Oral)   Resp 20   Wt 256 lb 1.6 oz (116.2 kg)   SpO2 94%   BMI 33.79 kg/m²   Exam deferred as means to protect PPE due to shortage & due to CORONAVIRUS risk. [facility], [place]    Data/  Recent Labs     01/31/22  1558 02/01/22  0441 02/02/22  0624   WBC 4.0 3.8* 3.7*   HGB 11.5* 10.8* 9.9*   HCT 35.7* 33.3* 30.3*   MCV 92.7 94.3 92.0    135 129*     Recent Labs     01/31/22  1558 02/01/22  0441 02/02/22  0624    137 136   K 5.4* 4.5 4.6    103 102   CO2 20* 19* 19*   GLUCOSE 86 79 74   BUN 60* 59* 63*   CREATININE 6.4* 6.6* 7.4*   LABGLOM 9* 9* 7*   GFRAA 11* 10* 9*       Assessment/     - ESKD on dialysis though has been off since end of Nov 2021 due to patient choice     - COVID+     - Right foot ulcer/cellulitis - on vancomycin/cefepime     - Anemia of chronic disease     - Hypertension     - Hyperkalemia     - Anion-gap metabolic acidosis     - Atrial fibrillation     - Alcoholic liver cirrhosis - requires frequent paracentesis     - DM2 c/b L BKA    Plan/     - HD today with 3 L UF target  - Trend labs, bp's, & urine output    Needs outpatient HD placement - d/w        ____________________________________  Chandu Klein MD  [facility]  [email or web address]  Office: [phone]

## 2022-02-02 NOTE — CONSULTS
2-1-22 (0908) anti-Xa 0.15 IU/ml. Please give heparin 2000 unit bolus and increase heparin drip to 1190 units/hr ( 11.9 ml/hr ). Please recheck anti-Xa at 0600 on 2-2-22. [facility]. .  2/2/2022 12:13 AM

## 2022-02-02 NOTE — FLOWSHEET NOTE
Treatment time: 3 hours  Net UF: 3000 ml    Pre weight: 115 kg   Post weight: 111.8 kg  EDW: TBD kg    Access used: Right chest wall TDC  Access function: Good with  ml/min    Medications or blood products given: No    Regular outpatient schedule: Missed HD over 3 months, First HD today    Summary of response to treatment: Pt tolerated well with HD, vital signs stable, HD completed in full, heparin dwell in TDC, capped and clamped. Copy of dialysis treatment record placed in chart, to be scanned into EMR.     02/02/22 0836 02/02/22 1146   Vital Signs   /71 (!) 155/84   Temp 96.5 °F (35.8 °C) 96.2 °F (35.7 °C)   Pulse 71 68   Resp 20 16   SpO2 91 %  --    Weight 253 lb 8.5 oz (115 kg) 246 lb 7.6 oz (111.8 kg)   Weight Method Actual;Bed scale Actual;Bed scale   Percent Weight Change -1 -2.78   Pain Assessment   Pain Assessment 0-10  --    Pain Level 0 0   Post-Hemodialysis Assessment   Post-Treatment Procedures  --  Blood returned;Catheter capped, clamped and heparinized x 2 ports   Machine Disinfection Process  --  Acid/Vinegar Clean;Heat Disinfect; Exterior Machine Disinfection   Rinseback Volume (ml)  --  400 ml   Total Liters Processed (l/min)  --  51.6 l/min   Dialyzer Clearance  --  Moderately streaked   Duration of Treatment (minutes)  --  180 minutes   Heparin amount administered during treatment (units)  --  0 units   Hemodialysis Intake (ml)  --  400 ml   Hemodialysis Output (ml)  --  3450 ml   NET Removed (ml)  --  3050 ml   Tolerated Treatment  --  Good   RLE Edema +2 +2

## 2022-02-02 NOTE — PROGRESS NOTES
RN notified of pt having 5 beats of Vtach. Pt was sleeping and asymptomatic. VSS. Oxygen was off d/t patient removing it intermittently but O2 sats 92%. Perfect serve sent to Dr. Fadia Young.

## 2022-02-02 NOTE — PROGRESS NOTES
Admit: 2022    Name:  Kelsie Cabral  Room:  [place] [id number]  MRN:    [id number]     Daily Progress Note for 2022   Admitted with COVID-19 infection,.encephalopathy and diabetic foot ulcer. Interval History:     Currently on 2 L of O2     Scheduled Meds:   albuterol sulfate HFA  2 puff Inhalation BID    vancomycin (VANCOCIN) intermittent dosing (placeholder)   Other RX Placeholder    cefepime  1,000 mg IntraVENous Q24H    isosorbide dinitrate  20 mg Oral TID    And    hydrALAZINE  37.5 mg Oral TID    LORazepam  1 mg Oral TID    povidone-iodine   Topical Daily    atorvastatin  40 mg Oral Nightly    metoprolol succinate  50 mg Oral Daily    nicotine  1 patch TransDERmal Daily    insulin lispro  0-6 Units SubCUTAneous TID WC    insulin lispro  0-3 Units SubCUTAneous Nightly    sodium chloride flush  5-40 mL IntraVENous 2 times per day    insulin glargine  0.25 Units/kg SubCUTAneous Nightly       Continuous Infusions:   heparin 25,000 units in dextrose 5% 250 mL infusion 1,190 Units/hr (22 0403)    dextrose      sodium chloride         PRN Meds:  albuterol sulfate HFA, heparin (porcine), heparin (porcine), ipratropium-albuterol, glucose, dextrose, glucagon (rDNA), dextrose, sodium chloride flush, sodium chloride, ondansetron **OR** ondansetron, polyethylene glycol, acetaminophen **OR** acetaminophen, hydrALAZINE                  Objective:     Temp  Av.2 °F (36.8 °C)  Min: 97 °F (36.1 °C)  Max: 99 °F (37.2 °C)  Pulse  Av  Min: 58  Max: 77  BP  Min: 126/70  Max: 155/91  SpO2  Av.8 %  Min: 92 %  Max: 96 %  Patient Vitals for the past 4 hrs:   Weight   22 0600 256 lb 1.6 oz (116.2 kg)         Intake/Output Summary (Last 24 hours) at 2022 0728  Last data filed at 2022 0540  Gross per 24 hour   Intake 960 ml   Output 1700 ml   Net -740 ml       Physical Exam:  Gen: Disheveled appearance, poor hygiene. No distress. Alert. Eyes: PERRL. No sclera icterus.  No conjunctival injection. ENT: No discharge. Pharynx clear. Multiple missing teeth   Neck: No JVD. Trachea midline. Resp: No accessory muscle use. No crackles. No wheezes. No rhonchi. Diminished air entry throughout   CV: Regular rate. Irregularly irregular rhythm. No murmur. No rub. 1+ edema RLE. TDC in right chest wall   GI: Non-tender. Non-distended. No masses. No organomegaly. Normal bowel sounds. No hernia. Skin:   Right foot with large intact blister with serous drainage over the medial heel   Right great toe with ulceration at distal aspect, foul smell  Left BKA stump with some wound dehiscence at prior surgical site. Foul smell   M/S: No cyanosis. No joint deformity. No clubbing. Neuro: Awake. Grossly nonfocal    Psych: Oriented to person, place, month. Could not name year. No anxiety or agitation. Lab Data:  CBC:   Recent Labs     01/31/22  1558 02/01/22  0441 02/02/22  0624   WBC 4.0 3.8* 3.7*   RBC 3.85* 3.54* 3.29*   HGB 11.5* 10.8* 9.9*   HCT 35.7* 33.3* 30.3*   MCV 92.7 94.3 92.0   RDW 15.1 15.0 14.9    135 129*     BMP:   Recent Labs     01/31/22  1558 02/01/22  0441 02/02/22  0624    137 136   K 5.4* 4.5 4.6    103 102   CO2 20* 19* 19*   BUN 60* 59* 63*   CREATININE 6.4* 6.6* 7.4*     BNP: No results for input(s): BNP in the last 72 hours. PT/INR: No results for input(s): PROTIME, INR in the last 72 hours. APTT:No results for input(s): APTT in the last 72 hours. CARDIAC ENZYMES:   Recent Labs     01/31/22  1823 01/31/22  2252 02/01/22  0441   TROPONINI 0.64* 0.62* 0.66*     FASTING LIPID PANEL:  Lab Results   Component Value Date    CHOL 86 02/22/2019    HDL 28 02/22/2019    TRIG 88 02/22/2019     LIVER PROFILE:   Recent Labs     01/31/22  1558 02/01/22  0441 02/02/22  0624   AST 25 22 27   ALT 12 10 11   BILITOT 0.3 0.3 0.3   ALKPHOS 94 81 76         XR FOOT RIGHT (MIN 3 VIEWS)   Final Result   Circumferential soft tissue swelling, greater dorsally.       No radiographic evidence of osteomyelitis. No soft tissue gas is seen.          XR CHEST (2 VW)   Final Result   Left-sided pleural effusion with left mid and bilateral lower lung opacities   that could represent atelectasis or infection               Assessment & Plan:     Patient Active Problem List    Diagnosis Date Noted    Cardiomyopathy ([address]     Abnormal nuclear stress test     COVID-19 02/01/2022    ESRD needing dialysis ([address] 01/31/2022    CAD (coronary artery disease) 08/18/2021    A-fib ([address] 41/14/7144    Alcoholic cirrhosis of liver with ascites ([address] 01/09/2021    Diabetic foot infection ([address] 01/08/2021    Abdominal distension     Hypoxia     Shortness of breath     Fluid overload 02/21/2019    Moderate malnutrition ([address] 72/66/1515    Metabolic alkalosis     NSVT (nonsustained ventricular tachycardia) (HCC)     Chronic systolic heart failure (HCC)     HTN (hypertension), malignant     Acute respiratory failure with hypoxia ([address] 01/19/2019    Acute respiratory failure (HCC) 01/19/2019    Ascites 01/19/2019    Chronic systolic [facility]congestive heart failure) ([facility])     Noncompliance     Osteomyelitis ([address] 04/17/2018    Stage 3 chronic kidney disease 02/28/2018    Pain of left lower extremity     Osteomyelitis of foot, left, acute ([address] 11/19/2017    Group B streptococcal bacteriuria     Type 2 diabetes mellitus with left diabetic foot infection ([address]     Septic arthritis of left foot (HCC)     Poorly controlled type 2 diabetes mellitus (HCC)     Elevated sed rate     Elevated C-reactive protein (CRP)     Acute kidney injury superimposed on chronic kidney disease ([address]     Hyponatremia     Diabetic polyneuropathy associated with type 2 diabetes mellitus ([address]     Mixed hyperlipidemia     Tobacco abuse counseling     Diabetes education, encounter for     Cellulitis 11/18/2017    NSTEMI (non-ST elevated myocardial infarction) ([address] 11/18/2017    Tobacco abuse 07/29/2015    Type 1 diabetes mellitus with neurological manifestations, uncontrolled ([facility] 07/29/2015    Diabetic ulcer of right foot ([facility] 07/29/2015         #COVID 19 infection  - droplet plus isolation in place   - vaccinated with 2 shot series, no booster   - symptom onset unclear- he states he has been coughing for months, he is a poor historian   - does not use home O2, now on 2 L of O2  Start decadron 6 mg daily      #ESRD  #Hyperkalemia   #Metabolic acidosis   - unclear patient's last dialysis session- he tells me it has been at least a month  - hyperkalemia improved with medical treatment  - Nephro c/s for HD  - he has TDC in the right chest wall   - he is requesting a new dialysis center at discharge - refusing to go back to his prior dialysis center   HD yesterday and today     #Metabolic encephalopathy  - suspect related to missed dialysis, acute infection, also concern for hepatic encephalopathy with cirrhosis history  - oriented to self, place, month. Neuro exam not focal  - checked ammonia- not elevated   - cont treatment for other acute medical issues as listed      #Right diabetic foot ulcer- great toe  #Right foot cellulitis   #Right foot blister   - podiatry c/s  - vanc and cefepime D#2       #History of left BKA   - mild wound dehiscence noted left stump- will ask podiatry to eval     #Acute on Chronic systolic CHF  - IV lasix per nephro, fluid removal with HD   - cont toprol XL     #Atrial fibrillation   - currently with controlled rates   - not on [facility] - unclear why- he was seen by EP during Jan 2021 admit to [facility] and NOAC recommended but he was ultimately not discharged with anticoagulation. He refused anticoagulation periodically throughout that admission. CHA2ds2vasc=3. I discussed AC with him and the risk of CVA with atrial fib, he is agreeable to heparin gtt for now.   Will need to consider oral AC on discharge- need to consider risks with cirrhosis history, his last EGD for varices surveillance to review was 1/2019 and was negative for varices. He has chronic anemia which is better now than his baseline. Will monitor closely for bleeding   - cont toprol, hydralazine, isordil       #Elevated troponin   - flat elevation, likely related to CHF, fluid overload, ESRD. No ischemia on EKG. Denies CP. Low suspicion ACS     #Alcoholic cirrhosis   - he gets periodic paracentesis with IR per chart review- most recently 1/24/22  - checked ammonia with confusion- not elevated at 28     #HTN  - controlled, cont home meds      #Lymphocytopenia   - suspect with COVID 19, monitor CBC     #DM2  - cont Lantus and use SSI      DVT Prophylaxis: Heparin   Diet: ADULT DIET; Regular; 4 carb choices (60 gm/meal);  Low Potassium (Less than 3000 mg/day)  Code Status: Full Code    Tatianna Fry MD

## 2022-02-02 NOTE — PROGRESS NOTES
Heparin Anti-Xa (06:24) = 0.3. Therapeutic. Continue Heparin infusion at 1190 units/hr. Next Anti-Xa scheduled for 12:30.   Yaw Johnson R.Ph.2/2/20227:28 AM

## 2022-02-03 NOTE — PROGRESS NOTES
A routine consult was called to Dr. Nathan Elias on 2/3/22 @ 8398, requesting Dr amber Franco.  Reason: foot infection

## 2022-02-03 NOTE — PROGRESS NOTES
Pharmacy: Low Dose Heparin Drip  Current rate: 1570 units/hr (15.7 ml/hr)  Anti- Xa @ 0358 = 0.43  Goal Xa= 0.3-0.7  Per protocol, continue same drip rate of 1570 units/hr and obtain another Anti-Xa 2/3 @ 1000. [place] Nelson

## 2022-02-03 NOTE — PROGRESS NOTES
[facility] Progress Note           Subjective/   59y.o. year old male who we are seeing in consultation for ESRD on HD. HPI:  Last HD on 2/2 with 3 liters removed, post-weight of 111.8 kg. Shortness of breath better. ROS:  Intake adequate, nausea resolved, no fevers. Objective/   GEN:  BP (!) 143/62   Pulse 52   Temp 97.4 °F (36.3 °C) (Oral)   Resp 16   Wt 247 lb 9.6 oz (112.3 kg)   SpO2 95%   BMI 32.67 kg/m²   Exam deferred as means to protect PPE due to shortage & due to CORONAVIRUS risk. [facility]    Data/  Recent Labs     01/31/22  1558 02/01/22  0441 02/02/22  0624   WBC 4.0 3.8* 3.7*   HGB 11.5* 10.8* 9.9*   HCT 35.7* 33.3* 30.3*   MCV 92.7 94.3 92.0    135 129*     Recent Labs     02/01/22  0441 02/02/22  0624 02/03/22  0358    136 132*   K 4.5 4.6 4.2    102 99   CO2 19* 19* 22   GLUCOSE 79 74 172*   BUN 59* 63* 47*   CREATININE 6.6* 7.4* 5.9*   LABGLOM 9* 7* 10*   GFRAA 10* 9* 12*       Assessment/     - ESKD on dialysis though has been off since end of Nov 2021 due to patient choice     - COVID+     - Right foot ulcer/cellulitis - on vancomycin/cefepime     - Anemia of chronic disease     - Hypertension     - Hyperkalemia     - Anion-gap metabolic acidosis     - Atrial fibrillation     - Alcoholic liver cirrhosis - requires frequent paracentesis     - DM2 c/b L BKA    Plan/     - HD tomorrow with 2-3 L UF target  - Trend labs, bp's    Needs outpatient HD placement - d/w        ____________________________________  Na Cantor MD  [facility]  [email or web address]  Office: [phone]

## 2022-02-03 NOTE — PROGRESS NOTES
Admit: 2022    Name:  Milad Keller  Room:  [id number]  MRN:    [id number]     Daily Progress Note for 2/3/2022   Admitted with COVID-19 infection,.encephalopathy and diabetic foot ulcer. Interval History:     Currently on RA  Says he feels better     Scheduled Meds:   dexamethasone  6 mg IntraVENous Daily    albuterol sulfate HFA  2 puff Inhalation BID    vancomycin (VANCOCIN) intermittent dosing (placeholder)   Other RX Placeholder    cefepime  1,000 mg IntraVENous Q24H    isosorbide dinitrate  20 mg Oral TID    And    hydrALAZINE  37.5 mg Oral TID    LORazepam  1 mg Oral TID    povidone-iodine   Topical Daily    atorvastatin  40 mg Oral Nightly    metoprolol succinate  50 mg Oral Daily    nicotine  1 patch TransDERmal Daily    insulin lispro  0-6 Units SubCUTAneous TID WC    insulin lispro  0-3 Units SubCUTAneous Nightly    sodium chloride flush  5-40 mL IntraVENous 2 times per day    insulin glargine  0.25 Units/kg SubCUTAneous Nightly       Continuous Infusions:   heparin 25,000 units in dextrose 5% 250 mL infusion 1,570 Units/hr (22)    dextrose      sodium chloride         PRN Meds:  heparin (porcine), albuterol sulfate HFA, heparin (porcine), heparin (porcine), ipratropium-albuterol, glucose, dextrose, glucagon (rDNA), dextrose, sodium chloride flush, sodium chloride, ondansetron **OR** ondansetron, polyethylene glycol, acetaminophen **OR** acetaminophen, hydrALAZINE                  Objective:     Temp  Av.7 °F (36.5 °C)  Min: 96.2 °F (35.7 °C)  Max: 99 °F (37.2 °C)  Pulse  Av.6  Min: 59  Max: 78  BP  Min: 119/70  Max: 163/80  SpO2  Av %  Min: 91 %  Max: 99 %  Patient Vitals for the past 4 hrs:   SpO2   22 0810 92 %         Intake/Output Summary (Last 24 hours) at 2/3/2022 0814  Last data filed at 2022 1745  Gross per 24 hour   Intake 822 ml   Output 3450 ml   Net -2628 ml       Physical Exam:  Gen: Disheveled appearance, poor hygiene.  No distress. Alert. Eyes: PERRL. No sclera icterus. No conjunctival injection. ENT: No discharge. Pharynx clear. Multiple missing teeth   Neck: No JVD. Trachea midline. Resp: No accessory muscle use. No crackles. No wheezes. No rhonchi. Diminished air entry throughout   CV: Regular rate. Irregularly irregular rhythm. No murmur. No rub. 1+ edema RLE. TDC in right chest wall   GI: Non-tender. Non-distended. No masses. No organomegaly. Normal bowel sounds. No hernia. Skin:   Right foot with large intact blister with serous drainage over the medial heel   Right great toe with ulceration at distal aspect, foul smell  Left BKA stump with some wound dehiscence at prior surgical site. Foul smell   M/S: No cyanosis. No joint deformity. No clubbing. Neuro: Awake. Grossly nonfocal    Psych: Oriented to person, place, month. Could not name year. No anxiety or agitation. Lab Data:  CBC:   Recent Labs     01/31/22  1558 02/01/22  0441 02/02/22  0624   WBC 4.0 3.8* 3.7*   RBC 3.85* 3.54* 3.29*   HGB 11.5* 10.8* 9.9*   HCT 35.7* 33.3* 30.3*   MCV 92.7 94.3 92.0   RDW 15.1 15.0 14.9    135 129*     BMP:   Recent Labs     02/01/22  0441 02/02/22  0624 02/03/22  0358    136 132*   K 4.5 4.6 4.2    102 99   CO2 19* 19* 22   BUN 59* 63* 47*   CREATININE 6.6* 7.4* 5.9*     BNP: No results for input(s): BNP in the last 72 hours. PT/INR: No results for input(s): PROTIME, INR in the last 72 hours. APTT:No results for input(s): APTT in the last 72 hours.   CARDIAC ENZYMES:   Recent Labs     01/31/22  1823 01/31/22  2252 02/01/22 0441   TROPONINI 0.64* 0.62* 0.66*     FASTING LIPID PANEL:  Lab Results   Component Value Date    CHOL 86 02/22/2019    HDL 28 02/22/2019    TRIG 88 02/22/2019     LIVER PROFILE:   Recent Labs     02/01/22  0441 02/02/22  0624 02/03/22  0358   AST 22 27 26   ALT 10 11 11   BILITOT 0.3 0.3 0.3   ALKPHOS 81 76 69         XR FOOT RIGHT (MIN 3 VIEWS)   Final Result   Circumferential soft tissue swelling, greater dorsally. No radiographic evidence of osteomyelitis. No soft tissue gas is seen.          XR CHEST (2 VW)   Final Result   Left-sided pleural effusion with left mid and bilateral lower lung opacities   that could represent atelectasis or infection               Assessment & Plan:     Patient Active Problem List    Diagnosis Date Noted    Cardiomyopathy ([address]     Abnormal nuclear stress test     Encephalopathy     COVID-19 02/01/2022    ESRD needing dialysis ([address] 01/31/2022    CAD (coronary artery disease) 08/18/2021    A-fib ([address] 04/29/1791    Alcoholic cirrhosis of liver with ascites ([address] 01/09/2021    Diabetic foot infection ([address] 01/08/2021    Abdominal distension     Hypoxia     Shortness of breath     Fluid overload 02/21/2019    Moderate malnutrition ([address] 59/97/6896    Metabolic alkalosis     NSVT (nonsustained ventricular tachycardia) (HCC)     Chronic systolic heart failure (HCC)     HTN (hypertension), malignant     Acute respiratory failure with hypoxia ([address] 01/19/2019    Acute respiratory failure (HCC) 01/19/2019    Ascites 01/19/2019    Chronic systolic [facility]est[facility] failure) ([facility])     Noncompliance     Osteomyelitis ([address] 04/17/2018    Stage 3 chronic kidney disease 02/28/2018    Pain of left lower extremity     Osteomyelitis of foot, left, acute ([address] 11/19/2017    Group B streptococcal bacteriuria     Type 2 diabetes mellitus with left diabetic foot infection ([address]     Septic arthritis of left foot (HCC)     Poorly controlled type 2 diabetes mellitus (HCC)     Elevated sed rate     Elevated C-reactive protein (CRP)     Acute kidney injury superimposed on chronic kidney disease ([address]     Hyponatremia     Diabetic polyneuropathy associated with type 2 diabetes mellitus ([address]     Mixed hyperlipidemia     Tobacco abuse counseling     Diabetes education, encounter for     Cellulitis 11/18/2017    NSTEMI (non-ST elevated myocardial infarction) ([facility] 11/18/2017    Tobacco abuse 07/29/2015    Type 1 diabetes mellitus with neurological manifestations, uncontrolled ([facility] 07/29/2015    Diabetic ulcer of right foot ([facility] 07/29/2015         #COVID 19 infection  - droplet plus isolation in place   - vaccinated with 2 shot series, no booster   - symptom onset unclear- he states he has been coughing for months, he is a poor historian   - does not use home O2, now on 2 L of O2  Start decadron 6 mg daily   Currently on RA      #ESRD  #Hyperkalemia   #Metabolic acidosis   - unclear patient's last dialysis session- he tells me it has been at least a month  - hyperkalemia improved with medical treatment  - Nephro c/s for HD  - he has TDC in the right chest wall   - he is requesting a new dialysis center at discharge - refusing to go back to his prior dialysis center   HD per nephro     #Metabolic encephalopathy  - suspect related to missed dialysis, acute infection, also concern for hepatic encephalopathy with cirrhosis history  - oriented to self, place, month. Neuro exam not focal  - checked ammonia- not elevated   - cont treatment for other acute medical issues as listed      #Right diabetic foot ulcer- great toe  #Right foot cellulitis   #Right foot blister   - podiatry c/s  - vanc and cefepime D#3       #History of left BKA   - mild wound dehiscence noted left stump- will ask podiatry to eval     #Acute on Chronic systolic CHF  - IV lasix per nephro, fluid removal with HD   - cont toprol XL     #Atrial fibrillation   - currently with controlled rates   - not on [facility] - unclear why- he was seen by EP during Jan 2021 admit to [facility] and NOAC recommended but he was ultimately not discharged with anticoagulation. He refused anticoagulation periodically throughout that admission. CHA2ds2vasc=3. I discussed AC with him and the risk of CVA with atrial fib, he is agreeable to heparin gtt for now.   Will need to consider oral AC on discharge- need to consider risks with cirrhosis history, his last EGD for varices surveillance to review was 1/2019 and was negative for varices. He has chronic anemia which is better now than his baseline. Will monitor closely for bleeding   - cont toprol, hydralazine, isordil       #Elevated troponin   - flat elevation, likely related to CHF, fluid overload, ESRD. No ischemia on EKG. Denies CP. Low suspicion ACS     #Alcoholic cirrhosis   - he gets periodic paracentesis with IR per chart review- most recently 1/24/22  - checked ammonia with confusion- not elevated at 28     #HTN  - controlled, cont home meds      #Lymphocytopenia   - suspect with COVID 19, monitor CBC     #DM2  - cont Lantus and use SSI      DVT Prophylaxis: Heparin   Diet: ADULT DIET; Regular; 4 carb choices (60 gm/meal);  Low Potassium (Less than 3000 mg/day)  Code Status: Full Code    Vicki Grier MD

## 2022-02-03 NOTE — FLOWSHEET NOTE
02/02/22 1955   Vital Signs   Temp 97.4 °F (36.3 °C)   Temp Source Oral   Pulse 65   Heart Rate Source Monitor   Resp 18   /68   BP Location Left upper arm   Patient Position Semi fowlers   Level of Consciousness Alert (0)   MEWS Score 1   Patient Currently in Pain Denies   Pain Assessment   Pain Assessment 0-10   Pain Level 0   Oxygen Therapy   SpO2 92 %   O2 Device None (Room air)   O2 Flow Rate (L/min) 0 L/min     Shift assessment completed, see flow sheet. Patient is A&O x4. Medications administered. Patient denies further needs at this time. Call light within reach. Will continue to monitor.

## 2022-02-03 NOTE — PROGRESS NOTES
Vanc-Day 3  No HD scheduled  Vanc random  15.2, No vanc today  Edison Radford Pharm D 2/3/006030:06 AM  .

## 2022-02-03 NOTE — PROGRESS NOTES
Pharmacy: Low Dose Heparin Drip  Current rate: 1570 units/hr (15.7 ml/hr)  Anti- Xa @ 1000 = 0.34  Goal Xa= 0.3-0.7  Per protocol, continue same drip rate of 1570 units/hr and obtain another Anti-Xa 2/4 @ 0600 and daily, given two consecutive goal [id number] D 0/3/467936:59 AM  .    . Rehan Rivers

## 2022-02-03 NOTE — PROGRESS NOTES
RT Inhaler-Nebulizer Bronchodilator Protocol Note    There is a bronchodilator order in the chart from a provider indicating to follow the RT Bronchodilator Protocol and there is an Initiate RT Inhaler-Nebulizer Bronchodilator Protocol order as well (see protocol at bottom of note). CXR Findings:  No results found. The findings from the last RT Protocol Assessment were as follows:   History Pulmonary Disease: Chronic pulmonary disease  Respiratory Pattern: Dyspnea on exertion or RR 21-25 bpm  Breath Sounds: Slightly diminished and/or crackles  Cough: Strong, spontaneous, non-productive  Indication for Bronchodilator Therapy: Decreased or absent breath sounds  Bronchodilator Assessment Score: 6    Aerosolized bronchodilator medication orders have been revised according to the RT Inhaler-Nebulizer Bronchodilator Protocol below. Respiratory Therapist to perform RT Therapy Protocol Assessment initially then follow the protocol. Repeat RT Therapy Protocol Assessment PRN for score 0-3 or on second treatment, BID, and PRN for scores above 3. No Indications - adjust the frequency to every 6 hours PRN wheezing or bronchospasm, if no treatments needed after 48 hours then discontinue using Per Protocol order mode. If indication present, adjust the RT bronchodilator orders based on the Bronchodilator Assessment Score as indicated below. Use Inhaler orders unless patient has one or more of the following: on home nebulizer, not able to hold breath for 10 seconds, is not alert and oriented, cannot activate and use MDI correctly, or respiratory rate 25 breaths per minute or more, then use the equivalent nebulizer order(s) with same Frequency and PRN reasons based on the score. If a patient is on this medication at home then do not decrease Frequency below that used at home.     0-3 - enter or revise RT bronchodilator order(s) to equivalent RT Bronchodilator order with Frequency of every 4 hours PRN for wheezing or increased work of breathing using Per Protocol order mode. 4-6 - enter or revise RT Bronchodilator order(s) to two equivalent RT bronchodilator orders with one order with BID Frequency and one order with Frequency of every 4 hours PRN wheezing or increased work of breathing using Per Protocol order mode. 7-10 - enter or revise RT Bronchodilator order(s) to two equivalent RT bronchodilator orders with one order with TID Frequency and one order with Frequency of every 4 hours PRN wheezing or increased work of breathing using Per Protocol order mode. 11-13 - enter or revise RT Bronchodilator order(s) to one equivalent RT bronchodilator order with QID Frequency and an Albuterol order with Frequency of every 4 hours PRN wheezing or increased work of breathing using Per Protocol order mode. Greater than 13 - enter or revise RT Bronchodilator order(s) to one equivalent RT bronchodilator order with every 4 hours Frequency and an Albuterol order with Frequency of every 2 hours PRN wheezing or increased work of breathing using Per Protocol order mode.          Electronically signed by Lolita Griffiths RCP on 2/2/2022 at 7:31 PM

## 2022-02-03 NOTE — FLOWSHEET NOTE
02/03/22 1345   Vital Signs   Temp 97.8 °F (36.6 °C)   Temp Source Oral   Pulse 56   Heart Rate Source Monitor   Resp 18   /73   BP Location Left upper arm   MAP (mmHg) 87   Patient Position Semi fowlers   Level of Consciousness Alert (0)   MEWS Score 1   Patient Currently in Pain Denies   Pain Assessment   Pain Assessment 0-10   Pain Level 0   Oxygen Therapy   SpO2 95 %   O2 Device None (Room air)     Pt resting comfortably. Podiatry consulted.

## 2022-02-03 NOTE — PROGRESS NOTES
Assessment and meds as charted. Wound care completed. Pt resting comfortably on RA. No c/o pain or nausea.

## 2022-02-04 NOTE — PROGRESS NOTES
Shift assessment completed, see flow sheet. No c/o pain and appears to be in no distress. Heparin infusion @ 15.7ml/hr, will continue to monitor. Remains on RA with diminished lung sounds and expiratory wheezes. In bed with call light in reach.

## 2022-02-04 NOTE — PROGRESS NOTES
RT Inhaler-Nebulizer Bronchodilator Protocol Note    There is a bronchodilator order in the chart from a provider indicating to follow the RT Bronchodilator Protocol and there is an Initiate RT Inhaler-Nebulizer Bronchodilator Protocol order as well (see protocol at bottom of note). CXR Findings:  No results found. The findings from the last RT Protocol Assessment were as follows:   History Pulmonary Disease: Chronic pulmonary disease  Respiratory Pattern: Dyspnea on exertion or RR 21-25 bpm  Breath Sounds: Slightly diminished and/or crackles  Cough: Strong, spontaneous, non-productive  Indication for Bronchodilator Therapy: Decreased or absent breath sounds  Bronchodilator Assessment Score: 6    Aerosolized bronchodilator medication orders have been revised according to the RT Inhaler-Nebulizer Bronchodilator Protocol below. Respiratory Therapist to perform RT Therapy Protocol Assessment initially then follow the protocol. Repeat RT Therapy Protocol Assessment PRN for score 0-3 or on second treatment, BID, and PRN for scores above 3. No Indications - adjust the frequency to every 6 hours PRN wheezing or bronchospasm, if no treatments needed after 48 hours then discontinue using Per Protocol order mode. If indication present, adjust the RT bronchodilator orders based on the Bronchodilator Assessment Score as indicated below. Use Inhaler orders unless patient has one or more of the following: on home nebulizer, not able to hold breath for 10 seconds, is not alert and oriented, cannot activate and use MDI correctly, or respiratory rate 25 breaths per minute or more, then use the equivalent nebulizer order(s) with same Frequency and PRN reasons based on the score. If a patient is on this medication at home then do not decrease Frequency below that used at home.     0-3 - enter or revise RT bronchodilator order(s) to equivalent RT Bronchodilator order with Frequency of every 4 hours PRN for wheezing or increased work of breathing using Per Protocol order mode. 4-6 - enter or revise RT Bronchodilator order(s) to two equivalent RT bronchodilator orders with one order with BID Frequency and one order with Frequency of every 4 hours PRN wheezing or increased work of breathing using Per Protocol order mode. 7-10 - enter or revise RT Bronchodilator order(s) to two equivalent RT bronchodilator orders with one order with TID Frequency and one order with Frequency of every 4 hours PRN wheezing or increased work of breathing using Per Protocol order mode. 11-13 - enter or revise RT Bronchodilator order(s) to one equivalent RT bronchodilator order with QID Frequency and an Albuterol order with Frequency of every 4 hours PRN wheezing or increased work of breathing using Per Protocol order mode. Greater than 13 - enter or revise RT Bronchodilator order(s) to one equivalent RT bronchodilator order with every 4 hours Frequency and an Albuterol order with Frequency of every 2 hours PRN wheezing or increased work of breathing using Per Protocol order mode.          Electronically signed by Rosanne Rodriguez RCP on 2/3/2022 at 9:21 PM

## 2022-02-04 NOTE — CARE COORDINATION
INTERDISCIPLINARY PLAN OF CARE CONFERENCE    Date/Time: 2/4/2022 12:14 PM  Completed by: Thuy Robison RN, Case Management      Patient Name:  Cristi Campos  YOB: 1960  Admitting Diagnosis: Pleural effusion [J90]  [facility] [N18.6, Z99.2]  Cellulitis of right lower extremity [L03.115]  Hypervolemia, unspecified hypervolemia type [E87.70]  Acute kidney injury superimposed on CKD ([facility] [N17.9, N18.9]  Acute on chronic congestive heart failure, unspecified heart failure type ([facility] [I50.9]  COVID [U07.1]     Admit Date/Time:  1/31/2022  3:18 PM    Chart reviewed. Interdisciplinary team contacted or reviewed plan related to patient progress and discharge plans. Disciplines included Case Management, Nursing, and Dietitian. Current Status:ongoing  PT/OT recommendation for discharge plan of care: n/a    Expected D/C Disposition:  Home  Confirmed plan with patient and/or family Yes confirmed with: (name) pt  Met with:pt  Discharge Plan Comments: Reviewed chart. Role of discharge planner explained and patient verbalized understanding. Pt is from home alone and plans to return. Pt is covid pos as of 1/31/2022. Pt states that he was going to [place], but has not been \"for 1-2 months\" because of not being able to get there because gas money is hard and would like the  location or [facility]. CM called Onesimo Kauffman with [facility] and she states that pt has not been to [facility] since 11/2021. Currently as inactive status. CM discussed pt missing HD. CM explained that if pt wants to not do HD, then we could discuss Hospice. He states he would like to do HD, but does not want to return back to the [facility]. Onesimo Rogera is looking at the [facility] and will let CM know. [facility] [place] full and has a waiting list, per Onesimo Kauffman. CM explained to pt about [facility] [place], but looking into the [facility] location.   Pt verbalized understanding. CM inquired if anyone could help pt transport to and from HD. He states he has a son, but the son works all of the time. Per Dr. Anayeli Smith, pt is now being placed on Eliquis for Afib. CM called Kobi Galvan with [facility] and the out of pocket cost after the 30 days free is $0 copay/month. Pt states he can afford this. Dr. Anayeli Smith is aware of cost of Eliquis and that we are looking for a new HD slot. Pt wanted HC, but multiple HC agencies are not in network, as Marti with [facility] has checked, as the referral was initially to [facility] for  SN/SW.      Home O2 in place on admit: No  Pt informed of need to bring portable home O2 tank on day of discharge for nursing to connect prior to leaving:  Not Indicated  Verbalized agreement/Understanding:  Not Indicated

## 2022-02-04 NOTE — PROGRESS NOTES
Pharmacy: Low Dose Heparin Drip  Current rate: 1570 units/hr (15.7 ml/hr)  Anti- Xa @ 0559 = 0.33  Goal Xa= 0.3-0.7  Per protocol, continue same drip rate of 1570 units/hr and obtain another Anti-Xa 2/5 with AM lab draws. Sobeida Flynn

## 2022-02-04 NOTE — PROGRESS NOTES
RT Inhaler-Nebulizer Bronchodilator Protocol Note    There is a bronchodilator order in the chart from a provider indicating to follow the RT Bronchodilator Protocol and there is an Initiate RT Inhaler-Nebulizer Bronchodilator Protocol order as well (see protocol at bottom of note). CXR Findings:  No results found. The findings from the last RT Protocol Assessment were as follows:   History Pulmonary Disease: Chronic pulmonary disease  Respiratory Pattern: Dyspnea on exertion or RR 21-25 bpm  Breath Sounds: Slightly diminished and/or crackles  Cough: Strong, spontaneous, non-productive  Indication for Bronchodilator Therapy: Decreased or absent breath sounds  Bronchodilator Assessment Score: 6    Aerosolized bronchodilator medication orders have been revised according to the RT Inhaler-Nebulizer Bronchodilator Protocol below. Respiratory Therapist to perform RT Therapy Protocol Assessment initially then follow the protocol. Repeat RT Therapy Protocol Assessment PRN for score 0-3 or on second treatment, BID, and PRN for scores above 3. No Indications - adjust the frequency to every 6 hours PRN wheezing or bronchospasm, if no treatments needed after 48 hours then discontinue using Per Protocol order mode. If indication present, adjust the RT bronchodilator orders based on the Bronchodilator Assessment Score as indicated below. Use Inhaler orders unless patient has one or more of the following: on home nebulizer, not able to hold breath for 10 seconds, is not alert and oriented, cannot activate and use MDI correctly, or respiratory rate 25 breaths per minute or more, then use the equivalent nebulizer order(s) with same Frequency and PRN reasons based on the score. If a patient is on this medication at home then do not decrease Frequency below that used at home.     0-3 - enter or revise RT bronchodilator order(s) to equivalent RT Bronchodilator order with Frequency of every 4 hours PRN for wheezing or increased work of breathing using Per Protocol order mode. 4-6 - enter or revise RT Bronchodilator order(s) to two equivalent RT bronchodilator orders with one order with BID Frequency and one order with Frequency of every 4 hours PRN wheezing or increased work of breathing using Per Protocol order mode. 7-10 - enter or revise RT Bronchodilator order(s) to two equivalent RT bronchodilator orders with one order with TID Frequency and one order with Frequency of every 4 hours PRN wheezing or increased work of breathing using Per Protocol order mode. 11-13 - enter or revise RT Bronchodilator order(s) to one equivalent RT bronchodilator order with QID Frequency and an Albuterol order with Frequency of every 4 hours PRN wheezing or increased work of breathing using Per Protocol order mode. Greater than 13 - enter or revise RT Bronchodilator order(s) to one equivalent RT bronchodilator order with every 4 hours Frequency and an Albuterol order with Frequency of every 2 hours PRN wheezing or increased work of breathing using Per Protocol order mode. RT to enter RT Home Evaluation for COPD & MDI Assessment order using Per Protocol order mode.     Electronically signed by Gayatri Mo RCP on 2/4/2022 at 10:28 AM

## 2022-02-04 NOTE — PROGRESS NOTES
[facility]  HD today, (MWF)  Vanc random  13,Vanc 750mg times one at end of HD  Will place random level for MWF  Huyen BERG 2/4/20227:30 AM  .      .

## 2022-02-04 NOTE — CARE COORDINATION
[facility] is not in network with pt's insurance. Liaison LM for CM regarding this on 2/2. Telephone call to CM and LM again. See note from 2/2. [facility] who are unable to service. [facility] to arrange [facility].       Electronically signed by Armando Boles RN on 2/4/2022 at 4:29 PM

## 2022-02-04 NOTE — FLOWSHEET NOTE
02/04/22 1130   Vital Signs   Temp 98.6 °F (37 °C)   Temp Source Oral   Pulse 60   Heart Rate Source Monitor   Resp 18   BP (!) 189/92   BP Location Right lower arm   Patient Position Semi fowlers   Level of Consciousness Alert (0)   MEWS Score 1   Patient Currently in Pain Denies   Pain Assessment   Pain Assessment 0-10   Pain Level 0   Patient's Stated Pain Goal No pain   Oxygen Therapy   SpO2 95 %   O2 Device None (Room air)   O2 Flow Rate (L/min) 0 L/min     Pt returned from dialysis. CM[facility] notified this RN that Pt had 5 beats of vtach. Occurred when Pt was getting off of elevator. MD notified; states it is related to the bumps getting off the elevator. Pt asymptomatic. No new orders.     Luis Byrd RN

## 2022-02-04 NOTE — PROGRESS NOTES
[facility] Progress Note           Subjective/   59y.o. year old male who we are seeing in consultation for ESRD on HD. HPI:  Last HD on 2/2 with 3 liters removed, post-weight of 111.8 kg. Seen on dialysis. Orders confirmed. Pre-weight at HD today 113.8 kg. ROS:  Intake adequate, nausea resolved, no fevers. Objective/   GEN:  BP (!) 167/84   Pulse 57   Temp 97 °F (36.1 °C)   Resp 18   Wt 250 lb 14.1 oz (113.8 kg)   SpO2 92%   BMI 33.10 kg/m²   Exam deferred as means to protect PPE due to shortage & due to CORONAVIRUS risk. [facility]    Data/  Recent Labs     02/02/22  0624 02/04/22  0559   WBC 3.7* 5.0   HGB 9.9* 10.1*   HCT 30.3* 30.7*   MCV 92.0 91.6   * 139     Recent Labs     02/02/22  0624 02/03/22  0358 02/04/22  0559    132* 132*   K 4.6 4.2 4.5    99 100   CO2 19* 22 19*   GLUCOSE 74 172* 93   PHOS  --   --  5.5*   MG  --   --  1.40*   BUN 63* 47* 56*   CREATININE 7.4* 5.9* 6.6*   LABGLOM 7* 10* 9*   GFRAA 9* 12* 10*       Assessment/     - ESKD on dialysis though has been off since end of Nov 2021 due to patient choice     - COVID+     - Right foot ulcer/cellulitis - on vancomycin/cefepime     - Anemia of chronic disease     - Hypertension     - Hyperkalemia     - Anion-gap metabolic acidosis     - Atrial fibrillation     - Alcoholic liver cirrhosis - requires frequent paracentesis     - DM2 c/b L BKA    - Hypomagnesemia/Hypocalcemia - prn replacement    Plan/     - HD today with 2-3 L UF target  - Trend labs, bp's    Needs outpatient HD placement - d/w      ____________________________________  Freda Salazar MD  [facility]  [email or web address]  Office: [phone]

## 2022-02-04 NOTE — PROGRESS NOTES
Physical Exam:  Gen: Disheveled appearance, poor hygiene. No distress. Alert. Eyes: PERRL. No sclera icterus. No conjunctival injection. ENT: No discharge. Pharynx clear. Multiple missing teeth   Neck: No JVD. Trachea midline. Resp: No accessory muscle use. No crackles. No wheezes. No rhonchi. Diminished air entry throughout   CV: Regular rate. Irregularly irregular rhythm. No murmur. No rub. 1+ edema RLE. TDC in right chest wall   GI: Non-tender. Non-distended. No masses. No organomegaly. Normal bowel sounds. No hernia. Skin:   Right foot with large intact blister with serous drainage over the medial heel   Right great toe with ulceration at distal aspect, foul smell  Left BKA stump with some wound dehiscence at prior surgical site. Foul smell   M/S: No cyanosis. No joint deformity. No clubbing. Neuro: Awake. Grossly nonfocal    Psych: Oriented to person, place, month. Could not name year. No anxiety or agitation. Lab Data:  CBC:   Recent Labs     02/02/22  0624 02/04/22  0559   WBC 3.7* 5.0   RBC 3.29* 3.36*   HGB 9.9* 10.1*   HCT 30.3* 30.7*   MCV 92.0 91.6   RDW 14.9 14.8   * 139     BMP:   Recent Labs     02/02/22  0624 02/03/22  0358 02/04/22  0559    132* 132*   K 4.6 4.2 4.5    99 100   CO2 19* 22 19*   PHOS  --   --  5.5*   BUN 63* 47* 56*   CREATININE 7.4* 5.9* 6.6*     BNP: No results for input(s): BNP in the last 72 hours. PT/INR: No results for input(s): PROTIME, INR in the last 72 hours. APTT:No results for input(s): APTT in the last 72 hours. CARDIAC ENZYMES:   No results for input(s): CKMB, CKMBINDEX, TROPONINI in the last 72 hours.     Invalid input(s): CKTOTAL;3  FASTING LIPID PANEL:  Lab Results   Component Value Date    CHOL 86 02/22/2019    HDL 28 02/22/2019    TRIG 88 02/22/2019     LIVER PROFILE:   Recent Labs     02/02/22  0624 02/03/22  0358 02/04/22  0559   AST 27 26 29   ALT 11 11 12   BILITOT 0.3 0.3 <0.2   ALKPHOS 76 69 68         XR FOOT RIGHT (MIN 3 VIEWS)   Final Result   Circumferential soft tissue swelling, greater dorsally. No radiographic evidence of osteomyelitis. No soft tissue gas is seen.          XR CHEST (2 VW)   Final Result   Left-sided pleural effusion with left mid and bilateral lower lung opacities   that could represent atelectasis or infection               Assessment & Plan:     Patient Active Problem List    Diagnosis Date Noted    Cardiomyopathy ([address]     Abnormal nuclear stress test     Encephalopathy     COVID-19 02/01/2022    ESRD needing dialysis ([address] 01/31/2022    CAD (coronary artery disease) 08/18/2021    A-fib ([address] 37/80/7859    Alcoholic cirrhosis of liver with ascites ([address] 01/09/2021    Diabetic foot infection ([address] 01/08/2021    Abdominal distension     Hypoxia     Shortness of breath     Fluid overload 02/21/2019    Moderate malnutrition ([address] 35/51/4228    Metabolic alkalosis     NSVT (nonsustained ventricular tachycardia) (HCC)     Chronic systolic heart failure (HCC)     HTN (hypertension), malignant     Acute respiratory failure with hypoxia ([address] 01/19/2019    Acute respiratory failure (HCC) 01/19/2019    Ascites 01/19/2019    Chronic systolic CHF (congestive heart failure) (HCC)     Noncompliance     Osteomyelitis ([address] 04/17/2018    Stage 3 chronic kidney disease 02/28/2018    Pain of left lower extremity     Osteomyelitis of foot, left, acute ([address] 11/19/2017    Group B streptococcal bacteriuria     Type 2 diabetes mellitus with left diabetic foot infection (HCC)     Septic arthritis of left foot (HCC)     Poorly controlled type 2 diabetes mellitus (HCC)     Elevated sed rate     Elevated C-reactive protein (CRP)     Acute kidney injury superimposed on chronic kidney disease ([address]     Hyponatremia     Diabetic polyneuropathy associated with type 2 diabetes mellitus ([address]     Mixed hyperlipidemia     Tobacco abuse counseling     Diabetes education, encounter the risk of CVA with atrial fib, he is agreeable to heparin gtt for now. Switch t o eliquis   - cont toprol, hydralazine, isordil       #Elevated troponin   - flat elevation, likely related to CHF, fluid overload, ESRD. No ischemia on EKG. Denies CP. Low suspicion ACS     #Alcoholic cirrhosis   - he gets periodic paracentesis with IR per chart review- most recently 1/24/22  - checked ammonia with confusion- not elevated at 28     #HTN  - controlled, cont home meds       #DM2  - cont Lantus and use SSI      DVT Prophylaxis: Heparin   Diet: ADULT DIET; Regular; 4 carb choices (60 gm/meal);  Low Potassium (Less than 3000 mg/day)  Code Status: Full Code    Santosh Rivera MD

## 2022-02-04 NOTE — FLOWSHEET NOTE
02/04/22 0755   Assessment   Charting Type Shift assessment   Neurological   Neuro (WDL) WDL   Level of Consciousness Alert (0)   Orientation Level Oriented X4   Cognition Follows commands   Language Clear   Swallow Screening   Is the patient able to remain alert for testing? Yes   Arline Coma Scale   Eye Opening 4   Best Verbal Response 5   Best Motor Response 6   [place] Coma Scale Score 15   HEENT   HEENT (WDL) X   Right Eye Intact; Impaired vision   Left Eye Intact; Impaired vision   Right Ear Impaired hearing   Left Ear Impaired hearing   Teeth Missing teeth   Respiratory   Respiratory (WDL) X   Respiratory Pattern Regular   Respiratory Depth Normal   Respiratory Quality/Effort Dyspnea with exertion   Chest Assessment Chest expansion symmetrical   L Breath Sounds Diminished   R Breath Sounds Diminished   Cardiac   Cardiac (WDL) X   Cardiac Regularity Irregular   Cardiac Rhythm Atrial fib   Rhythm Interpretation   Pulse 58   Cardiac Monitor   Telemetry Monitor On Yes   Telemetry Audible Yes   Telemetry Alarms Set Yes   Telemetry Box Number PCU   Telemetry Monitor Alarm Parameters PCU   Gastrointestinal   Abdominal (WDL) WDL   Abdomen Inspection Soft;Rounded   Tenderness Soft; No guarding;Nontender; No rebound   RUQ Bowel Sounds Active   LUQ Bowel Sounds Active   RLQ Bowel Sounds Active   LLQ Bowel Sounds Active   Peripheral Vascular   Peripheral Vascular (WDL) X   Edema Right lower extremity; Left lower extremity   RLE Edema Non-pitting   LLE Edema +1   Skin Color/Condition   Skin Color/Condition (WDL) X   Skin Color Appropriate for ethnicity   Skin Condition/Temp Warm;Dry;Swollen   Skin Integrity   Skin Integrity (WDL) X   Skin Integrity Abrasion;Bruising; Other (Comment)  (see wound care sheets)   Location Scattered   Musculoskeletal   Musculoskeletal (WDL) X  (gen weakness)   LL Extremity Amputation   Genitourinary   Genitourinary (WDL) WDL   Flank Tenderness No   Suprapubic Tenderness No   Dysuria No

## 2022-02-04 NOTE — CONSULTS
CONSULT    Admit Date:  1/31/2022    Subjective:  59 y.o. male who is seen for evaluation of cellulitis and ulcers on the right foot. Patient had stopped going to dialysis for about a month and developed increased swelling in his legs. Does have history of left BKA. He is not sure when wounds on his feet developed. No pain in his feet. States his breathing is doing better as well.        Past Medical History:        Diagnosis Date    Bacteremia 11/18/2017    strep agalactiae (also cultured from foot)    CAD (coronary artery disease) 8/18/2021    Cellulitis and abscess of foot, except toes     CHF (congestive heart failure) (HCC)     Diabetes mellitus ([facility] [address]     History of liver failure     Hypertension     Kidney disease     MRSA (methicillin resistant staph aureus) culture positive 4/17/18, 11/19/2017    left foot       Past Surgical History:        Procedure Laterality Date    IR TUNNELED CATHETER PLACEMENT GREATER THAN 5 YEARS  1/11/2021    IR TUNNELED CATHETER PLACEMENT GREATER THAN 5 YEARS 1/11/2021 Eduardo Negron MD [facility] SPECIAL PROCEDURES    LEG AMPUTATION BELOW KNEE      PARACENTESIS      UPPER GASTROINTESTINAL ENDOSCOPY N/A 1/29/2019    EGD ESOPHAGOGASTRODUODENOSCOPY performed by Paris Shine MD at [address]      Left BKA       Current Medications:     vancomycin  750 mg IntraVENous Once    magnesium sulfate  2,000 mg IntraVENous Once    apixaban  5 mg Oral BID    dexamethasone  6 mg IntraVENous Daily    albuterol sulfate HFA  2 puff Inhalation BID    vancomycin (VANCOCIN) intermittent dosing (placeholder)   Other RX Placeholder    cefepime  1,000 mg IntraVENous Q24H    isosorbide dinitrate  20 mg Oral TID    And    hydrALAZINE  37.5 mg Oral TID    LORazepam  1 mg Oral TID    povidone-iodine   Topical Daily    atorvastatin  40 mg Oral Nightly    metoprolol succinate  50 mg Oral Daily    nicotine  1 patch TransDERmal Daily    insulin lispro  0-6 Units SubCUTAneous TID WC    insulin lispro  0-3 Units SubCUTAneous Nightly    sodium chloride flush  5-40 mL IntraVENous 2 times per day    insulin glargine  0.25 Units/kg SubCUTAneous Nightly       Allergies:  Pcn [penicillins]    Social History:    Social History     Tobacco Use    Smoking status: Former Smoker     Packs/day: 1.00     Years: 40.00     Pack years: 40.00     Types: Cigarettes     Quit date: 2022     Years since quittin.0    Smokeless tobacco: Former User     Types: Chew    Tobacco comment: attempting to quit at this time   Vaping Use    Vaping Use: Never used   Substance Use Topics    Alcohol use: No     Comment: none    Drug use: Yes     Types: Marijuana (Weed)     Comment: occassionally        Family History:       Problem Relation Age of Onset    Diabetes Mother     Hypertension Mother     Dementia Mother     Heart Attack Father     Stroke Brother     No Known Problems Maternal Grandmother     No Known Problems Maternal Grandfather     No Known Problems Paternal Grandmother     No Known Problems Paternal Grandfather     Heart Attack Brother        Review of Systems    CONSTITUTIONAL:  negative  EYES:  negative  HEENT:  negative  RESPIRATORY:  negative  CARDIOVASCULAR:  negative  GASTROINTESTINAL:  negative  GENITOURINARY:  negative  INTEGUMENT/BREAST:  positive for ulcers  MUSCULOSKELETAL:  negative  NEUROLOGICAL:  positive for numbness      Objective:   BP (!) 167/84   Pulse 57   Temp 97 °F (36.1 °C)   Resp 18   Wt 250 lb 14.1 oz (113.8 kg)   SpO2 92%   BMI 33.10 kg/m²     Data:  CBC:   Recent Labs     22  0624 22  0559   WBC 3.7* 5.0   HGB 9.9* 10.1*   HCT 30.3* 30.7*   MCV 92.0 91.6   * 139     BMP:   Recent Labs     22  0624 22  0358 22  0559    132* 132*   K 4.6 4.2 4.5    99 100   CO2 19* 22 19*   PHOS  --   --  5.5*   BUN 63* 47* 56*   CREATININE 7.4* 5.9* 6.6*     LIVER PROFILE:   Recent Labs     22  0700 02/03/22  0358 02/04/22  0559   AST 27 26 29   ALT 11 11 12   BILITOT 0.3 0.3 <0.2   ALKPHOS 76 69 68     PT/INR:   Recent Labs     02/02/22  0624 02/03/22  0358 02/04/22  0559   PROT 5.7* 5.1* 5.3*     HgBA1c:  Lab Results   Component Value Date    LABA1C 5.7 01/31/2022       Cultures: blood x2 - NG    Imaging: xray right foot - no osteomyelitis noted. No gas in soft tissues noted. Physical Exam:    General Appearance: alert and oriented to person, place and time  Head: normocephalic and atraumatic  Eyes: pupils equal, round, and reactive to light, extraocular eye movements intact, conjunctivae normal  ENT: tympanic membrane, external ear and ear canal normal bilaterally, nose without deformity, nasal mucosa and turbinates normal without polyps  Neck: supple and non-tender without mass, no thyromegaly or thyroid nodules, no cervical lymphadenopathy  Cardiovascular: normal rate, regular rhythm, normal S1 and S2, no murmurs, rubs, clicks, or gallops, distal pulses intact, no carotid bruits  Abdomen: soft, non-tender, non-distended, normal bowel sounds, no masses or organomegaly    DP/PT palpable right  Right foot - stable eschar tip of the 4th digit. No active drainage noted. Hallux with mild erythema noted. No increase in skin temperature noted. Thin epithelial tissue noted. Ruptured bulla on the medial aspect heel with evidence of thin epithelial tissue noted after removing the lysing tissue. Mild serous drainage noted. No purulence or malodor noted. No crepitus noted. Probes to soft tissue only. Left BKA noted.            Assessment:  Patient Active Problem List   Diagnosis Code    Tobacco abuse Z72.0    Type 1 diabetes mellitus with neurological manifestations, uncontrolled (HCC) E10.49, E10.65    Diabetic ulcer of right foot ([address] E11.621, L97.519    Cellulitis L03.90    NSTEMI (non-ST elevated myocardial infarction) ([address] I21.4    Osteomyelitis of foot, left, acute ([address] M86.172    Group B streptococcal bacteriuria R82.71    Type 2 diabetes mellitus with left diabetic foot infection (HCC) E11.628, L08.9    Septic arthritis of left foot (HCC) M00.9    Poorly controlled type 2 diabetes mellitus (HCC) E11.65    Elevated sed rate R70.0    Elevated C-reactive protein (CRP) R79.82    Acute kidney injury superimposed on chronic kidney disease (HCC) N17.9, N18.9    Hyponatremia E87.1    Diabetic polyneuropathy associated with type 2 diabetes mellitus (HCC) E11.42    Mixed hyperlipidemia E78.2    Tobacco abuse counseling Z71.6    Diabetes education, encounter for Z71.89    Cardiomyopathy ([facility] [address] I42.9    Abnormal nuclear stress test R94.39    Pain of left lower extremity M79.605    Stage 3 chronic kidney disease N18.30    Osteomyelitis ([facility] M86.9    Acute respiratory failure with hypoxia ([facility]) J96.01    Acute respiratory failure (HCC) J96.00    Ascites R18.8    Chronic systolic CHF (congestive heart failure) ([facility]) I50.22    Noncompliance Z91.19    Chronic systolic heart failure (HCC) I50.22    HTN (hypertension), malignant R61    Metabolic alkalosis P10.1    NSVT (nonsustained ventricular tachycardia) ([facility]) I47.2    Moderate malnutrition (HCC) E44.0    Fluid overload E87.70    Abdominal distension R14.0    Hypoxia R09.02    Shortness of breath R06.02    Diabetic foot infection (HCC) E11.628, W77.4    Alcoholic cirrhosis of liver with ascites ([facility]) K70.31    A-fib ([facility]) I48.91    CAD (coronary artery disease) I25.10    ESRD needing dialysis ([facility]) N18.6, Z99.2    COVID-19 U07.1    Encephalopathy G93.40     COVID-19  diabetic foot ulcer right foot secondary to peripheral neuropathy   Cellulitis right LE - improving. Plan  Patient examined. Reviewed labs and images and wound pictures. Portion of lysing skin easily removed with slight pressure. Underlying skin appears to be improving. OK to continue local wound care. Elevation of leg to help control edema.    Control glucose levels to prevent future complications. Patient OK for D/C from Podiatry standpoint. Discussed with Dr. Haider Hoskins. Recommended oral antibiotics for additional week given high risk factors and resolving cellulitis. Can follow up in [facility] with any provider. Thank you for allowing me to participate in the care of your patient.        Electronically signed by Jeanna Negron DPM on 2/4/2022 at 9:35 AM.

## 2022-02-05 NOTE — FLOWSHEET NOTE
02/05/22 1515   Vital Signs   Temp 98.7 °F (37.1 °C)   Temp Source Oral   Pulse 50   Heart Rate Source Monitor   Resp 16   BP (!) 162/83   BP Location Left lower arm   Patient Position Sitting   Level of Consciousness Alert (0)   MEWS Score 2   Patient Currently in Pain Denies   Pain Assessment   Pain Assessment 0-10   Pain Level 0   Patient's Stated Pain Goal No pain   Oxygen Therapy   SpO2 92 %   O2 Device None (Room air)   O2 Flow Rate (L/min) 0 L/min     Afternoon vitals completed. Meds given per MAR.     Madison Lopez, RN

## 2022-02-05 NOTE — FLOWSHEET NOTE
02/05/22 0745   Vital Signs   Temp 97.5 °F (36.4 °C)   Temp Source Oral   Pulse 50   Heart Rate Source Monitor   Resp 15   BP (!) 159/64   BP Location Left lower arm   Patient Position Semi fowlers   Level of Consciousness Alert (0)   MEWS Score 2   Patient Currently in Pain Denies   Pain Assessment   Pain Assessment 0-10   Pain Level 0   Patient's Stated Pain Goal No pain   Oxygen Therapy   SpO2 94 %   O2 Device None (Room air)   O2 Flow Rate (L/min) 0 L/min     Pt assessment complete; see flow sheets. Vitals completed. Meds given per MAR. Pt agreeing to get bath today. Currently sitting up and eating breakfast. Pt stable at this time.     Katja Campa RN

## 2022-02-05 NOTE — PROGRESS NOTES
Bedside report and transfer of care given to [facility] [place], RN. Pt currently resting in bed with the call light within reach. Pt denies any other care needs at this time. Pt stable at this time.     Teresa Goncalves RN

## 2022-02-05 NOTE — PROGRESS NOTES
Vanc-Day: 5  HD MWF  No HD today, therefore no vancomycin today. Level scheduled to be drawn Monday prior to his next scheduled dialysis session . [place] Nelson

## 2022-02-05 NOTE — PROGRESS NOTES
02/04/22 2000   RT Protocol   History Pulmonary Disease 2   Respiratory pattern 2   Breath sounds 2   Cough 0   Indications for Bronchodilator Therapy Decreased or absent breath sounds   Bronchodilator Assessment Score 6   RT Inhaler-Nebulizer Bronchodilator Protocol Note    There is a bronchodilator order in the chart from a provider indicating to follow the RT Bronchodilator Protocol and there is an Initiate RT Inhaler-Nebulizer Bronchodilator Protocol order as well (see protocol at bottom of note). CXR Findings:  No results found. The findings from the last RT Protocol Assessment were as follows:   History Pulmonary Disease: Chronic pulmonary disease  Respiratory Pattern: Dyspnea on exertion or RR 21-25 bpm  Breath Sounds: Slightly diminished and/or crackles  Cough: Strong, spontaneous, non-productive  Indication for Bronchodilator Therapy: Decreased or absent breath sounds  Bronchodilator Assessment Score: 6    Aerosolized bronchodilator medication orders have been revised according to the RT Inhaler-Nebulizer Bronchodilator Protocol below. Respiratory Therapist to perform RT Therapy Protocol Assessment initially then follow the protocol. Repeat RT Therapy Protocol Assessment PRN for score 0-3 or on second treatment, BID, and PRN for scores above 3. No Indications - adjust the frequency to every 6 hours PRN wheezing or bronchospasm, if no treatments needed after 48 hours then discontinue using Per Protocol order mode. If indication present, adjust the RT bronchodilator orders based on the Bronchodilator Assessment Score as indicated below. Use Inhaler orders unless patient has one or more of the following: on home nebulizer, not able to hold breath for 10 seconds, is not alert and oriented, cannot activate and use MDI correctly, or respiratory rate 25 breaths per minute or more, then use the equivalent nebulizer order(s) with same Frequency and PRN reasons based on the score.   If a patient is on this medication at home then do not decrease Frequency below that used at home. 0-3 - enter or revise RT bronchodilator order(s) to equivalent RT Bronchodilator order with Frequency of every 4 hours PRN for wheezing or increased work of breathing using Per Protocol order mode. 4-6 - enter or revise RT Bronchodilator order(s) to two equivalent RT bronchodilator orders with one order with BID Frequency and one order with Frequency of every 4 hours PRN wheezing or increased work of breathing using Per Protocol order mode. 7-10 - enter or revise RT Bronchodilator order(s) to two equivalent RT bronchodilator orders with one order with TID Frequency and one order with Frequency of every 4 hours PRN wheezing or increased work of breathing using Per Protocol order mode. 11-13 - enter or revise RT Bronchodilator order(s) to one equivalent RT bronchodilator order with QID Frequency and an Albuterol order with Frequency of every 4 hours PRN wheezing or increased work of breathing using Per Protocol order mode. Greater than 13 - enter or revise RT Bronchodilator order(s) to one equivalent RT bronchodilator order with every 4 hours Frequency and an Albuterol order with Frequency of every 2 hours PRN wheezing or increased work of breathing using Per Protocol order mode.      Electronically signed by Kain Everett RCP on 2/4/2022 at 8:51 PM

## 2022-02-06 NOTE — FLOWSHEET NOTE
02/06/22 0845   Vital Signs   Temp 98.7 °F (37.1 °C)   Temp Source Oral   Pulse 51   Heart Rate Source Monitor   Resp 15   BP (!) 162/67   BP Location Left lower arm   Patient Position Semi fowlers   Level of Consciousness Alert (0)   MEWS Score 1   Patient Currently in Pain Denies   Pain Assessment   Pain Assessment 0-10   Pain Level 0   Patient's Stated Pain Goal No pain   Oxygen Therapy   SpO2 95 %   O2 Device None (Room air)   O2 Flow Rate (L/min) 0 L/min       Pt assessment complete; see flow sheets. Vitals completed. Pt currently sitting up and eating breakfast. Pt denies any other care needs at this time. Meds given per MAR. Pt stable.     Claudia Silva RN

## 2022-02-06 NOTE — PROGRESS NOTES
RT Inhaler-Nebulizer Bronchodilator Protocol Note    There is a bronchodilator order in the chart from a provider indicating to follow the RT Bronchodilator Protocol and there is an Initiate RT Inhaler-Nebulizer Bronchodilator Protocol order as well (see protocol at bottom of note). CXR Findings:  No results found. The findings from the last RT Protocol Assessment were as follows:   History Pulmonary Disease: Chronic pulmonary disease  Respiratory Pattern: Regular pattern and RR 12-20 bpm  Breath Sounds: Slightly diminished and/or crackles  Cough: Strong, spontaneous, non-productive  Indication for Bronchodilator Therapy: Decreased or absent breath sounds  Bronchodilator Assessment Score: 4    Aerosolized bronchodilator medication orders have been revised according to the RT Inhaler-Nebulizer Bronchodilator Protocol below. Respiratory Therapist to perform RT Therapy Protocol Assessment initially then follow the protocol. Repeat RT Therapy Protocol Assessment PRN for score 0-3 or on second treatment, BID, and PRN for scores above 3. No Indications - adjust the frequency to every 6 hours PRN wheezing or bronchospasm, if no treatments needed after 48 hours then discontinue using Per Protocol order mode. If indication present, adjust the RT bronchodilator orders based on the Bronchodilator Assessment Score as indicated below. Use Inhaler orders unless patient has one or more of the following: on home nebulizer, not able to hold breath for 10 seconds, is not alert and oriented, cannot activate and use MDI correctly, or respiratory rate 25 breaths per minute or more, then use the equivalent nebulizer order(s) with same Frequency and PRN reasons based on the score. If a patient is on this medication at home then do not decrease Frequency below that used at home.     0-3 - enter or revise RT bronchodilator order(s) to equivalent RT Bronchodilator order with Frequency of every 4 hours PRN for wheezing or increased work of breathing using Per Protocol order mode. 4-6 - enter or revise RT Bronchodilator order(s) to two equivalent RT bronchodilator orders with one order with BID Frequency and one order with Frequency of every 4 hours PRN wheezing or increased work of breathing using Per Protocol order mode. 7-10 - enter or revise RT Bronchodilator order(s) to two equivalent RT bronchodilator orders with one order with TID Frequency and one order with Frequency of every 4 hours PRN wheezing or increased work of breathing using Per Protocol order mode. 11-13 - enter or revise RT Bronchodilator order(s) to one equivalent RT bronchodilator order with QID Frequency and an Albuterol order with Frequency of every 4 hours PRN wheezing or increased work of breathing using Per Protocol order mode. Greater than 13 - enter or revise RT Bronchodilator order(s) to one equivalent RT bronchodilator order with every 4 hours Frequency and an Albuterol order with Frequency of every 2 hours PRN wheezing or increased work of breathing using Per Protocol order mode. RT to enter RT Home Evaluation for COPD & MDI Assessment order using Per Protocol order mode.     Electronically signed by Girish Leo on 2/6/2022 at 7:57 AM

## 2022-02-06 NOTE — PROGRESS NOTES
Hospitalist Progress Note      PCP: Franchesca Muse MD    Date of Admission: 1/31/2022    Subjective: pt wants to leave to home today, denies any complaints    Medications:  Reviewed    Infusion Medications    dextrose      sodium chloride       Scheduled Medications    cefepime  1,000 mg IntraVENous Q24H    calcium carbonate  1,000 mg Oral TID WC    apixaban  5 mg Oral BID    dexamethasone  6 mg IntraVENous Daily    albuterol sulfate HFA  2 puff Inhalation BID    vancomycin (VANCOCIN) intermittent dosing (placeholder)   Other RX Placeholder    isosorbide dinitrate  20 mg Oral TID    And    hydrALAZINE  37.5 mg Oral TID    LORazepam  1 mg Oral TID    povidone-iodine   Topical Daily    atorvastatin  40 mg Oral Nightly    metoprolol succinate  50 mg Oral Daily    nicotine  1 patch TransDERmal Daily    insulin lispro  0-6 Units SubCUTAneous TID WC    insulin lispro  0-3 Units SubCUTAneous Nightly    sodium chloride flush  5-40 mL IntraVENous 2 times per day    insulin glargine  0.25 Units/kg SubCUTAneous Nightly     PRN Meds: heparin (porcine), albuterol sulfate HFA, heparin (porcine), heparin (porcine), ipratropium-albuterol, glucose, dextrose, glucagon (rDNA), dextrose, sodium chloride flush, sodium chloride, ondansetron **OR** ondansetron, polyethylene glycol, acetaminophen **OR** acetaminophen, hydrALAZINE      Intake/Output Summary (Last 24 hours) at 2/6/2022 1742  Last data filed at 2/6/2022 0909  Gross per 24 hour   Intake 240 ml   Output 400 ml   Net -160 ml       Physical Exam Performed:    BP (!) 189/68   Pulse 59   Temp 98.8 °F (37.1 °C) (Oral)   Resp 17   Wt 243 lb 1 oz (110.3 kg)   SpO2 93%   BMI 32.07 kg/m²     Gen: Disheveled appearance, poor hygiene. No distress. Alert. Eyes: PERRL. No sclera icterus. No conjunctival injection. ENT: No discharge. Pharynx clear. Multiple missing teeth   Neck: No JVD. Trachea midline. Resp: No accessory muscle use. No crackles. No wheezes. No rhonchi. Diminished air entry throughout   CV: Regular rate. Irregularly irregular rhythm. No murmur.  No rub. 1+ edema RLE.   TDC in right chest wall   GI: Non-tender. Non-distended. No masses. No organomegaly. Normal bowel sounds. No hernia. Skin:   Right foot with large intact blister with serous drainage over the medial heel   Right great toe with ulceration at distal aspect, foul smell  Left BKA stump with some wound dehiscence at prior surgical site.  Foul smell   M/S: No cyanosis. No joint deformity. No clubbing. Neuro: Awake. Grossly nonfocal    Psych: Oriented to person, place, month.  Could not name year. No anxiety or agitation.        Labs:   Recent Labs     02/04/22 0559 02/05/22 0459 02/06/22 0434   WBC 5.0 4.5 4.6   HGB 10.1* 10.2* 10.4*   HCT 30.7* 30.9* 31.1*    137 142     Recent Labs     02/04/22 0559 02/05/22 0459 02/06/22 0434   * 130* 133*   K 4.5 4.0 4.3    98* 99   CO2 19* 21 24   BUN 56* 42* 50*   CREATININE 6.6* 5.0* 5.7*   CALCIUM 5.8* 6.5* 6.5*   PHOS 5.5*  --   --      Recent Labs     02/04/22 0559 02/05/22 0459 02/06/22 0434   AST 29 34 30   ALT 12 18 19   BILITOT <0.2 0.3 0.3   ALKPHOS 68 66 64     No results for input(s): INR in the last 72 hours. No results for input(s): Jacky Lincoln in the last 72 hours. Urinalysis:      Lab Results   Component Value Date    NITRU Negative 01/09/2021    WBCUA 0-2 01/09/2021    BACTERIA 3+ 01/09/2021    RBCUA 3-4 01/09/2021    BLOODU TRACE-INTACT 01/09/2021    SPECGRAV >=1.030 01/09/2021    GLUCOSEU Negative 01/09/2021       Radiology:  XR FOOT RIGHT (MIN 3 VIEWS)   Final Result   Circumferential soft tissue swelling, greater dorsally. No radiographic evidence of osteomyelitis. No soft tissue gas is seen.          XR CHEST (2 VW)   Final Result   Left-sided pleural effusion with left mid and bilateral lower lung opacities   that could represent atelectasis or infection Assessment/Plan:    Active Hospital Problems    Diagnosis     Cardiomyopathy [facility]) [I42.9]      Priority: High    Encephalopathy [G93.40]     COVID-19 [U07.1]     ESRD needing dialysis ([facility] [address] [N18.6, Z99.2]     Diabetic ulcer of right foot ([facility] [address] [H87.508, L97.519]     Type 1 diabetes mellitus with neurological manifestations, uncontrolled ([facility] [address] [E10.49, E10.65]            #COVID 19 infection  - droplet plus isolation in place   - vaccinated with 2 shot series, no booster   - symptom onset unclear- he states he has been coughing for months, he is a poor historian   - does not use home O2, now on 2 L of O2  cont decadron 6 mg daily -->taper on dc  Currently on RA      #ESRD  #Hyperkalemia   #Metabolic acidosis   - unclear patient's last dialysis session- he tells me it has been at least a month  - hyperkalemia improved with medical treatment  - Nephro c/s for HD  - he has TDC in the right chest wall   - he is requesting a new dialysis center at discharge - refusing to go back to his prior dialysis center   HD per nephro     #Metabolic encephalopathy- resolved  - suspect related to missed dialysis, acute infection, also concern for hepatic encephalopathy with cirrhosis history  - oriented to self, place, month.  Neuro exam not focal  - checked ammonia- not elevated   - cont treatment for other acute medical issues as listed      #Right diabetic foot ulcer- great toe  #Right foot cellulitis   #Right foot blister   - podiatry c/s  - vanc and cefepime D#6  Doxy and cefdinir for a week at discharge   d/w podiatry      #History of left BKA   - mild wound dehiscence noted left stump- will ask podiatry to eval     #Acute on Chronic systolic CHF  - IV lasix per nephro, fluid removal with HD   - cont toprol XL     #Atrial fibrillation   - currently with controlled rates   - not on [facility] - unclear why- he was seen by EP during Jan 2021 admit to [facility] and NOAC recommended but he was ultimately not discharged with anticoagulation.  He refused anticoagulation periodically throughout that admission.  CHA2ds2vasc=3.  I discussed AC with him and the risk of CVA with atrial fib, he is agreeable to heparin gtt for now. Switch to eliquis   - cont toprol, hydralazine, isordil       #Elevated troponin   - flat elevation, likely related to CHF, fluid overload, ESRD.  No ischemia on EKG.  Denies CP.  Low suspicion ACS     #Alcoholic cirrhosis   - he gets periodic paracentesis with IR per chart review- most recently 1/24/22  - checked ammonia with confusion- not elevated at 28     #HTN  - controlled, cont home meds       #DM2  - cont Lantus and use SSI        DVT Prophylaxis: eliquis  Diet: ADULT DIET; Regular; 4 carb choices (60 gm/meal);  Low Potassium (Less than 3000 mg/day)  Code Status: Full Code    PT/OT Eval Status: ordered    Dispo - cont care, ready for dc, need to confirm outpt spot for HD    Tucker Jovel MD

## 2022-02-06 NOTE — PROGRESS NOTES
Pt wanting to leave AMA. Talked to Pt about why he shouldn't leave AMA. Pt agreeing to do dialysis tomorrow and then discharge after. Pt refusing dressing change to foot.   Ivon Gonzalez RN

## 2022-02-06 NOTE — PROGRESS NOTES
02/06/22 0000   RT Protocol   History Pulmonary Disease 2   Respiratory pattern 0   Breath sounds 2   Cough 0   Indications for Bronchodilator Therapy Decreased or absent breath sounds   Bronchodilator Assessment Score 4   RT Inhaler-Nebulizer Bronchodilator Protocol Note    There is a bronchodilator order in the chart from a provider indicating to follow the RT Bronchodilator Protocol and there is an Initiate RT Inhaler-Nebulizer Bronchodilator Protocol order as well (see protocol at bottom of note). CXR Findings:  No results found. The findings from the last RT Protocol Assessment were as follows:   History Pulmonary Disease: Chronic pulmonary disease  Respiratory Pattern: Regular pattern and RR 12-20 bpm  Breath Sounds: Slightly diminished and/or crackles  Cough: Strong, spontaneous, non-productive  Indication for Bronchodilator Therapy: Decreased or absent breath sounds  Bronchodilator Assessment Score: 4    Aerosolized bronchodilator medication orders have been revised according to the RT Inhaler-Nebulizer Bronchodilator Protocol below. Respiratory Therapist to perform RT Therapy Protocol Assessment initially then follow the protocol. Repeat RT Therapy Protocol Assessment PRN for score 0-3 or on second treatment, BID, and PRN for scores above 3. No Indications - adjust the frequency to every 6 hours PRN wheezing or bronchospasm, if no treatments needed after 48 hours then discontinue using Per Protocol order mode. If indication present, adjust the RT bronchodilator orders based on the Bronchodilator Assessment Score as indicated below. Use Inhaler orders unless patient has one or more of the following: on home nebulizer, not able to hold breath for 10 seconds, is not alert and oriented, cannot activate and use MDI correctly, or respiratory rate 25 breaths per minute or more, then use the equivalent nebulizer order(s) with same Frequency and PRN reasons based on the score.   If a patient is on this medication at home then do not decrease Frequency below that used at home. 0-3 - enter or revise RT bronchodilator order(s) to equivalent RT Bronchodilator order with Frequency of every 4 hours PRN for wheezing or increased work of breathing using Per Protocol order mode. 4-6 - enter or revise RT Bronchodilator order(s) to two equivalent RT bronchodilator orders with one order with BID Frequency and one order with Frequency of every 4 hours PRN wheezing or increased work of breathing using Per Protocol order mode. 7-10 - enter or revise RT Bronchodilator order(s) to two equivalent RT bronchodilator orders with one order with TID Frequency and one order with Frequency of every 4 hours PRN wheezing or increased work of breathing using Per Protocol order mode. 11-13 - enter or revise RT Bronchodilator order(s) to one equivalent RT bronchodilator order with QID Frequency and an Albuterol order with Frequency of every 4 hours PRN wheezing or increased work of breathing using Per Protocol order mode. Greater than 13 - enter or revise RT Bronchodilator order(s) to one equivalent RT bronchodilator order with every 4 hours Frequency and an Albuterol order with Frequency of every 2 hours PRN wheezing or increased work of breathing using Per Protocol order mode.      Electronically signed by Haley Mohan RCP on 2/6/2022 at 12:35 AM

## 2022-02-06 NOTE — PROGRESS NOTES
RT Inhaler-Nebulizer Bronchodilator Protocol Note    There is a bronchodilator order in the chart from a provider indicating to follow the RT Bronchodilator Protocol and there is an Initiate RT Inhaler-Nebulizer Bronchodilator Protocol order as well (see protocol at bottom of note). CXR Findings:  No results found. The findings from the last RT Protocol Assessment were as follows:   History Pulmonary Disease: Chronic pulmonary disease  Respiratory Pattern: Regular pattern and RR 12-20 bpm  Breath Sounds: Slightly diminished and/or crackles  Cough: Strong, spontaneous, non-productive  Indication for Bronchodilator Therapy: Decreased or absent breath sounds  Bronchodilator Assessment Score: 4    Aerosolized bronchodilator medication orders have been revised according to the RT Inhaler-Nebulizer Bronchodilator Protocol below. Respiratory Therapist to perform RT Therapy Protocol Assessment initially then follow the protocol. Repeat RT Therapy Protocol Assessment PRN for score 0-3 or on second treatment, BID, and PRN for scores above 3. No Indications - adjust the frequency to every 6 hours PRN wheezing or bronchospasm, if no treatments needed after 48 hours then discontinue using Per Protocol order mode. If indication present, adjust the RT bronchodilator orders based on the Bronchodilator Assessment Score as indicated below. Use Inhaler orders unless patient has one or more of the following: on home nebulizer, not able to hold breath for 10 seconds, is not alert and oriented, cannot activate and use MDI correctly, or respiratory rate 25 breaths per minute or more, then use the equivalent nebulizer order(s) with same Frequency and PRN reasons based on the score. If a patient is on this medication at home then do not decrease Frequency below that used at home.     0-3 - enter or revise RT bronchodilator order(s) to equivalent RT Bronchodilator order with Frequency of every 4 hours PRN for wheezing or increased work of breathing using Per Protocol order mode. 4-6 - enter or revise RT Bronchodilator order(s) to two equivalent RT bronchodilator orders with one order with BID Frequency and one order with Frequency of every 4 hours PRN wheezing or increased work of breathing using Per Protocol order mode. 7-10 - enter or revise RT Bronchodilator order(s) to two equivalent RT bronchodilator orders with one order with TID Frequency and one order with Frequency of every 4 hours PRN wheezing or increased work of breathing using Per Protocol order mode. 11-13 - enter or revise RT Bronchodilator order(s) to one equivalent RT bronchodilator order with QID Frequency and an Albuterol order with Frequency of every 4 hours PRN wheezing or increased work of breathing using Per Protocol order mode. Greater than 13 - enter or revise RT Bronchodilator order(s) to one equivalent RT bronchodilator order with every 4 hours Frequency and an Albuterol order with Frequency of every 2 hours PRN wheezing or increased work of breathing using Per Protocol order mode. RT to enter RT Home Evaluation for COPD & MDI Assessment order using Per Protocol order mode.     Electronically signed by Yancy Walker RCP on 2/5/2022 at 8:31 PM

## 2022-02-06 NOTE — PROGRESS NOTES
Shift assessment complete, see flowsheets. Medications given, see MAR. Pt A+Ox4. Denies needs or discomforts at this time. Call light in easy reach, bedside table in easy reach, and bed in lowest position.   Lew Miller RN

## 2022-02-06 NOTE — PROGRESS NOTES
[facility] Progress Note           Subjective/   59y.o. year old male who we are seeing in consultation for ESRD on HD. HPI:  Last HD on 2/4 with 3 liters removed, post-weight of 110.8 kg. ROS:  Intake adequate, nausea resolved, no fevers. Objective/   GEN:  BP (!) 162/83   Pulse 50   Temp 98.7 °F (37.1 °C) (Oral)   Resp 16   Wt 250 lb (113.4 kg)   SpO2 92%   BMI 32.98 kg/m²   Exam deferred as means to protect PPE due to shortage & due to CORONAVIRUS risk. ACCESS: [facility]    Data/  Recent Labs     02/04/22  0559 02/05/22  0459   WBC 5.0 4.5   HGB 10.1* 10.2*   HCT 30.7* 30.9*   MCV 91.6 90.8    137     Recent Labs     02/03/22  0358 02/04/22  0559 02/05/22  0459   * 132* 130*   K 4.2 4.5 4.0   CL 99 100 98*   CO2 22 19* 21   GLUCOSE 172* 93 92   PHOS  --  5.5*  --    MG  --  1.40* 1.70*   BUN 47* 56* 42*   CREATININE 5.9* 6.6* 5.0*   LABGLOM 10* 9* 12*   GFRAA 12* 10* 14*       Assessment/     - ESKD on dialysis though has been off since end of Nov 2021 due to patient choice   Now on a MWF schedule here     - COVID+     - Right foot ulcer/cellulitis - on vancomycin/cefepime     - Anemia of chronic disease     - Hypertension     - Hyperkalemia     - Anion-gap metabolic acidosis     - Atrial fibrillation     - Alcoholic liver cirrhosis - requires frequent paracentesis     - DM2 c/b L BKA    - Renal osteodystrophy with hypocalcemia      Plan/     - HD next Monday with 2-3 L UF target  - Start oral calcium replacement  - Trend labs, bp's    Needs outpatient HD placement - d/w      ____________________________________  Renato Echols MD  [facility]  [email or web address]  Office: [phone]

## 2022-02-06 NOTE — PROGRESS NOTES
[facility] Progress Note           Subjective/   59y.o. year old male who we are seeing in consultation for ESRD on HD. HPI:  Last HD on 2/4 with 3 liters removed, post-weight of 110.8 kg. Denies any shortness of breath. ROS:  Intake adequate, nausea resolved, no fevers. Objective/   GEN:  BP (!) 189/68   Pulse 59   Temp 98.8 °F (37.1 °C) (Oral)   Resp 17   Wt 243 lb 1 oz (110.3 kg)   SpO2 93%   BMI 32.07 kg/m²   Exam deferred as means to protect PPE due to shortage & due to CORONAVIRUS risk. [facility]    Data/  Recent Labs     02/04/22 0559 02/05/22 0459 02/06/22  0434   WBC 5.0 4.5 4.6   HGB 10.1* 10.2* 10.4*   HCT 30.7* 30.9* 31.1*   MCV 91.6 90.8 91.1    137 142     Recent Labs     02/04/22 0559 02/05/22 0459 02/06/22  0434   * 130* 133*   K 4.5 4.0 4.3    98* 99   CO2 19* 21 24   GLUCOSE 93 92 105*   PHOS 5.5*  --   --    MG 1.40* 1.70*  --    BUN 56* 42* 50*   CREATININE 6.6* 5.0* 5.7*   LABGLOM 9* 12* 10*   GFRAA 10* 14* 12*       Assessment/     - ESKD on dialysis though has been off since end of Nov 2021 due to patient choice   Now on a MWF schedule here     - COVID+     - Right foot ulcer/cellulitis - on vancomycin/cefepime     - Anemia of chronic disease     - Hypertension     - Hyperkalemia     - Anion-gap metabolic acidosis     - Atrial fibrillation     - Alcoholic liver cirrhosis - requires frequent paracentesis     - DM2 c/b L BKA    - Renal osteodystrophy with hypocalcemia      Plan/     - HD next Monday with 2-3 L UF target as tolerated  - Started oral calcium replacement - refusing doses  - Trend labs, bp's    Needs outpatient HD placement - d/w      ____________________________________  Na Cantor MD  [facility]  [email or web address]  Office: [phone]

## 2022-02-06 NOTE — PROGRESS NOTES
Pt using foul language with staff. Refusing bed alarm. Educated on high fall risk and calling out for help. Pt instructed multiple times to not attempt to get out of bed. Pt continues to use foul language and refuses bed alarm. A+Ox4.  Latoya Malagon RN

## 2022-02-06 NOTE — PROGRESS NOTES
Bedside report and transfer of care given to [facility], RN. Pt currently resting in bed with the call light within reach. Pt denies any other care needs at this time. Pt stable at this time.     Angi Ramirez RN

## 2022-02-06 NOTE — CARE COORDINATION
Spoke with pt via phone as he is planning for home AMA today. Discussed with pt that dialysis is not set up and he wanted to go to [facility] and since has not been to dialysis since Nov this is like new set up. PPt angela he spoke with James Esposito at [facility] who states he can return there. Explained that proper orders and scheduled still are not confirmed and that if he leaves AMA and shows up there tomorrow then they will not do treatment and he likely will end up having to be re-admitted. At end of conversation pt agrees to stay til tomorrow to complete dialysis set up. Jere and MD aware.

## 2022-02-06 NOTE — PROGRESS NOTES
WYTW-[id number]:0  HD MWF  No HD today, therefore no vancomycin today. Level scheduled to be drawn Monday prior to his next scheduled dialysis session . Krystal Ferguson

## 2022-02-06 NOTE — PROGRESS NOTES
Hospitalist Progress Note      PCP: Madalyn Rodrigues MD    Date of Admission: 1/31/2022    Subjective: no acute events overnight, wants to go home when he is ready    Medications:  Reviewed    Infusion Medications    dextrose      sodium chloride       Scheduled Medications    cefepime  1,000 mg IntraVENous Q24H    calcium carbonate  1,000 mg Oral TID WC    apixaban  5 mg Oral BID    dexamethasone  6 mg IntraVENous Daily    albuterol sulfate HFA  2 puff Inhalation BID    vancomycin (VANCOCIN) intermittent dosing (placeholder)   Other RX Placeholder    isosorbide dinitrate  20 mg Oral TID    And    hydrALAZINE  37.5 mg Oral TID    LORazepam  1 mg Oral TID    povidone-iodine   Topical Daily    atorvastatin  40 mg Oral Nightly    metoprolol succinate  50 mg Oral Daily    nicotine  1 patch TransDERmal Daily    insulin lispro  0-6 Units SubCUTAneous TID WC    insulin lispro  0-3 Units SubCUTAneous Nightly    sodium chloride flush  5-40 mL IntraVENous 2 times per day    insulin glargine  0.25 Units/kg SubCUTAneous Nightly     PRN Meds: heparin (porcine), albuterol sulfate HFA, heparin (porcine), heparin (porcine), ipratropium-albuterol, glucose, dextrose, glucagon (rDNA), dextrose, sodium chloride flush, sodium chloride, ondansetron **OR** ondansetron, polyethylene glycol, acetaminophen **OR** acetaminophen, hydrALAZINE      Intake/Output Summary (Last 24 hours) at 2/6/2022 1107  Last data filed at 2/6/2022 0909  Gross per 24 hour   Intake 240 ml   Output 400 ml   Net -160 ml       Physical Exam Performed:    BP (!) 162/67   Pulse 51   Temp 98.7 °F (37.1 °C) (Oral)   Resp 15   Wt 243 lb 1 oz (110.3 kg)   SpO2 95%   BMI 32.07 kg/m²     Gen: Disheveled appearance, poor hygiene. No distress. Alert. Eyes: PERRL. No sclera icterus. No conjunctival injection. ENT: No discharge. Pharynx clear. Multiple missing teeth   Neck: No JVD. Trachea midline. Resp: No accessory muscle use. No crackles.  No wheezes. No rhonchi. Diminished air entry throughout   CV: Regular rate. Irregularly irregular rhythm. No murmur.  No rub. 1+ edema RLE.   TDC in right chest wall   GI: Non-tender. Non-distended. No masses. No organomegaly. Normal bowel sounds. No hernia. Skin:   Right foot with large intact blister with serous drainage over the medial heel   Right great toe with ulceration at distal aspect, foul smell  Left BKA stump with some wound dehiscence at prior surgical site.  Foul smell   M/S: No cyanosis. No joint deformity. No clubbing. Neuro: Awake. Grossly nonfocal    Psych: Oriented to person, place, month.  Could not name year. No anxiety or agitation. Labs:   Recent Labs     02/04/22 0559 02/05/22 0459 02/06/22 0434   WBC 5.0 4.5 4.6   HGB 10.1* 10.2* 10.4*   HCT 30.7* 30.9* 31.1*    137 142     Recent Labs     02/04/22 0559 02/05/22 0459 02/06/22 0434   * 130* 133*   K 4.5 4.0 4.3    98* 99   CO2 19* 21 24   BUN 56* 42* 50*   CREATININE 6.6* 5.0* 5.7*   CALCIUM 5.8* 6.5* 6.5*   PHOS 5.5*  --   --      Recent Labs     02/04/22 0559 02/05/22 0459 02/06/22 0434   AST 29 34 30   ALT 12 18 19   BILITOT <0.2 0.3 0.3   ALKPHOS 68 66 64     No results for input(s): INR in the last 72 hours. No results for input(s): Dow Sol in the last 72 hours. Urinalysis:      Lab Results   Component Value Date    NITRU Negative 01/09/2021    WBCUA 0-2 01/09/2021    BACTERIA 3+ 01/09/2021    RBCUA 3-4 01/09/2021    BLOODU TRACE-INTACT 01/09/2021    SPECGRAV >=1.030 01/09/2021    GLUCOSEU Negative 01/09/2021       Radiology:  XR FOOT RIGHT (MIN 3 VIEWS)   Final Result   Circumferential soft tissue swelling, greater dorsally. No radiographic evidence of osteomyelitis. No soft tissue gas is seen.          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, uncontrolled ([facility] [E10.49, E10.65]        #COVID 19 infection  - droplet plus isolation in place   - vaccinated with 2 shot series, no booster   - symptom onset unclear- he states he has been coughing for months, he is a poor historian   - does not use home O2, now on 2 L of O2  Start decadron 6 mg daily   Currently on RA      #ESRD  #Hyperkalemia   #Metabolic acidosis   - unclear patient's last dialysis session- he tells me it has been at least a month  - hyperkalemia improved with medical treatment  - Nephro c/s for HD  - he has TDC in the right chest wall   - he is requesting a new dialysis center at discharge - refusing to go back to his prior dialysis center   HD per nephro     #Metabolic encephalopathy- resolved  - suspect related to missed dialysis, acute infection, also concern for hepatic encephalopathy with cirrhosis history  - oriented to self, place, month.  Neuro exam not focal  - checked ammonia- not elevated   - cont treatment for other acute medical issues as listed      #Right diabetic foot ulcer- great toe  #Right foot cellulitis   #Right foot blister   - podiatry c/s  - vanc and cefepime D#5   Doxy and cefdinir for a week at discharge   d/w podiatry      #History of left BKA   - mild wound dehiscence noted left stump- will ask podiatry to eval     #Acute on Chronic systolic CHF  - IV lasix per nephro, fluid removal with HD   - cont toprol XL     #Atrial fibrillation   - currently with controlled rates   - not on [facility] - unclear why- he was seen by EP during Jan 2021 admit to [facility] and NOAC recommended but he was ultimately not discharged with [facility] [place]

## 2022-02-07 NOTE — PROGRESS NOTES
Pt awake in bed. Assessment completed and medications given. VS stable. A&O x4. Pt pleasant and compliant. Pt denies any further needs at this time. Call light in reach and bed in lowest position.

## 2022-02-07 NOTE — PROGRESS NOTES
02/06/22 2000   RT Protocol   History Pulmonary Disease 2   Respiratory pattern 0   Breath sounds 2   Cough 0   Indications for Bronchodilator Therapy Decreased or absent breath sounds   Bronchodilator Assessment Score 4   RT Inhaler-Nebulizer Bronchodilator Protocol Note    There is a bronchodilator order in the chart from a provider indicating to follow the RT Bronchodilator Protocol and there is an Initiate RT Inhaler-Nebulizer Bronchodilator Protocol order as well (see protocol at bottom of note). CXR Findings:  No results found. The findings from the last RT Protocol Assessment were as follows:   History Pulmonary Disease: Chronic pulmonary disease  Respiratory Pattern: Regular pattern and RR 12-20 bpm  Breath Sounds: Slightly diminished and/or crackles  Cough: Strong, spontaneous, non-productive  Indication for Bronchodilator Therapy: Decreased or absent breath sounds  Bronchodilator Assessment Score: 4    Aerosolized bronchodilator medication orders have been revised according to the RT Inhaler-Nebulizer Bronchodilator Protocol below. Respiratory Therapist to perform RT Therapy Protocol Assessment initially then follow the protocol. Repeat RT Therapy Protocol Assessment PRN for score 0-3 or on second treatment, BID, and PRN for scores above 3. No Indications - adjust the frequency to every 6 hours PRN wheezing or bronchospasm, if no treatments needed after 48 hours then discontinue using Per Protocol order mode. If indication present, adjust the RT bronchodilator orders based on the Bronchodilator Assessment Score as indicated below. Use Inhaler orders unless patient has one or more of the following: on home nebulizer, not able to hold breath for 10 seconds, is not alert and oriented, cannot activate and use MDI correctly, or respiratory rate 25 breaths per minute or more, then use the equivalent nebulizer order(s) with same Frequency and PRN reasons based on the score.   If a patient is on this medication at home then do not decrease Frequency below that used at home. 0-3 - enter or revise RT bronchodilator order(s) to equivalent RT Bronchodilator order with Frequency of every 4 hours PRN for wheezing or increased work of breathing using Per Protocol order mode. 4-6 - enter or revise RT Bronchodilator order(s) to two equivalent RT bronchodilator orders with one order with BID Frequency and one order with Frequency of every 4 hours PRN wheezing or increased work of breathing using Per Protocol order mode. 7-10 - enter or revise RT Bronchodilator order(s) to two equivalent RT bronchodilator orders with one order with TID Frequency and one order with Frequency of every 4 hours PRN wheezing or increased work of breathing using Per Protocol order mode. 11-13 - enter or revise RT Bronchodilator order(s) to one equivalent RT bronchodilator order with QID Frequency and an Albuterol order with Frequency of every 4 hours PRN wheezing or increased work of breathing using Per Protocol order mode. Greater than 13 - enter or revise RT Bronchodilator order(s) to one equivalent RT bronchodilator order with every 4 hours Frequency and an Albuterol order with Frequency of every 2 hours PRN wheezing or increased work of breathing using Per Protocol order mode.      Electronically signed by Graeme Alcantar RCP on 2/6/2022 at 8:32 PM

## 2022-02-07 NOTE — CARE COORDINATION
INTERDISCIPLINARY PLAN OF CARE CONFERENCE    Date/Time: 2/7/2022 8:59 AM  Completed by: Lynn Sanchez RN, Case Management      Patient Name:  Leta Collins  YOB: 1960  Admitting Diagnosis: Pleural effusion [J90]  ESRD needing dialysis ([facility] [N18.6, Z99.2]  Cellulitis of right lower extremity [L03.115]  Hypervolemia, unspecified hypervolemia type [E87.70]  Acute kidney injury superimposed on CKD ([facility] [N17.9, N18.9]  Acute on chronic congestive heart failure, unspecified heart failure type ([facility] [I50.9]  COVID [U07.1]     Admit Date/Time:  1/31/2022  3:18 PM    Chart reviewed. Interdisciplinary team contacted or reviewed plan related to patient progress and discharge plans. Disciplines included Case Management, Nursing, and Dietitian. Current Status:ongoing  PT/OT recommendation for discharge plan of care: n/a    Expected D/C Disposition:  Home  Confirmed plan with patient and/or family Yes confirmed with: (name) pt  Met with:pt  Discharge Plan Comments: Reviewed chart. Pt is currently in HD and not answering his cell phone. Pt is from home alone. Pt plans to return. Pt is active with [facility] and states he can no longer go there, as it is too far away and he can not transport there d/t needing gas. CM spoke with Jayant Quesada with [facility] on Friday and then again today. She is following up witht [facility], as [place] [facility] [place]. Jayant Quesada has to find a home clinic first then will find a covid clinic for pt. Pt was made aware of Friday, by this ,t hat he would have to go to either [facility] or [address] [place] [address] for the Covid location until day 21 of being covid pos (Tested pos on 1/31/22) and pt verbalized understanding. Awaiting to hear back from Jayant [facility] with [facility]. She is aware pt is ready for d/c. Pt is aware that Eliquis will cost $0/month copay and he can afford this, and this is after the 30 day free.      Pt is

## 2022-02-07 NOTE — PROGRESS NOTES
Patient signed AMA paperwork at this time. Writer educated patient that this is against medical advice and is not recommended. Patient verbalized understanding at this time.

## 2022-02-07 NOTE — FLOWSHEET NOTE
02/07/22 0217   Vital Signs   Temp 98.1 °F (36.7 °C)   Temp Source Oral   Pulse 64   Heart Rate Source Monitor   Resp 18   BP (!) 155/67   BP Location Left upper arm   Patient Position Lying left side   Level of Consciousness Alert (0)   MEWS Score 1     Pt awake in bed. VS as shown above. Pt denies any further needs at this time. Call light in reach and bed in lowest position.

## 2022-02-07 NOTE — PROGRESS NOTES
RT Inhaler-Nebulizer Bronchodilator Protocol Note    There is a bronchodilator order in the chart from a provider indicating to follow the RT Bronchodilator Protocol and there is an Initiate RT Inhaler-Nebulizer Bronchodilator Protocol order as well (see protocol at bottom of note). CXR Findings:  No results found. The findings from the last RT Protocol Assessment were as follows:   History Pulmonary Disease: (P) Chronic pulmonary disease  Respiratory Pattern: (P) Regular pattern and RR 12-20 bpm  Breath Sounds: (P) Slightly diminished and/or crackles  Cough: (P) Strong, spontaneous, non-productive  Indication for Bronchodilator Therapy: (P) Decreased or absent breath sounds  Bronchodilator Assessment Score: (P) 4    Aerosolized bronchodilator medication orders have been revised according to the RT Inhaler-Nebulizer Bronchodilator Protocol below. Respiratory Therapist to perform RT Therapy Protocol Assessment initially then follow the protocol. Repeat RT Therapy Protocol Assessment PRN for score 0-3 or on second treatment, BID, and PRN for scores above 3. No Indications - adjust the frequency to every 6 hours PRN wheezing or bronchospasm, if no treatments needed after 48 hours then discontinue using Per Protocol order mode. If indication present, adjust the RT bronchodilator orders based on the Bronchodilator Assessment Score as indicated below. Use Inhaler orders unless patient has one or more of the following: on home nebulizer, not able to hold breath for 10 seconds, is not alert and oriented, cannot activate and use MDI correctly, or respiratory rate 25 breaths per minute or more, then use the equivalent nebulizer order(s) with same Frequency and PRN reasons based on the score. If a patient is on this medication at home then do not decrease Frequency below that used at home.     0-3 - enter or revise RT bronchodilator order(s) to equivalent RT Bronchodilator order with Frequency of every 4 hours PRN for wheezing or increased work of breathing using Per Protocol order mode. 4-6 - enter or revise RT Bronchodilator order(s) to two equivalent RT bronchodilator orders with one order with BID Frequency and one order with Frequency of every 4 hours PRN wheezing or increased work of breathing using Per Protocol order mode. 7-10 - enter or revise RT Bronchodilator order(s) to two equivalent RT bronchodilator orders with one order with TID Frequency and one order with Frequency of every 4 hours PRN wheezing or increased work of breathing using Per Protocol order mode. 11-13 - enter or revise RT Bronchodilator order(s) to one equivalent RT bronchodilator order with QID Frequency and an Albuterol order with Frequency of every 4 hours PRN wheezing or increased work of breathing using Per Protocol order mode. Greater than 13 - enter or revise RT Bronchodilator order(s) to one equivalent RT bronchodilator order with every 4 hours Frequency and an Albuterol order with Frequency of every 2 hours PRN wheezing or increased work of breathing using Per Protocol order mode.          Electronically signed by Armando Martin RCP on 2/7/2022 at 3:44 PM

## 2022-02-07 NOTE — PROGRESS NOTES
Pharmacy Vancomycin Consult     Vancomycin Day: 7  Current Dosing: Pulse - HD on MWF  Current indication: SSTI    Recent Labs     02/06/22  0434 02/07/22  0653   BUN 50* 57*   CREATININE 5.7* 5.7*   WBC 4.6 6.2     Trough: 12.7    Assessment/Plan:  Will give 750 mg X 1 after HD today. Will check another level on 2/9 with AM labs.      Gayatri Cox PharmD, [facility], 2/7/2022 7:28 AM

## 2022-02-07 NOTE — PROGRESS NOTES
Bedside report and transfer of care given to [facility]. Pt currently resting in bed with the call light within reach. Pt denies any other care needs at this time. Pt stable at this time.     Ailyn Miles RN

## 2022-02-07 NOTE — PROGRESS NOTES
Treatment time: 3    Net UF: 3000    Pre weight: 114.5  Post weight: 111.5  EDW: tbd    Access used: cvc  Access function: well    Medications or blood products given: none    Regular outpatient schedule: MWF      Summary of response to treatment: tolerated well. No issues noted/voiced t/o tx. Report called to primary RN. Pt stable upon leaving unit    Copy of dialysis treatment record placed in chart, to be scanned into EMR.

## 2022-02-07 NOTE — PLAN OF CARE
Problem: Airway Clearance - Ineffective  Goal: Achieve or maintain patent airway  Outcome: Ongoing     Problem: Gas Exchange - Impaired  Goal: Absence of hypoxia  Outcome: Ongoing  Goal: Promote optimal lung function  Outcome: Ongoing     Problem: Breathing Pattern - Ineffective  Goal: Ability to achieve and maintain a regular respiratory rate  Outcome: Ongoing     Problem:  Body Temperature -  Risk of, Imbalanced  Goal: Ability to maintain a body temperature within defined limits  Outcome: Ongoing  Goal: Will regain or maintain usual level of consciousness  Outcome: Ongoing  Goal: Complications related to the disease process, condition or treatment will be avoided or minimized  Outcome: Ongoing     Problem: Isolation Precautions - Risk of Spread of Infection  Goal: Prevent transmission of infection  Outcome: Ongoing     Problem: Nutrition Deficits  Goal: Optimize nutritional status  Outcome: Ongoing     Problem: Risk for Fluid Volume Deficit  Goal: Maintain normal heart rhythm  Outcome: Ongoing  Goal: Maintain absence of muscle cramping  Outcome: Ongoing  Goal: Maintain normal serum potassium, sodium, calcium, phosphorus, and pH  Outcome: Ongoing     Problem: Loneliness or Risk for Loneliness  Goal: Demonstrate positive use of time alone when socialization is not possible  Outcome: Ongoing     Problem: Fatigue  Goal: Verbalize increase energy and improved vitality  Outcome: Ongoing     Problem: Patient Education: Go to Patient Education Activity  Goal: Patient/Family Education  Outcome: Ongoing     Problem: Falls - Risk of:  Goal: Will remain free from falls  Description: Will remain free from falls  Outcome: Ongoing  Goal: Absence of physical injury  Description: Absence of physical injury  Outcome: Ongoing     Problem: Fluid Volume:  Goal: Will show no signs or symptoms of fluid imbalance  Description: Will show no signs or symptoms of fluid imbalance  Outcome: Ongoing     Problem: Nutritional:  Goal: Ability to identify appropriate dietary choices will improve  Description: Ability to identify appropriate dietary choices will improve  Outcome: Ongoing

## 2022-02-07 NOTE — DISCHARGE SUMMARY
Pt insisted on leaving AMA. Please see progress note same date for details.      Bladimir Toussaint MD  2/7/2022  3:23 PM

## 2022-02-07 NOTE — PROGRESS NOTES
Hospitalist Progress Note      PCP: Julio Gallardo MD    Date of Admission: 1/31/2022    Subjective: Tolerated HD without incident today. Becoming unpleasant and verbally abusive to nurses. Demanding he leave the hospital today. I went to discuss the situation with him. Tried to explain that we do not have OP dialysis spot set up for him at this time. Pt insitsing on leaving AMA and is AAOx4 at this time.          Medications:  Reviewed    Infusion Medications    dextrose      sodium chloride       Scheduled Medications    cefepime  1,000 mg IntraVENous Q24H    calcium carbonate  1,000 mg Oral TID WC    apixaban  5 mg Oral BID    dexamethasone  6 mg IntraVENous Daily    albuterol sulfate HFA  2 puff Inhalation BID    vancomycin (VANCOCIN) intermittent dosing (placeholder)   Other RX Placeholder    isosorbide dinitrate  20 mg Oral TID    And    hydrALAZINE  37.5 mg Oral TID    LORazepam  1 mg Oral TID    povidone-iodine   Topical Daily    atorvastatin  40 mg Oral Nightly    metoprolol succinate  50 mg Oral Daily    nicotine  1 patch TransDERmal Daily    insulin lispro  0-6 Units SubCUTAneous TID WC    insulin lispro  0-3 Units SubCUTAneous Nightly    sodium chloride flush  5-40 mL IntraVENous 2 times per day    insulin glargine  0.25 Units/kg SubCUTAneous Nightly     PRN Meds: heparin (porcine), albuterol sulfate HFA, heparin (porcine), heparin (porcine), ipratropium-albuterol, glucose, dextrose, glucagon (rDNA), dextrose, sodium chloride flush, sodium chloride, ondansetron **OR** ondansetron, polyethylene glycol, acetaminophen **OR** acetaminophen, hydrALAZINE      Intake/Output Summary (Last 24 hours) at 2/7/2022 1521  Last data filed at 2/7/2022 1224  Gross per 24 hour   Intake 1485 ml   Output 3950 ml   Net -2465 ml       Physical Exam Performed:    BP (!) 172/83   Pulse 56   Temp 97.4 °F (36.3 °C) (Axillary)   Resp 18   Wt 245 lb 13 oz (111.5 kg)   SpO2 92%   BMI 32.43 kg/m²     Gen: Disheveled appearance, poor hygiene. No distress. Alert. Eyes: PERRL. No sclera icterus. No conjunctival injection. ENT: No discharge. Pharynx clear. Multiple missing teeth   Neck: No JVD. Trachea midline. Resp: No accessory muscle use. No crackles. No wheezes. No rhonchi. Diminished air entry throughout   CV: Regular rate. Irregularly irregular rhythm. No murmur.  No rub. 1+ edema RLE.   TDC in right chest wall   GI: Non-tender. Non-distended. No masses. No organomegaly. Normal bowel sounds. No hernia. Skin:   Right foot with large intact blister with serous drainage over the medial heel   Right great toe with ulceration at distal aspect, foul smell  Left BKA stump with some wound dehiscence at prior surgical site.  Foul smell   M/S: No cyanosis. No joint deformity. No clubbing. Neuro: Awake. Grossly nonfocal    Psych: Oriented to person, place, month.  Could not name year. No anxiety or agitation.        Labs:   Recent Labs     02/05/22 0459 02/06/22 0434 02/07/22 0653   WBC 4.5 4.6 6.2   HGB 10.2* 10.4* 10.5*   HCT 30.9* 31.1* 32.2*    142 152     Recent Labs     02/05/22 0459 02/06/22 0434 02/07/22  0653   * 133* 132*   K 4.0 4.3 4.0   CL 98* 99 99   CO2 21 24 20*   BUN 42* 50* 57*   CREATININE 5.0* 5.7* 5.7*   CALCIUM 6.5* 6.5* 6.3*     Recent Labs     02/05/22 0459 02/06/22 0434 02/07/22  0653   AST 34 30 26   ALT 18 19 19   BILITOT 0.3 0.3 0.3   ALKPHOS 66 64 62     No results for input(s): INR in the last 72 hours. No results for input(s): Sherry Castor in the last 72 hours. Urinalysis:      Lab Results   Component Value Date    NITRU Negative 01/09/2021    WBCUA 0-2 01/09/2021    BACTERIA 3+ 01/09/2021    RBCUA 3-4 01/09/2021    BLOODU TRACE-INTACT 01/09/2021    SPECGRAV >=1.030 01/09/2021    GLUCOSEU Negative 01/09/2021       Radiology:  XR FOOT RIGHT (MIN 3 VIEWS)   Final Result   Circumferential soft tissue swelling, greater dorsally.       No radiographic evidence of osteomyelitis. No soft tissue gas is seen.          XR CHEST (2 VW)   Final Result   Left-sided pleural effusion with left mid and bilateral lower lung opacities   that could represent atelectasis or infection                 Assessment/Plan:    Active Hospital Problems    Diagnosis     Cardiomyopathy [facility]) [I42.9]      Priority: High    Encephalopathy [G93.40]     COVID-19 [U07.1]     ESRD needing dialysis ([facility] [N18.6, Z99.2]     Diabetic ulcer of right foot (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 [address] [E10.49, E10.65]            #COVID 19 infection  - droplet plus isolation in place   - vaccinated with 2 shot series, no booster   - symptom onset unclear- he states he has been coughing for months, he is a poor historian   - does not use home O2, now on 2 L of O2  cont decadron 6 mg daily -->taper on dc  Currently on RA      #ESRD  #Hyperkalemia   #Metabolic acidosis   - unclear patient's last dialysis session- he tells me it has been at least a month  - hyperkalemia improved with medical treatment  - Nephro c/s for HD  - he has TDC in the right chest wall   - he is requesting a new dialysis center at discharge - refusing to go back to his prior dialysis center   HD per nephro     #Metabolic encephalopathy- resolved  - suspect related to missed dialysis, acute infection, also concern for hepatic encephalopathy with cirrhosis history  - oriented to self, place, month.  Neuro exam not focal  - checked ammonia- not elevated   - cont treatment for other acute medical issues as listed      #Right diabetic foot ulcer- great toe  #Right foot cellulitis   #Right foot blister   - podiatry c/s  - vanc and cefepime D#6  Doxy and cefdinir for a week at discharge   d/w podiatry      #History of left BKA   - mild wound dehiscence noted left stump- will ask podiatry to eval     #Acute on Chronic systolic CHF  - IV lasix per nephro,

## 2022-02-07 NOTE — PROGRESS NOTES
[facility] Progress Note           Subjective/   59y.o. year old male who we are seeing in consultation for ESRD on HD. HPI:  Last HD on 2/4 with 3 liters removed, post-weight of 110.8 kg. Seen on dialysis. Orders confirmed. Pre-weight at HD today 114.5 kg. Denies any shortness of breath. ROS:  Intake adequate, nausea resolved, no fevers. Objective/   GEN:  BP (!) 181/84   Pulse 50   Temp 98.6 °F (37 °C) (Oral)   Resp 18   Wt 253 lb 1 oz (114.8 kg)   SpO2 96%   BMI 33.39 kg/m²   Exam deferred as means to protect PPE due to shortage & due to CORONAVIRUS risk. [facility]    Data/  Recent Labs     02/05/22 0459 02/06/22  0434 02/07/22  0653   WBC 4.5 4.6 6.2   HGB 10.2* 10.4* 10.5*   HCT 30.9* 31.1* 32.2*   MCV 90.8 91.1 92.0    142 152     Recent Labs     02/05/22 0459 02/06/22  0434 02/07/22  0653   * 133* 132*   K 4.0 4.3 4.0   CL 98* 99 99   CO2 21 24 20*   GLUCOSE 92 105* 98   MG 1.70*  --   --    BUN 42* 50* 57*   CREATININE 5.0* 5.7* 5.7*   LABGLOM 12* 10* 10*   GFRAA 14* 12* 12*       Assessment/     - ESKD on dialysis though has been off since end of Nov 2021 due to patient choice   Now on a MWF schedule here     - COVID+     - Right foot ulcer/cellulitis - on vancomycin/cefepime     - Anemia of chronic disease     - Hypertension     - Hyperkalemia     - Anion-gap metabolic acidosis     - Atrial fibrillation     - Alcoholic liver cirrhosis - requires frequent paracentesis     - DM2 c/b L BKA    - Renal osteodystrophy with hypocalcemia      Plan/     - HD today with 2-3 L UF target as tolerated  - Started oral calcium replacement - refusing doses  - Trend labs, bp's    Needs outpatient HD placement - d/w      ____________________________________  Deena Opitz, MD  [facility]  [email or web address]  Office: [phone]

## 2022-02-08 NOTE — CARE COORDINATION
[facility] Transitions Initial Follow Up Call    Call within 2 business days of discharge: Yes    Patient: Pato Ward Patient : 1960   MRN: [id number]  Reason for Admission: COVID-19 Detected on 2022, ESRD, hyperkalemia, metabolic acidosis, metabolic encephalopathy, R diabetic foot ulcer great toe, R foot cellulitis, R foot blister, hx of L BKA, acute on chronic systolic CHF, A Fib, elevated troponin, alcoholic cirrhosis, HTN, DM2 -> left AMA prior to HD slot confirmed, no AVS, no meds  Discharge Date: 22 RARS: Readmission Risk Score: 20.1 ( )      Last Discharge  [place]       Complaint Diagnosis Description Type Department Provider    22 Leg Swelling Hypervolemia, unspecified hypervolemia type . .. ED to Hosp-Admission (Discharged) (ADMITTED) Jeannine Meyers MD; Da...           1st attempt - CTN attempted follow-up outreach to patient, but was unable to reach. Message left including CTN contact information for return call.       Follow Up  Future Appointments   Date Time Provider Sheela Nuno   2022  2:00 PM [address] [facility]TRA [place] Rad   2022  3:00 PM [facility] OP NURSING ROOM 1 [facility] OP RN [place] HOD   2022  2:00 PM [facility] US RM 1 [facility] [place] Rad   2022  3:00 PM [facility] OP NURSING ROOM 1 [facility] OP RN [place] HOD   2022  2:00 PM [facility] [place] [facility] [place] [facility]   2022  3:00 PM [facility] OP NURSING ROOM 1 [facility] OP RN [place] HOD   3/7/2022  2:00 PM [address] [facility] [place] Rad   3/7/2022  3:00 PM [facility] OP NURSING ROOM 1 [facility] OP RN [place] HOD   3/14/2022  2:00 PM [address] [facility] [place] Rad   3/14/2022  3:00 PM [facility] OP NURSING ROOM 1 [facility] OP RN [place] HOD   3/21/2022  2:00 PM [facility] US RM 1 [facility] UL[facility] [place] Rad   3/21/2022  3:00 PM [facility] OP NURSING ROOM 1 [facility] OP [place] CHATMAN   3/28/2022  2:00 PM [facility] US RM 1 [facility] [place] Rad   3/28/2022  3:00 PM [facility] OP NURSING ROOM Bhargavi Jerald Felder [address] OP [place] 4/4/2022  2:00 PM [facility] US RM 1 [facility] [place] Rad   4/4/2022  3:00 PM [facility] OP NURSING ROOM 1 [facility] OP RN [place] HOD   4/11/2022  2:00 PM [facility] US RM 1 [facility] [place] Rad   4/11/2022  3:00 PM [facility] OP NURSING ROOM 1 [facility] OP RN [place] HOD   4/18/2022  2:00 PM [facility] US RM 1 [facility] UL[facility] [place] Rad   4/18/2022  3:00 PM [facility] OP NURSING ROOM 1 [facility] OP RN [place] HOD   4/25/2022  2:00 PM [facility] US RM 1 [facility] UL[facility] [place] Rad   4/25/2022  3:00 PM [facility] OP NURSING ROOM 1 [facility] OP RN [place] HOD   5/2/2022  2:00 PM [facility] US RM [address] Rad   5/2/2022  3:00 PM [facility] OP NURSING ROOM 1 [facility] OP RN [place] HOD   5/9/2022  2:00 PM [facility] US RM [address] Rad   5/9/2022  3:00 PM [facility] OP NURSING ROOM 1 [facility] OP RN [place] HOD   5/16/2022  2:00 PM [address] Rad   5/16/2022  3:00 PM [facility] OP NURSING ROOM 1 [facility] OP RN [place] HOD   5/23/2022  2:00 PM [facility] US RM 1 [facility] UL[facility] [place] Rad   5/23/2022  3:00 PM [facility] OP NURSING ROOM 1 [facility] OP RN [place] HOD   5/30/2022  2:00 PM [facility] US RM 1 [facility] [place] Rad   6/6/2022  2:00 PM [address] RM 1 [facility] ULTRA [place] Rad   6/6/2022  3:00 PM [facility] OP NURSING ROOM 1 [facility] OP RN [place] HOD   6/13/2022  2:00 PM [address] RM 1 [facility] ULTRA [place] Rad   6/13/2022  3:00 PM [facility] OP NURSING ROOM 1 [facility] OP RN [place] HOD   6/20/2022  2:00 PM [address] Rad   6/20/2022  3:00 PM [facility] OP NURSING ROOM 1 [facility] OP [place] CHATMAN   6/27/2022  2:00 PM [facility] RM [address] Rad   6/27/2022  3:00 PM [facility] OP NURSING ROOM Bhargavi Jerald Felder [address] OP ELVA [facility]       Leno Thomason RN

## 2022-02-09 NOTE — CARE COORDINATION
[facility] Transitions Initial Follow Up Call    Call within 2 business days of discharge: Yes    Patient: Chapo Stallworth Patient : 1960   MRN: [id number]  Reason for Admission: COVID-19 Detected on 2022, ESRD, hyperkalemia, metabolic acidosis, metabolic encephalopathy, R diabetic foot ulcer great toe, R foot cellulitis, R foot blister, hx of L BKA, acute on chronic systolic CHF, A Fib, elevated troponin, alcoholic cirrhosis, HTN, DM2 -> left AMA prior to HD slot confirmed, no AVS, no meds  Discharge Date: 22 RARS: Readmission Risk Score: 20.1 ( )      [facility] Discharge [facility]       Complaint Diagnosis Description Type Department Provider    22 Leg Swelling Hypervolemia, unspecified hypervolemia type . .. ED to Hosp-Admission (Discharged) (Elvia Heller) Glenn Malone MD; Da...          2nd attempt - CTN attempted follow-up outreach to patient, but was unable to reach. Message left including CTN contact information for return call. Outreach to PCP - Caro Centerfrancis Zendejas. Message left and last progress note prior to leaving AMA securely sent via Right[employer]ax at this time for continuity of care. Kati Backuriel at PCP office confirms that patient has a follow up appointment with one of their providers tomorrow, 2/10/2022. No further CTN outreach scheduled.      Follow Up  Future Appointments   Date Time Provider Sheela Nuno   2022  2:00 PM [facility] US RM 1 [facility] [place] Rad   2022  3:00 PM [facility] OP NURSING ROOM 1 [facility] OP RN [place] HOD   2022  2:00 PM [facility] US RM 1 [facility] [place] [facility]   2022  3:00 PM [address] OP NURSING ROOM 1 [facility] OP RN [place] HOD   2022  2:00 PM [address] RM 1 [facility] UL[facility] [place] Rad   2022  3:00 PM [address] OP NURSING ROOM 1 [facility] OP RN [place] HOD   3/7/2022  2:00 PM [address] Rad   3/7/2022  3:00 PM [facility] OP NURSING ROOM 1 [facility] D[place] CHATMAN   3/14/2022  2:00 PM [facility] US RM 1 [facility] [place] Rad   3/14/2022  3:00 PM [facility] OP NURSING ROOM 1 [facility] OP RN [place] HOD   3/21/2022  2:00 PM [facility] US RM 1 [facility] UL[facility] [place] Rad   3/21/2022  3:00 PM [facility] OP NURSING ROOM 1 [facility] OP RN [place] HOD   3/28/2022  2:00 PM [facility] US RM 1 [facility] [place] [facility]   3/28/2022  3:00 PM [facility] OP NURSING ROOM 1 [facility] OP RN [place] HOD   4/4/2022  2:00 PM [facility] US RM [address] [place] Rad   4/4/2022  3:00 PM [facility] OP NURSING ROOM 1 [facility] OP RN [place] HOD   4/11/2022  2:00 PM [facility] US RM 1 [facility] [place] Rad   4/11/2022  3:00 PM [facility] OP NURSING ROOM 1 [facility] OP RN [place] HOD   4/18/2022  2:00 PM [facility] US RM 1 [facility] [place] Rad   4/18/2022  3:00 PM [facility] OP NURSING ROOM 1 [facility] OP RN [place] HOD   4/25/2022  2:00 PM [facility] US RM 1 [facility] UL[facility] [place] Rad   4/25/2022  3:00 PM [facility] OP NURSING ROOM 1 [facility] OP RN [place] HOD   5/2/2022  2:00 PM [facility] US RM [address] Rad   5/2/2022  3:00 PM [facility] OP NURSING ROOM 1 [facility] OP RN [place] HOD   5/9/2022  2:00 PM [facility] US RM [address] Rad   5/9/2022  3:00 PM [facility] OP NURSING ROOM 1 [facility] OP RN [place] HOD   5/16/2022  2:00 PM [facility] US RM [address] Rad   5/16/2022  3:00 PM [facility] OP NURSING ROOM 1 [facility] OP RN [place] HOD   5/23/2022  2:00 PM [facility] US RM 1 [facility] [place] [facility]   5/23/2022  3:00 PM [facility] OP NURSING ROOM 1 [facility] OP RN [place] HOD   5/30/2022  2:00 PM [facility] US RM 1 [facility] [place] [facility]   6/6/2022  2:00 PM [facility] US RM 1 [facility] [place] [facility]   6/6/2022  3:00 PM [facility] OP NURSING ROOM 1 [facility] OP RN [place] HOD   6/13/2022  2:00 PM [facility] US RM 1 [facility] UL[facility] [place] [facility]   6/13/2022  3:00 PM [facility] OP NURSING ROOM 1 [facility] OP RN [place] HOD   6/20/2022  2:00 PM [facility] US RM 1 [facility] [place] Rad   6/20/2022  3:00 PM [facility] OP NURSING ROOM 1 [facility] OP RN [place] HOD   6/27/2022  2:00 PM [facility] US RM [address] Rad   6/27/2022  3:00 PM [facility] OP NURSING ROOM Bhargavi Jerald Felder [address] OP [facility]       Shelly Valentin RN

## 2022-02-28 NOTE — BRIEF OP NOTE
Brief Postoperative Note    Leta Collins  YOB: 1960  [id number]    Pre-operative Diagnosis: ascites    Post-operative Diagnosis: Same    Procedure: US-guided paracentesis    Anesthesia: Local    Surgeons: Martha Oliveira MD    Estimated Blood Loss: Less than 5 mL    Complications: None    Specimens: ascites drained    Findings: Successful US-guided paracentesis.     Electronically signed by Martha Oliveira MD on 2/28/2022 at 1:45 PM

## 2022-03-23 NOTE — BRIEF OP NOTE
Brief Postoperative Note    Liza Andrea  YOB: 1960  [id number]    Pre-operative Diagnosis: Ascites    Post-operative Diagnosis: Same    Procedure: US Guided Paracentesis    Anesthesia: Local    Surgeons/Assistants: Page Cool MD    Estimated Blood Loss: less than 5 mL    Complications: None    Specimens: Was Obtained: serous Ascitic Fluid    Findings: Technically successful US guided paracentesis, 10L removed    Electronically signed by Pgae Cool MD on 3/23/2022 at 3:17 PM

## 2022-04-23 PROBLEM — R07.9 CHEST PAIN: Status: ACTIVE | Noted: 2022-01-01

## 2022-04-23 NOTE — ED PROVIDER NOTES
Neurological: Negative for dizziness and light-headedness. Psychiatric/Behavioral: Negative for agitation. The patient is nervous/anxious. All other systems reviewed and are negative. Positives and Pertinent negatives as per HPI. Except as noted above in the ROS, all other systems were reviewed and negative. PAST MEDICAL HISTORY     Past Medical History:   Diagnosis Date    Bacteremia 11/18/2017    strep agalactiae (also cultured from foot)    CAD (coronary artery disease) 8/18/2021    Cellulitis and abscess of foot, except toes     CHF (congestive heart failure) (HCC)     Diabetes mellitus ([facility] [address]     History of liver failure     Hypertension     Kidney disease     MRSA (methicillin resistant staph aureus) culture positive 4/17/18, 11/19/2017    left foot         SURGICAL HISTORY       Past Surgical History:   Procedure Laterality Date    IR TUNNELED CATHETER PLACEMENT GREATER THAN 5 YEARS  1/11/2021    IR TUNNELED CATHETER PLACEMENT GREATER THAN 5 YEARS 1/11/2021 Dyllan Dodson MD [facility] SPECIAL PROCEDURES    LEG AMPUTATION BELOW KNEE      PARACENTESIS      UPPER GASTROINTESTINAL ENDOSCOPY N/A 1/29/2019    EGD ESOPHAGOGASTRODUODENOSCOPY performed by Emilie Stevenson MD at [address] [place] [address] St      Left A         CURRENT MEDICATIONS       Previous Medications    ALBUTEROL SULFATE HFA (PROAIR HFA) 108 (90 BASE) MCG/ACT INHALER    Inhale 2 puffs into the lungs every 6 hours as needed for Wheezing    APIXABAN (ELIQUIS) 5 MG TABS TABLET    Take 1 tablet by mouth 2 times daily    ATORVASTATIN (LIPITOR) 40 MG TABLET    Take 1 tablet by mouth nightly    DEXAMETHASONE (DECADRON) 1 MG TABLET    Take 4 tablets once a day for 3 days, then take 3 tablets once a day for 3 days, then take 2 tablets once a day for 3 days, then take 1 tablet once a day for 3 days, then stop.     INSULIN LISPRO (HUMALOG KWIKPEN) 100 UNIT/ML PEN    Glucose: Dose:   No Insulin 140-249 1 Unit 250-349 2 Running Out of Food in the Last Year: Not on file    Ran Out of Food in the Last Year: Not on file   Transportation Needs:     Lack of Transportation (Medical): Not on file    Lack of Transportation (Non-Medical): Not on file   Physical Activity:     Days of Exercise per Week: Not on file    Minutes of Exercise per Session: Not on file   Stress:     Feeling of Stress : Not on file   Social Connections:     Frequency of Communication with Friends and Family: Not on file    Frequency of Social Gatherings with Friends and Family: Not on file    Attends Sikh Services: Not on file    Active Member of [address] [place] [address] [employer] or Organizations: Not on file    Attends Club or Organization Meetings: Not on file    Marital Status: Not on file   Intimate Partner Violence:     Fear of Current or Ex-Partner: Not on file    Emotionally Abused: Not on file    Physically Abused: Not on file    Sexually Abused: Not on file   Housing Stability:     Unable to Pay for Housing in the Last Year: Not on file    Number of [place] in the Last Year: Not on file    Unstable Housing in the Last Year: Not on file       SCREENINGS    Arline Coma Scale  Eye Opening: Spontaneous  Best Verbal Response: Oriented  Best Motor Response: Obeys commands  Arline Coma Scale Score: 15        PHYSICAL EXAM  (up to 7 for level 4, 8 or more for level 5)     ED Triage Vitals [04/23/22 1701]   BP Temp Temp src Pulse Resp SpO2 Height Weight   (!) 175/82 97.7 °F (36.5 °C) -- 70 16 93 % -- --       Physical Exam  Constitutional:       Appearance: He is well-developed. HENT:      Head: Normocephalic and atraumatic. Cardiovascular:      Rate and Rhythm: Normal rate. Pulmonary:      Effort: Pulmonary effort is normal. No respiratory distress. Abdominal:      General: There is no distension. Palpations: Abdomen is soft. Tenderness: There is no abdominal tenderness. Musculoskeletal:         General: Normal range of motion.       Cervical back: Normal range of motion. Skin:     General: Skin is warm and dry. Neurological:      Mental Status: He is alert and oriented to person, place, and time. Psychiatric:         Thought Content: Thought content does not include homicidal or suicidal ideation. Thought content does not include homicidal or suicidal plan.          DIAGNOSTIC RESULTS   LABS:    Labs Reviewed   CBC WITH AUTO DIFFERENTIAL - Abnormal; Notable for the following components:       Result Value    RBC 3.21 (*)     Hemoglobin 9.5 (*)     Hematocrit 29.4 (*)     RDW 15.8 (*)     Lymphocytes Absolute 0.3 (*)     All other components within normal limits   D-DIMER, QUANTITATIVE - Abnormal; Notable for the following components:    D-Dimer, Quant 936 (*)     All other components within normal limits   PROTIME-INR - Abnormal; Notable for the following components:    Protime 13.2 (*)     INR 1.16 (*)     All other components within normal limits   COMPREHENSIVE METABOLIC PANEL W/ REFLEX TO MG FOR LOW K - Abnormal; Notable for the following components:    BUN 78 (*)     CREATININE 6.3 (*)     GFR Non- 9 (*)     GFR  11 (*)     Calcium 6.7 (*)     Total Protein 5.6 (*)     Albumin 3.0 (*)     ALT 7 (*)     AST 14 (*)     All other components within normal limits    Narrative:     Lesa Miner tel. [phone],  Chemistry results called to and read back by Levy Amanda RN, 04/23/2022  19:55, by FRANCESCA   TROPONIN - Abnormal; Notable for the following components:    Troponin 0.51 (*)     All other components within normal limits    Narrative:     Yo UREÑA tel. [phone],  Chemistry results called to and read back by Levy Amanda RN, 04/23/2022  19:55, by FRANCESCA   ACETAMINOPHEN LEVEL - Abnormal; Notable for the following components:    Acetaminophen Level <5 (*)     All other components within normal limits   SALICYLATE LEVEL - Abnormal; Notable for the following components:    Salicylate, Serum <5.8 (*) All other components within normal limits   URINE DRUG SCREEN   ETHANOL       All other labs werewithin normal range or not returned as of this dictation. EKG: All EKG's are interpreted by the Emergency Department Physician who either signs or Co-signs this chart in the absence of a cardiologist.  Please see their note for interpretation of EKG. RADIOLOGY:     Chest x-ray interpreted by radiologist for  Impression:    Bilateral pleural effusions with bibasilar opacities could represent   atelectasis or infection              Interpretation per the Radiologist below, if available at the time of this note:    XR CHEST (2 VW)   Final Result   Bilateral pleural effusions with bibasilar opacities could represent   atelectasis or infection         NM LUNG VENT/PERFUSION (VQ)    (Results Pending)     No results found.       PROCEDURES   Unless otherwise noted below, none     Procedures     CRITICAL CARE TIME   N/A    CONSULTS:  IP CONSULT TO HOSPITALIST  IP CONSULT TO NEPHROLOGY  IP CONSULT TO CARDIOLOGY      EMERGENCYDEPARTMENT COURSE and DIFFERENTIAL DIAGNOSIS/MDM:   Vitals:    Vitals:    04/23/22 1845 04/23/22 1849 04/23/22 1900 04/23/22 2000   BP: (!) 145/121 (!) 151/70 (!) 171/66 (!) 164/87   Pulse:  74     Resp:  16     Temp:       SpO2:  96%         Patient was given the following medications:  Medications   metoprolol succinate (TOPROL XL) extended release tablet 50 mg (has no administration in time range)   sodium chloride flush 0.9 % injection 5-40 mL (has no administration in time range)   sodium chloride flush 0.9 % injection 5-40 mL (has no administration in time range)   0.9 % sodium chloride infusion (has no administration in time range)   ondansetron (ZOFRAN-ODT) disintegrating tablet 4 mg (has no administration in time range)     Or   ondansetron (ZOFRAN) injection 4 mg (has no administration in time range)   acetaminophen (TYLENOL) tablet 650 mg (has no administration in time range)     Or acetaminophen (TYLENOL) suppository 650 mg (has no administration in time range)   polyethylene glycol (GLYCOLAX) packet 17 g (has no administration in time range)   aspirin chewable tablet 81 mg (has no administration in time range)   atorvastatin (LIPITOR) tablet 40 mg (has no administration in time range)   heparin (porcine) injection 5,000 Units (has no administration in time range)   LORazepam (ATIVAN) tablet 1 mg (has no administration in time range)   aspirin tablet 325 mg (325 mg Oral Given 4/23/22 2016)   hydrOXYzine (ATARAX) tablet 25 mg (25 mg Oral Given 4/23/22 2036)       Patient was seen and evaluated by myself and Dr Ash Rivera. Patient here for concerns for chest pain and anxiety. Patient reports that he intermittently has chest discomfort with a deep breath. Patient reports his symptoms are improved when he talks to other people. He denies any suicidal or homicidal ideation. Patient is not a reliable source. Patient denies any history of A. fib or taking any Eliquis however these are both on his chart. Patient denies any suicidal or homicidal ideations. Lab values have been reviewed and interpreted. Patient is found to have chronic kidney disease and states that at one time he was on dialysis and does have a dialysis catheter to his right upper chest.  He does report that he \"needs to get back into dialysis \". Patient again denies any history of A. fib or use of Eliquis. Patient did have an elevated D-dimer however due to his kidney function or is not able to get a CT for PE.  VQ scan was ordered. Patient was not started on antibiotics due to his white count being 5.9. Consult was placed to the hospitalist for admission for chronic kidney disease elevated D-dimer and pleuritic chest pain. Hospitalist has accepted. Patient's care was transferred to the inpatient unit. The patient tolerated their visit well. I have evaluated this patient.  My supervising physician was available for consultation. The patient and / or the family were informed of the results of any tests, a time was given to answer questions, a plan was proposed and they agreed with plan. FINAL IMPRESSION      1. Chest pain, unspecified type    2. Chronic kidney disease, unspecified CKD stage    3. Elevated d-dimer          DISPOSITION/PLAN   DISPOSITION Admitted 04/23/2022 08:59:15 PM      PATIENT REFERRED TO:  No follow-up provider specified.     DISCHARGE MEDICATIONS:  New Prescriptions    No medications on file       DISCONTINUED MEDICATIONS:  Discontinued Medications    No medications on file              (Please note that portions of this note were completed with a voice recognition program.  Efforts were made to edit the dictations but occasionally words are mis-transcribed.)    LARRY Fernandez CNP (electronically signed)         LARRY Fernandez CNP  04/23/22 2116

## 2022-04-23 NOTE — ED NOTES
Patient currently sitting bedside in wheelchair. Patient does not wish to get into bed at this time.  Patient reports anxiety that's worse than usual.      Varinder Barrientos RN  04/23/22 2249

## 2022-04-24 PROBLEM — I48.0 PAF (PAROXYSMAL ATRIAL FIBRILLATION) (HCC): Status: ACTIVE | Noted: 2021-02-22

## 2022-04-24 NOTE — H&P
History and Physical    Chief Complaint   Patient presents with    Anxiety     reports having anxiety and chest pressure x 1 week.  Chest Pain        HISTORY OF PRESENT ILLNESS:     The patient is a 68-year-old white male with a history of ESRD. He has a tunneled dialysis catheter in the right side. Patient apparently missed 2 treatments. Diarrhea. He has a history of missing dialysis and signing out AMA. Patient cannot get into his regular dialysis center. He came to the ER yesterday with a serum creatinine of 6.3 and fluid overload. He is also been having weekly paracentesis. He had atypical chest pain. Patient felt anxious. He was admitted to hospital.  No hyperkalemia. Patient was seen by both nephrology and cardiology. Patient has a history of ischemic cardiomyopathy with a EF of 25% and has refused cath in the past.  He has a history of paroxysmal atrial fibrillation chronically elevated troponins hypertension, ESRD, cirrhosis status post paracentesis, PAD, tobacco abuse and status post left BKA. His last echo was in January 2021. It showed global hypokinesis. Patient noted a panic attack and then chest pain. EKG was nonacute. Troponin was high but flat. Prior troponins have been high. Patient is allergic to pcn [penicillins].     Past Medical History:   Diagnosis Date    Bacteremia 11/18/2017    strep agalactiae (also cultured from foot)    CAD (coronary artery disease) 8/18/2021    Cellulitis and abscess of foot, except toes     CHF (congestive heart failure) (HCC)     Diabetes mellitus ([facility] [address]     History of liver failure     Hypertension     Kidney disease     MRSA (methicillin resistant staph aureus) culture positive 4/17/18, 11/19/2017    left foot       Past Surgical History:   Procedure Laterality Date    IR TUNNELED CATHETER PLACEMENT GREATER THAN 5 YEARS  1/11/2021    IR TUNNELED CATHETER PLACEMENT GREATER THAN 5 YEARS 1/11/2021 Lance Sandhu MD [facility] SPECIAL PROCEDURES    LEG AMPUTATION BELOW KNEE      PARACENTESIS      UPPER GASTROINTESTINAL ENDOSCOPY N/A 2019    EGD ESOPHAGOGASTRODUODENOSCOPY performed by Maricel Weiss MD at [address]      Left BKA       Scheduled Meds:   isosorbide dinitrate  20 mg Oral TID    hydrALAZINE  37.5 mg Oral TID    metoprolol succinate  50 mg Oral Daily    sodium chloride flush  5-40 mL IntraVENous 2 times per day    aspirin  81 mg Oral Daily    atorvastatin  40 mg Oral Nightly    heparin (porcine)  5,000 Units SubCUTAneous 3 times per day       Continuous Infusions:   sodium chloride         PRN Meds:  albuterol sulfate HFA, sodium chloride flush, sodium chloride, ondansetron **OR** ondansetron, acetaminophen **OR** acetaminophen, polyethylene glycol, LORazepam       reports that he quit smoking about 2 months ago. His smoking use included cigarettes. He has a 40.00 pack-year smoking history. He has quit using smokeless tobacco.  His smokeless tobacco use included chew. Family History   Problem Relation Age of Onset    Diabetes Mother     Hypertension Mother     Dementia Mother     Heart Attack Father     Stroke Brother     No Known Problems Maternal Grandmother     No Known Problems Maternal Grandfather     No Known Problems Paternal Grandmother     No Known Problems Paternal Grandfather     Heart Attack Brother        Social History     Socioeconomic History    Marital status:       Spouse name: None    Number of children: None    Years of education: None    Highest education level: None   Occupational History    Occupation: unemployed   Tobacco Use    Smoking status: Former Smoker     Packs/day: 1.00     Years: 40.00     Pack years: 40.00     Types: Cigarettes     Quit date: 2022     Years since quittin.2    Smokeless tobacco: Former User     Types: Chew    Tobacco comment: attempting to quit at this time   Vaping Use    Vaping Use: Never used Substance and Sexual Activity    Alcohol use: No     Comment: none    Drug use: Yes     Types: Marijuana Dwain Abbe)     Comment: occassionally     Sexual activity: Yes     Partners: Female   Other Topics Concern    None   Social History Narrative    None     Social Determinants of Health     Financial Resource Strain:     Difficulty of Paying Living Expenses: Not on file   Food Insecurity:     Worried About Running Out of Food in the Last Year: Not on file    Julissa of Food in the Last Year: Not on file   Transportation Needs:     Lack of Transportation (Medical): Not on file    Lack of Transportation (Non-Medical):  Not on file   Physical Activity:     Days of Exercise per Week: Not on file    Minutes of Exercise per Session: Not on file   Stress:     Feeling of Stress : Not on file   Social Connections:     Frequency of Communication with Friends and Family: Not on file    Frequency of Social Gatherings with Friends and Family: Not on file    Attends Anabaptist Services: Not on file    Active Member of [address] [place] [address] or Organizations: Not on file    Attends Club or Organization Meetings: Not on file    Marital Status: Not on file   Intimate Partner Violence:     Fear of Current or Ex-Partner: Not on file    Emotionally Abused: Not on file    Physically Abused: Not on file    Sexually Abused: Not on file   Housing Stability:     Unable to Pay for Housing in the Last Year: Not on file    Number of [place] in the Last Year: Not on file    Unstable Housing in the Last Year: Not on file     REVIEW OF SYSTEMS:   Constitutional: Negative for fever   HENT: Negative for sore throat   Eyes: Negative for redness   Respiratory: + for dyspnea, -cough   Cardiovascular: + for chest pain   Gastrointestinal: Negative for vomiting, diarrhea   Genitourinary: Negative for hematuria   Musculoskeletal: Negative for arthralgias   Skin: Negative for rash   Neurological: Negative for syncope   Hematological: Negative for adenopathy   Psychiatric/Behavorial: Negative for anxiety    VS:   /65   Pulse 85   Temp 98.3 °F (36.8 °C) (Oral)   Resp 18   Ht 6' 1\" (1.854 m)   Wt 245 lb 8 oz (111.4 kg)   SpO2 96%   BMI 32.39 kg/m²     Gen: No distress. Alert. Chronically sick appearing  Eyes: PERRL. No sclera icterus. No conjunctival injection. ENT: No discharge. Pharynx clear. Neck: Trachea midline. Normal thyroid. Resp: No accessory muscle use. No crackles. No wheezes. No rhonchi. No dullness on percussion. CV: Regular rate. Regular rhythm. +murmur no rub. + edema. GI: Non-tender. Non-distended. No masses. No organomegaly. Normal bowel sounds. No hernia. Skin: Warm and dry. No nodule on exposed extremities. No rash on exposed extremities. Dryness of skin of the right lower extremity with a few scratch marks that are bleeding  Lymph: No cervical LAD. No supraclavicular LAD. M/S: No cyanosis. No joint deformity. No clubbing. Left BKA  Neuro: Awake. Reflexes 2+ symmetric bilaterally. Moves all 4 extremities, non focal  Psych: Oriented x 3. No anxiety or agitation. CBC:   Recent Labs     04/23/22 1713 04/24/22  0410   WBC 5.9 5.8   HGB 9.5* 9.4*   HCT 29.4* 28.4*   MCV 91.5 90.5    168     BMP:   Recent Labs     04/23/22 1913      K 4.8      CO2 21   BUN 78*   CREATININE 6.3*     LIVER PROFILE:   Recent Labs     04/23/22 1913   AST 14*   ALT 7*   BILITOT <0.2   ALKPHOS 85     PT/INR:   Recent Labs     04/23/22 1713   PROTIME 13.2*   INR 1.16*     APTT: No results for input(s): APTT in the last 72 hours. UA:  Recent Labs     04/23/22  1825   PHUR 6.0     Results for Ashwin Dow (MRN [id number]) as of 4/24/2022 12:38   Ref. Range 1/31/2022 22:52 2/1/2022 04:41 4/23/2022 19:13 4/23/2022 23:50 4/24/2022 01:21   Troponin Latest Ref Range: <0.01 ng/mL 0.62 (HH) 0.66 (HH) 0.51 (HH) 0.48 (H) 0.50 (H)   Results for Ashwin Dow (MRN [id number]) as of 4/24/2022 12:38   Ref.  Range 4/23/2022 17:13   D-Dimer, Kamari Edward Latest Ref Range: 0 - 229 ng/mL  (H)       NM LUNG VENT/PERFUSION (VQ)   Final Result   Low probability for pulmonary embolus. XR CHEST (2 VW)   Final Result   Bilateral pleural effusions with bibasilar opacities could represent   atelectasis or infection            ECG  Sinus rhythm with sinus arrhythmia with short PRLeft axis deviationRight bundle branch blockInferior infarct , age undeterminedPoor R wave progressionAbnormal ECGConfirmed by Jaquelin Ramos MD, [employer] ([id number]) on 4/23/2022 7:53:43     ASSESSMENT:    Principal Problem:    Chest pain  Active Problems:    Cardiomyopathy ([address]    Elevated troponin    Diabetic polyneuropathy associated with type 2 diabetes mellitus (HCC)    Chronic systolic CHF (congestive heart failure) (HCC)    Alcoholic cirrhosis of liver with ascites (HCC)    PAF (paroxysmal atrial fibrillation) (HCC)    CAD (coronary artery disease)  Resolved Problems:    * No resolved hospital problems. *      PLAN:    #Atypical chest pain associated with a panic attack. Admitted to the hospital.  Cardiology consultation obtained. He has a history of known CAD. He is very noncompliant. Troponin elevated but flat. Prior history of elevated troponin. No plans for any further cardiac work-up. History of noncompliance and leaving AMA. On Lipitor, hydralazine, Isordil and metoprolol XL. #VQ scan done for elevated d dimer. It is negative. #DM type 2. Carb control diet. Start SSI. #ESRD. No evidence of pulmonary edema or hyperkalemia. No reason for urgent hemodialysis. Nephrology consultation. Resume hemodialysis. #Paroxysmal atrial fibrillation. In sinus rhythm right now. On aspirin. History of noncompliance with anticoagulation. #Alcoholic cirrhosis with paracentesis as needed. #Ischemic cardiomyopathy/chronic systolic heart failure with a EF of 25%. History should help with fluid overload. #Heparin for DVT prophylaxis.       Will need an outpatient follow-up for hemodialysis. Plan for hemodialysis in a.m. Discharge planning once hemodialysis is reinitiated in the hospital and outpatient slot is found. IMPORTANT: Please note that some portions of this note may have been created using Dragon voice recognition software. Some \"sound-alike\" and totally wrong word substitutions may have taken place due to known inherent limitations of any such software, including this voice recognition software. In spite of efforts to eliminate such errors, some may not have been corrected. So please read the note with this in mind and recognize such mistakes and understand the correct version using the  context. If there are still uncertainties in the mind of the medical provider reading this note about any aspect of the note, the provider can feel free to contact me. Thanks.          Elridge Rubinstein, MD 4/24/2022 12:34 PM

## 2022-04-24 NOTE — CONSULTS
[address] [place] [address]  [phone]        Reason for Consultation/Chief Complaint: \"I have been having panic attack and CP. \"  Consulted for atypical CP, chronic elevated Lali    History of Present Illness:  Celia Eastman is a 59 y.o. patient who presented to [facility] 4/23/22 with c/o panic attack and CP. He has PMH presumed CAD and ICM EF=25% (refused cath), PAF dx 1/21, chronically elevated Lali, HTN, ESRD (noncompliant with HD), cirrhosis s/p paracenteses (most recent 2/22), s/p Left BKA, PVD, and tobacco abuse. Most recent ECHO 1/9/21 EF=25%; global HK; mild MR/TR; ELIJAH; RV dilated and hypokinetic; mod PH 57mmHg (no change 1/19 study). Now presents with c/o panic attack and CP. Note he is somnolent and falling asleep during history. He is vague and poor historian. Reports \"panic attack\" in general yesterday and cannot give specifics. He reported CP, mid-region, no radiation, sharp, vague regarding timing \"lasted awhile\", and no assoc symptoms. Admit EKG Mobitz I 2nd degree AV block; RBBB; inferior/anterolateral infarct; NST change (prior EKG's ? afib and NSR with ectopy). Lali 0.51, 0.48, 0.50, 0.54 (similar Jan/Feb '22); BUN/Cr=78/6.3; K+ 4.8; negative flu/Covid. Patient with no c/o SOB, palpitations, dizziness, edema, or orthopnea/PND. I have been asked to provide consultation regarding further management and testing. Past Medical History:   has a past medical history of Bacteremia, CAD (coronary artery disease), Cellulitis and abscess of foot, except toes, CHF (congestive heart failure) ([address] Diabetes mellitus ([address] History of liver failure, Hypertension, Kidney disease, and MRSA (methicillin resistant staph aureus) culture positive. Surgical History:   has a past surgical history that includes Leg amputation below knee; vascular surgery; Upper gastrointestinal endoscopy (N/A, 1/29/2019); Paracentesis; and IR TUNNELED CVC PLACE WO SQ PORT/PUMP > 5 YEARS (1/11/2021).      Social History:   reports that he quit smoking about 2 months ago. His smoking use included cigarettes. He has a 40.00 pack-year smoking history. He has quit using smokeless tobacco.  His smokeless tobacco use included chew. He reports current drug use. Drug: Marijuana Charlaine Miky). He reports that he does not drink alcohol. Family History:  family history includes Dementia in his mother; Diabetes in his mother; Heart Attack in his brother and father; Hypertension in his mother; No Known Problems in his maternal grandfather, maternal grandmother, paternal grandfather, and paternal grandmother; Stroke in his brother. Home Medications:  Were reviewed and are listed in nursing record. and/or listed below  Prior to Admission medications    Medication Sig Start Date End Date Taking? Authorizing Provider   apixaban (ELIQUIS) 5 MG TABS tablet Take 1 tablet by mouth 2 times daily 2/6/22   Ara Garcia MD   dexamethasone (DECADRON) 1 MG tablet Take 4 tablets once a day for 3 days, then take 3 tablets once a day for 3 days, then take 2 tablets once a day for 3 days, then take 1 tablet once a day for 3 days, then stop.  2/6/22   Ara Garcia MD   ipratropium-albuterol (DUONEB) 0.5-2.5 (3) MG/3ML SOLN nebulizer solution Inhale 3 mLs into the lungs every 4 hours as needed for Shortness of Breath 1/16/21   LARRY Mohamud CNP   nicotine (NICODERM CQ) 7 MG/24HR Place 1 patch onto the skin daily  Patient taking differently: Place 1 patch onto the skin daily Not taking 1/17/21   LARRY Mohamud CNP   isosorbide-hydrALAZINE (BIDIL) 20-37.5 MG per tablet Take 1 tablet by mouth 3 times daily  Patient taking differently: Take 1 tablet by mouth 3 times daily Not taking 2/27/19   LARRY Barth CNP   metoprolol succinate (TOPROL XL) 50 MG extended release tablet Take 1 tablet by mouth daily  Patient taking differently: Take 50 mg by mouth daily Not taking 2/23/19   Mio Patel MD   insulin lispro (HUMALOG KWIKPEN) 100 UNIT/ML pen Glucose: Dose:   No Insulin 140-249 1 Unit 250-349 2 Units Over 350 -3 Units 2/23/19   Dayo Purvis MD   atorvastatin (LIPITOR) 40 MG tablet Take 1 tablet by mouth nightly  Patient taking differently: Take 40 mg by mouth nightly Not taking 1/29/19 3/21/20  Beryle Chandler, MD   albuterol sulfate HFA (PROAIR HFA) 108 (90 Base) MCG/ACT inhaler Inhale 2 puffs into the lungs every 6 hours as needed for Wheezing 1/1/18   Rosey Oppenheim, MD        Allergies:  Pcn [penicillins]     Review of Systems:   12 point ROS negative in all areas as listed below except as in [place]  Constitutional, EENT, Cardiovascular, pulmonary, GI, , Musculoskeletal, skin, neurological, hematological, endocrine, Psychiatric    Physical Examination:    Vitals:    04/24/22 0615   BP: (!) 163/88   Pulse: 61   Resp: 18   Temp: 96.7 °F (35.9 °C)   SpO2: 96%    Weight: 245 lb 8 oz (111.4 kg)         General Appearance:  Somnolent; no distress, appears older than stated age   Head:  Normocephalic, without obvious abnormality, atraumatic   Eyes:  PERRL, conjunctiva/corneas clear       Nose: Nares normal, no drainage or sinus tenderness   Throat: Lips, mucosa, and tongue normal   Neck: Supple, symmetrical, trachea midline, no adenopathy, thyroid: not enlarged, symmetric, no tenderness/mass/nodules, no carotid bruit or JVD       Lungs:   Clear to auscultation bilaterally, respirations unlabored   Chest Wall:  No tenderness or deformity   Heart:  Regular rate and rhythm, S1, S2 normal, no rub or gallop; +soft RO   Abdomen:   Soft, non-tender, bowel sounds active all four quadrants,  no masses, no organomegaly           Extremities: Extremities s/p L BKA; no edema RLE   Pulses: 2+ and symmetric   Skin: Skin color, texture, turgor normal, no rashes or lesions   Pysch: Normal mood and affect   Neurologic: Normal gross motor and sensory exam.         Labs  CBC:   Lab Results   Component Value Date    WBC 5.8 04/24/2022    RBC 3.13 04/24/2022    HGB 9.4 04/24/2022    HCT 28.4 04/24/2022    MCV 90.5 04/24/2022    RDW 15.9 04/24/2022     04/24/2022     CMP:    Lab Results   Component Value Date     04/23/2022    K 4.8 04/23/2022     04/23/2022    CO2 21 04/23/2022    BUN 78 04/23/2022    CREATININE 6.3 04/23/2022    GFRAA 11 04/23/2022    AGRATIO 1.2 04/23/2022    LABGLOM 9 04/23/2022    GLUCOSE 94 04/23/2022    PROT 5.6 04/23/2022    CALCIUM 6.7 04/23/2022    BILITOT <0.2 04/23/2022    ALKPHOS 85 04/23/2022    AST 14 04/23/2022    ALT 7 04/23/2022     PT/INR:  No results found for: PTINR  Lab Results   Component Value Date    CKTOTAL 111 01/19/2019    TROPONINI 0.54 ([facility]) 04/24/2022       EKG:  I have reviewed EKG with the following interpretation:  Impression:  See HPI    ECHO 1/9/21 Summary   Definity contrast could not be used due to lack of IV access. Technically difficult examination. The left ventricular systolic function is moderate to severely reduced with an ejection fraction of 25 %. There is global hypokinesis with regional variation. Normal left ventricular size with mild concentric left ventricular hypertrophy. Grade I diastolic dysfunction with normal filling pressure. Compared to previous study from 1- no changes noted in left ventricular function. Mild mitral and tricuspid regurgitation. Moderate Bi-atrial enlargement. The right ventricle is moderately enlarged. Right ventricular systolic function is moderately reduced . Systolic pulmonic artery pressure (SPAP) is estimated at 57 mmHg consistent   with moderate pulmonary hypertension (Right atrial pressure of 8 mmHg).       [id number] Score for Atrial Fibrillation Stroke Risk   Risk   Factors  Component Value   C CHF Yes 1   H HTN Yes 1   A2 Age >= 75 No,  (62 y.o.) 0   D DM Yes 1   S2 Prior Stroke/TIA No 0   V Vascular Disease Yes 1   A Age 74-69 No,  (62 y.o.) 0   Sc Sex male 0    [id number]  Score  4 Score last updated 4/24/22 10:39 AM EDT      Assessment:  Gianna Laguna is a 59 y.o. patient who presented to [facility] 4/23/22 with c/o panic attack and CP. He has PMH presumed CAD and ICM EF=25% (refused cath), PAF dx 1/21, chronically elevated Lali, HTN, ESRD (noncompliant with HD), cirrhosis s/p paracenteses (most recent 2/22), s/p Left BKA, PVD, and tobacco abuse. Most recent ECHO 1/9/21 EF=25%; global HK; mild MR/TR; ELIJAH; RV dilated and hypokinetic; mod PH 57mmHg (no change 1/19 study). Now presents with c/o panic attack and CP. Note he is somnolent and falling asleep during history. He is vague and poor historian. Reports \"panic attack\" in general yesterday and cannot give specifics. He reported CP, mid-region, no radiation, sharp, vague regarding timing \"lasted awhile\", and no assoc symptoms. Admit EKG Mobitz I 2nd degree AV block; RBBB; inferior/anterolateral infarct; NST change (prior EKG's ? afib and NSR with ectopy). Lali 0.51, 0.48, 0.50, 0.54 (similar Jan/Feb '22); BUN/Cr=78/6.3; K+ 4.8; negative flu/Covid. Diagnosis of unspecified CP and elevated Lali in older male with presumed CAD and severe ICM, PAF, chronically elevated Lali, and ESRD. Recs:  1. No ischemic EKG ST changes and Lali are no different than prior. 2. Long history of non-compliance, leaving AMA, and unreliability. 3. No need for cardiac testing at this time secondary to #2 above and would treat medically as best possible. 4. Continue baby asa, lipitor, hydralazine, isordil, toprol XL  5. BP elevated initially but improving likely due to being on proper meds now. 6. No ACE-I due to ESRD. He is non-compliant with HD (reports not going for last month or so). 7. No AC due to noncompliance. Signing off. Would get SW to help him at home as much as possible. OK for d/c when IM doc feels ready.        Patient Active Problem List   Diagnosis    Tobacco abuse    Type 1 diabetes mellitus with neurological manifestations, uncontrolled ([address]    Diabetic ulcer of right foot ([address]    Cellulitis    NSTEMI (non-ST elevated myocardial infarction) ([address]    Osteomyelitis of foot, left, acute ([address]    Group B streptococcal bacteriuria    Type 2 diabetes mellitus with left diabetic foot infection ([address]    Septic arthritis of left foot (HCC)    Poorly controlled type 2 diabetes mellitus (HCC)    Elevated sed rate    Elevated C-reactive protein (CRP)    Acute kidney injury superimposed on CKD ([address]    Hyponatremia    Diabetic polyneuropathy associated with type 2 diabetes mellitus ([address]    Mixed hyperlipidemia    Tobacco abuse counseling    Diabetes education, encounter for    Cardiomyopathy ([address]    Abnormal nuclear stress test    Pain of left lower extremity    Stage 3 chronic kidney disease    Osteomyelitis ([address]    Acute respiratory failure with hypoxia ([address]    Acute respiratory failure (HCC)    Ascites    Chronic systolic CHF (congestive heart failure) (HCC)    Noncompliance    Chronic systolic heart failure (HCC)    HTN (hypertension), malignant    Metabolic alkalosis    NSVT (nonsustained ventricular tachycardia) (HCC)    Moderate malnutrition (HCC)    Fluid overload    Abdominal distension    Hypoxia    Shortness of breath    Diabetic foot infection ([address]    Alcoholic cirrhosis of liver with ascites ([address]    A-fib ([address]    CAD (coronary artery disease)    ESRD needing dialysis ([address]    COVID    Encephalopathy    Acute on chronic congestive heart failure ([address]    Chest pain       Thank you for allowing to us to participate in the care or [facility]. Further evaluation will be based upon the patient's clinical course and testing results.

## 2022-04-24 NOTE — PROGRESS NOTES
Perfect serve sent to  about critical troponin of 0.54. pt also requesting his PRN inhaler. See new orders. RT called for PRN inhaler.  Electronically signed by Loyda Sung RN on 4/24/2022 at 6:41 AM

## 2022-04-24 NOTE — PLAN OF CARE
Atypical chest pain thought to be secondary to anxiety  -Chronically elevated troponin levels  -Anxiety disorder

## 2022-04-24 NOTE — PROGRESS NOTES
Patient admitted to room 314 from Nuclear Medicine. Patient oriented to room, call light, bed rails, phone, lights and bathroom. Patient instructed about the schedule of the day including: vital sign frequency, lab draws, possible tests, frequency of MD and staff rounds, daily weights, I &O's and prescribed diet. Telemetry box in place, patient aware of placement and reason. Bed locked, in lowest position, side rails up 2/4, call light within reach. Admission assessment complete. Writer reviewed home medications that were already listed. Pt states he doesn't take any insulin, eliquis or ativan. When writer reviewed BP medications he could not remember if he still took them. Home medications still need to be reviewed. States he can have his son bring in a list. Pt declined CHG bath and gown. Denies chest pain at this time. No further needs at this time. Recliner Assessment  Patient is able to demonstrate the ability to move from a reclining position to an upright position within the recliner. 4 Eyes Skin Assessment     The patient is being assess for   Admission    I agree that 2 RN's have performed a thorough Head to Toe Skin Assessment on the patient. ALL assessment sites listed below have been assessed. Areas assessed for pressure by both nurses:   [x]   Head, Face, and Ears   [x]   Shoulders, Back, and Chest, Abdomen  [x]   Arms, Elbows, and Hands   []   Coccyx, Sacrum, and Ischium - refused  [x]   Legs, Feet, and Heels      Dry skin to RLL with scattered scabs. Left upper leg noted with blanchable redness. Skin Assessed Under all Medical Devices by both nurses:  Left upper leg                         **SHARE this note so that the co-signing nurse is able to place an eSignature**    Co-signer eSignature: Jonathon Marino RN    Does the Patient have Skin Breakdown related to pressure?   No              Gabriel Prevention initiated:  NA   Wound Care Orders initiated:  NA      [facility] nurse consulted for Pressure Injury (Stage 3,4, Unstageable, DTI, NWPT, Complex wounds)and New or Established Ostomies:  NA      Primary Nurse eSignature: Electronically signed by Gyaatri Escobar RN on 4/24/22 at 3:10 AM EDT

## 2022-04-24 NOTE — CONSULTS
Thank you to requesting provider: Dr. Yanely Cai, for asking us to see Crissy Pierre  Reason for consultation:  Renal failure   Chief Complaint:  Chest pain    History of Presenting Illness      60 y/o male with history of ESRD, he had a right TDC. He has not been to dialysis in ~ 1 month. He says that he missed 2 treatments due to diarrhea so he was told he could not come back and would have to go to the ER for HD. He says he was also COVID+ so could not get to [facility]. His Scr is 6.3 and he is fluid overloaded. He does get weekly paracentesis. Admitted with atypical chest pain. Patient feels this is more of his anxiety and he is doing better now.        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, uncontrolled ([address] 07/29/2015    Diabetic ulcer of right foot ([address] 07/29/2015    Cellulitis 11/18/2017    NSTEMI (non-ST elevated myocardial infarction) ([address] 11/18/2017    Osteomyelitis of foot, left, acute ([address] 11/19/2017    Group B streptococcal bacteriuria     Type 2 diabetes mellitus with left diabetic foot infection ([address]     Septic arthritis of left foot (HCC)     Poorly controlled type 2 diabetes mellitus (HCC)     Elevated sed rate     Elevated C-reactive protein (CRP)     Acute kidney injury superimposed on CKD ([address]     Hyponatremia     Diabetic polyneuropathy associated with type 2 diabetes mellitus ([address]     Mixed hyperlipidemia     Tobacco abuse counseling     Diabetes education, encounter for     Cardiomyopathy ([address]     Abnormal nuclear stress test     Pain of left lower extremity     Stage 3 chronic kidney disease 02/28/2018    Osteomyelitis ([address] 04/17/2018    Acute respiratory failure with hypoxia ([address] 01/19/2019    Acute respiratory failure ([address] 01/19/2019    Ascites 01/19/2019    Chronic systolic CHF (congestive heart failure) ([facility]     Noncompliance     Chronic systolic heart failure ([address]     HTN (hypertension), malignant     Metabolic alkalosis     NSVT (nonsustained ventricular tachycardia) (HCC)     Moderate malnutrition ([address] 02/16/2019    Fluid overload 02/21/2019    Abdominal distension     Hypoxia     Shortness of breath     Diabetic foot infection ([address] 90/30/2578    Alcoholic cirrhosis of liver with ascites ([facility] 01/09/2021    A-fib ([facility] 02/22/2021    CAD (coronary artery disease) 08/18/2021    ESRD needing dialysis ([address] 01/31/2022    COVID 02/01/2022    Encephalopathy     Acute on chronic congestive heart failure ([facility]      Resolved Ambulatory Problems     Diagnosis Date Noted    Elevated troponin     Essential hypertension 11/03/2020     Past Medical History:   Diagnosis Date    Bacteremia 11/18/2017    Cellulitis and abscess of foot, except toes     CHF (congestive heart failure) (HCC)     Diabetes mellitus ([facility]     History of liver failure     Hypertension     Kidney disease     MRSA (methicillin resistant staph aureus) culture positive 4/17/18, 11/19/2017         Review of Systems     Constitutional:  No weight loss, no fever/chills  Eyes:  No eye pain, no eye redness  Cardiovascular: + chest pain, no worsening of edema  Respiratory:  No hemoptysis, no stridor  Gastrointestinal:  No blood in stool, no n/v, no diarrhea  Genitoruinary:  No hematuria, no difficulty with urination  Musculoskeletal:  No joint swelling, no redness  Integumentary:  No Rash, no itching  Neurological:  No focal weakness, No new sensory deficit  Psychiatric:  No depression, no confusion  Endocrine:  No polyuria, no polydipsia       Medications      Reviewed in EMR     Allergies     Pcn [penicillins]      Family History       Negative for Kidney Disease    Social History      Social History     Socioeconomic History    Marital status:       Spouse name: None    Number of children: None    Years of education: None    Highest education level: None   Occupational History    Occupation: unemployed   Tobacco Use    Smoking status: Former Smoker     Packs/day: 1.00     Years: 40.00     Pack years: 40.00     Types: Cigarettes     Quit date: 2022     Years since quittin.2    Smokeless tobacco: Former User     Types: Chew    Tobacco comment: attempting to quit at this time   Vaping Use    Vaping Use: Never used   Substance and Sexual Activity    Alcohol use: No     Comment: none    Drug use: Yes     Types: Marijuana Curlie Opal)     Comment: occassionally     Sexual activity: Yes     Partners: Female   Other Topics Concern    None   Social History Narrative    None     Social Determinants of Health     Financial Resource Strain:     Difficulty of Paying Living Expenses: Not on file   Food Insecurity:     Worried About Running Out of Food in the Last Year: Not on file    Julissa of Food in the Last Year: Not on file   Transportation Needs:     Lack of Transportation (Medical): Not on file    Lack of Transportation (Non-Medical):  Not on file   Physical Activity:     Days of Exercise per Week: Not on file    Minutes of Exercise per Session: Not on file   Stress:     Feeling of Stress : Not on file   Social Connections:     Frequency of Communication with Friends and Family: Not on file    Frequency of Social Gatherings with Friends and Family: Not on file    Attends Oriental orthodox Services: Not on file    Active Member of [address] [place] [address] or Organizations: Not on file    Attends Club or Organization Meetings: Not on file    Marital Status: Not on file   Intimate Partner Violence:     Fear of Current or Ex-Partner: Not on file    Emotionally Abused: Not on file    Physically Abused: Not on file    Sexually Abused: Not on file   Housing Stability:     Unable to Pay for Housing in the Last Year: Not on file    Number of [place] in the Last Year: Not on file    Unstable Housing in the Last Year: Not on file Physical Exam     Blood pressure 105/65, pulse 85, temperature 98.3 °F (36.8 °C), temperature source Oral, resp. rate 18, height 6' 1\" (1.854 m), weight 245 lb 8 oz (111.4 kg), SpO2 96 %. General:  NAD, A+Ox3, ill-appearing  HEENT:  PERRL, EOMI  Neck:  Supple, normal range of movement  Chest:  CTAB, good respiratory effort, room air, decreased in bases   CV:  Regular, no rub   Abdomen:  NTND, soft, +BS, no hepatosplenomegaly  Extremities:  ++ peripheral edema, right BKA  Neurological:  Moving all four extremities, CN II-XII grossly intact  Lymphatics:  No palpable lymph nodes  Skin:  No rash, no jaundice  Psychiatric:  Alert, flat affect  Access:  Right [facility]     Data     Recent Labs     04/23/22  1713 04/24/22  0410   WBC 5.9 5.8   HGB 9.5* 9.4*   HCT 29.4* 28.4*   MCV 91.5 90.5    168     Recent Labs     04/23/22  1913      K 4.8      CO2 21   GLUCOSE 94   BUN 78*   CREATININE 6.3*   LABGLOM 9*   GFRAA 11*       Assessment:    ESRD:  - Patient has been on and off dialysis for over the last year  - Scr of 6.3 would certainly be in range to need dialysis although he is not frankly uremic despite not having dialysis for ~ 1 month. He is fluid overloaded. No rub that would indicate uremic pericarditis     Anemia of chronic disease:  - Below target due to not being on dialysis and getting EPO    Chest Pain:  - Atypical, felt to be more related to anxiety.   Has improved     Alcoholic liver cirrhosis:  - Getting frequent paracentesis     Plan:    Labs suggest he would be better off on dialysis   He seems willing to start back  We could get better volume control and manage his anemia, otherwise would be high risk to decompensate and be back in the hospital  EPO  Will need case management to assist with setting up a dialysis spot     Thank you for asking us to participate in the management of your patient, please do not hesitate to contact me for any concerns regarding my recommendations as outlined above.    -----------------------------  Emanuel Brownlee M.D.   [facility]

## 2022-04-24 NOTE — ED PROVIDER NOTES
MidLevel Attestation   I havepersonally performed and/or participated in the history, exam and medical decision making and agree with all pertinent clinical information. I have also reviewed and agree with the past medical, family and social historyunless otherwise noted. I have personally performed a face to face diagnostic evaluation onthis patient. I have reviewed the mid-levels findings and agree. In brief, Gianna Laguna is a 59 y.o. male that presented to the emergency department with   Chief Complaint   Patient presents with    Anxiety     reports having anxiety and chest pressure x 1 week.  Chest Pain   . CONSTITUTIONAL: Well appearing, in no acute distress   EYES: PERRL, No injection, discharge or scleral icterus. NECK: Normal ROM, NO LAD and Moist mucous membranes. CARDIOVASCULAR: Regular rate and rhythm. No murmurs or gallop. PULMONARY/CHEST: Airway patent. No retractions. Breath sounds clear with good air entry bilaterally. ABDOMEN: Soft, Non-distended and non-tender, without guarding or rebound. SKIN: Acyanotic, warm, dry   MUSCULOSKELETAL: No swelling, tenderness or deformity   NEUROLOGICAL: Awake. Pulses intact. Grossly nonfocal     59year-old presents complaining of chest pain. On exam patient appears nontoxic in no acute distress. Labs obtained with no significant findings. EKG with no ischemic changes chest x-ray was normal.  Nonetheless his heart score is greater than 4 requiring admission for further evaluation and treatment. EKG by my preliminary interpretation shows sinus rhythm with rate of 72, normal axis, normal intervals, with no ST changes indicative of ischemia at this time.     I have reviewed and interpreted all of the currently available lab results and diagnostics from this visit:  Results for orders placed or performed during the hospital encounter of 04/23/22   COVID-19 & Influenza Combo    Specimen: Nasopharyngeal Swab   Result Value Ref Range SARS-CoV-2 RNA, RT PCR NOT DETECTED NOT DETECTED    INFLUENZA A NOT DETECTED NOT DETECTED    INFLUENZA B NOT DETECTED NOT DETECTED   CBC with Auto Differential   Result Value Ref Range    WBC 5.9 4.0 - 11.0 K/uL    RBC 3.21 (L) 4.20 - 5.90 M/uL    Hemoglobin 9.5 (L) 13.5 - 17.5 g/dL    Hematocrit 29.4 (L) 40.5 - 52.5 %    MCV 91.5 80.0 - 100.0 fL    MCH 29.6 26.0 - 34.0 pg    MCHC 32.4 31.0 - 36.0 g/dL    RDW 15.8 (H) 12.4 - 15.4 %    Platelets 281 570 - 372 K/uL    MPV 8.0 5.0 - 10.5 fL    Neutrophils % 86.4 %    Lymphocytes % 5.6 %    Monocytes % 5.6 %    Eosinophils % 1.8 %    Basophils % 0.6 %    Neutrophils Absolute 5.1 1.7 - 7.7 K/uL    Lymphocytes Absolute 0.3 (L) 1.0 - 5.1 K/uL    Monocytes Absolute 0.3 0.0 - 1.3 K/uL    Eosinophils Absolute 0.1 0.0 - 0.6 K/uL    Basophils Absolute 0.0 0.0 - 0.2 K/uL   D-Dimer, Quantitative   Result Value Ref Range    D-Dimer, Quant 936 (H) 0 - 229 ng/mL DDU   Protime-INR   Result Value Ref Range    Protime 13.2 (H) 9.9 - 12.7 sec    INR 1.16 (H) 0.88 - 1.12   Comprehensive Metabolic Panel w/ Reflex to MG   Result Value Ref Range    Sodium 140 136 - 145 mmol/L    Potassium reflex Magnesium 4.8 3.5 - 5.1 mmol/L    Chloride 104 99 - 110 mmol/L    CO2 21 21 - 32 mmol/L    Anion Gap 15 3 - 16    Glucose 94 70 - 99 mg/dL    BUN 78 (H) 7 - 20 mg/dL    CREATININE 6.3 (HH) 0.8 - 1.3 mg/dL    GFR Non-African American 9 (A) >60    GFR  11 (A) >60    Calcium 6.7 (L) 8.3 - 10.6 mg/dL    Total Protein 5.6 (L) 6.4 - 8.2 g/dL    Albumin 3.0 (L) 3.4 - 5.0 g/dL    Albumin/Globulin Ratio 1.2 1.1 - 2.2    Total Bilirubin <0.2 0.0 - 1.0 mg/dL    Alkaline Phosphatase 85 40 - 129 U/L    ALT 7 (L) 10 - 40 U/L    AST 14 (L) 15 - 37 U/L   Troponin   Result Value Ref Range    Troponin 0.51 (HH) <0.01 ng/mL   Urine Drug Screen   Result Value Ref Range    Amphetamine Screen, Urine Neg Negative <1000ng/mL    Barbiturate Screen, Ur Neg Negative <200 ng/mL    Benzodiazepine Screen, Urine Neg Negative <200 ng/mL    Cannabinoid Scrn, Ur Neg Negative <50 ng/mL    Cocaine Metabolite Screen, Urine Neg Negative <300 ng/mL    Opiate Scrn, Ur Neg Negative <300 ng/mL    PCP Screen, Urine Neg Negative <25 ng/mL    Methadone Screen, Urine Neg Negative <300 ng/mL    Propoxyphene Scrn, Ur Neg Negative <300 ng/mL    Oxycodone Urine Neg Negative <100 ng/ml    pH, UA 6.0     Drug Screen Comment: see below    Acetaminophen Level   Result Value Ref Range    Acetaminophen Level <5 (L) 10 - 30 ug/mL   Ethanol   Result Value Ref Range    Ethanol Lvl None Detected mg/dL   Salicylate   Result Value Ref Range    Salicylate, Serum <5.6 (L) 15.0 - 30.0 mg/dL   Troponin   Result Value Ref Range    Troponin 0.48 (H) <0.01 ng/mL   Troponin   Result Value Ref Range    Troponin 0.50 (H) <0.01 ng/mL   Troponin   Result Value Ref Range    Troponin 0.54 (HH) <0.01 ng/mL   CBC   Result Value Ref Range    WBC 5.8 4.0 - 11.0 K/uL    RBC 3.13 (L) 4.20 - 5.90 M/uL    Hemoglobin 9.4 (L) 13.5 - 17.5 g/dL    Hematocrit 28.4 (L) 40.5 - 52.5 %    MCV 90.5 80.0 - 100.0 fL    MCH 29.9 26.0 - 34.0 pg    MCHC 33.1 31.0 - 36.0 g/dL    RDW 15.9 (H) 12.4 - 15.4 %    Platelets 032 380 - 295 K/uL    MPV 8.2 5.0 - 10.5 fL   Lipid panel - fasting   Result Value Ref Range    Cholesterol, Total 96 0 - 199 mg/dL    Triglycerides 77 0 - 150 mg/dL    HDL 35 (L) 40 - 60 mg/dL    LDL Calculated 46 <100 mg/dL    VLDL Cholesterol Calculated 15 Not Established mg/dL   POCT Glucose   Result Value Ref Range    POC Glucose 138 (H) 70 - 99 mg/dl    Performed on ACCU-CHEK    POCT Glucose   Result Value Ref Range    POC Glucose 103 (H) 70 - 99 mg/dl    Performed on ACCU-CHEK    POCT Glucose   Result Value Ref Range    POC Glucose 138 (H) 70 - 99 mg/dl    Performed on ACCU-CHEK    POCT Glucose   Result Value Ref Range    POC Glucose 115 (H) 70 - 99 mg/dl    Performed on ACCU-CHEK    POCT Glucose   Result Value Ref Range    POC Glucose 100 (H) 70 - 99 mg/dl    Performed on ACCU-CHEK    EKG 12 Lead   Result Value Ref Range    Ventricular Rate 72 BPM    Atrial Rate 76 BPM    P-R Interval 80 ms    QRS Duration 124 ms    Q-T Interval 420 ms    QTc Calculation (Bazett) 459 ms    R Axis -66 degrees    T Axis 164 degrees    Diagnosis       Sinus rhythm with sinus arrhythmia with short PRLeft axis deviationRight bundle branch blockInferior infarct , age undeterminedPoor R wave progressionAbnormal ECGConfirmed by Ari Soriano MD, Anjel Briceno ([id number]) on 4/23/2022 7:53:43 PM     NM LUNG VENT/PERFUSION (VQ)    Result Date: 4/24/2022  EXAMINATION: NUCLEAR MEDICINE VENTILATION PERFUSION SCAN. 4/23/2022 TECHNIQUE: 87.4 millicuries of [id number] was administered via mask prior to planar imaging of the lungs in anterior and posterior projections. Then, 6.8 millicuries of Tc 72Z MAA was administered intravenously prior to planar imaging of the lungs in multiple projections. COMPARISON: Chest radiograph 04/23/2022. HISTORY: ORDERING SYSTEM PROVIDED HISTORY: elevated dimer pleuritic chest pain TECHNOLOGIST PROVIDED HISTORY: Reason for exam:->elevated dimer pleuritic chest pain Decision Support Exception - unselect if not a suspected or confirmed emergency medical condition->Emergency Medical Condition (MA) FINDINGS: VENTILATION: Ventilation defect in the left lower lobe. PERFUSION: At least 2 matched defects involving the left lower lobe. CHEST RADIOGRAPH: Bilateral pulmonary vascular congestion with bibasilar atelectasis and bilateral pleural effusions. Low probability for pulmonary embolus.        ED Medication Orders (From admission, onward)    Start Ordered     Status Ordering Provider    04/24/22 2100 04/24/22 1244  insulin lispro (HUMALOG) injection vial 0-3 Units  NIGHTLY         Last MAR action: Not Given - by Saira Ozuna on 04/24/22 at 2037 [facility]    04/24/22 1300 04/24/22 1244  insulin lispro (HUMALOG) injection vial 0-6 Units  3 TIMES DAILY WITH MEALS         Last MAR action: Not Given - by John Heath on 04/24/22 at [facility]    04/24/22 1256 04/24/22 1257  dextrose bolus (hypoglycemia) 10% 125 mL  PRN        \"Or\" Linked Group Details    Acknowledged Seferino Molina    04/24/22 1256 04/24/22 1257  dextrose bolus (hypoglycemia) 10% 250 mL  PRN        \"Or\" Linked Group Details    Acknowledged Seferino Molina    04/24/22 1244 04/24/22 1244  glucose (GLUTOSE) 40 % oral gel 15 g  PRN         Acknowledged BERNABE LESTER    04/24/22 1244 04/24/22 1244  glucagon (rDNA) injection 1 mg  PRN         Acknowledged BERNABE LESTER    04/24/22 1244 04/24/22 1244  dextrose 5 % solution  PRN         Acknowledged BERNABE LESTER    04/24/22 0900 04/23/22 2109  [Held by provider]  metoprolol succinate (TOPROL XL) extended release tablet 50 mg  DAILY        (Held by provider since Sun 4/24/2022 at 1651 by Marissa Knapp MD.Hold Reason: Other.)    Last MAR action: Automatically Held - by Valeriy Burgess on 04/27/22 at 0900 YESY CUBA E    04/24/22 0900 04/23/22 2109  aspirin chewable tablet 81 mg  DAILY         Last MAR action: Given - by John Heath on 04/24/22 at 0913 Alphonso CUBA E    04/24/22 0900 04/24/22 0100  isosorbide dinitrate (ISORDIL) tablet 20 mg  3 TIMES DAILY         Last MAR action: Not Given - by Garrett Ro on 04/24/22 at 2037 Teresa Ron E    04/24/22 0900 04/24/22 0100  hydrALAZINE (APRESOLINE) tablet 37.5 mg  3 times daily         Last MAR action: Given - by Garrett Ro on 04/24/22 at 2028 Teresa Ron E    04/24/22 0624 04/24/22 0624  albuterol sulfate  (90 Base) MCG/ACT inhaler 2 puff  EVERY 6 HOURS PRN        Question:  Initiate RT Bronchodilator Protocol  Answer:  Yes    Last MAR action: Given - by Vijay Alston on 04/24/22 at 1500 YESY CUBA E    04/24/22 0042 04/24/22 0043  technetium albumin aggregated (MAA) solution 6.8 millicurie  IMG ONCE PRN Last MAR action: Given - by Zhaneyubonnie Maurervel on 04/24/22 at 0043 Jessica WHITLEY    04/24/22 0042 04/24/22 0043  xenon xe 280 gas 48.0 millicurie  IMG ONCE PRN         Last MAR action: Given - by Mica Maurervel on 04/24/22 at 0043 Jessica WHITLEY    04/23/22 2200 04/23/22 2109  heparin (porcine) injection 5,000 Units  3 times per day         Last MAR action: Given - by Brianna Sorto on 04/24/22 at 2028 [facility]    04/23/22 2115 04/23/22 2109  sodium chloride flush 0.9 % injection 5-40 mL  2 times per day         Last MAR action: Given - by Brianna Sorto on 04/24/22 at 2029 [facility]    04/23/22 2115 04/23/22 2109  atorvastatin (LIPITOR) tablet 40 mg  NIGHTLY         Last MAR action: Given - by Brianna Sorto on 04/24/22 at 2028 [facility]    04/23/22 2109 04/23/22 2109  sodium chloride flush 0.9 % injection 5-40 mL  PRN         Acknowledged YESY CUBA    04/23/22 2109 04/23/22 2109  0.9 % sodium chloride infusion  PRN         Acknowledged YESY CUBA E    04/23/22 2109 04/23/22 2109  ondansetron (ZOFRAN-ODT) disintegrating tablet 4 mg  EVERY 8 HOURS PRN        \"Or\" Linked Group Details    Acknowledged Aishwarya Flower    04/23/22 2109 04/23/22 2109  ondansetron (ZOFRAN) injection 4 mg  EVERY 6 HOURS PRN        \"Or\" Linked Group Details    Acknowledged Aishwarya Flower    04/23/22 2109 04/23/22 2109  acetaminophen (TYLENOL) tablet 650 mg  EVERY 6 HOURS PRN        \"Or\" Linked Group Details    Last MAR action: Given - by Brianna Sorto on 04/24/22 at 2028 [facility]    04/23/22 2109 04/23/22 2109  acetaminophen (TYLENOL) suppository 650 mg  EVERY 6 HOURS PRN        \"Or\" Linked Group Details    Last MAR action: See Alternative - by Brianna Sorto on 04/24/22 at 2028 [facility]    04/23/22 2109 04/23/22 2109  polyethylene glycol (GLYCOLAX) packet 17 g  DAILY PRN         Acknowledged Mika BRADLEY    04/23/22 2109 04/23/22 2109  LORazepam (ATIVAN) tablet 1 mg  EVERY 6 HOURS PRN         Acknowledged Banda Query E    04/23/22 2030 04/23/22 2018  hydrOXYzine (ATARAX) tablet 25 mg  ONCE         Last MAR action: Given - by Johnson Thornton on 04/23/22 at 2036 Chris WHITLEY    04/23/22 2015 04/23/22 2012  aspirin tablet 325 mg  ONCE         Last MAR action: Given - by Johnson Thornton on 04/23/22 at 2016 Chris WHITLEY          Final Impression      1. Chest pain, unspecified type    2. Chronic kidney disease, unspecified CKD stage    3. Elevated d-dimer        DISPOSITION Admitted 04/23/2022 08:59:15 PM       This record is transcribed utilizing voice recognition technology. There are inherent limitations in this technology. In addition, there may be limitations in editing of this report. If there are any discrepancies, please contact me directly.     Jose Elias Scales MD   4/24/2022         Francisco Hua MD  04/24/22 4228

## 2022-04-24 NOTE — PROGRESS NOTES
End of shift report given to Akbar Arriola RN. Pt in stable condition, care transferred at this time.  Electronically signed by Sue Neal RN on 4/24/2022 at 8:11 AM

## 2022-04-24 NOTE — PROGRESS NOTES
Pt transported from Nuclear Medicine to PCU room 314.  Electronically signed by Gerber Larsen RN on 4/24/2022 at 1:07 AM

## 2022-04-24 NOTE — PROGRESS NOTES
Patient resting in bed, AM assessment completed. Lungs decreased. BS+. Patient on RA. L BKA noted. No acute distress noted. Call light in reach. Will continue to monitor.

## 2022-04-24 NOTE — PROGRESS NOTES
Patient resting in bed watching television. HR down to 38, entered room and patient asymptomatic at this time. Message sent to Dr. Sujatha Castillo now 59. Will continue to monitor.

## 2022-04-24 NOTE — PROGRESS NOTES
Patient resting in bed, report given to [facility] for transfer of care at this time.

## 2022-04-25 NOTE — H&P
History and Physical    Chief Complaint   Patient presents with    Anxiety     reports having anxiety and chest pressure x 1 week.  Chest Pain        HISTORY OF PRESENT ILLNESS:     The patient is a 55-year-old white male with a history of ESRD. He has a tunneled dialysis catheter in the right side. Patient apparently missed 2 treatments. Diarrhea. He has a history of missing dialysis and signing out AMA. Patient cannot get into his regular dialysis center. He came to the ER yesterday with a serum creatinine of 6.3 and fluid overload. He is also been having weekly paracentesis. He had atypical chest pain. Patient felt anxious. He was admitted to hospital.  No hyperkalemia. Patient was seen by both nephrology and cardiology. Patient has a history of ischemic cardiomyopathy with a EF of 25% and has refused cath in the past.  He has a history of paroxysmal atrial fibrillation chronically elevated troponins hypertension, ESRD, cirrhosis status post paracentesis, PAD, tobacco abuse and status post left BKA. His last echo was in January 2021. It showed global hypokinesis. Patient noted a panic attack and then chest pain. EKG was nonacute. Troponin was high but flat. Prior troponins have been high.    4/25  Denies complaints today    Patient is allergic to pcn [penicillins].     Past Medical History:   Diagnosis Date    Bacteremia 11/18/2017    strep agalactiae (also cultured from foot)    CAD (coronary artery disease) 8/18/2021    Cellulitis and abscess of foot, except toes     CHF (congestive heart failure) (HCC)     Diabetes mellitus ([facility] [address]     History of liver failure     Hypertension     Kidney disease     MRSA (methicillin resistant staph aureus) culture positive 4/17/18, 11/19/2017    left foot       Past Surgical History:   Procedure Laterality Date    IR TUNNELED CATHETER PLACEMENT GREATER THAN 5 YEARS  1/11/2021    IR TUNNELED CATHETER PLACEMENT GREATER THAN 5 YEARS 1/11/2021 Faby Ruggiero MD [facility]    LEG AMPUTATION BELOW KNEE      PARACENTESIS      UPPER GASTROINTESTINAL ENDOSCOPY N/A 1/29/2019    EGD ESOPHAGOGASTRODUODENOSCOPY performed by John Grimes MD at [address]      Left BKA       Scheduled Meds:   melatonin  10 mg Oral Nightly    isosorbide dinitrate  20 mg Oral TID    hydrALAZINE  37.5 mg Oral TID    insulin lispro  0-6 Units SubCUTAneous TID WC    insulin lispro  0-3 Units SubCUTAneous Nightly    [Held by provider] metoprolol succinate  50 mg Oral Daily    sodium chloride flush  5-40 mL IntraVENous 2 times per day    aspirin  81 mg Oral Daily    atorvastatin  40 mg Oral Nightly    heparin (porcine)  5,000 Units SubCUTAneous 3 times per day       Continuous Infusions:   dextrose      sodium chloride         PRN Meds:  albuterol sulfate HFA, glucose, glucagon (rDNA), dextrose, dextrose bolus (hypoglycemia) **OR** dextrose bolus (hypoglycemia), sodium chloride flush, sodium chloride, ondansetron **OR** ondansetron, acetaminophen **OR** acetaminophen, polyethylene glycol, LORazepam       reports that he quit smoking about 2 months ago. His smoking use included cigarettes. He has a 40.00 pack-year smoking history. He has quit using smokeless tobacco.  His smokeless tobacco use included chew. Family History   Problem Relation Age of Onset    Diabetes Mother     Hypertension Mother     Dementia Mother     Heart Attack Father     Stroke Brother     No Known Problems Maternal Grandmother     No Known Problems Maternal Grandfather     No Known Problems Paternal Grandmother     No Known Problems Paternal Grandfather     Heart Attack Brother        Social History     Socioeconomic History    Marital status:       Spouse name: None    Number of children: None    Years of education: None    Highest education level: None   Occupational History    Occupation: unemployed   Tobacco Use    Smoking status: Former Smoker     Packs/day: 1.00     Years: 40.00     Pack years: 40.00     Types: Cigarettes     Quit date: 2022     Years since quittin.2    Smokeless tobacco: Former User     Types: Chew    Tobacco comment: attempting to quit at this time   Vaping Use    Vaping Use: Never used   Substance and Sexual Activity    Alcohol use: No     Comment: none    Drug use: Yes     Types: Marijuana Sheyenne Abbe)     Comment: occassionally     Sexual activity: Yes     Partners: Female   Other Topics Concern    None   Social History Narrative    None     Social Determinants of Health     Financial Resource Strain:     Difficulty of Paying Living Expenses: Not on file   Food Insecurity:     Worried About Running Out of Food in the Last Year: Not on file    Julissa of Food in the Last Year: Not on file   Transportation Needs:     Lack of Transportation (Medical): Not on file    Lack of Transportation (Non-Medical):  Not on file   Physical Activity:     Days of Exercise per Week: Not on file    Minutes of Exercise per Session: Not on file   Stress:     Feeling of Stress : Not on file   Social Connections:     Frequency of Communication with Friends and Family: Not on file    Frequency of Social Gatherings with Friends and Family: Not on file    Attends Orthodox Services: Not on file    Active Member of [address] [place] [address] or Organizations: Not on file    Attends Club or Organization Meetings: Not on file    Marital Status: Not on file   Intimate Partner Violence:     Fear of Current or Ex-Partner: Not on file    Emotionally Abused: Not on file    Physically Abused: Not on file    Sexually Abused: Not on file   Housing Stability:     Unable to Pay for Housing in the Last Year: Not on file    Number of [place] in the Last Year: Not on file    Unstable Housing in the Last Year: Not on file         VS:   BP (!) 165/90   Pulse 63   Temp 97 °F (36.1 °C) (Oral)   Resp 16   Ht 6' 1\" (1.854 m)   Wt 245 lb 3.2 oz (111.2 kg)   SpO2 94%   BMI 32.35 kg/m²     Gen: No distress. Alert. Chronically sick appearing  Eyes: PERRL. No sclera icterus. No conjunctival injection. ENT: No discharge. Pharynx clear. Neck: Trachea midline. Normal thyroid. Resp: No accessory muscle use. No crackles. No wheezes. No rhonchi. No dullness on percussion. CV: Regular rate. Regular rhythm. +murmur no rub. + edema. GI: Non-tender. Non-distended. No masses. No organomegaly. Normal bowel sounds. No hernia. Skin: Warm and dry. No nodule on exposed extremities. No rash on exposed extremities. Dryness of skin of the right lower extremity with a few scratch marks that are bleeding  Lymph: No cervical LAD. No supraclavicular LAD. M/S: No cyanosis. No joint deformity. No clubbing. Left BKA  Neuro: Awake. Reflexes 2+ symmetric bilaterally. Moves all 4 extremities, non focal  Psych: Oriented x 3. No anxiety or agitation. CBC:   Recent Labs     04/23/22 1713 04/24/22  0410   WBC 5.9 5.8   HGB 9.5* 9.4*   HCT 29.4* 28.4*   MCV 91.5 90.5    168     BMP:   Recent Labs     04/23/22 1913      K 4.8      CO2 21   BUN 78*   CREATININE 6.3*     LIVER PROFILE:   Recent Labs     04/23/22 1913   AST 14*   ALT 7*   BILITOT <0.2   ALKPHOS 85     PT/INR:   Recent Labs     04/23/22 1713   PROTIME 13.2*   INR 1.16*     APTT: No results for input(s): APTT in the last 72 hours. UA:  Recent Labs     04/23/22  1825   PHUR 6.0     Results for Cierra Oliveira (MRN [id number]) as of 4/24/2022 12:38   Ref. Range 1/31/2022 22:52 2/1/2022 04:41 4/23/2022 19:13 4/23/2022 23:50 4/24/2022 01:21   Troponin Latest Ref Range: <0.01 ng/mL 0.62 (HH) 0.66 (HH) 0.51 (HH) 0.48 (H) 0.50 (H)   Results for [place] Tee (MRN [id number]) as of 4/24/2022 12:38   Ref. Range 4/23/2022 17:13   D-Dimer, Quant Latest Ref Range: 0 - 229 ng/mL  (H)       NM LUNG VENT/PERFUSION (VQ)   Final Result   Low probability for pulmonary embolus. XR CHEST (2 VW)   Final Result   Bilateral pleural effusions with bibasilar opacities could represent   atelectasis or infection            ECG  Sinus rhythm with sinus arrhythmia with short PRLeft axis deviationRight bundle branch blockInferior infarct , age undeterminedPoor R wave progressionAbnormal ECGConfirmed by Nelson Mohr MD, Diana Jeans ([id number]) on 4/23/2022 7:53:43     ASSESSMENT:    Principal Problem:    Chest pain  Active Problems:    Cardiomyopathy ([address]    Elevated troponin    Diabetic polyneuropathy associated with type 2 diabetes mellitus (HCC)    Chronic systolic CHF (congestive heart failure) (HCC)    Alcoholic cirrhosis of liver with ascites (HCC)    PAF (paroxysmal atrial fibrillation) (HCC)    CAD (coronary artery disease)  Resolved Problems:    * No resolved hospital problems. *      PLAN:    #Atypical chest pain associated with a panic attack. Admitted to the hospital.  Cardiology consultation obtained. He has a history of known CAD. He is very noncompliant. Troponin elevated but flat. Prior history of elevated troponin. No plans for any further cardiac work-up. History of noncompliance and leaving AMA. On Lipitor, hydralazine, Isordil and metoprolol XL. Decrease metoprolol XL to 25 given borderline low heart rate. #VQ scan done for elevated d dimer. It is negative. #DM type 2. Carb control diet. Start SSI. #ESRD. No evidence of pulmonary edema or hyperkalemia. No reason for urgent hemodialysis. Nephrology consultation. Resume hemodialysis. #Paroxysmal atrial fibrillation. In sinus rhythm right now. On aspirin. History of noncompliance with anticoagulation. #Alcoholic cirrhosis with paracentesis as needed. #Ischemic cardiomyopathy/chronic systolic heart failure with a EF of 25%. HD should help with fluid overload. #Heparin for DVT prophylaxis. Will need an outpatient follow-up for hemodialysis. Plan for hemodialysis today.   Discharge planning once hemodialysis is reinitiated in the hospital and outpatient slot is found.       Samantha Hadley MD 4/25/2022 9:33 AM

## 2022-04-25 NOTE — PROGRESS NOTES
[facility] Progress Note           Subjective/   59y.o. year old male who we are seeing in consultation for ESKD. HPI:  Renal function remains poor, urine output of 950 ml over last 24 hours. Denies any shortness of breath or chest pain. ROS:  Intake adequate, no fevers. Objective/   GEN:  Chronically ill, BP (!) 165/90   Pulse 63   Temp 97 °F (36.1 °C) (Oral)   Resp 16   Ht 6' 1\" (1.854 m)   Wt 245 lb 3.2 oz (111.2 kg)   SpO2 94%   BMI 32.35 kg/m²   HEENT: non-icteric, no JVD  CV: S1, S2 without m/r/g; + RLE edema, s/p L BKA  RESP: CTA B without w/r/r; breathing wnl  ABD: +bs, soft, distended, nt, no hsm  SKIN: warm, no rashes    Data/  Recent Labs     04/23/22  1713 04/24/22  0410   WBC 5.9 5.8   HGB 9.5* 9.4*   HCT 29.4* 28.4*   MCV 91.5 90.5    168     Recent Labs     04/23/22  1913      K 4.8      CO2 21   GLUCOSE 94   BUN 78*   CREATININE 6.3*   LABGLOM 9*   GFRAA 11*       Assessment/     ESRD:  - Patient has been on and off dialysis for over the last year  - Scr of 6.3 would certainly be in range to need dialysis although he is not frankly uremic despite not having dialysis for ~ 1 month. He is fluid overloaded. No rub that would indicate uremic pericarditis      Anemia of chronic disease:  - Below target due to not being on dialysis and getting EPO     Chest Pain:  - Atypical, felt to be more related to anxiety. Has improved      Alcoholic liver cirrhosis:  - Getting weekly paracentesis     Hypertension      Plan/     - HD tomorrow with UF target with crit line monitoring  - HARPREET with HD as BP's allow  - Paracentesis ordered for today  - Trend labs, bp's    CM consulted for outpatient HD placement, transportation to dialysis is a limiting factor    ____________________________________  Luiz Ernandez MD  [facility]  [email or web address]  Office: [phone]

## 2022-04-25 NOTE — PROGRESS NOTES
This RN went to administer toprol xl as per ordered and pt keeps asking to come back later that he could not take it right now. MD aware of noncompliance with medical treatments.

## 2022-04-25 NOTE — PROGRESS NOTES
Admit: 2022    Name:  Yumiko Marte  Room:  /9662-62  MRN:    [id number]    Daily Progress Note for 2022         Interval History:     Denies any complaints today except for distention of abdomen    Scheduled Meds    :   melatonin  10 mg Oral Nightly    metoprolol succinate  25 mg Oral Daily    isosorbide dinitrate  20 mg Oral TID    hydrALAZINE  37.5 mg Oral TID    insulin lispro  0-6 Units SubCUTAneous TID WC    insulin lispro  0-3 Units SubCUTAneous Nightly    sodium chloride flush  5-40 mL IntraVENous 2 times per day    aspirin  81 mg Oral Daily    atorvastatin  40 mg Oral Nightly    heparin (porcine)  5,000 Units SubCUTAneous 3 times per day       Continuous Infusions:   dextrose      sodium chloride         PRN Meds:  albuterol sulfate HFA, glucose, glucagon (rDNA), dextrose, dextrose bolus (hypoglycemia) **OR** dextrose bolus (hypoglycemia), sodium chloride flush, sodium chloride, ondansetron **OR** ondansetron, acetaminophen **OR** acetaminophen, polyethylene glycol, LORazepam                  Objective:     Temp  Av °F (36.1 °C)  Min: 96.6 °F (35.9 °C)  Max: 97.3 °F (36.3 °C)  Pulse  Av.5  Min: 55  Max: 72  BP  Min: 148/79  Max: 171/90  SpO2  Av.5 %  Min: 92 %  Max: 95 %  Patient Vitals for the past 4 hrs:   BP Temp Temp src Pulse Resp SpO2   22 0808 (!) 165/90 97 °F (36.1 °C) Oral 63 16 94 %         Intake/Output Summary (Last 24 hours) at 2022 1039  Last data filed at 2022 0815  Gross per 24 hour   Intake 462 ml   Output 950 ml   Net -488 ml       Physical Exam:    Gen: No distress. Alert. Chronically sick appearing  Eyes: PERRL. No sclera icterus. No conjunctival injection. ENT: No discharge. Pharynx clear. Neck: Trachea midline. Normal thyroid. Resp: No accessory muscle use. No crackles. No wheezes. No rhonchi. No dullness on percussion. CV: Regular rate. Regular rhythm. +murmur no rub. + edema. GI: Non-tender. Non-distended.  No masses. No organomegaly. Normal bowel sounds. No hernia. Skin: Warm and dry. No nodule on exposed extremities. No rash on exposed extremities. Dryness of skin of the right lower extremity with a few scratch marks that are bleeding  Lymph: No cervical LAD. No supraclavicular LAD. M/S: No cyanosis. No joint deformity. No clubbing. Left BKA  Neuro: Awake. Reflexes 2+ symmetric bilaterally. Moves all 4 extremities, non focal  Psych: Oriented x 3. No anxiety or agitation. Lab Data:  CBC:   Recent Labs     04/23/22 1713 04/24/22  0410   WBC 5.9 5.8   RBC 3.21* 3.13*   HGB 9.5* 9.4*   HCT 29.4* 28.4*   MCV 91.5 90.5   RDW 15.8* 15.9*    168     BMP:   Recent Labs     04/23/22 1913      K 4.8      CO2 21   BUN 78*   CREATININE 6.3*     BNP: No results for input(s): BNP in the last 72 hours. PT/INR:   Recent Labs     04/23/22 1713   PROTIME 13.2*   INR 1.16*     APTT:No results for input(s): APTT in the last 72 hours. CARDIAC ENZYMES:   Recent Labs     04/23/22  2350 04/24/22  0121 04/24/22  0410   TROPONINI 0.48* 0.50* 0.54*     FASTING LIPID PANEL:  Lab Results   Component Value Date    CHOL 96 04/24/2022    HDL 35 04/24/2022    TRIG 77 04/24/2022     LIVER PROFILE:   Recent Labs     04/23/22 1913   AST 14*   ALT 7*   BILITOT <0.2   ALKPHOS 85         NM LUNG VENT/PERFUSION (VQ)   Final Result   Low probability for pulmonary embolus.          XR CHEST (2 VW)   Final Result   Bilateral pleural effusions with bibasilar opacities could represent   atelectasis or infection         US GUIDED PARACENTESIS    (Results Pending)         Assessment & Plan:     Patient Active Problem List    Diagnosis Date Noted    Cardiomyopathy [facility])     Abnormal nuclear stress test     Chest pain 04/23/2022    Acute on chronic [facility]ive [facility] failure [facility]     Encephalopathy     COVID 02/01/2022    ESRD needing dialysis ([facility] 01/31/2022    CAD (coronary artery disease) 08/18/2021    PAF (paroxysmal atrial fibrillation) ([facility] 11/76/3419    Alcoholic cirrhosis of liver with ascites ([facility] 01/09/2021    Diabetic foot infection ([facility] 01/08/2021    Abdominal distension     Hypoxia     Shortness of breath     Fluid overload 02/21/2019    Moderate malnutrition (HCC) 08/64/8224    Metabolic alkalosis     NSVT (nonsustained ventricular tachycardia) (HCC)     Chronic systolic heart failure (HCC)     HTN (hypertension), malignant     Acute respiratory failure with hypoxia ([address] 01/19/2019    Acute respiratory failure (HCC) 01/19/2019    Ascites 01/19/2019    Chronic systolic CHF (congestive heart failure) ([facility])     Noncompliance     Osteomyelitis ([address] 04/17/2018    Stage 3 chronic kidney disease 02/28/2018    Pain of left lower extremity     Osteomyelitis of foot, left, acute ([address] 11/19/2017    Elevated troponin     Group B streptococcal bacteriuria     Type 2 diabetes mellitus with left diabetic foot infection ([facility]     Septic arthritis of left foot ([facility])     Poorly controlled type 2 diabetes mellitus (HCC)     Elevated sed rate     Elevated C-reactive protein (CRP)     Acute kidney injury superimposed on CKD ([address]     Hyponatremia     Diabetic polyneuropathy associated with type 2 diabetes mellitus ([address]     Mixed hyperlipidemia     Tobacco abuse counseling     Diabetes education, encounter for     Cellulitis 11/18/2017    NSTEMI (non-ST elevated myocardial infarction) ([address] 11/18/2017    Tobacco abuse 07/29/2015    Type 1 diabetes mellitus with neurological manifestations, uncontrolled ([facility] 07/29/2015    Diabetic ulcer of right foot ([facility] 07/29/2015         #Atypical chest pain associated with a panic attack. Admitted to the hospital.  Cardiology consultation obtained. He has a history of known CAD. He is very noncompliant. Troponin elevated but flat. Prior history of elevated troponin. No plans for any further cardiac work-up. History of noncompliance and leaving AMA.   On Lipitor, hydralazine, Isordil and metoprolol XL. Decrease metoprolol XL to 25 given borderline low heart rate.     #VQ scan done for elevated d dimer. It is negative.     #DM type 2. Carb control diet. Start SSI.     #ESRD. No evidence of pulmonary edema or hyperkalemia. No reason for urgent hemodialysis. Nephrology consultation. Resume hemodialysis.     #Paroxysmal atrial fibrillation. In sinus rhythm right now. On aspirin. History of noncompliance with anticoagulation.     #Alcoholic cirrhosis with paracentesis as needed.     #Ischemic cardiomyopathy/chronic systolic heart failure with a EF of 25%. HD should help with fluid overload.     #Heparin for DVT prophylaxis.        Will need an outpatient follow-up for hemodialysis. Plan for hemodialysis today. Discharge planning once hemodialysis is reinitiated in the hospital and outpatient slot is found.     Catherine Vital MD

## 2022-04-25 NOTE — PROGRESS NOTES
Bedside report and transfer of care given to Abigail Mueller RN Pt currently resting in bed with the call light within reach. Pt denies any other care needs at this time. Pt stable at this time.

## 2022-04-25 NOTE — PROGRESS NOTES
While asleep patients heart rate dropped to 28. Patient is asymptomatic, while awake heart rate 50's/ 60's.

## 2022-04-25 NOTE — CARE COORDINATION
Case Management Assessment  Initial Evaluation      Patient Name: Crissy Pierre  YOB: 1960  Diagnosis: Chest pain [R07.9]  Elevated d-dimer [R79.89]  Chest pain, unspecified type [R07.9]  Chronic kidney disease, unspecified CKD stage [N18.9]  Date / Time: 4/23/2022  5:15 PM    Admission status/Date:4/23/2022   Chart Reviewed: Yes      Patient Interviewed: Yes   Family Interviewed:  No      Hospitalization in the last 30 days:  No      Health Care Decision Maker :   Primary Decision Maker: [address] [place], [place] [address]  [phone]    (CM - must 1st enter selection under Navigator - emergency contact- Health Care Decision Maker Relationship and pick relationship)   Who do you trust or have selected to make healthcare decisions for you      Met with: pt  Interview conducted  (bedside/phone): bedside    Current PCP: Rayna Sosa MD      [address] required for SNF : Y, N          3 night stay required - Y, N    ADLS  Support Systems/Care Needs: Children  Transportation: self    Meal Preparation: self    Housing  Living Arrangements: ranch alone  Steps: 0  Intent for return to present living arrangements: Yes  Identified Issues: Gets paracentesis weekly (usually every Monday). Home Care Information  Active with Home Health Care : No Agency:(Services)  Type of Home Care Services: None  Passport/Waiver : No  :                      Phone Number:    Passport/Waiver Services: n/a          Durable Medical Equiptment   DME Provider: n/a  Equipment: yes  Walker___Cane___RTS___ BSC___Shower Chair___Hospital Bed___W/C__X__Other________  02 at ____Liter(s)---wears(frequency)_______ [facility] ___ CPAP___ BiPap___   N/A____  Pt is using his father's home O2 very prn, but does not have home O2.        Home O2 Use :  No    If No for home O2---if presently on O2 during hospitalization:  No  if yes CM to follow for potential DC O2 need  Informed of need for care provider to bring portable Last  on 4/13/17.  Would you like Emma to get current fasting blood work and decide what medication depending on the results?    Current Medications    BISACODYL (DULCOLAX) 5 MG EC TABLET    Use as directed in colonoscopy prep instructions    CITALOPRAM (CELEXA) 20 MG TABLET    Take 1 tablet by mouth daily.    ELECTROLYTE/PEG 3350 (COLYTE) 240 G SOLUTION    Use as directed in colonoscopy instructions/ take 4000ml by mouth for 1 dose    SERTRALINE (ZOLOFT) 100 MG TABLET    Take 1.5 tablets by mouth daily.      home O2 tank on day of discharge for nursing to connect prior to leaving:   No  Verbalized agreement/Understanding:   No    Community Service Affiliation  Dialysis:  Yes - SEE NOTE BELOW    · Agency:  · Location:  · Dialysis Schedule:  · Phone:   · Fax: Other Community Services: (ex:PT/OT,Mental Health,Wound Clinic, Cardio/Pul [address])    DISCHARGE PLAN: Explained Case Management role/services. Reviewed chart. Role of discharge planner explained and patient verbalized understanding. Pt states that he lives in a ranch alone and plans to return. Pt states that he uses his father's old O2 very prn, but does not need home O2. Pt left AMA from [facility] on 2/7/22. CM was trying to set up HD at that time for pt. Pt was going to [facility] for HD at that time and wanted to go to [facility] or [facility], but Kee Hester with [facility] stated that they could not accept at those location. CM spoke with Renetta Kamara with [facility] and she states pt has not been to [facility] since Sept, Oct 2021 and is not longer a pt there. CM spoke with pt and he states that if he does HD, he would like it at [facility] and does not want [facility], but will go to it if needed. Hermann spoke with charge RN with [facility]. Per Braulio Hayden, she spoke with Dr. William Sat on 4/24 and he stated to Braulio Hayden that they are not sure if they are going to be pursuing HD with pt d/t pt not wanting HD long term. CM informed Dr. Roddy Montenegro and Craig Lew of this and awaiting for Dr. Mercy Smith to see pt today to make this decision. Pt is having a paracentesis today at 1400 per Scott STEVENSON. Addendum 1342: HERMANN spoke Dr. Mercy Smith (nephrology) and he states to look for a HD slot for pt. HERMANN called Frandy Claros with [facility] ([phone]) and asked her to look at [facility], [place] or [facility] location, as these are the locations pt asked for.   Frandy Claros states that [facility] is out of her jurisdiction, but she will look into [facility]. She will look into the [place] location, as well. Nadeem Alexandra states [facility] and [facility] is full. Awaiting for a HD slot and for Kendy to call CM back with a HD slot and time. Dr. Chriss Najjar called Cabrera Patterson with [facility] and informed her that pt will need HD while here. JEFF spoke with Cabrera Ptaterson with [facility] and she states pt is on the schedule for HD for tomorrow, but the time is not noted as of now. CM informed Calli RN (charge RN). Addendum 4/25/2022 1613: Nadeem Alexandra with [facility]  called and stated that [facility] and [place] are full and cannot accept pt. Per Nadeem Alexandra, [facility] and [facility] him for [facility] and does not have a time slot available yet. CM went into pt's room and he had frend in the rooma nd stated that CM could speak in fornt of the friend. CM informed pt about having HD here at the hospital tomorrow and then that the only HD opening was [address] [place] [address] location for TTS. Pt states that he understands and is agreeable to [facility] TTS. JEFF placed this info in the d/c instructions, but CM does not have a time slot yet. Nadeem Alexandra will let CM know the time slot. Nadeem Alexandra states that she needs a Hep B surface antigen drawn. Dr. Ivon Bach informed with verbalized understanding.

## 2022-04-25 NOTE — PROGRESS NOTES
PM assessment completed. Scheduled medications given per MAR. VSS room air, A/O x4, denies any needs at this time. Call light in reach, will monitor, bed alarm on.

## 2022-04-25 NOTE — FLOWSHEET NOTE
04/25/22 0808   Vital Signs   Temp 97 °F (36.1 °C)   Temp Source Oral   Pulse 63   Heart Rate Source Monitor   Resp 16   BP (!) 165/90   BP Location Right upper arm   MAP (Calculated) 115   Patient Position Sitting   Level of Consciousness Alert (0)   MEWS Score 1   Oxygen Therapy   SpO2 94 %   Pulse Oximeter Device Mode Intermittent   Pulse Oximeter Device Location Finger   O2 Device None (Room air)     Shift assessment complete - see flow sheet, pt denies needs, call light within reach.

## 2022-04-26 NOTE — PROGRESS NOTES
factor    ____________________________________  Isaura Newton MD  [facility]  [email or web address]  Office: [phone]

## 2022-04-26 NOTE — PROGRESS NOTES
Patient educated on discharge instructions as well as new medications use, dosage, administration and possible side effects. Patient verified knowledge. IV removed without difficulty and dry dressing in place. Telemetry monitor removed and returned to [facility]. Pt left facility in stable condition to Home with all of their personal belongings.

## 2022-04-26 NOTE — PROGRESS NOTES
Admit: 2022    Name:  Belem Riddle  Room:  /9747-02  MRN:    [id number]    Daily Progress Note for 2022         Interval History:     Feels much better today. Wants to go home. Underwent his weekly paracentesis yesterday. Scheduled Meds    :   melatonin  10 mg Oral Nightly    metoprolol succinate  25 mg Oral Daily    isosorbide dinitrate  20 mg Oral TID    hydrALAZINE  37.5 mg Oral TID    insulin lispro  0-6 Units SubCUTAneous TID WC    insulin lispro  0-3 Units SubCUTAneous Nightly    sodium chloride flush  5-40 mL IntraVENous 2 times per day    aspirin  81 mg Oral Daily    atorvastatin  40 mg Oral Nightly    heparin (porcine)  5,000 Units SubCUTAneous 3 times per day       Continuous Infusions:   dextrose      sodium chloride         PRN Meds:  heparin (porcine), albuterol sulfate HFA, glucose, glucagon (rDNA), dextrose, dextrose bolus (hypoglycemia) **OR** dextrose bolus (hypoglycemia), sodium chloride flush, sodium chloride, ondansetron **OR** ondansetron, acetaminophen **OR** acetaminophen, polyethylene glycol, LORazepam                  Objective:     Temp  Av.3 °F (36.3 °C)  Min: 96.5 °F (35.8 °C)  Max: 98 °F (36.7 °C)  Pulse  Av.8  Min: 60  Max: 72  BP  Min: 116/72  Max: 172/61  SpO2  Av.4 %  Min: 93 %  Max: 97 %  Patient Vitals for the past 4 hrs:   BP Temp Pulse Resp Weight   22 0805 (!) 152/81 98 °F (36.7 °C) 60 16 235 lb 3.7 oz (106.7 kg)         Intake/Output Summary (Last 24 hours) at 2022 1133  Last data filed at 2022 2322  Gross per 24 hour   Intake 260 ml   Output 300 ml   Net -40 ml       Physical Exam:    Gen: No distress. Alert. Chronically sick appearing  Eyes: PERRL. No sclera icterus. No conjunctival injection. ENT: No discharge. Pharynx clear. Neck: Trachea midline. Normal thyroid. Resp: No accessory muscle use. No crackles. No wheezes. No rhonchi. No dullness on percussion. CV: Regular rate.  Regular rhythm. +murmur no rub. + edema. GI: Non-tender. Non-distended. No masses. No organomegaly. Normal bowel sounds. No hernia. Skin: Warm and dry. No nodule on exposed extremities. No rash on exposed extremities. Dryness of skin of the right lower extremity with a few scratch marks that are bleeding  Lymph: No cervical LAD. No supraclavicular LAD. M/S: No cyanosis. No joint deformity. No clubbing. Left BKA  Neuro: Awake. Reflexes 2+ symmetric bilaterally. Moves all 4 extremities, non focal  Psych: Oriented x 3. No anxiety or agitation. Lab Data:  CBC:   Recent Labs     04/23/22 1713 04/24/22 0410 04/26/22  0516   WBC 5.9 5.8 5.0   RBC 3.21* 3.13* 3.13*   HGB 9.5* 9.4* 9.3*   HCT 29.4* 28.4* 28.3*   MCV 91.5 90.5 90.4   RDW 15.8* 15.9* 15.7*    168 179     BMP:   Recent Labs     04/23/22 1913 04/26/22  0516    139   K 4.8 4.9    103   CO2 21 18*   PHOS  --  6.8*   BUN 78* 84*   CREATININE 6.3* 6.5*     BNP: No results for input(s): BNP in the last 72 hours. PT/INR:   Recent Labs     04/23/22 1713   PROTIME 13.2*   INR 1.16*     APTT:No results for input(s): APTT in the last 72 hours. CARDIAC ENZYMES:   Recent Labs     04/23/22  2350 04/24/22  0121 04/24/22  0410   TROPONINI 0.48* 0.50* 0.54*     FASTING LIPID PANEL:  Lab Results   Component Value Date    CHOL 96 04/24/2022    HDL 35 04/24/2022    TRIG 77 04/24/2022     LIVER PROFILE:   Recent Labs     04/23/22 1913   AST 14*   ALT 7*   BILITOT <0.2   ALKPHOS 85         US GUIDED PARACENTESIS   Final Result   Successful paracentesis. NM LUNG VENT/PERFUSION (VQ)   Final Result   Low probability for pulmonary embolus.          XR CHEST (2 VW)   Final Result   Bilateral pleural effusions with bibasilar opacities could represent   atelectasis or infection               Assessment & Plan:     Patient Active Problem List    Diagnosis Date Noted    Cardiomyopathy [facility])     Abnormal nuclear stress test     Chest pain 04/23/2022    Acute on chronic congestive heart failure ([address]     Encephalopathy     COVID 02/01/2022    ESRD needing dialysis ([address] 01/31/2022    CAD (coronary artery disease) 08/18/2021    PAF (paroxysmal atrial fibrillation) ([address] 52/75/8175    Alcoholic cirrhosis of liver with ascites ([address] 01/09/2021    Diabetic foot infection ([facility] 01/08/2021    Abdominal distension     Hypoxia     Shortness of breath     Fluid overload 02/21/2019    Moderate malnutrition (HCC) 29/61/0618    Metabolic alkalosis     NSVT (nonsustained ventricular tachycardia) [facility]     Chronic systolic heart failure ([facility]     HTN (hypertension), malignant     Acute respiratory failure with hypoxia ([address] 01/19/2019    Acute respiratory failure (HCC) 01/19/2019    Ascites 01/19/2019    Chronic systolic CHF (congestive heart failure) ([facility]     Noncompliance     Osteomyelitis ([address] 04/17/2018    Chronic kidney disease 02/28/2018    Pain of left lower extremity     Osteomyelitis of foot, left, acute ([address] 11/19/2017    Elevated troponin     Group B streptococcal bacteriuria     Type 2 diabetes mellitus with left diabetic foot infection ([address]     Septic arthritis of left foot ([facility]     Poorly controlled type 2 diabetes mellitus (HCC)     Elevated sed rate     Elevated C-reactive protein (CRP)     Acute kidney injury superimposed on CKD ([address]     Hyponatremia     Diabetic polyneuropathy associated with type 2 diabetes mellitus ([address]     Mixed hyperlipidemia     Tobacco abuse counseling     Diabetes education, encounter for     Cellulitis 11/18/2017    NSTEMI (non-ST elevated myocardial infarction) ([address] 11/18/2017    Tobacco abuse 07/29/2015    Type 1 diabetes mellitus with neurological manifestations, uncontrolled ([address] 07/29/2015    Diabetic ulcer of right foot ([address] 07/29/2015         #Atypical chest pain associated with a panic attack. Admitted to the hospital.  Cardiology consultation obtained. He has a history of known CAD.   He is

## 2022-04-26 NOTE — PLAN OF CARE
Problem: Discharge Planning  Goal: Discharge to home or other facility with appropriate resources  4/25/2022 2058 by Oliver Tanner RN  Outcome: Progressing  4/25/2022 1839 by Seferino Rose RN  Outcome: Progressing     Problem: Pain  Goal: Verbalizes/displays adequate comfort level or baseline comfort level  4/25/2022 2058 by Oliver Tanner RN  Outcome: Progressing  4/25/2022 1839 by Seferino Rose RN  Outcome: Adequate for Discharge     Problem: Safety - Adult  Goal: Free from fall injury  4/25/2022 2058 by Oliver Tanner RN  Outcome: Progressing  4/25/2022 1839 by Seferino Rose RN  Outcome: Progressing     Problem: ABCDS Injury Assessment  Goal: Absence of physical injury  4/25/2022 2058 by Oliver Tanner RN  Outcome: Progressing  4/25/2022 1839 by Seferino Rose RN  Outcome: Progressing

## 2022-04-26 NOTE — PROGRESS NOTES
Pt completed 3 hours of hemodialysis and removed 2 liters of fluid. Intermittent complaints of cramping in extremities, vas cath performed well, dressing changed, site unremarkable.        04/26/22 0805 04/26/22 1130   Vital Signs   Temp 98 °F (36.7 °C) 97.6 °F (36.4 °C)   Resp 16 16   BP (!) 152/81 (!) 132/54   Height and Weight   Weight 235 lb 3.7 oz (106.7 kg) 231 lb 0.7 oz (104.8 kg)   Weight Method Bed scale Bed scale

## 2022-04-26 NOTE — CARE COORDINATION
DISCHARGE ORDER  Date/Time 2022 1:31 PM  Completed by: Sera Daniels RN, Case Management    Patient Name: Yaw Chao      : 1960  Admitting Diagnosis: Chest pain [R07.9]  Elevated d-dimer [R79.89]  Chest pain, unspecified type [R07.9]  Chronic kidney disease, unspecified CKD stage [N18.9]  Fluid overload [E87.70]      Admit order Date and Status:2022  (verify MD's last order for status of admission)      Noted discharge order. If applicable PT/OT recommendation at Discharge: n/a  DME recommendation by PT/OT:n/a  Confirmed discharge plan  (pt): Yes  with whom______________pt_  If pt confirmed DC plan does family need to be contacted by CM No if yes who__n/a____  Discharge Plan: Reviewed chart. Role of discharge planner explained and patient verbalized understanding. Pt is alert and oriented. Discharge order is noted. Pt is being d/c'd to home today. Pt states that his son will pick him at at 5009-2072 today. Pt's O2 sats are 94% on RA. CM spoke with Dr. Miriam Lyn and he added on a Hep B Antigen and it was drawn today after order was placed, as lab informed Carlottavesna Monmarck of this. Rishabh Naidu with [facility] stated pt needed this to have HD  outpt within a 30 day time frame. Dr. Miriam Lyn spoke with Gabriella Dougherty with [facility] HD, and he has accepted pt there. Gabriella Dougherty states pt will have a Tues and Thurs slot there, as Dr. Miriam Lyn states pt only needs to go 2 days a week. Pt will have a 0700am chair time with a 0645am arrival. Pt will run 3 hours. CM informed pt of this with verbalized agreeance and approval.  Faxed DILLAN/AVS/Nephro notes/HD notes to [phone] [facility])    Patient was informed to call the phone number on the back of his [facility] insurance card to have him set up transportation for himself. He can set up transportation to and from hemodialysis.  Pt stated that he left his wallet and insurance card at home, but verbalized understanding and this info was placed on the d/c instructions, as well. CM informed Jacqueline Higgins and Shirley Davila of this, as well. CM offered HC and pt declined. No further discharge needs needed or noted. Addendum 1350: Warden Wilman STEVENSON informed CM that the following meds were sent to [facility]. [place], but nto being covered by pt's [place]. CM called Luna Stock with [facility] and asked her to call the [address] [place] [address] 16 to have these meds sent to [facility] and informed CM what the issue is if there are any issues. Luna Montrell verbalized understanding and Najma Watts is aware. Luna Stock with [facility] is aware pt is being d/c'd today and picked up at 9533-3607 today. 1 aspirin  2 isosorbide-hydrALAZINE  3 metoprolol succinate        Addendum 1440: Per Luna Stock with [facility], it is the Bidil  (isosorbide-hydrALAZINE combo) that is being rejected and would need a PA. Dr. Ivon Bach is aware of this. Per Dr. Ivon Bach, he would like isosorbide 20mg 1 po TID #90 called into [facility] and then hydrALAZINE 25mg tabs at 1 1/2 tabs TID x 1 month called into [facility] and see if insurance covers the medication this way. Luna Stock with [facility] is running this. Addendum [phone]: Per Luna Stock with [facility], meds are covered 100% and being sent up to pt's room. Warden Stovall RN is aware. Reviewed chart. Role of discharge planner explained and patient verbalized understanding. Discharge order is noted. Has Home O2 in place on admit:  No  Informed of need to bring portable home O2 tank on day of discharge for nursing to connect prior to leaving:   No  Verbalized agreement/Understanding:   Not Indicated  Pt is being d/c'd to home today. Pt's O2 sats are 94% on RA. Discharge timeout done with Warden Wilman RN. All discharge needs and concerns addressed.

## 2022-04-28 NOTE — DISCHARGE SUMMARY
Name:  Nancy Carney  Room:  /3571-36  MRN:    [id number]    Discharge Summary      This discharge summary is in conjunction with a complete physical exam done on the day of discharge. Discharging Physician: Dr. Ry Grimm MD     Admit: 4/23/2022  Discharge:  4/26/22    HPI taken from admission H&P:    The patient is a 58-year-old white male with a history of ESRD. He has a tunneled dialysis catheter in the right side. Patient apparently missed 2 treatments. Diarrhea. He has a history of missing dialysis and signing out AMA. Patient cannot get into his regular dialysis center. He came to the ER yesterday with a serum creatinine of 6.3 and fluid overload. He is also been having weekly paracentesis. He had atypical chest pain. Patient felt anxious. He was admitted to hospital.  No hyperkalemia.     Patient was seen by both nephrology and cardiology. Patient has a history of ischemic cardiomyopathy with a EF of 25% and has refused cath in the past.  He has a history of paroxysmal atrial fibrillation chronically elevated troponins hypertension, ESRD, cirrhosis status post paracentesis, PAD, tobacco abuse and status post left BKA. His last echo was in January 2021. It showed global hypokinesis.     Patient noted a panic attack and then chest pain. EKG was nonacute. Troponin was high but flat. Prior troponins have been high. Diagnoses this Admission and Hospital Course   #Atypical chest pain associated with a panic attack.  Admitted to the hospital. Cardiology consultation obtained. [facility] has a history of known CAD. [facility] is very noncompliant.  Troponin elevated but flat.  Prior history of elevated troponin.  No plans for any further cardiac work-up.  History of noncompliance and leaving AMA.  On Lipitor, hydralazine, Isordil and metoprolol XL.  Decrease metoprolol XL to 25 given borderline low heart rate.     #VQ scan done for elevated d dimer. It is negative.     #DM type 2.  Carb control diet. Start SSI.     #ESRD.  No evidence of pulmonary edema or hyperkalemia.  No reason for urgent hemodialysis.  Nephrology consultation.  Resume hemodialysis. Outpatient slot for hemodialysis has been arranged.     #Paroxysmal atrial fibrillation.  In sinus rhythm right now.  On aspirin.  History of noncompliance with anticoagulation.     #Alcoholic cirrhosis with paracentesis as needed. Underwent paracentesis yesterday.     #Ischemic cardiomyopathy/chronic systolic heart failure with a EF of 25%.  HD should help with fluid overload. Procedures (Please Review Full Report for Details)  None    Consults    Cardiology  Nephrology    Physical Exam at Discharge:    BP (!) 154/86   Pulse 64   Temp 96.2 °F (35.7 °C) (Oral)   Resp 16   Ht 6' 1\" (1.854 m)   Wt 231 lb 0.7 oz (104.8 kg)   SpO2 95%   BMI 30.48 kg/m²     Gen: No distress. Alert.  Chronically sick appearing  Eyes: PERRL. No sclera icterus. No conjunctival injection. ENT: No discharge. Pharynx clear. Neck: Trachea midline. Normal thyroid. Resp: No accessory muscle use. No crackles. No wheezes. No rhonchi. No dullness on percussion. CV: Regular rate. Regular rhythm. +murmur no rub. + edema. GI: Non-tender. Non-distended. No masses. No organomegaly. Normal bowel sounds. No hernia. Skin: Warm and dry. No nodule on exposed extremities. No rash on exposed extremities. Chai Musty of skin of the right lower extremity with a few scratch marks that are bleeding  Lymph: No cervical LAD. No supraclavicular LAD. M/S: No cyanosis. No joint deformity. No clubbing.  Left BKA  Neuro: Awake. Reflexes 2+ symmetric bilaterally. Moves all 4 extremities, non focal  Psych: Oriented x 3. No anxiety or agitation.    Lab Data:  CBC:         Recent Labs     04/23/22  1713 04/24/22  0410 04/26/22  0516   WBC 5.9 5.8 5.0   RBC 3.21* 3.13* 3.13*   HGB 9.5* 9.4* 9.3*   HCT 29.4* 28.4* 28.3*   MCV 91.5 90.5 90.4   RDW 15.8* 15.9* 15.7*    168 179      BMP: Recent Labs     04/23/22 1913 04/26/22  0516    139   K 4.8 4.9    103   CO2 21 18*   PHOS  --  6.8*   BUN 78* 84*   CREATININE 6.3* 6.5*      BNP: No results for input(s): BNP in the last 72 hours. PT/INR:       Recent Labs     04/23/22  1713   PROTIME 13.2*   INR 1.16*      APTT:No results for input(s): APTT in the last 72 hours. CARDIAC ENZYMES:   Recent Labs     04/23/22  2350 04/24/22  0121 04/24/22  0410   TROPONINI 0.48* 0.50* 0.54*      FASTING LIPID PANEL:        Lab Results   Component Value Date     CHOL 96 04/24/2022     HDL 35 04/24/2022     TRIG 77 04/24/2022      LIVER PROFILE:       Recent Labs     04/23/22 1913   AST 14*   ALT 7*   BILITOT <0.2   ALKPHOS 85         CULTURES  Results for Edward Cameron (MRN [id number]) as of 4/28/2022 13:41   Ref. Range 4/23/2022 21:24   INFLUENZA A Latest Ref Range: NOT DETECTED  NOT DETECTED   INFLUENZA B Latest Ref Range: NOT DETECTED  NOT DETECTED   SARS-CoV-2 RNA, RT PCR Latest Ref Range: NOT DETECTED  NOT DETECTED       RADIOLOGY  US GUIDED PARACENTESIS   Final Result   Successful paracentesis. NM LUNG VENT/PERFUSION (VQ)   Final Result   Low probability for pulmonary embolus.          XR CHEST (2 VW)   Final Result   Bilateral pleural effusions with bibasilar opacities could represent   atelectasis or infection              Discharge Medications     Medication List      START taking these medications    aspirin 81 MG chewable tablet  Take 1 tablet by mouth daily        CHANGE how you take these medications    atorvastatin 40 MG tablet  Commonly known as: LIPITOR  Take 1 tablet by mouth nightly  What changed: additional instructions     isosorbide-hydrALAZINE 20-37.5 MG per tablet  Commonly known as: BIDIL  Take 1 tablet by mouth 3 times daily  What changed: additional instructions     metoprolol succinate 25 MG extended release tablet  Commonly known as: TOPROL XL  Take 1 tablet by mouth daily  What changed:   · medication strength  · how much to take     nicotine 7 MG/24HR  Commonly known as: NICODERM CQ  Place 1 patch onto the skin daily  What changed: additional instructions        CONTINUE taking these medications    albuterol sulfate  (90 Base) MCG/ACT inhaler  Commonly known as: ProAir HFA  Inhale 2 puffs into the lungs every 6 hours as needed for Wheezing     insulin lispro 100 UNIT/ML pen  Commonly known as: HumaLOG KwikPen  Glucose: Dose:   No Insulin 140-249 1 Unit 250-349 2 Units Over 350 -3 Units     ipratropium-albuterol 0.5-2.5 (3) MG/3ML Soln nebulizer solution  Commonly known as: DUONEB  Inhale 3 mLs into the lungs every 4 hours as needed for Shortness of Breath        STOP taking these medications    apixaban 5 MG Tabs tablet  Commonly known as: ELIQUIS     dexamethasone 1 MG tablet  Commonly known as: DECADRON           Where to Get Your Medications      These medications were sent to [facility] [id number] [facility] [address] - P [phone] - F [phone]  [address] [place] [facility] [address]    Phone: [phone]   · aspirin 81 MG chewable tablet  · isosorbide-hydrALAZINE 20-37.5 MG per tablet  · metoprolol succinate 25 MG extended release tablet           Discharged in stable condition to home    Follow Up: Follow up with PCP in 1 week    Total time spent on discharge is 35 minutes    CARLA Soares.

## 2022-05-26 NOTE — ED NOTES
This RN notified by ED registration that pt has left ER.       Otoniel Christiansen RN  05/25/22 5837

## 2022-06-27 NOTE — BRIEF OP NOTE
Brief Postoperative Note    Carmen Harley  YOB: 1960  [id number]    Pre-operative Diagnosis: Ascites    Post-operative Diagnosis: Same    Procedure: US Guided Paracentesis    Anesthesia: Local    Surgeons/Assistants: Marly Mariano MD    Estimated Blood Loss: less than 5 mL    Complications: None    Specimens: Was Obtained: serous Ascitic Fluid    Findings: Technically successful US guided paracentesis, 6.4L removed.     Electronically signed by Marly Mariano MD on 6/27/2022 at 2:28 PM

## 2022-07-08 PROBLEM — E87.5 HYPERKALEMIA: Status: ACTIVE | Noted: 2022-01-01

## 2022-07-08 NOTE — PROGRESS NOTES
Inpatient Occupational Therapy  Evaluation and Treatment    Unit: PCU  Date:  7/8/2022  Patient Name:    Olaf Whitt  Admitting diagnosis:  Hyperkalemia [E87.5]  End stage renal disease on dialysis [facility]) [N18.6, Z99.2]  Medically noncompliant [Z91.19]  Admit Date:  7/7/2022  Precautions/Restrictions/WB Status/ Lines/ Wounds/ Oxygen: fall risk, purewick catheter, telemetry and continuous pulse ox, left BKA-  prothesis    Treatment Time:  9557-7845  Treatment Number: 1     Billable Treatment Time: 33 minutes   Total Treatment Time:   43   minutes    Patient Goals for Therapy:  \" stand to urinate \"      Discharge Recommendations: SNF  DME needs for discharge: defer to facility       Therapy recommendations for staff:   Assist of 1 with use of rolling walker (RW) for standing    History of Present Illness: per ED note 59 y.o. male with a history of multiple medical problems including liver cirrhosis, CHF with an ejection fraction of 25%, diabetes, status post left below the knee amputation and also end-stage renal disease and medical noncompliance presents with shortness of breath and generalized malaise. He states he has been falling at home and is so weak he cannot out of his wheelchair. EMS reports his house was in deplorable conditions. The patient is noted to have a dialysis catheter in his right chest.  He states he was told several months ago he did not need to go to dialysis anymore. On chart review this does not appear to be accurate. When questioned again he admits he has been noncompliant has not followed up dialysis since his last hospitalization in May. He reports shortness of breath and states he feels like he needs to sit up because he cannot breathe. He denies any chest pain. No abdominal pain. He does still make urine.   No other complaints, modifying factors or associated symptoms.        Home Health S4 Level Recommendation:  NA  AM-PAC Score: AM-PAC Inpatient Daily Activity Raw Score: 53 Emelyn Henry    Pt. Lives Alone  (son lives 30 minutes away, see a few times a week)  Home environment:  one story home  Steps to enter first floor:   No steps    Steps to second floor: N/A  Bathroom:  Tub/Shower unit , Pt sponge bathes, handicap height toilet- uses walker in bathroom to get to commode, w/c does not fit in bathroom  Equipment owned:  [address] [employer] (RW) and manual W/C     Preadmission Status / PLOF:  History of falls   Yes reason for admission  Pt. Able to drive   Yes  Pt Fully independent with ADL's  Yes  Pt. Required assistance from family for: Independent PTA    Pt. Fully independent for transfers and gait and walked with: wheelchair mostly, uses walker short distances    Pain  No  Rating:NA  Location:  Pain Medicine Status: No request made      Cognition    A&O x4   Able to follow 2 step commands    Subjective  Patient lying supine in bed with no family present   Pt agreeable to this OT eval & tx. Upper Extremity ROM:    WFL,  pt able to perform all bed mobility, transfers, and gait without ROM limitation. Upper Extremity Strength:    BUE strength impaired but not formally assessed w/ MMT    Upper Extremity Sensation    WFL    Upper Extremity Proprioception:  WFL    Coordination and Tone  WFL    Balance  Functional Sitting Balance:  Impaired CGA -SBA at EOB  Functional Standing Balance:Impaired Min A standing with RW    Bed mobility:    Supine to sit:   Mod A  Sit to supine:   CGA  Rolling:    Not Tested  Scooting in sitting: Mod A  Scooting to head of bed:   Total A     Bridging:   Not Tested    Transfers:  Nursing requests Pt back to bed for safety  Sit to stand:   Mod A with RW with bed elevated   Stand to sit:  Min A with RW with bed elevated  Bed to chair:   Not Tested  Standard toilet: Not Tested  Bed to [facility]:  Not Tested    Dressing:      UE:   Not Tested  LE:    Max A    Bathing:    UE:  Not Tested  LE:  Not Tested    Eating:   Independent    Toileting:  Not Tested    Grooming: Not Tested    Activity Tolerance   Pt completed therapy session with SOB noted with activity  SpO2: 95% on RA   HR: 65  BP: 125/72  Sitting at EOB O2 sats on RA 88%, Pts O2 placed back on 2L and sats returned to 98%  Supine: O2 sats 94% on RA    Positioning Needs: In bed, call light and needs in reach. Alarm Set    Exercise / Activities Initiated:   N/A    Patient/Family Education:   Role of OT  Recommendations for DC  Energy conservation techniques  Safe RW use/hand placement  Oxygen tubing management    Assessment of Deficits: Pt seen for Occupational therapy evaluation in acute care setting. Pt demonstrated decreased Activity tolerance, ADLs, IADLs, Balance , Bed mobility, Safety Awareness, Strength and Transfers. Pt functioning below baseline and will likely benefit from skilled occupational therapy services to maximize safety and independence. Goal(s) : To be met in 3 Visits:  1). Bed to toilet/BSC: Min A    To be met in 5 Visits:  1). Supine to/from Sit:  Min A  2). Upper Body Bathing:   SBA  3). Lower Body Bathing: Mod A  4). Upper Body Dressing:  SBA  5). Lower Body Dressing: Mod A  6). Pt to demonstrate UE exs x 15 reps with minimal cues    Rehabilitation Potential:  Good for goals listed above. Strengths for achieving goals include: Pt motivated and Family Support  Barriers to achieving goals include:  Weakness     Plan: To be seen 3-5 x/wk while in acute care setting for therapeutic exercises, bed mobility, transfers, dressing, bathing, family/patient education, ADL/IADL retraining, energy conservation training.      Ami Keller, OTR/L 7084    If patient discharges from this facility prior to next visit, this note will serve as the Discharge Summary

## 2022-07-08 NOTE — ED NOTES
[phone] Call placed to Nephrology for consult     [phone] consult completed with call back spoke with Dr. Holly Yancey  07/07/22 0856

## 2022-07-08 NOTE — PROGRESS NOTES
4 Eyes Skin Assessment     The patient is being assess for   Admission    I agree that 2 RN's have performed a thorough Head to Toe Skin Assessment on the patient. ALL assessment sites listed below have been assessed. Areas assessed for pressure by both nurses:   [x]   Head, Face, and Ears   [x]   Shoulders, Back, and Chest, Abdomen  [x]   Arms, Elbows, and Hands   [x]   Coccyx, Sacrum, and Ischium  [x]   Legs, Feet, and Heels        Skin Assessed Under all Medical Devices by both nurses:  Patient had   Blanchable redness to Right buttocks with old scarring to his sacrum, patient refused Mepilex and allowed pillow placement and rotation. Appears to be MASD  Bruise to Right knee with abrasion in various healing stages  Dry/Flaky/Red skin to Right Shin  Right outer foot DTI  Right great toe DTI  3rd right toe DTI  All toe to right foot were dry/flaky  Left BKA stump intact and No remarkable issues       All Mepilex Borders were peeled back and area peeked at by both nurses:  No: NA  Please list where Mepilex Borders are located:  NA               **SHARE this note so that the co-signing nurse is able to place an eSignature**    Co-signer eSignature: Electronically signed by Kiki Katz RN on 7/8/22 at 7:36 PM EDT    Does the Patient have Skin Breakdown related to pressure?   Yes [facility] WOUND CARE was Initiated documentation include the Bella-wound, Wound Assessment, Measurements, Dressing Treatment, Drainage, and Color\",                                  Gabriel Prevention initiated:  Yes   Wound Care Orders initiated:  Yes      [address]  nurse consulted for Pressure Injury (Stage 3,4, Unstageable, DTI, NWPT, Complex wounds)and New or Established Ostomies:  Yes      Primary Nurse eSignature: Electronically signed by Luis Alberto Perry RN on 7/8/22 at 7:28 PM EDT

## 2022-07-08 NOTE — ED PROVIDER NOTES
[facility] Emergency Department      CHIEF COMPLAINT  Fatigue (lives at home, feels like he cannot take care of himself at home. does live alone, per ems not good living situations.)      HISTORY OF PRESENT ILLNESS  Lori Cherry is a 59 y.o. male with a history of multiple medical problems including liver cirrhosis, CHF with an ejection fraction of 25%, diabetes, status post left below the knee amputation and also end-stage renal disease and medical noncompliance presents with shortness of breath and generalized malaise. He states he has been falling at home and is so weak he cannot out of his wheelchair. EMS reports his house was in deplorable conditions. The patient is noted to have a dialysis catheter in his right chest.  He states he was told several months ago he did not need to go to dialysis anymore. On chart review this does not appear to be accurate. When questioned again he admits he has been noncompliant has not followed up dialysis since his last hospitalization in May. He reports shortness of breath and states he feels like he needs to sit up because he cannot breathe. He denies any chest pain. No abdominal pain. He does still make urine. No other complaints, modifying factors or associated symptoms. I have reviewed the following from the nursing documentation.     Past Medical History:   Diagnosis Date    Bacteremia 11/18/2017    strep agalactiae (also cultured from foot)    CAD (coronary artery disease) 8/18/2021    Cellulitis and abscess of foot, except toes     CHF (congestive heart failure) (HCC)     Diabetes mellitus ([address]     History of liver failure     Hypertension     Kidney disease     MRSA (methicillin resistant staph aureus) culture positive 4/17/18, 11/19/2017    left foot     Past Surgical History:   Procedure Laterality Date    IR TUNNELED CATHETER PLACEMENT GREATER THAN 5 YEARS  1/11/2021    IR TUNNELED CATHETER PLACEMENT GREATER THAN 5 YEARS 2021 Dario Martinez MD [facility]ED[facility]    LEG AMPUTATION BELOW KNEE      PARACENTESIS      UPPER GASTROINTESTINAL ENDOSCOPY N/A 2019    EGD ESOPHAGOGASTRODUODENOSCOPY performed by Analy Soto MD at [address]      Left BKA     Family History   Problem Relation Age of Onset    Diabetes Mother     Hypertension Mother     Dementia Mother     Heart Attack Father     Stroke Brother     No Known Problems Maternal Grandmother     No Known Problems Maternal Grandfather     No Known Problems Paternal Grandmother     No Known Problems Paternal Grandfather     Heart Attack Brother      Social History     Socioeconomic History    Marital status:      Spouse name: Not on file    Number of children: Not on file    Years of education: Not on file    Highest education level: Not on file   Occupational History    Occupation: unemployed   Tobacco Use    Smoking status: Former Smoker     Packs/day: 1.00     Years: 40.00     Pack years: 40.00     Types: Cigarettes     Quit date: 2022     Years since quittin.4    Smokeless tobacco: Former User     Types: Chew    Tobacco comment: attempting to quit at this time   Vaping Use    Vaping Use: Never used   Substance and Sexual Activity    Alcohol use: No     Comment: none    Drug use: Yes     Types: Marijuana Wesly Semen)     Comment: occassionally     Sexual activity: Yes     Partners: Female   Other Topics Concern    Not on file   Social History Narrative    Not on file     Social Determinants of Health     Financial Resource Strain:     Difficulty of Paying Living Expenses: Not on file   Food Insecurity:     Worried About [address] in the Last Year: Not on file    [address] in the Last Year: Not on file   Transportation Needs:     Lack of Transportation (Medical): Not on file    Lack of Transportation (Non-Medical):  Not on file   Physical Activity:     Days of Exercise per Week: Not on file    Minutes of Exercise per Session: Not on file   Stress:     Feeling of Stress : Not on file   Social Connections:     Frequency of Communication with Friends and Family: Not on file    Frequency of Social Gatherings with Friends and Family: Not on file    Attends Hoahaoism Services: Not on file    Active Member of [address] [place] [address] or Organizations: Not on file    Attends Club or Organization Meetings: Not on file    Marital Status: Not on file   Intimate Partner Violence:     Fear of Current or Ex-Partner: Not on file    Emotionally Abused: Not on file    Physically Abused: Not on file    Sexually Abused: Not on file   Housing Stability:     Unable to Pay for Housing in the Last Year: Not on file    Number of [place] in the Last Year: Not on file    Unstable Housing in the Last Year: Not on file     Current Facility-Administered Medications   Medication Dose Route Frequency Provider Last Rate Last Admin    iopamidol (ISOVUE-370) 76 % injection 85 mL  85 mL IntraVENous ONCE PRN Sujatha Rubalcava MD        sodium zirconium cyclosilicate (LOKELMA) oral suspension 5 g  5 g Oral TID Sujatha Rubalcava MD         Current Outpatient Medications   Medication Sig Dispense Refill    aspirin 81 MG chewable tablet Take 1 tablet by mouth daily 30 tablet 0    metoprolol succinate (TOPROL XL) 25 MG extended release tablet Take 1 tablet by mouth daily 30 tablet 0    isosorbide-hydrALAZINE (BIDIL) 20-37.5 MG per tablet Take 1 tablet by mouth 3 times daily 90 tablet 0    ipratropium-albuterol (DUONEB) 0.5-2.5 (3) MG/3ML SOLN nebulizer solution Inhale 3 mLs into the lungs every 4 hours as needed for Shortness of Breath 360 mL     nicotine (NICODERM CQ) 7 MG/24HR Place 1 patch onto the skin daily (Patient taking differently: Place 1 patch onto the skin daily Not taking) 30 patch 3    insulin lispro (HUMALOG KWIKPEN) 100 UNIT/ML pen Glucose: Dose:   No Insulin 140-249 1 Unit 250-349 2 Units Over 350 -3 Units 2 pen 0    atorvastatin (LIPITOR) 40 MG tablet Take 1 tablet by mouth nightly (Patient taking differently: Take 40 mg by mouth nightly Not taking) 30 tablet 0    albuterol sulfate HFA (PROAIR HFA) 108 (90 Base) MCG/ACT inhaler Inhale 2 puffs into the lungs every 6 hours as needed for Wheezing 1 Inhaler 3     Allergies   Allergen Reactions    Pcn [Penicillins] Hives       REVIEW OF SYSTEMS      General:  No fevers. Generalized malaise and fatigue. Eyes:  No recent vison changes  ENT:  No sore throat, no nasal congestion  Cardiovascular:  no palpitations  Respiratory:   Shortness of breath  Gastrointestinal:  No abdominal pain, no vomiting, no diarrhea  Musculoskeletal:  No muscle pain, no joint pain  Skin:  No rash   Neurologic:  No speech problems, no headache, no extremity numbness, no extremity weakness  Genitourinary:  No dysuria  Extremities:  no edema, no pain      Unless otherwise stated in this report, this patient's positive and negative responses for review of systems (constitutional, eyes, ENT, cardiovascular, respiratory, gastrointestinal, neurological, genitourinary, musculoskeletal, integument systems and systems related to the presenting problem) are either stated in the preceding paragraph, were not pertinent or were negative for the symptoms and/or complaints related to the medical problem. PHYSICAL EXAM  BP (!) 146/68   Pulse 66   Temp 98.2 °F (36.8 °C) (Oral)   Resp 15   Ht 6' 1\" (1.854 m)   Wt 250 lb (113.4 kg)   SpO2 95%   BMI 32.98 kg/m²   GENERAL APPEARANCE: Awake and alert. Disheveled, appears chronically ill. HEAD: Normocephalic. Atraumatic. EYES: PERRL. EOM's grossly intact. ENT: Mucous membranes are moist.   NECK: Supple, trachea midline. HEART: RRR. LUNGS: Tachypneic, bibasilar crackles. Moderate respiratory distress. ABDOMEN: Soft. Non-distended. Non-tender. No guarding or rebound. EXTREMITIES: No peripheral edema. Left below the knee amputation.   SKIN: Warm, dry and intact. Scattered excoriations on his extremities. NEUROLOGICAL: Alert and oriented X 3. CN II-XII grossly intact. Equal strength and sensation in bilateral extremities but is globally weak. PSYCHIATRIC: Normal mood and affect. LABS  I have reviewed all labs for this visit.    Results for orders placed or performed during the hospital encounter of 07/07/22   COVID-19 & Influenza Combo    Specimen: Nasopharyngeal Swab   Result Value Ref Range    SARS-CoV-2 RNA, RT PCR NOT DETECTED NOT DETECTED    INFLUENZA A NOT DETECTED NOT DETECTED    INFLUENZA B NOT DETECTED NOT DETECTED   Blood Gas, Venous   Result Value Ref Range    pH, Inocencio 7.163 (LL) 7.350 - 7.450    pCO2, Inocencio 45.2 40.0 - 50.0 mmHg    pO2, Inocencio 52.0 (H) 25.0 - 40.0 mmHg    HCO3, Venous 15.9 (L) 23.0 - 29.0 mmol/L    Base Excess, Inocencio -12.2 (L) -3.0 - 3.0 mmol/L    O2 Sat, Inocencio 77 Not Established %    Carboxyhemoglobin 2.3 (H) 0.0 - 1.5 %    MetHgb, Inocencio 0.3 <1.5 %    TC02 (Calc), Inocencio 17 Not Established mmol/L    O2 Therapy Unknown    CBC with Auto Differential   Result Value Ref Range    WBC 9.5 4.0 - 11.0 K/uL    RBC 3.31 (L) 4.20 - 5.90 M/uL    Hemoglobin 9.7 (L) 13.5 - 17.5 g/dL    Hematocrit 30.5 (L) 40.5 - 52.5 %    MCV 92.3 80.0 - 100.0 fL    MCH 29.3 26.0 - 34.0 pg    MCHC 31.8 31.0 - 36.0 g/dL    RDW 15.9 (H) 12.4 - 15.4 %    Platelets 358 139 - 515 K/uL    MPV 8.2 5.0 - 10.5 fL    Neutrophils % 91.3 %    Lymphocytes % 2.5 %    Monocytes % 5.2 %    Eosinophils % 0.6 %    Basophils % 0.4 %    Neutrophils Absolute 8.7 (H) 1.7 - 7.7 K/uL    Lymphocytes Absolute 0.2 (L) 1.0 - 5.1 K/uL    Monocytes Absolute 0.5 0.0 - 1.3 K/uL    Eosinophils Absolute 0.1 0.0 - 0.6 K/uL    Basophils Absolute 0.0 0.0 - 0.2 K/uL   Comprehensive Metabolic Panel w/ Reflex to MG   Result Value Ref Range    Sodium 137 136 - 145 mmol/L    Potassium reflex Magnesium 7.7 (HH) 3.5 - 5.1 mmol/L    Chloride 103 99 - 110 mmol/L    CO2 16 (L) 21 - 32 mmol/L    Anion Gap 18 (H) 3 - 16    Glucose 130 (H) 70 - 99 mg/dL     (HH) 7 - 20 mg/dL    CREATININE 9.8 (HH) 0.8 - 1.3 mg/dL    GFR Non- 5 (A) >60    GFR  7 (A) >60    Calcium 5.7 (LL) 8.3 - 10.6 mg/dL    Total Protein 6.0 (L) 6.4 - 8.2 g/dL    Albumin 2.8 (L) 3.4 - 5.0 g/dL    Albumin/Globulin Ratio 0.9 (L) 1.1 - 2.2    Total Bilirubin 0.3 0.0 - 1.0 mg/dL    Alkaline Phosphatase 85 40 - 129 U/L    ALT 10 10 - 40 U/L    AST 20 15 - 37 U/L   Brain Natriuretic Peptide   Result Value Ref Range    Pro-BNP >70,000 (H) 0 - 124 pg/mL   Troponin   Result Value Ref Range    Troponin 0.68 (HH) <0.01 ng/mL   Lactate, Sepsis   Result Value Ref Range    Lactic Acid, Sepsis 0.6 0.4 - 1.9 mmol/L   Lactate, Sepsis   Result Value Ref Range    Lactic Acid, Sepsis 0.6 0.4 - 1.9 mmol/L   EKG 12 Lead   Result Value Ref Range    Ventricular Rate 72 BPM    Atrial Rate 72 BPM    P-R Interval 80 ms    QRS Duration 158 ms    Q-T Interval 446 ms    QTc Calculation (Bazett) 488 ms    P Axis 22 degrees    R Axis 243 degrees    T Axis -19 degrees    Diagnosis       Sinus rhythm with short PRIndeterminate axisRight bundle branch blockSeptal infarct (cited on or before 23-APR-2022)Inferior infarct (cited on or before 23-APR-2022)Abnormal ECGWhen compared with ECG of 23-APR-2022 17:05,Questionable change in QRS durationQuestionable change in initial forces of Lateral leads       EKG  The Ekg interpreted by myself  normal sinus rhythm with a rate of 72  Axis is   Normal  QTc is  normal  Right bundle branch block  No specific ST-T wave changes appreciated. No evidence of acute ischemia. No prior EKG for comparison. Cardiac Monitoring: The cardiac monitor revealed normal sinus rhythm as interpreted by me. The cardiac monitor was ordered secondary to the patient's complaint of shortness of breath and to monitor the patient for dysrhythmia.           RADIOLOGY  X-RAYS: ALL IMAGES INCLUDING PLAIN FILMS, CT, ULTRASOUND AND MRI HAVE BEEN READ BY THE RADIOLOGIST. I have personally reviewed plain film images and have reviewed the radiology reports. XR CHEST PORTABLE   Final Result   No acute cardiopulmonary disease. Stable CHF with bilateral effusions and   bibasilar volume loss. Rechecks: Physical assessment performed. Patient is mod for comfortable on BiPAP. He has been given medications to temporize his hyperkalemia and is awaiting dialysis in bed 5 in the ER at this time. Critical Care:I personally spent a total of 50 minutes of critical care time in obtaining history, performing a physical exam, bedside monitoring of interventions, collecting and interpreting tests and discussion with consultants but excluding time spent performing procedures, treating other patients and teaching time. Sepsis:  Is this patient to be included in the SEP-1 Core Measure due to severe sepsis or septic shock? No   Exclusion criteria - the patient is NOT to be included for SEP-1 Core Measure due to:  2+ SIRS criteria are not met         ED COURSE/MDM  Patient seen and evaluated. Here the patient is afebrile and tachypneic. With any movement his oxygen sats do drop below 90%. . Old records reviewed, including details of his previous admissions. It appears the patient is chronically noncompliant with dialysis. He is in respiratory distress on arrival so he was placed on BiPAP. Lab work is revealing acute on chronic renal failure with hyperkalemia with a potassium of 7.7. His pH is 7.1. He is not having any chest pain. His troponin is elevated but I think this is likely secondary to his end-stage renal disease. I have ordered multiple medications to temporize his hyperkalemia. I have ordered insulin and D50, Lasix, sodium bicarb, calcium gluconate and albuterol. I have also ordered Devan Cram.   The patient does still make urine.  I consulted nephrology and spoke with Dr. Tuyet Reilly. He is calling in the dialysis nurse for emergent dialysis. We do not currently have an available ICU bed so we will move the patient to bed 5 in the ER for emergent dialysis. We will repeat lab work and see if we can take him off of BiPAP after dialysis. If so he will be able to be downgraded to PCU status and be admitted here. I will transfer care to Dr. Nikolay Waller at shift change with repeat lab work and reassessment after dialysis pending. I have spoken with Dr. Radha Romero who is aware of the patient as well. Labs and imaging reviewed and results discussed with patient. Patient was reassessed as noted above . Plan of care discussed with patient. Patient in agreement with plan. CLINICAL IMPRESSION  1. End stage renal disease on dialysis ([facility] [address]    2. Medically noncompliant    3. Hyperkalemia        Blood pressure (!) 146/68, pulse 66, temperature 98.2 °F (36.8 °C), temperature source Oral, resp. rate 15, height 6' 1\" (1.854 m), weight 250 lb (113.4 kg), SpO2 95 %. Kathryn Jacob was admitted in stable condition.     (Please note this note was completed with a voice recognition program.  Efforts were made to edit the dictations but occasionally words are mis-transcribed.)        Lluvia Pace MD  07/08/22 1379

## 2022-07-08 NOTE — ED TRIAGE NOTES
Patient states the reason he came today was because it was getting more difficult to get up at home, states he used a wheelchair to get to and from the restroom, denies having family around, states he takes care of himself. Denies pain, poor historian, is unable to tell me past medical hx.

## 2022-07-08 NOTE — PROGRESS NOTES
Patient has been complaining of rear end pain. No Notable redness. Patient given pillow and recommended to rotate Q2 Hour and refusing. Patient has been educated on the need to alternate positions to prevent skin breakdown.

## 2022-07-08 NOTE — PROGRESS NOTES
07/07/22 7384   NIV Type   $NIV $Daily Charge   Skin Assessment Clean, dry, & intact   Skin Protection for O2 Device Yes   Location Nose   NIV Started/Stopped On   Equipment Type V60   Mode Bilevel   Mask Type Full face mask   Mask Size Extra large   Settings/Measurements   PIP Observed 17 cm H20   IPAP 16 cmH20   CPAP/EPAP 8 cmH2O   Rate Ordered 16   Resp (!) 34   Insp Rise Time (%) 2 %   FiO2  30 %   I Time/ I Time % 0.9 s   Vt Exhaled 567 mL   Minute Volume 19.1 Liters   Mask Leak (lpm) 67 lpm   Comfort Level Good   Using Accessory Muscles No   SpO2 98   Patient's Home Machine No   Alarm Settings   Alarms On Y   Low Pressure 8 cmH2O   High Pressure  35 cmH2O   Delay Alarm 20 sec(s)   RR Low  14   RR High 45 br/min

## 2022-07-08 NOTE — PROGRESS NOTES
RT Inhaler-Nebulizer Bronchodilator Protocol Note    There is a bronchodilator order in the chart from a provider indicating to follow the RT Bronchodilator Protocol and there is an Initiate RT Inhaler-Nebulizer Bronchodilator Protocol order as well (see protocol at bottom of note). CXR Findings:  XR CHEST PORTABLE    Result Date: 7/7/2022  No acute cardiopulmonary disease. Stable CHF with bilateral effusions and bibasilar volume loss. The findings from the last RT Protocol Assessment were as follows:   History Pulmonary Disease: Smoker 15 pack years or more  Respiratory Pattern: Dyspnea on exertion or RR 21-25 bpm  Breath Sounds: Clear breath sounds  Cough: Strong, spontaneous, non-productive  Indication for Bronchodilator Therapy: Decreased or absent breath sounds  Bronchodilator Assessment Score: 3    Aerosolized bronchodilator medication orders have been revised according to the RT Inhaler-Nebulizer Bronchodilator Protocol below. Respiratory Therapist to perform RT Therapy Protocol Assessment initially then follow the protocol. Repeat RT Therapy Protocol Assessment PRN for score 0-3 or on second treatment, BID, and PRN for scores above 3. No Indications - adjust the frequency to every 6 hours PRN wheezing or bronchospasm, if no treatments needed after 48 hours then discontinue using Per Protocol order mode. If indication present, adjust the RT bronchodilator orders based on the Bronchodilator Assessment Score as indicated below. Use Inhaler orders unless patient has one or more of the following: on home nebulizer, not able to hold breath for 10 seconds, is not alert and oriented, cannot activate and use MDI correctly, or respiratory rate 25 breaths per minute or more, then use the equivalent nebulizer order(s) with same Frequency and PRN reasons based on the score. If a patient is on this medication at home then do not decrease Frequency below that used at home.     0-3 - enter or revise RT bronchodilator order(s) to equivalent RT Bronchodilator order with Frequency of every 4 hours PRN for wheezing or increased work of breathing using Per Protocol order mode. 4-6 - enter or revise RT Bronchodilator order(s) to two equivalent RT bronchodilator orders with one order with BID Frequency and one order with Frequency of every 4 hours PRN wheezing or increased work of breathing using Per Protocol order mode. 7-10 - enter or revise RT Bronchodilator order(s) to two equivalent RT bronchodilator orders with one order with TID Frequency and one order with Frequency of every 4 hours PRN wheezing or increased work of breathing using Per Protocol order mode. 11-13 - enter or revise RT Bronchodilator order(s) to one equivalent RT bronchodilator order with QID Frequency and an Albuterol order with Frequency of every 4 hours PRN wheezing or increased work of breathing using Per Protocol order mode. Greater than 13 - enter or revise RT Bronchodilator order(s) to one equivalent RT bronchodilator order with every 4 hours Frequency and an Albuterol order with Frequency of every 2 hours PRN wheezing or increased work of breathing using Per Protocol order mode. RT to enter RT Home Evaluation for COPD & MDI Assessment order using Per Protocol order mode.     Electronically signed by Lawrence Parsons RCP on 7/8/2022 at 11:46 AM

## 2022-07-08 NOTE — PLAN OF CARE
61yoM p/w fatigue. He has apparently not gone to HD since May when he was last DC. He was found to be vol OL, hypoxic and markedly hyperkalemic. He was requiring BIPAP. No ICU bed available here so decision was made to emergently dialyze in ED and try to wean off BIPAP if able to allow for PCU admit. Following HD he was able to be weaned off BIPAP. He is now requiring 2L. K went from 7.7 to 5.9. He may need HD again today. Trop is elevated but stable at 0.68 and 0.70, tele and ser trops ordered. He has presented w/ similar values in the past.  Notified ED of pt glucose of 57 on repeat labs at ~0300. Hypoglycemia orders placed.

## 2022-07-08 NOTE — PROGRESS NOTES
Inpatient Physical Therapy Evaluation and Treatment    [facility]  Date:  7/8/2022  Patient Name:    Maribel Palmer  Admitting diagnosis:  Hyperkalemia [E87.5]  End stage renal disease on dialysis [facility]) [N18.6, Z99.2]  Medically noncompliant [Z91.19]  Admit Date:  7/7/2022  Precautions/Restrictions/WB Status/ Lines/ Wounds/ Oxygen: Fall risk, Bed/chair alarm, Lines -Supplemental O2 (2L PRN per RN) and Purewick catheter, Telemetry and Continuous pulse oximetry   RN requested return to bed at end of session. Treatment Time:  12:30-13:05  Treatment Number:  1   Timed Code Treatment Minutes: 25 minutes  Total Treatment Minutes:  35  minutes    Patient Goals for Therapy: stand up to urinate      Discharge Recommendations: SNF  DME needs for discharge: defer to facility       Therapy recommendation for EMS Transport: requires transport by cot due to unsafe transfer. Therapy recommendations for staff:   Assist of 1 for bed level mobility. STEDY assist of 1-2 for transfer to chair or BSC (elevated). History of Present Illness: Per ED provider Dr. Stephanie Slaughter: DeLayna.Pinks p/w fatigue. He has apparently not gone to HD since May when he was last DC. He was found to be vol OL, hypoxic and markedly hyperkalemic. He was requiring BIPAP. No ICU bed available here so decision was made to emergently dialyze in ED and try to wean off BIPAP if able to allow for PCU admit. Following HD he was able to be weaned off BIPAP. He is now requiring 2L. K went from 7.7 to 5.9. He may need HD again today. Trop is elevated but stable at 0.68 and 0.70, tele and ser trops ordered. He has presented w/ similar values in the past.  Notified ED of pt glucose of 57 on repeat labs at ~0300. Hypoglycemia orders placed. \" No H&P yet. Home Health S4 Level Recommendation:  NA  AM-PAC Mobility Score       AM-PAC Inpatient Mobility without Stair Climbing Raw Score : 11    Preadmission Environment    Pt.  Lives Alone  (son lives 30 minutes away, see about once a week)  Home environment:    one story home  Steps to enter first floor:   No steps      Steps to second floor: N/A  Bathroom:       Tub/Shower unit , Pt sponge bathes, handicap height toilet- uses walker in bathroom to get to commode, w/c does not fit in bathroom  Equipment owned:      EchoStar (RW) and manual W/C      Preadmission Status / PLOF:  History of falls             Yes, reason for admission  Pt. Able to drive          Yes  Pt Fully independent with ADL's         Yes  Pt. Required assistance from family for: Independent PTA    Pt. Fully independent for transfers with walker, uses wheelchair mostly for household mobility, uses walker within bathroom to get to commode. Pain   No    Cognition    A&O x4   Able to follow 2 step commands    Subjective  Patient lying supine in bed with no family present. Pt agreeable to this PT eval & tx. Upper Extremity ROM/Strength  Please see OT evaluation. Lower Extremity ROM / Strength   AROM WFL: Yes    BLE strength impaired, but not formally assessed with MMT. Assessed functionally. Lower Extremity Sensation    Impaired - lacking sensation to light touch and deep pressure R foot, numb to knee level. Lower Extremity Proprioception:   NT    Coordination and Tone  NT    Balance  Sitting:  Good ; Supervision statically at EOB. SBA while attempting to don prosthetic (see OT note for details)  Comments: at EOB ~15 minutes. Standing: Fair ; CGA  Comments: tolerated ~20 seconds static stance with walker at EOB, heavy reliance on UEs for support (arms shaky), flexed posture, RLE resting against side of bed for support. Bed Mobility   Supine to Sit:    Max A   Sit to Supine:   Max A   Rolling:   Not Tested  Scooting in sitting: Not Tested  Scooting in supine: Total A    Transfer Training     Sit to stand: Mod A from elevated EOB  Stand to sit:   CGA  Bed to Chair:   Not Tested with use of N/A.  Deferred 2/2 fatigue and unsteadiness while standing statically. Gait gait deferred due to fatigue/weakness in static stance; pt ambulated 0 ft. Stair Training deferred, pt unsafe/ not appropriate to complete stairs at this time    Activity Tolerance   Pt completed therapy session with SOB noted with all functional activity. At rest:  SpO2: 95% on RA   HR: 65  BP: 125/72  Sitting at EOB:  SpO2 88% on RA, Pts O2 placed back on 2L and sats returned to 98%  Supine:   SpO2 94% on RA    Positioning Needs   Pt in bed, alarm set, positioned in proper neutral alignment and pressure relief provided. Call light provided and all needs within reach    Exercises Initiated  Sravani deferred secondary to treatment focus on functional mobility  NA    Other  Pt unable to don/doff prosthesis and shoe. Typically able to do this although difficult per pt. Patient/Family Education   Pt educated on role of inpatient PT, POC, importance of continued activity, DC recommendations, transfer techniques and calling for assist with mobility. Assessment  Pt seen for Physical Therapy evaluation in acute care setting. Pt demonstrated decreased Activity tolerance, Balance, Safety and Strength as well as decreased independence with Ambulation, Bed Mobility  and Transfers. Recommending SNF upon discharge as patient functioning well below baseline, demonstrates good rehab potential and unable to return home due to limited or no family support, home environment not conducive to patient recovery and limited safety awareness. Goals : To be met in 3 visits:  1). Independent with LE Ex x 10 reps    To be met in 6 visits:  1). Supine to/from sit: Supervision   2). Sit to/from stand: Supervision  3). Bed to chair: SBA  4). Gait: Ambulate 10 ft.   with  CGA and use of rolling walker (RW)  5).   Tolerate B LE exercises 3 sets of 10-15 reps    Rehabilitation Potential: Good  Strengths for achieving goals include:   Pt cooperative   Barriers to achieving goals include:    Complexity of condition    Plan    To be seen 3-5 x / week  while in acute care setting for therapeutic exercises, bed mobility, transfers, progressive gait training, balance training, and family/patient education. Signature: Curlee Romberg, PT, DPT    If patient discharges from this facility prior to next visit, this note will serve as the Discharge Summary.

## 2022-07-08 NOTE — FLOWSHEET NOTE
Treatment time: 3 hrs    Net UF: 3 liters    Pre weight: UTO  Post weight: UTO  EDW: TBD    Access used: RCW TDC  Access function: -300. Increased Arterial pressures at times. Cath nina placed per MD orders.  after Cath nina dwelling. Pt completed last 2 hrs of treatment without difficulty. Medications or blood products given: None    Regular outpatient schedule: [facility], per pt has not been to dialysis since discharged from hospital in May. Summary of response to treatment: 1 hr of treatment then catheter issues and system clotting. Cath nina dwelled 45 min, rechecked and good blood return, and flushes with ease. Completed ordered treatment of 3 hrs total. No crit line secondary to system clotting and interruption. Copy of dialysis treatment record placed in chart, to be scanned into EMR.        07/08/22 0109 07/08/22 0535   Treatment   Time On 0109  --    Time Off  --  0530   Vital Signs   /71 133/79   Heart Rate 60 68   Post-Hemodialysis Assessment   Blood Volume Processed (Liters)  --  49.5 l/min   Duration of Treatment (minutes)  --  180 minutes   NET Removed (ml)  --  3013   Tolerated Treatment  --  Good  (Pt tolerated, catheter issues)   Dialysis Bath   K+ (Potassium) 1  (1k for 1 hr then 2k for 2 hrs)  --    Ca+ (Calcium) 2.5  --    Na+ (Sodium) 138  --    HCO3 (Bicarb) 38  --

## 2022-07-08 NOTE — PROGRESS NOTES
07/08/22 0413   NIV Type   NIV Started/Stopped Off   Settings/Measurements   Resp 18   Patient Observation   Observations patient off bipap at this time

## 2022-07-08 NOTE — ACP (ADVANCE CARE PLANNING)
Advance Care Planning   The patient has the following advanced directives on file:  Advance Directives     Power of [address] Will ACP-Advance Directive ACP-Power of     Not on File Not on File Not on File Not on File          The patient has appointed the following active healthcare agents:    Primary Decision Maker: José Manuel Cartagena - [phone]    The Patient has the following current code status:    Code Status: Full Code    Visit Documentation:  I called and spoke with pt's son,Lawson, regarding Advance Care Planning and Advance Care Directives, including Living Will and [address]. I discussed Advance Care Planning with pt's son,Lawson, today which included the importance of making their choices for care and treatment in the case of a health event that adversely affects their decision-making abilities. Pt has not completed the Advance Care Directives. Pt. does not have an active health care agent at this time. Palliative Care Initial Note  Palliative Care Admit date:07/08/22    Advance Directives:Full Code    Plan of care/goals: Reviewed chart. Pt off the floor for testing at this time. Per nephrology notes pt is confused likely r/t uremia. Pt has refused HD for the past few months. Pt has hx of CHF with EF of 25%, DM,right BKA amputation, cirrhosis r/t alcoholism, and ESKD. Writer spoke with pt's son,Lawson, and he states will be in to see his father over the weekend. He is the only child. Pt has been to [facility] and [facility] in the past and Lawson requesting referrals to both facilities. [facility] to [facility] with [facility] and she is out of network with pt's insurance. Referral called to Jerry Nugent with [facility] and she is reviewing the pt,however, likely no HD beds at [facility]. JEFF Tsang updated on above POC. [facility] with the pt and he is very [facility] and alert at this time. Pt agreeable to SNF for short term rehab.  Pt states HD was not helping him and unsure if he wants to continue with it. Pt requesting for staff to come in and reposition him, needing to use urinal. PC to f/u with pt on 07/11/22 r/t GOC discussion in hopes that mentation will improve as well. Social/Spiritual: Prayer Support    Plan: Referral called to Delvis Lewis with [facility] for short term rehab and writer left  for admissions Winsome Esparza. PC following peripherally.      Reason for consult:    _x__ Advance Care Planning  _x__ Transition of Care Planning  _x__ Psychosocial/Spiritual Support  ___  Symptom Management    Lois Walker, [facility]  7/8/2022

## 2022-07-08 NOTE — CARE COORDINATION
Case Management Assessment  Initial Evaluation      Patient Name: Antony Velez  YOB: 1960  Diagnosis: Hyperkalemia [E87.5]  End stage renal disease on dialysis [facility] [N18.6, Z99.2]  Medically noncompliant [Z91.19]  Date / Time: 7/7/2022  7:50 PM    Admission status/Date:  07/08/2022  Chart Reviewed: Yes      Patient Interviewed: Yes   Family Interviewed:  Yes - [address] [facility]     Hospitalization in the last 30 days:  No      Health Care Decision Maker :   Primary Decision Maker: [facility] QuickProNotes [facility] - [phone]    (CM - must 1st enter selection under Navigator - emergency contact- [place] Relationship and pick relationship)   Who do you trust or have selected to make healthcare decisions for you    Current PCP: [address] [place] [address] required for SNF : YES       3 night stay required -  NA  ADLS  Support Systems/Care Needs: Children  Transportation: drives    Meal Preparation: self    Housing  Living Arrangements: Lives alone  Steps: None  Intent for return to present living arrangements: TBD  Identified Issues: having difficulty with caring for self, weakness. Non-compliant with HD and was discharged in may 2022 from [facility].  No 24/7 assistance available, son lives 30 min away and sees him 1-2 times a week    [address] [facility] : No Agency:(Services)  Type of Home Care Services: None  Passport/Waiver : No  :                      Phone Number:    Passport/Waiver Services: NA          Durable Medical Equiptment   DME Provider:   Equipment:   Walker___Cane___RTS___ BSC___Shower Chair___Hospital Bed___W/C____Other________  02 at [facility] his fathers old O2 concentrator that he uses PRN ___Liter(s)---wears(frequency)_______ HHN ___ CPAP___ BiPap___   N/A____      Home O2 Use :  Not connected to a DME provider    Community Service Affiliation  Dialysis:  - Discharged from HD in May  · Agency:  · Location:  · Dialysis Schedule:  · Phone:   · Fax: Other Community Services: (ex:PT/OT,Mental Health,Wound Clinic, Cardio/Pul [address])    DISCHARGE PLAN: Explained Case Management role/services. Chart reviewed. Met with pt at bedside and explained the role of the CM. Pt is very drowsy during discussion and falls asleep. Awakens to verbal but with minimal responses to CM interview questions. Call placed to son, Bubba Sheets who confirmed the above information. Palliative consult for goals of care ([address]) discussion. May need placement for rehab, +CM following.

## 2022-07-08 NOTE — ED NOTES
Patients oxygen was 87% on RA, applied 2L at this time patient now 94%     Magdalena Benavidez RN  07/08/22 5454

## 2022-07-08 NOTE — CONSULTS
Palliative Care Initial Note  Palliative Care Admit date:07/08/2022  ACP/Palliative Care this date    [facility], RN Palliative Care

## 2022-07-08 NOTE — CONSULTS
Nephrology Consult Note  [facility]. [facility]        Reason for Consult: ESKD  Consulting Physician: Jorge Vo MD    HISTORY OF PRESENT ILLNESS:      The patient is a 59 y.o. male with significant past medical history of CAD, CHF, DM and HTN who presents with SOB and hyperkalemia. He has ESKD but has not had dialysis for the past few months. K+ was 7.7mmol/L with BUN of 137 and serum creatinine of 9.8mg/dL. Per [facility], he was discharged from their facility because of missing his treatments and on one of his well checks, he told the  that he does not want to continue dialysis. Patient is awake but confused likely secondary to uremia. Past Medical History:        Diagnosis Date    Bacteremia 11/18/2017    strep agalactiae (also cultured from foot)    CAD (coronary artery disease) 8/18/2021    Cellulitis and abscess of foot, except toes     CHF (congestive heart failure) (HCC)     [facility]     Diabetes mellitus ([facility]     History of liver failure     Hypertension     Kidney disease     MRSA (methicillin resistant staph aureus) culture positive 4/17/18, 11/19/2017    left foot       Past Surgical History:        Procedure Laterality Date    IR TUNNELED CATHETER PLACEMENT GREATER THAN 5 YEARS  1/11/2021    IR TUNNELED CATHETER PLACEMENT GREATER THAN 5 YEARS 1/11/2021 Kieran Lopez MD [facility] PROCEDURES    LEG AMPUTATION BELOW KNEE      PARACENTESIS      UPPER GASTROINTESTINAL ENDOSCOPY N/A 1/29/2019    EGD ESOPHAGOGASTRODUODENOSCOPY performed by Hai Brannon MD at [address] [place] [address]       Current Medications:    No current facility-administered medications on file prior to encounter.      Current Outpatient Medications on File Prior to Encounter   Medication Sig Dispense Refill    hydrALAZINE (APRESOLINE) 25 MG tablet Take 25 mg by mouth 3 times daily (with meals)      Cholecalciferol (VITAMIN D3) 50 MCG (2000 UT) TABS Take 2,000 Units by mouth daily      diazePAM (VALIUM) 2 MG tablet Take 2 mg by mouth as needed.  aspirin 81 MG chewable tablet Take 1 tablet by mouth daily 30 tablet 0    metoprolol succinate (TOPROL XL) 25 MG extended release tablet Take 1 tablet by mouth daily 30 tablet 0    isosorbide-hydrALAZINE (BIDIL) 20-37.5 MG per tablet Take 1 tablet by mouth 3 times daily 90 tablet 0    ipratropium-albuterol (DUONEB) 0.5-2.5 (3) MG/3ML SOLN nebulizer solution Inhale 3 mLs into the lungs every 4 hours as needed for Shortness of Breath 360 mL     nicotine (NICODERM CQ) 7 MG/24HR Place 1 patch onto the skin daily (Patient taking differently: Place 1 patch onto the skin daily Not taking) 30 patch 3    insulin lispro (HUMALOG KWIKPEN) 100 UNIT/ML pen Glucose: Dose:   No Insulin 140-249 1 Unit 250-349 2 Units Over 350 -3 Units 2 pen 0    atorvastatin (LIPITOR) 40 MG tablet Take 1 tablet by mouth nightly (Patient taking differently: Take 40 mg by mouth nightly Not taking) 30 tablet 0    albuterol sulfate HFA (PROAIR HFA) 108 (90 Base) MCG/ACT inhaler Inhale 2 puffs into the lungs every 6 hours as needed for Wheezing 1 Inhaler 3       Allergies:  Pcn [penicillins]    Social History:    Social History     Socioeconomic History    Marital status:       Spouse name: Not on file    Number of children: Not on file    Years of education: Not on file    Highest education level: Not on file   Occupational History    Occupation: unemployed   Tobacco Use    Smoking status: Former Smoker     Packs/day: 1.00     Years: 40.00     Pack years: 40.00     Types: Cigarettes     Quit date: 2022     Years since quittin.4    Smokeless tobacco: Former User     Types: Chew   Vaping Use    Vaping Use: Never used   Substance and Sexual Activity    Alcohol use: No     Comment: none    Drug use: Yes     Types: Marijuana Darryle Pimple)     Comment: occassionally     Sexual activity: Yes     Partners: Female   Other Topics Concern    Not on file   Social History Narrative    Not on file     Social Determinants of Health     Financial Resource Strain:     Difficulty of Paying Living Expenses: Not on file   Food Insecurity:     Worried About Running Out of Food in the Last Year: Not on file    Julissa of Food in the Last Year: Not on file   Transportation Needs:     Lack of Transportation (Medical): Not on file    Lack of Transportation (Non-Medical): Not on file   Physical Activity:     Days of Exercise per Week: Not on file    Minutes of Exercise per Session: Not on file   Stress:     Feeling of Stress : Not on file   Social Connections:     Frequency of Communication with Friends and Family: Not on file    Frequency of Social Gatherings with Friends and Family: Not on file    Attends Yazidism Services: Not on file    Active Member of [address] [place] [address] or Organizations: Not on file    Attends Club or Organization Meetings: Not on file    Marital Status: Not on file   Intimate Partner Violence:     Fear of Current or Ex-Partner: Not on file    Emotionally Abused: Not on file    Physically Abused: Not on file    Sexually Abused: Not on file   Housing Stability:     Unable to Pay for Housing in the Last Year: Not on file    Number of [place] in the Last Year: Not on file    Unstable Housing in the Last Year: Not on file       Family History:       Problem Relation Age of Onset    Diabetes Mother     Hypertension Mother     Dementia Mother     Heart Attack Father     Stroke Brother     No Known Problems Maternal Grandmother     No Known Problems Maternal Grandfather     No Known Problems Paternal Grandmother     No Known Problems Paternal Grandfather     Heart Attack Brother          REVIEW OF SYSTEMS:    Review of Systems   Confused, SOB. Unable to complete bec of patient factors.       PHYSICAL EXAM:    Vitals:    /63   Pulse 68   Temp 97.7 °F (36.5 °C) (Oral)   Resp 18   Ht 6' 1\" (1.854 m) Wt 220 lb 3.2 oz (99.9 kg)   SpO2 91%   BMI 29.05 kg/m²   I/O last 3 completed shifts: In: 700   Out: 3713   No intake/output data recorded. Physical Exam:  Physical Exam  Vitals reviewed. Constitutional:       General: He is not in acute distress. HENT:      Head: Normocephalic and atraumatic. Eyes:      General: No scleral icterus. Conjunctiva/sclera: Conjunctivae normal.   Cardiovascular:      Rate and Rhythm: Normal rate. Pulmonary:      Effort: Pulmonary effort is normal. No respiratory distress. Breath sounds: Rales present. Abdominal:      General: Bowel sounds are normal. There is no distension. Tenderness: There is no abdominal tenderness. Musculoskeletal:      Right lower leg: Edema present. Left lower leg: Edema present. DATA:    Recent Labs     07/07/22 2015   WBC 9.5   HGB 9.7*   HCT 30.5*   MCV 92.3        Recent Labs     07/07/22 2015 07/08/22  0200    137   K 7.7* 5.9*    102   CO2 16* 19*   GLUCOSE 130* 57*   * 111*   CREATININE 9.8* 8.6*   LABGLOM 5* 6*   GFRAA 7* 8*           IMPRESSION/RECOMMENDATIONS:      ESKD. - Missed dialysis for about 2 months. - Previously at [facility] but discharged from their clinic because of non-compliance. - Received HD this morning. Next HD planned for tomorrow. - Agree with palliative care consult for goals of care discussion. Hyperkalemia.  - From missed dialysis. - Improving with HD.  - Low K+ diet. Metabolic Acidosis. - From CKD. - Should improve with dialysis. Anemia.  - Continue HARPREET with HD, 6K units. Hypertension.  - BP is acceptable. - On Metoprolol, Isordil and Hydralazine. CKD-MBD.  - Hypocalcemia, received IV replacement. Will check iPTH, vitamin D and phos. Thank you for allowing me to participate in the care of this patient. Please do not hesitate to contact me at [phone] if with questions.     Anastasia Holt MD  [facility]  [facility]. [employer]  7/8/2022

## 2022-07-08 NOTE — ED NOTES
Verified with MD to give food and apple juice at this time due to low blood sugar.      Hoang Gonzalez RN  07/08/22 3774

## 2022-07-08 NOTE — H&P
Hospital Medicine History & Physical      PCP: Sarah Santos MD    Date of Admission: 7/7/2022    Date of Service: Pt seen/examined on 7/8/2022     Chief Complaint:    Chief Complaint   Patient presents with    Fatigue     lives at home, feels like he cannot take care of himself at home. does live alone, per ems not good living situations. History Of Present Illness: The patient is a 59 y.o. male with pmhx of CHF, ESRD, CHF, alcoholic cirrhosis, DM, HTN who presented to [facility] ED with complaint of concern he cannot care for self at home. Patient reports that he has felt \"Crappy\" ever since he had his right BKA amputation, he has had difficulty getting around on his own and caring for himself at home. The past couple months, he has become increasingly weaker. Yesterday, he was unable to get out of his wheelchair so he called EMS and his neighbor assisted him. He denies any falls or LOC. Pt has also felt more short of breath lately, not improved with inhalers. He does get weekly paracentesis for his decompensated liver cirrhosis. He stopped going to dialysis in May because he felt it wasn't helping. No fever, chills, chest pain, nausea, vomiting, diarrhea, hematuria, dysuria, dizziness, syncope. He does still smoke. EMS reported that his house was in deplorable conditions. He was placed on BIPAP in the ED, after dialysis was able to weaned off on to 2 L O2 NC.        Past Medical History:        Diagnosis Date    Bacteremia 11/18/2017    strep agalactiae (also cultured from foot)    CAD (coronary artery disease) 8/18/2021    Cellulitis and abscess of foot, except toes     CHF (congestive heart failure) (HCC)     [facility]     Diabetes mellitus ([facility]     History of liver failure     Hypertension     Kidney disease     MRSA (methicillin resistant staph aureus) culture positive 4/17/18, 11/19/2017    left foot       Past Surgical History:        Procedure Laterality Date    IR TUNNELED CATHETER PLACEMENT GREATER THAN 5 YEARS  1/11/2021    IR TUNNELED CATHETER PLACEMENT GREATER THAN 5 YEARS 1/11/2021 Edith Crocker MD [id number] [facility] PROC[facility]    LEG AMPUTATION BELOW KNEE      PARACENTESIS      UPPER GASTROINTESTINAL ENDOSCOPY N/A 1/29/2019    EGD ESOPHAGOGASTRODUODENOSCOPY performed by Obinna Brown MD at [facility]       Medications Prior to Admission:    Prior to Admission medications    Medication Sig Start Date End Date Taking? Authorizing Provider   hydrALAZINE (APRESOLINE) 25 MG tablet Take 25 mg by mouth 3 times daily (with meals) 2/21/20  Yes Historical Provider, MD   Cholecalciferol (VITAMIN D3) 50 MCG (2000 UT) TABS Take 2,000 Units by mouth daily 5/5/22   Historical Provider, MD   diazePAM (VALIUM) 2 MG tablet Take 2 mg by mouth as needed.  6/14/22   Historical Provider, MD   aspirin 81 MG chewable tablet Take 1 tablet by mouth daily 4/26/22   Olimpia Celeste MD   metoprolol succinate (TOPROL XL) 25 MG extended release tablet Take 1 tablet by mouth daily 4/26/22   Olimpia Celeste MD   isosorbide-hydrALAZINE (BIDIL) 20-37.5 MG per tablet Take 1 tablet by mouth 3 times daily 4/26/22   Olimpia Celeste MD   ipratropium-albuterol (DUONEB) 0.5-2.5 (3) MG/3ML SOLN nebulizer solution Inhale 3 mLs into the lungs every 4 hours as needed for Shortness of Breath 1/16/21   Rafiq Sensing, APRN - CNP   nicotine (NICODERM CQ) 7 MG/24HR Place 1 patch onto the skin daily  Patient taking differently: Place 1 patch onto the skin daily Not taking 1/17/21   Rafiq Sensing, APRN - CNP   insulin lispro (HUMALOG KWIKPEN) 100 UNIT/ML pen Glucose: Dose:   No Insulin 140-249 1 Unit 250-349 2 Units Over 350 -3 Units 2/23/19   Km Espinoza MD   atorvastatin (LIPITOR) 40 MG tablet Take 1 tablet by mouth nightly  Patient taking differently: Take 40 mg by mouth nightly Not taking 1/29/19 3/21/20  Portia Wright MD [facility]   albuterol sulfate HFA (PROAIR HFA) 108 (90 Base) MCG/ACT inhaler Inhale 2 puffs into the lungs every 6 hours as needed for Wheezing 1/1/18   Christina Vogel MD       Allergies:  Pcn [penicillins]    Social History:  The patient currently lives at home alone     TOBACCO:   reports that he quit smoking about 5 months ago. His smoking use included cigarettes. He has a 40.00 pack-year smoking history. He has quit using smokeless tobacco.  His smokeless tobacco use included chew. ETOH:   reports no history of alcohol use. Family History:   Positive as follows:        Problem Relation Age of Onset    Diabetes Mother     Hypertension Mother     Dementia Mother     Heart Attack Father     Stroke Brother     No Known Problems Maternal Grandmother     No Known Problems Maternal Grandfather     No Known Problems Paternal Grandmother     No Known Problems Paternal Grandfather     Heart Attack Brother        REVIEW OF SYSTEMS:       Constitutional: Negative for fever, + weakness   HENT: Negative for sore throat   Eyes: Negative for redness   Respiratory: Negative  for cough, + dyspnea  Cardiovascular: Negative for chest pain   Gastrointestinal: Negative for vomiting, diarrhea   Genitourinary: Negative for hematuria   Musculoskeletal: Negative for arthralgias   Skin: Negative for rash   Neurological: Negative for syncope   Hematological: Negative for adenopathy   Psychiatric/Behavorial: Negative for anxiety    PHYSICAL EXAM:    /63   Pulse 68   Temp 97.7 °F (36.5 °C) (Oral)   Resp 18   Ht 6' 1\" (1.854 m)   Wt 220 lb 3.2 oz (99.9 kg)   SpO2 91%   BMI 29.05 kg/m²     Gen: No distress. Alert. +Chronically ill appearing male   Eyes: PERRL. No sclera icterus. No conjunctival injection. ENT: No discharge. Pharynx clear. Neck: No JVD. No Carotid Bruit. Trachea midline. Resp: No accessory muscle use. No crackles. No wheezes. No rhonchi. +rales throughout lungs bilaterally   CV: Regular rate. Regular rhythm. No murmur. No rub. No edema. Capillary Refill: Brisk,< 3 seconds   Peripheral Pulses: could not detect pulses in right lower extremity   GI: Non-tender. No masses. No organomegaly. Normal bowel sounds. No hernia.  +distended, soft   Skin: Warm and dry. No nodule on exposed extremities. No rash on exposed extremities. +chronic scaling of right lower extremity, with chronic healing wounds on the right foot and great toe + eschar   S/p left BKA, healed incision scar   M/S: No cyanosis. No joint deformity. No clubbing. Neuro: Awake. Grossly nonfocal    Psych: Oriented x 3. No anxiety or agitation.      Lab Results   Component Value Date    WBC 9.5 07/07/2022    HGB 9.7 (L) 07/07/2022    HCT 30.5 (L) 07/07/2022    MCV 92.3 07/07/2022     07/07/2022     Lab Results   Component Value Date     07/08/2022    K 5.9 (H) 07/08/2022     07/08/2022    CO2 19 (L) 07/08/2022     (HH) 07/08/2022    CREATININE 8.6 (HH) 07/08/2022    GLUCOSE 57 (L) 07/08/2022    CALCIUM 6.0 (L) 07/08/2022    PROT 5.7 (L) 07/08/2022    LABALBU 2.6 (L) 07/08/2022    BILITOT 0.3 07/08/2022    ALKPHOS 80 07/08/2022    AST 19 07/08/2022    ALT 12 07/08/2022    LABGLOM 6 (A) 07/08/2022    GFRAA 8 (A) 07/08/2022    AGRATIO 0.8 (L) 07/08/2022    GLOB 3.2 01/16/2021       CARDIAC ENZYMES  Recent Labs     07/07/22 2015 07/08/22  0200   TROPONINI 0.68* 0.70*       U/A:    Lab Results   Component Value Date/Time    COLORU Yellow 01/09/2021 05:50 PM    WBCUA 0-2 01/09/2021 05:50 PM    RBCUA 3-4 01/09/2021 05:50 PM    MUCUS 1+ 01/09/2021 05:50 PM    BACTERIA 3+ 01/09/2021 05:50 PM    CLARITYU Clear 01/09/2021 05:50 PM    SPECGRAV >=1.030 01/09/2021 05:50 PM    LEUKOCYTESUR Negative 01/09/2021 05:50 PM    BLOODU TRACE-INTACT 01/09/2021 05:50 PM    GLUCOSEU Negative 01/09/2021 05:50 PM    AMORPHOUS 2+ 01/09/2021 05:50 PM       CULTURES  COVID and Flu not detected     EKG: Baseline artifactUndetermined rhythm , possible acidosis   -2/2 to above  -should improve with dialysis     #Elevated troponin   -chronically elevated   -0.68 --> 0.70 --> trend  -likely 2/2 to ESRD   -no CP   -no EKG changes     #Hypoglycemia   -BG 57 in ED --> 86 today   -hypoglycemia protocol  -monitor BS     #Hypocalcemia   -5.7 --> 6.0 after calcium gluconate  -will continue to monitor   -PTH, vitamin D, and phosphorus pending  -nephrology following     #Inability to care for self  #Weakness   -PT/OT   -case management consulted   -palliative care consulted     #Chronic combined systolic and diastolic HF   #Ischemic cardiomyopathy   #Pulmonary HTN   -decompensation likely 2/2 to need for HD  -CXR stable CHF    -echo from 1/9/2021 with EF 50%, grade I diastolic dysfunction and SPAP 57 mmHg   -on BB   -daily weights   -I and Os  -will continue to monitor     #CAD  -on ASA, statin     #Paroxysmal Atrial fibrillation   -NSR on presentation   -rate controlled   -on toprol XL   -on ASA   -noncompliant with anticoagulation     #DM   -insulin dependent, unsure of compliance at home  -hypoglycemic, given D5   -will continue to monitor BS   -on SSI     #Chronic anemia  -Hgb 9.3   -will continue to monitor   -on retacrit per nephrology      #HTN   -on toprol xl, bidil, and hydralazine   -BP stable   -continue to monitor     #Decompensated Alcoholic Cirrhosis   -receives weekly paracentesis  -will need para on Monday   -consulted palliative care     #S/p left BKA   -has prosthesis    #Chronic right foot ulcers   -could not palpate pulses   -ABIs pending   -wound care consulted  -can consult podiatry     -palliative care consulted for goals of care discussion       DVT Prophylaxis: heparin   Diet: ADULT DIET; Regular; 4 carb choices (60 gm/meal); Low Sodium (2 gm); Low Potassium (Less than 3000 mg/day);  Low Phosphorus (Less than 1000 mg); 60 to 80 gm  Code Status: Full Code    KONSTANTIN Barrientos  07/08/22  1:29 PM

## 2022-07-09 NOTE — PROGRESS NOTES
Pt has no complaints at the time, stable, and standard safety measures intact. Dialysis nurse called for report and stated transport will be picking up the pt between [id number].

## 2022-07-09 NOTE — PROGRESS NOTES
Progress Note    HISTORY     CC:  Shortness of breath           We are following for ESRD      Subjective/   HPI:  Patient missed dialysis and did have treatment yesterday. Transport is here today and he is refusing to go to dialysis. I discussed with him and he feels it is not doing anything for him. Does not want to go to dialysis. Palliative care has been consulted and his son is going to come and talk with him this weekend.       ROS:  Constitutional:  No fevers, No Chills, + weakness  Cardiovascular:  No palpations, + edema  Respiratory:  No wheezing, no cough  Skin:  No rash, no itching  :  No hematuria, no dysuria     Social Hx:  No Family at the bedside     Past Medical and Surgical History:  - Reviewed, no changes     EXAM       Objective/     Vitals:    07/08/22 1512 07/08/22 2053 07/09/22 0017 07/09/22 0453   BP: (!) 141/74 (!) 109/58  107/64   Pulse: 71 61  64   Resp: 18 16  16   Temp: 98 °F (36.7 °C) 98.1 °F (36.7 °C)  98.5 °F (36.9 °C)   TempSrc: Oral Oral  Oral   SpO2: 93% 94%  95%   Weight:   213 lb (96.6 kg)    Height:         24HR INTAKE/OUTPUT:      Intake/Output Summary (Last 24 hours) at 7/9/2022 0733  Last data filed at 7/8/2022 2351  Gross per 24 hour   Intake 595 ml   Output 200 ml   Net 395 ml     Constitutional:  Ill appearing, [place]  Eyes:  Pupils reactive, sclera clear   Neck:  Normal thyroid, no masses   Cardiovascular:  Regular, no rub  Respiratory:  No distress, no wheezing  Psychiatry:  Appropriate mood/affect, alert  Abdomen: +bs, soft, nt, no masses   Musculoskeletal: + LE edema, left BKA  Lymphatics:  No LAD in neck, no supraclavicular nodes       MEDICAL DECISION MAKING       Data/  Recent Labs     07/07/22 2015 07/09/22 0412   WBC 9.5 8.1   HGB 9.7* 8.6*   HCT 30.5* 26.5*   MCV 92.3 91.1    137     Recent Labs     07/07/22 2015 07/08/22  0200 07/09/22  0412    137 135*   K 7.7* 5.9* 5.5*    102 101   CO2 16* 19* 17*   GLUCOSE 130* 57* 68* PHOS  --   --  6.8*   * 111* 86*   CREATININE 9.8* 8.6* 7.1*   LABGLOM 5* 6* 8*   GFRAA 7* 8* 9*       Assessment/     ESKD. - Missed dialysis for about 2 months. - Previously at [facility] but discharged from their clinic because of non-compliance. - Received HD x 1, labs are stable but patient is refusing to go to dialysis today. Says that he is done with dialysis   - Palliative care consulted, son coming to meet this weekend      Hyperkalemia.  - From missed dialysis. - Improving with HD.  - Low K+ diet.     Metabolic Acidosis. - From CKD. - Should improve with dialysis.     Anemia.  - Continue HARPREET with HD, 6K units.     Hypertension.  - BP is acceptable. - On Metoprolol, Isordil and Hydralazine.     CKD-MBD.  - Hypocalcemia, received IV replacement. Will check iPTH, vitamin D and phos. Plan/     These situations are difficult, while he might not fully understand the situation, he is alert enough to refuse dialysis and we are not able to sedate him to get him to do his dialysis treatments. I have tried to explain to the patient that dialysis is keeping him alive and without it he would only have months to live. Asked him to talk with his family and palliative care is already following him. Hospice would be appropriate as I doubt he would make it 6 months without dialysis.   No plans for dialysis at this time      Thank you for asking us to participate in the management of your patient, please do not hesitate to contact me for any concerns regarding my recommendations as outlined above.    -----------------------------  Forrest Schwartz M.D.   [facility]

## 2022-07-09 NOTE — PLAN OF CARE
Care Plan, Education, Fall Risk, Gabriel Scale, and Lift Assessment complete. Patient is resting and reports no needs at this time.      Problem: Discharge Planning  Goal: Discharge to home or other facility with appropriate resources  7/9/2022 1411 by Yolanda Burton RN  Outcome: Progressing  7/9/2022 0016 by Beba Mg RN  Outcome: Progressing     Problem: Safety - Adult  Goal: Free from fall injury  7/9/2022 1411 by Yolanda Burton RN  Outcome: Progressing  7/9/2022 0016 by Beba Mg RN  Outcome: Progressing     Problem: ABCDS Injury Assessment  Goal: Absence of physical injury  7/9/2022 1411 by Yolanda Burton RN  Outcome: Progressing  7/9/2022 0016 by Beba Mg RN  Outcome: Progressing

## 2022-07-09 NOTE — PLAN OF CARE
Problem: Discharge Planning  Goal: Discharge to home or other facility with appropriate resources  Outcome: Progressing     Problem: Safety - Adult  Goal: Free from fall injury  Outcome: Progressing     Problem: ABCDS Injury Assessment  Goal: Absence of physical injury  Outcome: Progressing     Problem: Cardiovascular - Adult  Goal: Maintains optimal cardiac output and hemodynamic stability  Outcome: Progressing  Goal: Absence of cardiac dysrhythmias or at baseline  Outcome: Progressing     Problem: Skin/Tissue Integrity - Adult  Goal: Skin integrity remains intact  Outcome: Progressing  Goal: Incisions, wounds, or drain sites healing without S/S of infection  Outcome: Progressing  Goal: Oral mucous membranes remain intact  Outcome: Progressing     Problem: Musculoskeletal - Adult  Goal: Return mobility to safest level of function  Outcome: Progressing  Goal: Maintain proper alignment of affected body part  Outcome: Progressing  Goal: Return ADL status to a safe level of function  Outcome: Progressing     Problem: Infection - Adult  Goal: Absence of infection at discharge  Outcome: Progressing  Goal: Absence of infection during hospitalization  Outcome: Progressing  Goal: Absence of fever/infection during anticipated neutropenic period  Outcome: Progressing     Problem: Metabolic/Fluid and Electrolytes - Adult  Goal: Electrolytes maintained within normal limits  Outcome: Progressing  Goal: Hemodynamic stability and optimal renal function maintained  Outcome: Progressing  Goal: Glucose maintained within prescribed range  Outcome: Progressing     Problem: Hematologic - Adult  Goal: Maintains hematologic stability  Outcome: Progressing     Problem: Skin/Tissue Integrity  Goal: Absence of new skin breakdown  Description: 1. Monitor for areas of redness and/or skin breakdown  2. Assess vascular access sites hourly  3. Every 4-6 hours minimum:  Change oxygen saturation probe site  4.   Every 4-6 hours:  If on nasal continuous positive airway pressure, respiratory therapy assess nares and determine need for appliance change or resting period.   Outcome: Progressing     Problem: Chronic Conditions and Co-morbidities  Goal: Patient's chronic conditions and co-morbidity symptoms are monitored and maintained or improved  Outcome: Progressing     Problem: Pain  Goal: Verbalizes/displays adequate comfort level or baseline comfort level  Outcome: Progressing

## 2022-07-09 NOTE — CONSULTS
Danielnikia MacRome Wound Ostomy Continence Nurse  Consult Note       NAME:  Aldair Bay  MEDICAL RECORD NUMBER:  [id number]  AGE: 59 y.o. GENDER: male  : 1960  TODAY'S DATE:  2022    Subjective  I do not have any pain right now. Reason for WOCN Evaluation and Assessment: chronic right foot wounds      Aldair Bay is a 59 y.o. male referred by:   [x] Physician  [x] Nursing  [] Other:     Wound Identification:  Wound Type: traumatic - Excoriations right lower leg. Dried blood blisters right inner foot and right 3rd inner toe. Abraision right knee (open). Contributing Factors: edema, decreased mobility and shear force    Wound History: Aldair Bay is a 59 y.o. male with a history of multiple medical problems including liver cirrhosis, CHF with an ejection fraction of 25%, diabetes, status post left below the knee amputation and also end-stage renal disease and medical noncompliance presents with shortness of breath and generalized malaise. He states he has been falling at home and is so weak he cannot out of his wheelchair. EMS reports his house was in deplorable conditions. The patient is noted to have a dialysis catheter in his right chest.  He states he was told several months ago he did not need to go to dialysis anymore. On chart review this does not appear to be accurate. When questioned again he admits he has been noncompliant has not followed up dialysis since his last hospitalization in May.   He reports shortness of breath and states he feels like he needs to sit up because he cannot breathe  Current Wound Care Treatment:      Patient Goal of Care:  [x] Wound Healing  [] Odor Control  [] Palliative Care  [] Pain Control   [] Other:         PAST MEDICAL HISTORY        Diagnosis Date    Bacteremia 2017    strep agalactiae (also cultured from foot)    CAD (coronary artery disease) 2021    Cellulitis and abscess of foot, except toes     CH[facility]congestive heart failure) ([facility]     [facility] with [facility]ism [facility]     Diabetes mellitus ([facility]     History of liver failure     Hypertension     Kidney disease     MRSA (methicillin resistant staph aureus) culture positive 18, 2017    left foot       PAST SURGICAL HISTORY    Past Surgical History:   Procedure Laterality Date    IR TUNNELED CATHETER PLACEMENT GREATER THAN 5 YEARS  2021    IR TUNNELED CATHETER PLACEMENT GREATER THAN 5 YEARS 2021 Marcy Erazo MD [facility] SPECIAL PROCEDURES    LEG AMPUTATION BELOW KNEE      PARACENTESIS      UPPER GASTROINTESTINAL ENDOSCOPY N/A 2019    EGD ESOPHAGOGASTRODUODENOSCOPY performed by Markell Rao MD at [address]      Left BKA       FAMILY HISTORY    Family History   Problem Relation Age of Onset    Diabetes Mother     Hypertension Mother     Dementia Mother     Heart Attack Father     Stroke Brother     No Known Problems Maternal Grandmother     No Known Problems Maternal Grandfather     No Known Problems Paternal Grandmother     No Known Problems Paternal Grandfather     Heart Attack Brother        SOCIAL HISTORY    Social History     Tobacco Use    Smoking status: Former Smoker     Packs/day: 1.00     Years: 40.00     Pack years: 40.00     Types: Cigarettes     Quit date: 2022     Years since quittin.4    Smokeless tobacco: Former User     Types: Chew   Vaping Use    Vaping Use: Never used   Substance Use Topics    Alcohol use: No     Comment: none    Drug use: Yes     Types: Marijuana (Weed)     Comment: occassionally        ALLERGIES    Allergies   Allergen Reactions    Pcn [Penicillins] Hives       MEDICATIONS    No current facility-administered medications on file prior to encounter.      Current Outpatient Medications on File Prior to Encounter   Medication Sig Dispense Refill    hydrALAZINE (APRESOLINE) 25 MG tablet Take 25 mg by mouth 3 times daily (with meals)      Cholecalciferol (VITAMIN D3) 50 MCG scabbed scratches/excoriations on leg. Right knee, abrasion with and open area. See photo.    Gabriel Risk Score: Gabriel Scale Score: 13    Patient Active Problem List   Diagnosis    Tobacco abuse    Type 1 diabetes mellitus with neurological manifestations, uncontrolled ([address]    Diabetic ulcer of right foot ([address]    Cellulitis    NSTEMI (non-ST elevated myocardial infarction) ([address]    Osteomyelitis of foot, left, acute (HCC)    Group B streptococcal bacteriuria    Type 2 diabetes mellitus with left diabetic foot infection (HCC)    Septic arthritis of left foot (HCC)    Poorly controlled type 2 diabetes mellitus (HCC)    Elevated sed rate    Elevated C-reactive protein (CRP)    Acute kidney injury superimposed on CKD ([address]    Hyponatremia    Diabetic polyneuropathy associated with type 2 diabetes mellitus ([address]    Mixed hyperlipidemia    Tobacco abuse counseling    Diabetes education, encounter for    Cardiomyopathy ([address]    Abnormal nuclear stress test    Pain of left lower extremity    Chronic kidney disease    Osteomyelitis ([address]    Acute respiratory failure with hypoxia (HCC)    Acute respiratory failure (HCC)    Ascites    Chronic systolic CHF (congestive heart failure) (HCC)    Noncompliance    Chronic systolic heart failure (HCC)    HTN (hypertension), malignant    Metabolic alkalosis    NSVT (nonsustained ventricular tachycardia) (HCC)    Moderate malnutrition (HCC)    Fluid overload    Abdominal distension    Hypoxia    Shortness of breath    Diabetic foot infection (HCC)    Alcoholic cirrhosis of liver with ascites (HCC)    PAF (paroxysmal atrial fibrillation) (HCC)    CAD (coronary artery disease)    End stage renal disease on dialysis (HCC)    COVID    Encephalopathy    Acute on chronic congestive heart failure (HCC)    Chest pain    Hyperkalemia       Measurements:  Wound 07/08/22 Knee Anterior;Right (Active)   Wound Image   07/08/22 2010   Wound Etiology Traumatic 07/08/22 2010   Dressing Status New dressing applied 07/08/22 2010   Wound Cleansed Cleansed with saline 07/08/22 2010   Dressing/Treatment Foam 07/08/22 2010   Dressing Change Due 07/11/22 07/08/22 2010   Wound Length (cm) 3 cm 07/08/22 2010   Wound Width (cm) 0.3 cm 07/08/22 2010   Wound Depth (cm) 0.1 cm 07/08/22 2010   Wound Surface Area (cm^2) 0.9 cm^2 07/08/22 2010   Wound Volume (cm^3) 0.09 cm^3 07/08/22 2010   Distance Tunneling (cm) 0 cm 07/08/22 2010   Tunneling Position ___ O'Clock 0 07/08/22 2010   Undermining Starts ___ O'Clock 0 07/08/22 2010   Undermining Ends___ O'Clock 0 07/08/22 2010   Undermining Maxium Distance (cm) 0 07/08/22 2010   Wound Assessment Pink/red 07/08/22 2010   Drainage Amount Scant 07/08/22 2010   Drainage Description Serosanguinous 07/08/22 2010   Odor None 07/08/22 2010   Bella-wound Assessment Dry/flaky;Fragile; Intact 07/08/22 2010   Margins Attached edges; Defined edges 07/08/22 2010   Wound Thickness Description not for Pressure Injury Partial thickness 07/08/22 2010   Number of days: 0      right lateral inner foot:        Right inner 3rd toe:        Right lower leg:        Response to treatment:  Well tolerated by patient. Pain Assessment:  Severity:  0 / 10  Quality of pain: N/A  Wound Pain Timing/Severity: none  Premedicated: No    Plan   Plan of Care: Wound 07/08/22 Knee Anterior;Right-Dressing/Treatment: Foam     Recommend:  Clean lower legs and foot with foamy cleanser, soap and water or normal saline. Apply foam dressing to right knee, change every 3 days and prn. Apply moisturizing lotion to lower leg daily. Paint right inner foot and 3rd toe with betadine daily. Wound care to sign off. Re-consult for deterioration    Specialty Bed Required : Yes   [] Low Air Loss   [] Pressure Redistribution  [] Fluid Immersion  [] Bariatric  [] Total Pressure Relief  [] Other:     Current Diet: ADULT DIET; Regular; 4 carb choices (60 gm/meal); Low Sodium (2 gm);  Low Potassium (Less than 3000 mg/day);  Low Phosphorus (Less than 1000 mg); 60 to 80 gm  Dietician consult:  No    Discharge Plan:  Placement for patient upon discharge: unknown at this time    Patient appropriate for Outpatient [address]: No    Referrals:  [x]  / discharge planner salvador  [] [facility]  [] Supplies  [] Other    Patient/Caregiver Teaching:  Level of patient/caregiver understanding able to:   [] Indicates understanding       [] Needs reinforcement  [] Unsuccessful      [] Verbal Understanding  [] Demonstrated understanding       [] No evidence of learning  [] Refused teaching         [x] N/A       Electronically signed by Viktoriya Monet, RN, MSN, Doretha Interiano on 7/8/2022 at 9:23 PM

## 2022-07-09 NOTE — PROGRESS NOTES
Patient refusing dialysis. Nephrologist and RN to bedside. MD explained in depth and detail the risks of no dialysis and the reason his kidneys require this. Patient adamantly refusing dialysis and agrees to speak with palliative care again.

## 2022-07-09 NOTE — PROGRESS NOTES
Shift assessment complete. See flow sheet. Scheduled meds not given. Patient states he doesn't want them right now. Patients head-toe complete, VS are logged, and active bowel sound noted in all four quadrants. No further needs noted at this time. Call light and bedside table are within reach. The bed is locked and is in the lowest position.

## 2022-07-09 NOTE — PROGRESS NOTES
Inpatient Physical Therapy Daily Treatment Note    Unit: PCU  Date:  7/9/2022  Patient Name:    Donnie Minor  Admitting diagnosis:  Hyperkalemia [E87.5]  End stage renal disease on dialysis [facility]) [N18.6, Z99.2]  Medically noncompliant [Z91.19]  Admit Date:  7/7/2022  Precautions/Restrictions:  Fall risk, Bed/chair alarm, Lines -Supplemental O2 (2L PRN per RN), Telemetry and Continuous pulse oximetry      Discharge Recommendations: SNF  DME needs for discharge: defer to facility       Therapy recommendation for EMS Transport: can transport by wheelchair    Therapy recommendations for staff:   Assist of 1 with use of rolling walker (RW) and gait belt for all transfers to/from [facility]  to/from [facility]    History of Present Illness: Per ED provider Dr. Radha Romero: George.Host p/w Volodymyr Solomon has apparently not gone to HD since May when he was last Crissie Soulier was found to be vol OL, hypoxic and markedly hyperkalemic.  He was requiring BIPAP.  No ICU bed available here so decision was made to emergently dialyze in ED and try to wean off BIPAP if able to allow for PCU admit.  Following HD he was able to be weaned off Donice Linda is now requiring 2L.  K went from 7.7 to 5.9. [facility] may need HD again today.  Trop is elevated but stable at 0.68 and 0.70, tele and ser trops ordered. [facility] has presented w/ similar values in the past.  Notified ED of pt glucose of 57 on repeat labs at ~0300.  Hypoglycemia orders placed. \" No H&P yet. Home Health S4 Level Recommendation: NA  AM-PAC Mobility Score   AM-PAC Inpatient Mobility Raw Score : 17  AM-PAC Inpatient Mobility without Stair Climbing Raw Score : 15    Treatment Time:  9423-5242  Treatment number: 2  Timed Code Treatment Minutes: 27 minutes  Total Treatment Minutes:  27  minutes    Cognition    A&O orientation not directly assessed. Able to follow 2 step commands    Subjective  Patient lying supine in bed with no family present   Pt agreeable to this PT tx.      Pain   No  Location: Rating:    NA/10  Pain Medicine Status: No request made     Bed Mobility   Supine to Sit:    Supervision  Sit to Supine:   Supervision  Rolling:   Modified Independent, HOB elevated  Scooting:   Modified Independent    Transfer Training     Sit to stand:   CGA and Mod A , CGA from elevated bed height, Mod A from chair  Stand to sit:   CGA  Bed to Chair:   Min A  with use of gait belt and rolling walker (RW)    Gait Training gait completed as indicated below  Distance:      3 ft + 5 ft  Deviations (firm surface/linoleum):  forward flexed posture, step to pattern and decreased step length on left  Assistive Device Used:    gait belt, rolling walker (RW) and L BKA and prosthesis used  Level of Assist:    CGA  Comment:     Stair Training deferred, pt unsafe/not appropriate to complete stairs at this time  # of Steps:   N/A  Level of Assist:  Not Tested  UE Support:  NA  Assistive Device:  N/A  Pattern:   N/A  Comments:     Therapeutic Exercise Sravani deferred secondary to pt fatigued  NA    Balance  Sitting:  Good ; Supervision  Comments: Pt able to sit at EOB and don/doff prosthetic leg    Standing: Fair ; CGA  Comments: No LOB in standing. Patient Education      Role of PT, POC, Discharge recommendations, DC recommendations, safety awareness, transfer techniques, pacing activity and calling for assist with mobility. Positioning Needs       Pt in bed, alarm set, positioned in proper neutral alignment and pressure relief provided. Call light provided and all needs within reach    ROM Measurements N/A  Knee Flexion:  Knee Extension:     Activity Tolerance   Pt completed therapy session with pt complained of SOB with standing activity and walking. Other      Assessment :  Patient demonstrated improved mobility at today's visit as he walked very short distances, but pt still fatigues quickly with standing activity.  Pt was unable to perform more than 2 bouts of standing and walking, complaining of fatigue after ~20 minutes of activity. Pt will need to demonstrate improved endurance with standing and walking before he is ready to return home, so SNF placement is recommended at this time. Recommending SNF upon discharge as patient functioning well below baseline, demonstrates good rehab potential and unable to return home due to limited or no family support, burden of care beyond caregiver ability and home environment not conducive to patient recovery. Goals (all goals ongoing unless otherwise indicated)  To be met in 3 visits:  1). Independent with LE Ex x 10 reps     To be met in 6 visits:  1). Supine to/from sit: Supervision   2). Sit to/from stand: Supervision  3). Bed to chair: SBA  4). Gait: Ambulate 10 ft.   with  CGA and use of rolling walker (RW)  5). Tolerate B LE exercises 3 sets of 10-15 reps    Plan   Continue with plan of care. Signature: Linda Jc PT, DPT, Abraham Clink #[id number]    If patient discharges from this facility prior to next visit, this note will serve as the Discharge Summary.

## 2022-07-09 NOTE — PROGRESS NOTES
IM Progress Note    Admit Date:  7/7/2022    Patient with ESRD, missed dialysis for over 2 months. Presented with fatigue admitted with hyperkalemia, hypoxemia  And initially had emergent dialysis    Subjective:  Mr. Linh Castro  refused dialysis this morning. I spoke to patient at length. He is now agreeable for dialysis. He is interested in going to rehab. Objective:   /60   Pulse 72   Temp 98.6 °F (37 °C) (Oral)   Resp 17   Ht 6' 1\" (1.854 m)   Wt 213 lb (96.6 kg)   SpO2 96%   BMI 28.10 kg/m²     Intake/Output Summary (Last 24 hours) at 7/9/2022 1501  Last data filed at 7/9/2022 0839  Gross per 24 hour   Intake 535 ml   Output 670 ml   Net -135 ml         Physical Exam:   Gen: No distress. Alert. +Chronically ill appearing male   Awake and well-oriented  Eyes: PERRL. No sclera icterus. No conjunctival injection. ENT: No discharge. Pharynx clear. Neck: No JVD. No Carotid Bruit. Trachea midline. Resp: No accessory muscle use. No crackles. No wheezes. No rhonchi. +rales throughout lungs bilaterally   CV: Regular rate. Regular rhythm. No murmur. No rub. No edema. Capillary Refill: Brisk,< 3 seconds   Peripheral Pulses: could not detect pulses in right lower extremity   GI: Non-tender. No masses. No organomegaly. Normal bowel sounds. No hernia.  +distended, soft   Skin: Warm and dry. No nodule on exposed extremities. No rash on exposed extremities. +chronic scaling of right lower extremity, with chronic healing wounds on the right foot and great toe + eschar   S/p left BKA, healed incision scar   M/S: No cyanosis. No joint deformity. No clubbing. Neuro: Awake. Grossly nonfocal    Psych: Oriented x 3. No anxiety or agitation.        Medications:   Scheduled Meds:   aspirin  81 mg Oral Daily    atorvastatin  40 mg Oral Nightly    metoprolol succinate  25 mg Oral Daily    sodium chloride flush  5-40 mL IntraVENous 2 times per day    heparin (porcine)  5,000 Units SubCUTAneous 3 times per day    insulin lispro  0-6 Units SubCUTAneous TID     insulin lispro  0-3 Units SubCUTAneous Nightly    dextrose bolus  250 mL IntraVENous Once    epoetin lillie-epbx  6,000 Units IntraVENous Once per day on Tue Thu Sat    isosorbide dinitrate  20 mg Oral TID    And    hydrALAZINE  37.5 mg Oral 3 times per day    povidone-iodine   Topical Daily       Continuous Infusions:   dextrose      sodium chloride         Data:  CBC:   Recent Labs     07/07/22 2015 07/09/22 0412   WBC 9.5 8.1   RBC 3.31* 2.90*   HGB 9.7* 8.6*   HCT 30.5* 26.5*   MCV 92.3 91.1   RDW 15.9* 15.4    137     BMP:   Recent Labs     07/07/22 2015 07/07/22 2015 07/08/22 0200 07/09/22 0412     --  137 135*   K 7.7*   < > 5.9* 5.5*  5.5*     --  102 101   CO2 16*  --  19* 17*   PHOS  --   --   --  6.8*   *  --  111* 86*   CREATININE 9.8*  --  8.6* 7.1*    < > = values in this interval not displayed. BNP: No results for input(s): BNP in the last 72 hours. PT/INR: No results for input(s): PROTIME, INR in the last 72 hours. APTT: No results for input(s): APTT in the last 72 hours. CARDIAC ENZYMES:   Recent Labs     07/07/22 2015 07/08/22 0200   TROPONINI 0.68* 0.70*     FASTING LIPID PANEL:  Lab Results   Component Value Date    CHOL 96 04/24/2022    HDL 35 (L) 04/24/2022    TRIG 77 04/24/2022     LIVER PROFILE:   Recent Labs     07/07/22 2015 07/08/22  0200   AST 20 19   ALT 10 12   BILITOT 0.3 0.3   ALKPHOS 80 80          Cultures  COVID and influenza not detected  Blood cultures no growth to date    Radiology  VL Tcpo2 Sayra Multi Level   Final Result      XR CHEST PORTABLE   Final Result   No acute cardiopulmonary disease. Stable CHF with bilateral effusions and   bibasilar volume loss.                Assessment:  Principal Problem:    Hyperkalemia  Active Problems:    Cardiomyopathy ([address]    Tobacco abuse    Type 1 diabetes mellitus with neurological manifestations, uncontrolled (HCC)    Diabetic ulcer of right foot [facility]    Poorly controlled type 2 diabetes mellitus ([facility]    Mixed hyperlipidemia    Acute respiratory failure with hypoxia (HCC)    Chronic systolic heart failure (HCC)    Moderate malnutrition (HCC)    Alcoholic cirrhosis of liver with ascites (HCC)    PAF (paroxysmal atrial fibrillation) (HCC)    CAD (coronary artery disease)    End stage renal disease on dialysis [facility])  Resolved Problems:    * No resolved hospital problems. *      Plan:  #Acute hypoxic respiratory failure   #Respiratory acidosis   -was initially placed on BIPAP in ED, weaned off after emergent dialysis   -now requiring 2 L O2   -continue to wean as tolerated   -improving      #ESRD on HD  -Cr 9.8 on presentation,   -has not been compliant with HD, has not been since May  -required emergent dialysis in ED  -nephrology consulted   Patient refused HD this morning. I spoke to him at length. He is now agreeable for continued HD. Next HD will be on Monday.    He will need hemodialysis set up, with nursing home placement     #Hyperkalemia   -initially 7.7 --> 5.9    -2/2 to above  -given insulin, D50, lasix, sodium bicarb, calcium gluconate, albuterol  -continue lokelma   -nephrology consulted  Monitor with dialysis     #Metabolic acidosis   -2/2 to above  -should improve with dialysis      #Elevated troponin   -chronically elevated   -0.68 --> 0.70 --> trend  -likely 2/2 to ESRD   -no CP   -no EKG changes      #Hypoglycemia   -BG 57 in ED --> 86 today   -hypoglycemia protocol  -monitor BS      #Hypocalcemia   -5.7 --> 6.0 after calcium gluconate  -will continue to monitor   -PTH, vitamin D, and phosphorus pending  -nephrology following      #Inability to care for self  #Weakness   -PT/OT   -case management consulted   -palliative care consulted      #Chronic combined systolic and diastolic HF   #Ischemic cardiomyopathy   #Pulmonary HTN   -decompensation likely 2/2 to need for HD  -CXR stable CHF    -echo from 1/9/2021 with EF 77%, grade I diastolic dysfunction and SPAP 57 mmHg   -on BB   -daily weights   -I and Os  -will continue to monitor      #CAD  -on ASA, statin      #Paroxysmal Atrial fibrillation   -NSR on presentation   -rate controlled   -on toprol XL   -on ASA   -noncompliant with anticoagulation      #DM   -insulin dependent, unsure of compliance at home  -hypoglycemic, given D5   -will continue to monitor BS   -on SSI      #Chronic anemia  -Hgb 9.3   -will continue to monitor   -on retacrit per nephrology          #HTN   -on toprol xl, bidil, and hydralazine   -BP stable   -continue to monitor      #Decompensated Alcoholic Cirrhosis   -receives weekly paracentesis  -will need para on Monday   -consulted palliative care      #S/p left BKA   -has prosthesis     #Chronic right foot ulcers   -could not palpate pulses   -ABIs pending   -wound care consulted  -can consult podiatry      -palliative care consulted for goals of care discussion         DVT Prophylaxis: heparin   Diet: ADULT DIET; Regular; 4 carb choices (60 gm/meal); Low Sodium (2 gm); Low Potassium (Less than 3000 mg/day); Low Phosphorus (Less than 1000 mg); 60 to 80 gm  Code Status: Full Code      I spoke to patient at length. He is not interested in hospice care. He wants to get better and go to a nursing home for placement in rehab. I have discussed with him that he needs dialysis for maintenance of his life. He does not want end-of-life hospice care    Will inform nephrology, patient will get next dialysis on Monday. He will need a nursing home spot , where continued dialysis can be coordinated       Total time today 40 minutes.  > 50 % time dominated by counseling  coordination of care          Giuliana Cavazos MD   7/9/2022 3:01 PM

## 2022-07-09 NOTE — PROGRESS NOTES
Pt has no complaints at the time, but is requesting sleep aid medications. No prn orders in [facility] Standard safety measures established, will continue to monitor.

## 2022-07-10 NOTE — PROGRESS NOTES
Spoke with on call neprology. Plan to place new orders (per nephrology) for hyperkalemia protocol. Also, nephrology wants patient to have hemodialysis today. Working to call on call RN in to provide [phone].  Jeromy Bradshaw RN

## 2022-07-10 NOTE — PROGRESS NOTES
Progress Note    HISTORY     CC:  Shortness of breath           We are following for ESRD      Subjective/   HPI:  Patient is now willing to do dialysis. K is 6.1 but otherwise he feels ok. On room air.   Says he is willing to do outpatient dialysis but unclear if he can get a spot     ROS:  Constitutional:  No fevers, No Chills, + weakness  Cardiovascular:  No palpations, + edema  Respiratory:  No wheezing, no cough  Skin:  No rash, no itching  :  No hematuria, no dysuria     Social Hx:  No Family at the bedside     Past Medical and Surgical History:  - Reviewed, no changes     EXAM       Objective/     Vitals:    07/09/22 2025 07/09/22 2154 07/10/22 0345 07/10/22 0514   BP: 113/74  105/71 (!) 121/56   Pulse:  63 60    Resp:  18 16    Temp:   97.5 °F (36.4 °C)    TempSrc:   Oral    SpO2:  94% 91%    Weight:   216 lb 6.4 oz (98.2 kg)    Height:         24HR INTAKE/OUTPUT:      Intake/Output Summary (Last 24 hours) at 7/10/2022 0753  Last data filed at 7/9/2022 2025  Gross per 24 hour   Intake 190 ml   Output 220 ml   Net -30 ml     Constitutional:  Ill appearing, [place]  Eyes:  Pupils reactive, sclera clear   Neck:  Normal thyroid, no masses   Cardiovascular:  Regular, no rub  Respiratory:  No distress, no wheezing  Psychiatry:  Appropriate mood/affect, alert  Abdomen: +bs, soft, nt, no masses   Musculoskeletal: + LE edema, left BKA  Lymphatics:  No LAD in neck, no supraclavicular nodes       MEDICAL DECISION MAKING       Data/  Recent Labs     07/07/22  2015 07/09/22  0412 07/10/22  0457   WBC 9.5 8.1 9.8   HGB 9.7* 8.6* 9.2*   HCT 30.5* 26.5* 27.9*   MCV 92.3 91.1 90.1    137 151     Recent Labs     07/08/22  0200 07/08/22  0200 07/09/22  0412 07/10/22  0457     --  135* 132*   K 5.9*   < > 5.5*  5.5* 6.1*     --  101 98*   CO2 19*  --  17* 15*   GLUCOSE 57*  --  68* 77   PHOS  --   --  6.8*  --    *  --  86* 90*   CREATININE 8.6*  --  7.1* 7.5*   LABGLOM 6*  --  8* 7*   GFRAA 8*  --  9* 9*    < > = values in this interval not displayed. Assessment/     ESKD. - Missed dialysis for about 2 months. - Previously at [facility] but discharged from their clinic because of non-compliance. - Received HD x 1, labs are stable but patient is refusing to go to dialysis today. Says that he is done with dialysis   - Palliative care consulted, son coming to meet this weekend      Hyperkalemia.  - From missed dialysis.     Metabolic Acidosis. - From CKD. - Should improve with dialysis.     Anemia.  - Continue HARPREET with HD, 6K units.     Hypertension.  - BP is acceptable. - On Metoprolol, Isordil and Hydralazine.     CKD-MBD.  - Hypocalcemia, received IV replacement. Will check iPTH, vitamin D and phos. Plan/     Patient has agreed to dialysis, placement may be difficult given his history for an outpatient dialysis spot  No need for dialysis today, we can give him lokelma and plan for dialysis tomorrow.   He refused yesterday when transport was there and we were all set up  Follow labs  Discharge planning tomorrow   -----------------------------  Adryan Burris M.D.   [facility]

## 2022-07-10 NOTE — PROGRESS NOTES
Patient refusing to wear continuous pulse ox monitor despite education. Pulse ox replaced X2 after patient pulled off the pulse ox but patient continues with behaviors. Dr. Stephanie Slaughter updated on patient behavior. NNO.  Kerri Luna RN

## 2022-07-10 NOTE — PROGRESS NOTES
Shift report given to [facility]. Pt stable. Care transferred at this time.  Schuyler Mak RN

## 2022-07-10 NOTE — PROGRESS NOTES
Shift assessment completed. VSS. See full assessment. No complaints of pain or discomfort. Resting in bed with call light in reach.  Junior Siddiqi RN

## 2022-07-10 NOTE — PROGRESS NOTES
Progress Note    Admit Date:  7/7/2022    Patient with ESRD, missed dialysis for over 2 months. Presented with fatigue admitted with hyperkalemia, hypoxemia  And initially had emergent dialysis    Subjective:  Mr. Humble Triana stable with no complaints . He had refused dialysis on Saturday  I spoke to patient at length. He is now agreeable for dialysis. He is interested in going to rehab. Discussed with discharge planner. This can likely be set up for Monday for patient to go to [facility] after dialysis    Objective:   /68   Pulse 66   Temp 98.7 °F (37.1 °C) (Oral)   Resp 15   Ht 6' 1\" (1.854 m)   Wt 216 lb 6.4 oz (98.2 kg)   SpO2 92%   BMI 28.55 kg/m²       Intake/Output Summary (Last 24 hours) at 7/10/2022 1452  Last data filed at 7/9/2022 2025  Gross per 24 hour   Intake 10 ml   Output --   Net 10 ml         Physical Exam:   Gen: No distress. Alert. +Chronically ill appearing male   Awake and well-oriented  Eyes: PERRL. No sclera icterus. No conjunctival injection. ENT: No discharge. Pharynx clear. Neck: No JVD. No Carotid Bruit. Trachea midline. Resp: No accessory muscle use. No crackles. No wheezes. No rhonchi. +rales throughout lungs bilaterally   CV: Regular rate. Regular rhythm. No murmur. No rub. No edema. Capillary Refill: Brisk,< 3 seconds   Peripheral Pulses: could not detect pulses in right lower extremity   GI: Non-tender. No masses. No organomegaly. Normal bowel sounds. No hernia.  +distended, soft   Skin: Warm and dry. No nodule on exposed extremities. No rash on exposed extremities. +chronic scaling of right lower extremity, with chronic healing wounds on the right foot and great toe + eschar   S/p left BKA, healed incision scar   M/S: No cyanosis. No joint deformity. No clubbing. Neuro: Awake. Grossly nonfocal    Psych: Oriented x 3. No anxiety or agitation.        Medications:   Scheduled Meds:   insulin regular  10 Units IntraVENous Once    And    dextrose bolus  250 mL IntraVENous Once    calcium gluconate  1,000 mg IntraVENous Once    aspirin  81 mg Oral Daily    atorvastatin  40 mg Oral Nightly    metoprolol succinate  25 mg Oral Daily    sodium chloride flush  5-40 mL IntraVENous 2 times per day    heparin (porcine)  5,000 Units SubCUTAneous 3 times per day    insulin lispro  0-6 Units SubCUTAneous TID     insulin lispro  0-3 Units SubCUTAneous Nightly    dextrose bolus  250 mL IntraVENous Once    epoetin lillie-epbx  6,000 Units IntraVENous Once per day on Tue Thu Sat    isosorbide dinitrate  20 mg Oral TID    And    hydrALAZINE  37.5 mg Oral 3 times per day    povidone-iodine   Topical Daily       Continuous Infusions:   dextrose      sodium chloride         Data:  CBC:   Recent Labs     07/07/22  2015 07/09/22  0412 07/10/22  0457   WBC 9.5 8.1 9.8   RBC 3.31* 2.90* 3.10*   HGB 9.7* 8.6* 9.2*   HCT 30.5* 26.5* 27.9*   MCV 92.3 91.1 90.1   RDW 15.9* 15.4 15.4    137 151     BMP:   Recent Labs     07/08/22  0200 07/08/22  0200 07/09/22  0412 07/10/22  0457     --  135* 132*   K 5.9*   < > 5.5*  5.5* 6.1*     --  101 98*   CO2 19*  --  17* 15*   PHOS  --   --  6.8*  --    *  --  86* 90*   CREATININE 8.6*  --  7.1* 7.5*    < > = values in this interval not displayed. BNP: No results for input(s): BNP in the last 72 hours. PT/INR: No results for input(s): PROTIME, INR in the last 72 hours. APTT: No results for input(s): APTT in the last 72 hours.   CARDIAC ENZYMES:   Recent Labs     07/07/22 2015 07/08/22 0200   TROPONINI 0.68* 0.70*     FASTING LIPID PANEL:  Lab Results   Component Value Date    CHOL 96 04/24/2022    HDL 35 (L) 04/24/2022    TRIG 77 04/24/2022     LIVER PROFILE:   Recent Labs     07/07/22  2015 07/08/22  0200   AST 20 19   ALT 10 12   BILITOT 0.3 0.3   ALKPHOS 85 80          Cultures  COVID and influenza not detected  Blood cultures no growth to date    Radiology  VL Tcpo2 [place] Multi Level   Final Result      XR CHEST PORTABLE   Final Result   No acute cardiopulmonary disease. Stable CHF with bilateral effusions and   bibasilar volume loss. 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 *      Plan:  #Acute hypoxic respiratory failure   #Respiratory acidosis   -was initially placed on BIPAP in ED, weaned off after emergent dialysis  -O2 sat stable on room air today     #ESRD on HD  -Cr 9.8 on presentation,   -has not been compliant with HD, has not been to HD since May  -required emergent dialysis in ED  -nephrology consulted   Patient refused HD  On saturday. I spoke to him at length. He is now agreeable for continued HD. Next HD will be on Monday.    He will need hemodialysis set up, with nursing home placement     #Hyperkalemia   -initially 7.7 --> 5.9    -2/2 to above  -given insulin, D50, lasix, sodium bicarb, calcium gluconate, albuterol  -continue lokelma   -nephrology consulted  Monitor with dialysis     #Metabolic acidosis   -2/2 to above  -should improve with dialysis      #Elevated troponin   -chronically elevated   -0.68 --> 0.70 --> trend  -likely 2/2 to ESRD   -no CP   -no EKG changes      #Hypoglycemia   -hypoglycemia protocol  -monitor BS   Resolved     #Hypocalcemia   -5.7 --> 6.0 after calcium gluconate  -will continue to monitor   -PTH, vitamin D, and phosphorus pending  -nephrology following      #Inability to care for self  #Weakness   -PT/OT   -case management consulted      #Chronic combined systolic and diastolic HF   #Ischemic cardiomyopathy   #Pulmonary HTN   -decompensation likely 2/2 to need for HD  -CXR stable CHF    -echo from 1/9/2021 with EF 06%, grade I diastolic dysfunction and SPAP 57 mmHg   -on BB   -daily weights   -I and Os  -will continue to monitor      #CAD  -on ASA, statin      #Paroxysmal Atrial fibrillation   -NSR on presentation   -rate controlled   -on toprol XL   -on ASA   -noncompliant with anticoagulation      #DM   -insulin dependent, unsure of compliance at home  -hypoglycemic, given D5   -will continue to monitor BS   -on SSI      #Chronic anemia  -Hgb 9.3   -will continue to monitor   -on retacrit per nephrology          #HTN   -on toprol xl, bidil, and hydralazine   -BP stable   -continue to monitor      #Decompensated Alcoholic Cirrhosis   -receives weekly paracentesis  -will need para on Monday   -consulted palliative care      #S/p left BKA   -has prosthesis     #Chronic right foot ulcers   -could not palpate pulses   -ABIs pending   -wound care consulted  -can consult podiatry      # Palliative care consulted for goals of care discussion    on Saturday   I spoke to patient at length. He is not interested in hospice care. He wants to get better and go to a nursing home for  rehab. I have discussed with him that he needs dialysis for maintenance of his life. He does not want end-of-life hospice care. He wants to continue with dialysis as long as it can be coordinated with the nursing home. He states he has had difficulty with transportation to go for dialysis. DVT Prophylaxis: heparin   Diet: ADULT DIET; Regular; 4 carb choices (60 gm/meal); Low Sodium (2 gm); Low Potassium (Less than 3000 mg/day);  Low Phosphorus (Less than 1000 mg); 60 to 80 gm  Code Status: Full Code      Will inform nephrology, patient will get next dialysis on Monday. He will need a nursing home spot , where continued dialysis can be coordinated.   Per discharge planner they can likely set this up at [address] Matilde Carvajal MD   7/10/2022 2:52 PM

## 2022-07-10 NOTE — PROGRESS NOTES
Alerted to room that patient's CVC dressing was not in place. Patient had CVC dressing almost removed (only attached at top). Dressing not covering CVC insertion site. Mask placed on patient. Area around CVC cleaned and transparent dressing put in place until dialysis RN able to place new dressing. Also noted that right foot of bed had blood smeared on footboard. Sock pulled off foot and noted area on right great toe had opened and bled. Patient stated he has had the area for years. Area cleansed, optifoam put in place on that area and black area to side of right great toe and new sock put in place. Patient now resting in bed with call light in reach.  Teddy Jean Baptiste RN

## 2022-07-10 NOTE — PLAN OF CARE
Care Plan, Education, Fall Risk, Gabriel Scale, and Lift Assessment complete. Patient is resting and reports no needs at this time.      Problem: ABCDS Injury Assessment  Goal: Absence of physical injury  Outcome: Progressing     Problem: Safety - Adult  Goal: Free from fall injury  Outcome: Progressing     Problem: Skin/Tissue Integrity - Adult  Goal: Skin integrity remains intact  Outcome: Progressing  Goal: Incisions, wounds, or drain sites healing without S/S of infection  Outcome: Progressing  Goal: Oral mucous membranes remain intact  Outcome: Progressing

## 2022-07-10 NOTE — PROGRESS NOTES
Message to Dr. Kiley Falk regarding K+ 6.1 and CA++ 5.9. Request to call oncall nephrologist with labs due to patient being a hemodialysis patient and missing hemo on previous day. Call to [facility] and spoke with María Ball to request call back from oncall neprologist. Awaiting call back.  Heladio Fenton RN

## 2022-07-11 NOTE — PROGRESS NOTES
Progress Note    HISTORY     CC:  Shortness of breath           We are following for ESRD      Subjective/   HPI:  Patient is now willing to do dialysis. Seen on dialysis. Blood flow of 400 cc/min. Tolerating fluid removal.  K is 6.3    ROS:  Constitutional:  No fevers, No Chills, + weakness  Cardiovascular:  No palpations, + edema  Respiratory:  No wheezing, no cough  Skin:  No rash, no itching  :  No hematuria, no dysuria     Social Hx:  No Family at the bedside     Past Medical and Surgical History:  - Reviewed, no changes     EXAM       Objective/     Vitals:    07/11/22 0400 07/11/22 0617 07/11/22 0648 07/11/22 1010   BP: 116/86 (!) 107/57 122/72 (!) 140/77   Pulse: 68  68 73   Resp: 16  16 16   Temp: 97.9 °F (36.6 °C)  97.8 °F (36.6 °C) 98.3 °F (36.8 °C)   TempSrc: Oral      SpO2: 93%      Weight:   214 lb 4.6 oz (97.2 kg) 210 lb 12.2 oz (95.6 kg)   Height:         24HR INTAKE/OUTPUT:      Intake/Output Summary (Last 24 hours) at 7/11/2022 1059  Last data filed at 7/11/2022 1007  Gross per 24 hour   Intake 0 ml   Output --   Net 0 ml     Constitutional:  Ill appearing, [place]  Eyes:  Pupils reactive, sclera clear   Neck:  Normal thyroid, no masses   Cardiovascular:  Regular, no rub  Respiratory:  No distress, no wheezing  Psychiatry:  Appropriate mood/affect, alert  Abdomen: +bs, soft, nt, no masses   Musculoskeletal: + LE edema, left BKA  Lymphatics:  No LAD in neck, no supraclavicular nodes       MEDICAL DECISION MAKING       Data/  Recent Labs     07/09/22  0412 07/10/22  0457 07/11/22  0441   WBC 8.1 9.8 10.5   HGB 8.6* 9.2* 9.2*   HCT 26.5* 27.9* 28.2*   MCV 91.1 90.1 91.5    151 147     Recent Labs     07/09/22  0412 07/10/22  0457 07/11/22  0441   * 132* 131*   K 5.5*  5.5* 6.1* 6.3*    98* 97*   CO2 17* 15* 12*   GLUCOSE 68* 77 78   PHOS 6.8*  --   --    BUN 86* 90* 102*   CREATININE 7.1* 7.5* 8.0*   LABGLOM 8* 7* 7*   GFRAA 9* 9* 8*       Assessment/     ESKD.   - Missed dialysis for about 2 months. - Previously at [facility] but discharged from their clinic because of non-compliance. - Started back on dialysis / has agreed to do dialysis for the present time     Hyperkalemia.  - From missed dialysis.     Metabolic Acidosis. - From CKD. - Should improve with dialysis.     Anemia.  - Continue HARPREET with HD, 6K units.     Hypertension.  - BP is acceptable.   - On Metoprolol, Isordil and Hydralazine.     CKD-MBD.  - Hypocalcemia    Plan/     Patient has agreed to dialysis  Will need placement with outpatient dialysis arrangements, this might be difficult due to his history of non-compliance   HD today - Next HD on Thursday as might be TTS schedule but will need to see what options he may have  IV calcium today   Dialysis with 1K bath  -----------------------------  Gabrielle Cleary M.D.   [facility]

## 2022-07-11 NOTE — FLOWSHEET NOTE
07/10/22 2015   Vital Signs   Temp 98.2 °F (36.8 °C)   Temp Source Oral   Heart Rate 54   Heart Rate Source Monitor   Resp 18   /66   BP Location Left upper arm   BP Method Automatic   MAP (Calculated) 77.67   Patient Position Sitting   Level of Consciousness Alert (0)   MEWS Score 1   Oxygen Therapy   SpO2 90 %   O2 Device None (Room air)       Shift assessment complete, see flowsheet. Pt A&O x4. Assisted pt to standing position to use urinal. Pt expresses feelings of SOB. SPO2 90% on RA. Placed on 2L at this time for comfort. PT agreeable to wear cont pulse ox, sensor replaced at this time. CMU made aware. Scheduled medications administered per eMAR. Pt denies any further needs. Call light left within reach.

## 2022-07-11 NOTE — PLAN OF CARE
Problem: Discharge Planning  Goal: Discharge to home or other facility with appropriate resources  7/11/2022 0121 by Musa Hernandez RN  Outcome: Progressing  7/10/2022 1743 by Octavio Starks RN  Outcome: Progressing     Problem: Safety - Adult  Goal: Free from fall injury  7/11/2022 0121 by Musa Hernandez RN  Outcome: Progressing  Flowsheets (Taken 7/10/2022 1745 by Octavio Starks RN)  Free From Fall Injury:   Instruct family/caregiver on patient safety   Based on caregiver fall risk screen, instruct family/caregiver to ask for assistance with transferring infant if caregiver noted to have fall risk factors  7/10/2022 1743 by Octavio Starks RN  Outcome: Progressing     Problem: ABCDS Injury Assessment  Goal: Absence of physical injury  7/11/2022 0121 by Musa Hernandez RN  Outcome: Progressing  Flowsheets (Taken 7/10/2022 1745 by Octavio Starks RN)  Absence of Physical Injury: Implement safety measures based on patient assessment  7/10/2022 1743 by Octavio Starks RN  Outcome: Progressing     Problem: Cardiovascular - Adult  Goal: Maintains optimal cardiac output and hemodynamic stability  7/11/2022 0121 by Musa Hernandez RN  Outcome: Progressing  7/10/2022 1743 by Octavio Starks RN  Outcome: Progressing  Goal: Absence of cardiac dysrhythmias or at baseline  7/11/2022 0121 by Musa Hernandez RN  Outcome: Progressing  7/10/2022 1743 by Octavio Starks RN  Outcome: Progressing     Problem: Skin/Tissue Integrity - Adult  Goal: Skin integrity remains intact  7/11/2022 0121 by Musa Hernandez RN  Outcome: Progressing  Flowsheets (Taken 7/10/2022 1745 by Octavio Starks RN)  Skin Integrity Remains Intact:   Monitor for areas of redness and/or skin breakdown   Assess vascular access sites hourly   Every 4-6 hours minimum: Change oxygen saturation probe site   Every 4-6 hours: If on nasal continuous positive airway pressure, respiratory therapy assesses nares and determine need for appliance change or resting period  7/10/2022 1743 by Lobito Landry RN  Outcome: Progressing  Goal: Incisions, wounds, or drain sites healing without S/S of infection  7/11/2022 0121 by Diego Coello RN  Outcome: Progressing  7/10/2022 1743 by Lobito Landry RN  Outcome: Progressing  Goal: Oral mucous membranes remain intact  7/11/2022 0121 by Diego Coello RN  Outcome: Progressing  7/10/2022 1743 by Lobito Landry RN  Outcome: Progressing     Problem: Musculoskeletal - Adult  Goal: Return mobility to safest level of function  7/11/2022 0121 by Diego Coello RN  Outcome: Progressing  7/10/2022 1743 by Lobito Landry RN  Outcome: Progressing  Goal: Maintain proper alignment of affected body part  7/11/2022 0121 by Diego Coello RN  Outcome: Progressing  7/10/2022 1743 by Lobito Landry RN  Outcome: Progressing  Goal: Return ADL status to a safe level of function  7/11/2022 0121 by Diego Coello RN  Outcome: Progressing  7/10/2022 1743 by Lobito Landry RN  Outcome: Progressing     Problem: Infection - Adult  Goal: Absence of infection at discharge  7/11/2022 0121 by Diego Coello RN  Outcome: Progressing  7/10/2022 1743 by Lobito Landry RN  Outcome: Progressing  Goal: Absence of infection during hospitalization  7/11/2022 0121 by Diego Coello RN  Outcome: Progressing  7/10/2022 1743 by Lobito Landry RN  Outcome: Progressing  Goal: Absence of fever/infection during anticipated neutropenic period  7/11/2022 0121 by Diego Coello RN  Outcome: Progressing  7/10/2022 1743 by Lobito Landry RN  Outcome: Progressing     Problem: Metabolic/Fluid and Electrolytes - Adult  Goal: Electrolytes maintained within normal limits  7/11/2022 0121 by Diego Coello RN  Outcome: Progressing  7/10/2022 1743 by Lobito Landry RN  Outcome: Progressing  Goal: Hemodynamic stability and optimal renal function maintained  7/11/2022 0121 by Diego Coello RN  Outcome: Progressing  7/10/2022 [id number] by Marilin Luna RN  Outcome: Progressing  Goal: Glucose maintained within prescribed range  7/11/2022 0121 by Giorgio Romeo RN  Outcome: Progressing  7/10/2022 1743 by Marilin Luna RN  Outcome: Progressing     Problem: Hematologic - Adult  Goal: Maintains hematologic stability  7/11/2022 0121 by Giorgio Romeo RN  Outcome: Progressing  7/10/2022 1743 by Marilin Luna RN  Outcome: Progressing     Problem: Skin/Tissue Integrity  Goal: Absence of new skin breakdown  Description: 1. Monitor for areas of redness and/or skin breakdown  2. Assess vascular access sites hourly  3. Every 4-6 hours minimum:  Change oxygen saturation probe site  4. Every 4-6 hours:  If on nasal continuous positive airway pressure, respiratory therapy assess nares and determine need for appliance change or resting period.   7/11/2022 0121 by Giorgio Romeo RN  Outcome: Progressing  7/10/2022 1743 by Marilin Luna RN  Outcome: Progressing     Problem: Chronic Conditions and Co-morbidities  Goal: Patient's chronic conditions and co-morbidity symptoms are monitored and maintained or improved  7/11/2022 0121 by Giorgio Romeo RN  Outcome: Progressing  7/10/2022 1743 by Marilin Luna RN  Outcome: Progressing     Problem: Pain  Goal: Verbalizes/displays adequate comfort level or baseline comfort level  7/11/2022 0121 by Giorgio Romeo RN  Outcome: Progressing  7/10/2022 1743 by Marilin Luna RN  Outcome: Progressing

## 2022-07-11 NOTE — PROGRESS NOTES
Lab called panic levels. CO2  12  BUN   102  Creatinine 8.0  Calcium 5.7  Potassium 6.3    MD notified at this time via perfect serve. Dialysis pt.  Treatment scheduled for this am.

## 2022-07-11 NOTE — FLOWSHEET NOTE
07/11/22 0648 07/11/22 1010   Vital Signs   /72 (!) 140/77   Temp 97.8 °F (36.6 °C) 98.3 °F (36.8 °C)   Heart Rate 68 73   Weight 214 lb 4.6 oz (97.2 kg) 210 lb 12.2 oz (95.6 kg)   Weight Method Actual;Bed scale Actual;Bed scale     Treatment time: 180min    Net UF: 1700ml    Pre weight: 97.2k  Post weight: 95.6k  EDW: TBD    Access used: RIJ TDC  Access function: Good    Medications or blood products given: Retacrit 6000units     Regular outpatient schedule: TTS, Next tx thursday    Summary of response to treatment: Tolerated well. Refill negative per crit line and ending HCT 28% BV changed -6.7%. Copy of dialysis treatment record placed in chart, to be scanned into EMR.

## 2022-07-11 NOTE — PROGRESS NOTES
Occupational Therapy Daily Treatment Note    Unit: PCU  Date:  7/11/2022  Patient Name:    Camron Abreu  Admitting diagnosis:  Hyperkalemia [E87.5]  End stage renal disease on dialysis [facility]) [N18.6, Z99.2]  Medically noncompliant [Z91.19]  Admit Date:  7/7/2022  Precautions/Restrictions:  fall risk purewick catheter, telemetry and continuous pulse ox, left BKA-  prothesis        Discharge Recommendations: SNF  DME needs for discharge: defer to facility       Therapy recommendations for staff:   Assist of 1 (moderate assist) with use of rolling walker (RW) for all transfers to/from BSC/chair    AM-PAC Score: AM-PAC Inpatient Daily Activity Raw Score: 13  Home Health S4 Level: NA       Treatment Time:  11:10-11:40  Treatment number:  2   Total Treatment Time:   30 minutes    History of Present Illness: per ED note 59 y. o. male with a history of multiple medical problems including liver cirrhosis, CHF with an ejection fraction of 25%, diabetes, status post left below the knee amputation and also end-stage renal disease and medical noncompliance presents with shortness of breath and generalized malaise.  He states he has been falling at home and is so weak he cannot out of his wheelchair.  EMS reports his house was in deplorable conditions.  The patient is noted to have a dialysis catheter in his right chest.  He states he was told several months ago he did not need to go to dialysis anymore.  On chart review this does not appear to be accurate.  When questioned again he admits he has been noncompliant has not followed up dialysis since his last hospitalization in May.  He reports shortness of breath and states he feels like he needs to sit up because he cannot breathe.  He denies any chest pain.  No abdominal pain.  He does still make urine.   No other complaints, modifying factors or associated symptoms    Subjective:  Pt agreeable to get up to chair    Pain   No  Rating: NA  Location:NA  Pain Medicine Status: No

## 2022-07-11 NOTE — CARE COORDINATION
INTERDISCIPLINARY PLAN OF CARE CONFERENCE    Date/Time: 7/11/2022 10:52 AM  Completed by: Reese Torre RN, Case Management      Patient Name:  Mirna Cuadra  YOB: 1960  Admitting Diagnosis: Hyperkalemia [E87.5]  End stage renal disease on dialysis [facility]) [N18.6, Z99.2]  Medically noncompliant [Z91.19]     Admit Date/Time:  7/7/2022  7:50 PM    Chart reviewed. Interdisciplinary team contacted or reviewed plan related to patient progress and discharge plans. Disciplines included Case Management, Nursing, and Dietitian. Current Status:INPT   PT/OT recommendation for discharge plan of care: SNF    Expected D/C Disposition:  Skilled nursing facility  Confirmed plan with patient and/or family Yes confirmed with: (name) with pt  Met with:pt at bedside. Discharge Plan Comments: Pt plans to go to SNF at discharge and would like a referral to [facility] and to [facility] for HD. Referral called to Claribel Rosen at [address] at this time and she is reviewing pt information. Referral called to Kylee Ordonez with [facility] in [facility] who states that pt was discharged from their facility in May and his spot has been filled. Kylee Ordonez advised CM to call main [facility] referral line. CM called and left vmm on Main [facility] line and faxed referral for new HD set up. Await return call. 1155: CM spoke with Lawson Magaña at [facility] who states that she is reviewing pt information and will return call to CM.     1225: Rec'd return call from Claribel Rosen with [facility] stating they cannot accept pt due to unable to provide for transportation needs to HD at this time. Referral called to [facility] per pt request. CM left vmm for [facility] with [facility]. Await return call. CM called [facility] in [place] who states that they will review pt information. Referral faxed at this time. Referral called to [facility] per pt request.    5855: Rec'd return call from [facility] with [facility] who states they are out of network for pt insurance.     Referral called to Pepe Berrios with [facility] and [facility] per pt request. Await return call.     1600: CM called [facility] and spoke with Yessica Montejo who states that Ms. Parker Hill is handling pt case and she will return call to  re status of referral. Await return call. [phone]: CM called [facility] and left [facility] for the charge nurse re referral status. Await return call. CM called Pepe Berrios with [facility] and [facility] re referral status. No answer and voicemail full.        Home O2 in place on admit: No  Pt informed of need to bring portable home O2 tank on day of discharge for nursing to connect prior to leaving:  Not Indicated  Verbalized agreement/Understanding:  Not Indicated

## 2022-07-11 NOTE — FLOWSHEET NOTE
07/11/22 0400   Vital Signs   Temp 97.9 °F (36.6 °C)   Temp Source Oral   Heart Rate 68   Heart Rate Source Monitor   Resp 16   /86   BP Location Left upper arm   BP Method Automatic   MAP (Calculated) 96   Patient Position Sitting   Level of Consciousness Alert (0)   MEWS Score 1   Oxygen Therapy   SpO2 93 %   O2 Device None (Room air)       Pt resting comfortably in bed. Pt removed oxygen on own. Pt denies feelings of SOB. Refusing to have cont pulse ox replaced at this time. Pt states he doesn't need it right now, if he starts feeling bad then he'll put it back on.

## 2022-07-11 NOTE — PROGRESS NOTES
Progress Note    Admit Date:  7/7/2022    Patient with ESRD, missed dialysis for over 2 months. Presented with fatigue admitted with hyperkalemia, hypoxemia  And initially had emergent dialysis    Subjective:  Mr. Donato Sheer stable with no complaints . He had refused dialysis on Saturday  I spoke to patient at length. He is now agreeable for dialysis. He is interested in going to rehab. Discussed with discharge planner. This can likely be set up for Monday for patient to go to [facility] after dialysis    7/11-the patient was seen after dialysis today. He is feeling better. Objective:   /63   Pulse 70   Temp 99 °F (37.2 °C) (Oral)   Resp 16   Ht 6' 1\" (1.854 m)   Wt 210 lb 12.2 oz (95.6 kg)   SpO2 96%   BMI 27.81 kg/m²       Intake/Output Summary (Last 24 hours) at 7/11/2022 1435  Last data filed at 7/11/2022 1317  Gross per 24 hour   Intake 180 ml   Output --   Net 180 ml         Physical Exam:   Gen: No distress. Alert. +Chronically ill appearing male   Awake and well-oriented  Eyes: PERRL. No sclera icterus. No conjunctival injection. ENT: No discharge. Pharynx clear. Neck: No JVD. No Carotid Bruit. Trachea midline. Resp: No accessory muscle use. No crackles. No wheezes. No rhonchi. +rales throughout lungs bilaterally   CV: Regular rate. Regular rhythm. No murmur. No rub. No edema. Capillary Refill: Brisk,< 3 seconds   Peripheral Pulses: could not detect pulses in right lower extremity   GI: Non-tender. No masses. No organomegaly. Normal bowel sounds. No hernia.  +distended, soft   Skin: Warm and dry. No nodule on exposed extremities. No rash on exposed extremities. +chronic scaling of right lower extremity, with chronic healing wounds on the right foot and great toe + eschar   S/p left BKA, healed incision scar   M/S: No cyanosis. No joint deformity. No clubbing. Neuro: Awake. Grossly nonfocal    Psych: Oriented x 3. No anxiety or agitation.        Medications:   Scheduled Meds:   epoetin lillie-epbx  6,000 Units IntraVENous Once per day on Tue Thu Sat    calcium gluconate IVPB  4,000 mg IntraVENous Once    insulin regular  10 Units IntraVENous Once    And    dextrose bolus  250 mL IntraVENous Once    calcium gluconate  1,000 mg IntraVENous Once    aspirin  81 mg Oral Daily    atorvastatin  40 mg Oral Nightly    metoprolol succinate  25 mg Oral Daily    sodium chloride flush  5-40 mL IntraVENous 2 times per day    heparin (porcine)  5,000 Units SubCUTAneous 3 times per day    insulin lispro  0-6 Units SubCUTAneous TID WC    insulin lispro  0-3 Units SubCUTAneous Nightly    dextrose bolus  250 mL IntraVENous Once    isosorbide dinitrate  20 mg Oral TID    And    hydrALAZINE  37.5 mg Oral 3 times per day    povidone-iodine   Topical Daily       Continuous Infusions:   dextrose      sodium chloride         Data:  CBC:   Recent Labs     07/09/22  0412 07/10/22  0457 07/11/22  0441   WBC 8.1 9.8 10.5   RBC 2.90* 3.10* 3.08*   HGB 8.6* 9.2* 9.2*   HCT 26.5* 27.9* 28.2*   MCV 91.1 90.1 91.5   RDW 15.4 15.4 15.5*    151 147     BMP:   Recent Labs     07/09/22  0412 07/10/22  0457 07/11/22  0441   * 132* 131*   K 5.5*  5.5* 6.1* 6.3*    98* 97*   CO2 17* 15* 12*   PHOS 6.8*  --   --    BUN 86* 90* 102*   CREATININE 7.1* 7.5* 8.0*     BNP: No results for input(s): BNP in the last 72 hours. PT/INR: No results for input(s): PROTIME, INR in the last 72 hours. APTT: No results for input(s): APTT in the last 72 hours. CARDIAC ENZYMES:   No results for input(s): CKMB, CKMBINDEX, TROPONINI in the last 72 hours. Invalid input(s): CKTOTAL;3  FASTING LIPID PANEL:  Lab Results   Component Value Date    CHOL 96 04/24/2022    HDL 35 (L) 04/24/2022    TRIG 77 04/24/2022     LIVER PROFILE:   No results for input(s): AST, ALT, ALB, BILIDIR, BILITOT, ALKPHOS in the last 72 hours.        Cultures  COVID and influenza not detected  Blood cultures no growth to date    Radiology  VL Tcpo2 [place] Multi Level   Final Result      XR CHEST PORTABLE   Final Result   No acute cardiopulmonary disease. Stable CHF with bilateral effusions and   bibasilar volume loss. 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 *      Plan:  #Acute hypoxic respiratory failure   #Respiratory acidosis   -was initially placed on BIPAP in ED, weaned off after emergent dialysis  -O2 sat stable on room air today     #ESRD on HD  -Cr 9.8 on presentation,   -has not been compliant with HD, has not been to HD since May  -required emergent dialysis in ED  -nephrology consulted   Patient refused HD  On saturday. I spoke to him at length. He is now agreeable for continued HD.   He had HD today  He will need hemodialysis set up, with nursing home placement  [facility] is trying to set this up.     #Hyperkalemia   -initially 7.7 --> 5.9    -2/2 to above  -given insulin, D50, lasix, sodium bicarb, calcium gluconate, albuterol  -continue lokelma   -nephrology consulted  Monitor with dialysis     #Metabolic acidosis   -2/2 to above  -should improve with dialysis      #Elevated troponin   -chronically elevated   -0.68 --> 0.70 --> trend  -likely 2/2 to ESRD   -no CP   -ECG showed Wenckebach phenomenon.     #Hypoglycemia   -hypoglycemia protocol  -monitor BS   Resolved     #Hypocalcemia   -5.7 --> 6.0 after calcium gluconate  -will continue to monitor   -PTH, vitamin D, and phosphorus pending  -nephrology following      #Inability to care for self  #Weakness   -PT/OT   -case management consulted      #Chronic combined systolic and diastolic HF   #Ischemic cardiomyopathy   #Pulmonary HTN   -decompensation likely 2/2 to need for HD  -CXR stable CHF    -echo from 1/9/2021 with EF 61%, grade I diastolic dysfunction and SPAP 57 mmHg   -on BB   -daily weights   -I and Os  -will continue to monitor      #CAD  -on ASA, statin      #Paroxysmal Atrial fibrillation   -NSR on presentation   -rate controlled   -on toprol XL   -on ASA   -noncompliant with anticoagulation      #DM   -insulin dependent, unsure of compliance at home  -hypoglycemic, given D5   -will continue to monitor BS   -on SSI      #Chronic anemia  -Hgb 9.3   -will continue to monitor   -on retacrit per nephrology          #HTN   -on toprol xl, bidil, and hydralazine   -BP stable   -continue to monitor      #Decompensated Alcoholic Cirrhosis   -receives weekly paracentesis  -will need para on Monday   -consulted palliative care      #S/p left BKA   -has prosthesis     #Chronic right foot ulcers   -could not palpate pulses   -ABIs pending   -wound care consulted  -can consult podiatry        Per Dr Kimmy Travis  # Palliative care consulted for goals of care discussion    on Saturday   I spoke to patient at length. He is not interested in hospice care. He wants to get better and go to a nursing home for  rehab. I have discussed with him that he needs dialysis for maintenance of his life. He does not want end-of-life hospice care. He wants to continue with dialysis as long as it can be coordinated with the nursing home. He states he has had difficulty with transportation to go for dialysis. DVT Prophylaxis: heparin   Diet: ADULT DIET; Regular; 4 carb choices (60 gm/meal); Low Sodium (2 gm); Low Potassium (Less than 3000 mg/day);  Low Phosphorus (Less than 1000 mg); 60 to 80 gm  Code Status: Full Code      He needs HD Link MD [facility]mer   7/11/2022 2:35 PM

## 2022-07-12 NOTE — PROGRESS NOTES
Report given to Jamee Alamo RN. Jamee Alamo aware that pt is refusing O2 currently. Care transferred at this time.

## 2022-07-12 NOTE — PROGRESS NOTES
Progress Note    Admit Date:  7/7/2022    Patient with ESRD, missed dialysis for over 2 months. Presented with fatigue admitted with hyperkalemia, hypoxemia  And initially had emergent dialysis    Subjective:  Mr. Seble Anthonyers stable with no complaints . He had refused dialysis on Saturday  I spoke to patient at length. He is now agreeable for dialysis. He is interested in going to rehab. Discussed with discharge planner. This can likely be set up for Monday for patient to go to [facility] after dialysis    7/11-the patient was seen after dialysis today. He is feeling better. 7/12- patient is doing better. Discharge planning working on rehab. Objective:   /63   Pulse 66   Temp 97.8 °F (36.6 °C) (Axillary)   Resp 16   Ht 6' 1\" (1.854 m)   Wt 216 lb 0.8 oz (98 kg)   SpO2 95%   BMI 28.50 kg/m²       Intake/Output Summary (Last 24 hours) at 7/12/2022 1127  Last data filed at 7/12/2022 0816  Gross per 24 hour   Intake 775 ml   Output 100 ml   Net 675 ml         Physical Exam:   Gen: No distress. Alert. +Chronically ill appearing male   Awake and well-oriented  Eyes: PERRL. No sclera icterus. No conjunctival injection. ENT: No discharge. Pharynx clear. Neck: No JVD. No Carotid Bruit. Trachea midline. Resp: No accessory muscle use. No crackles. No wheezes. No rhonchi. +rales throughout lungs bilaterally   CV: Regular rate. Regular rhythm. No murmur. No rub. No edema. Capillary Refill: Brisk,< 3 seconds   Peripheral Pulses: could not detect pulses in right lower extremity   GI: Non-tender. No masses. No organomegaly. Normal bowel sounds. No hernia.  +distended, soft   Skin: Warm and dry. No nodule on exposed extremities. No rash on exposed extremities. +chronic scaling of right lower extremity, with chronic healing wounds on the right foot and great toe + eschar   S/p left BKA, healed incision scar   M/S: No cyanosis. No joint deformity. No clubbing. Neuro: Awake.  Grossly nonfocal    Psych: Oriented x 3. No anxiety or agitation. Medications:   Scheduled Meds:   epoetin lillie-epbx  6,000 Units IntraVENous Once per day on Tue Thu Sat    insulin regular  10 Units IntraVENous Once    And    dextrose bolus  250 mL IntraVENous Once    calcium gluconate  1,000 mg IntraVENous Once    aspirin  81 mg Oral Daily    atorvastatin  40 mg Oral Nightly    metoprolol succinate  25 mg Oral Daily    sodium chloride flush  5-40 mL IntraVENous 2 times per day    heparin (porcine)  5,000 Units SubCUTAneous 3 times per day    insulin lispro  0-6 Units SubCUTAneous TID WC    insulin lispro  0-3 Units SubCUTAneous Nightly    dextrose bolus  250 mL IntraVENous Once    isosorbide dinitrate  20 mg Oral TID    And    hydrALAZINE  37.5 mg Oral 3 times per day    povidone-iodine   Topical Daily       Continuous Infusions:   dextrose      sodium chloride         Data:  CBC:   Recent Labs     07/10/22  0457 07/11/22  0441 07/12/22  0453   WBC 9.8 10.5 10.2   RBC 3.10* 3.08* 2.98*   HGB 9.2* 9.2* 8.9*   HCT 27.9* 28.2* 27.3*   MCV 90.1 91.5 91.7   RDW 15.4 15.5* 15.8*    147 141     BMP:   Recent Labs     07/10/22  0457 07/11/22  0441 07/12/22  0453   * 131* 130*   K 6.1* 6.3* 4.8   CL 98* 97* 95*   CO2 15* 12* 20*   BUN 90* 102* 59*   CREATININE 7.5* 8.0* 6.0*     BNP: No results for input(s): BNP in the last 72 hours. PT/INR: No results for input(s): PROTIME, INR in the last 72 hours. APTT: No results for input(s): APTT in the last 72 hours. CARDIAC ENZYMES:   No results for input(s): CKMB, CKMBINDEX, TROPONINI in the last 72 hours. Invalid input(s): CKTOTAL;3  FASTING LIPID PANEL:  Lab Results   Component Value Date    CHOL 96 04/24/2022    HDL 35 (L) 04/24/2022    TRIG 77 04/24/2022     LIVER PROFILE:   No results for input(s): AST, ALT, ALB, BILIDIR, BILITOT, ALKPHOS in the last 72 hours.        Cultures  COVID and influenza not detected  Blood cultures no growth to date    Radiology  VL Tcpo2 [place] Multi Level   Final Result      XR CHEST PORTABLE   Final Result   No acute cardiopulmonary disease. Stable CHF with bilateral effusions and   bibasilar volume loss. 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 *      Plan:  #Acute hypoxic respiratory failure improving. #Respiratory acidosis   -was initially placed on BIPAP in ED, weaned off after emergent dialysis  - needing oxygen.      #ESRD on HD  -Cr 9.8 on presentation,   -has not been compliant with HD, has not been to HD since May  -required emergent dialysis in ED  -nephrology consulted   Patient refused HD  On saturday. I spoke to him at length. He is now agreeable for continued HD.   He will need hemodialysis set up, with nursing home placement  [facility] is trying to set this up.     #Hyperkalemia   -initially 7.7 --> 5.9    -2/2 to above  -given insulin, D50, lasix, sodium bicarb, calcium gluconate, albuterol  -continue lokelma   -nephrology consulted  Monitor with dialysis     #Metabolic acidosis   -2/2 to above  -should improve with dialysis      #Elevated troponin   -chronically elevated   -0.68 --> 0.70 --> trend  -likely 2/2 to ESRD   -no CP   -ECG showed Wenckebach phenomenon.     #Hypoglycemia   -hypoglycemia protocol  -monitor BS 3000 mg/day);  Low Phosphorus (Less than 1000 mg); 60 to 80 gm  Code Status: Full Code      He needs HD slot.       Inder Finn MD   7/12/2022 11:27 AM

## 2022-07-12 NOTE — FLOWSHEET NOTE
Pt appears to be resting comfortably. VSS. Per pt, no new needs at this time. 07/12/22 0449   Vital Signs   Temp 98.3 °F (36.8 °C)   Temp Source Oral   Heart Rate 61   Heart Rate Source Monitor   Resp 18   BP (!) 105/51   BP Location Right upper arm   BP Method Automatic   MAP (Calculated) 69   Patient Position Semi fowlers; Lying left side   Oxygen Therapy   SpO2 90 %   O2 Device None (Room air)   Height and Weight   Weight 216 lb 0.8 oz (98 kg)   Weight Method Bed scale   BMI (Calculated) 28.6

## 2022-07-12 NOTE — PROGRESS NOTES
Patient assisted back to bed w/ use of steady. End of shift bedside report given to [address] [place] [address] Padmini transferred. Call light in patient's reach. Visitor at bedside.

## 2022-07-12 NOTE — CARE COORDINATION
INTERDISCIPLINARY PLAN OF CARE CONFERENCE    Date/Time: 7/12/2022 9:34 AM  Completed by: Jenny Muniz RN, Case Management      Patient Name:  Raquel Rivero  YOB: 1960  Admitting Diagnosis: Hyperkalemia [E87.5]  End stage renal disease on dialysis [facility]) [N18.6, Z99.2]  Medically noncompliant [Z91.19]     Admit Date/Time:  7/7/2022  7:50 PM    Chart reviewed. Interdisciplinary team contacted or reviewed plan related to patient progress and discharge plans. Disciplines included Case Management, Nursing, and Dietitian. Current Status: IP 07/08/2022  PT/OT recommendation for discharge plan of care: AM-PAC 17  Discharge Recommendations: SNF  DME needs for discharge: defer to facility  Expected D/C Disposition: SNF    Discharge Plan Comments:   Need confirmation of chair time at [facility] [employer] or [facility]. Needs to be MWF preferably due to transportation issues  Multiple referrals to SNF   [facility]- Cannot arrange transportation  [facility] arrange transportation  [address] [place] [address] accept (compliance)  [facility]- Not in network    Referrals made to facilities who provide transportation as pt can go by wheel chair van to HD:  [address]- unable to [facility]- pending review  [facility]- [facility]    Referrals to [facility] and [facility] (not open today) under review. Await call back from scheduling. Addendum 1500   Calls placed to [facility] admissions-  left to confirm result of referral.call to [facility] to check status of referral. [facility] [address] is only open on Monday Weds and Fridays. CM will call again in am.    Referrals made to:   [facility]  (in house HD) + ESRD

## 2022-07-12 NOTE — PROGRESS NOTES
Pt was given Lipitor, pt placed pill in his mouth with water and then when this RN turned around, pt swallowed water but spit pill out of his mouth on to the ground. When RN asked pt why he spit the pill out, pt stated \"It's nasty. \" This RN offered pt a fresh pill in pudding or applesauce, pt declined.

## 2022-07-12 NOTE — PROGRESS NOTES
Patient assisted OOB to wheelchair per patient request.  Assisted up w/ steady to wheelchair. Tolerated well.

## 2022-07-12 NOTE — PROGRESS NOTES
Physical Therapy    Attempted to see pt for PT tx. Pt declining therapy at this time stating \"I'm good right here. Maybe later. \" PT stressed the importance of participation in therapy session to improve overall strength and mobility. PT offered cushion for chair to improve comfort as pt c/o sore on his bottom. Pt adamantly declined to participate in therex or OOB activity. Will follow-up as PT schedule and pt status permit. No Charge.     Chicho Whitfield, PT, DPT, OMT-C # [id number]

## 2022-07-12 NOTE — PROGRESS NOTES
Progress Note    HISTORY     CC:  Shortness of breath           We are following for ESRD      Subjective/   HPI:  Patient is now willing to do dialysis. Placement will be very difficult. HD yesterday. Will continue on MWF schedule for now. ROS:  Constitutional:  No fevers, No Chills, + weakness  Cardiovascular:  No palpations, + edema  Respiratory:  No wheezing, no cough  Skin:  No rash, no itching  :  No hematuria, no dysuria     Social Hx:  No Family at the bedside     Past Medical and Surgical History:  - Reviewed, no changes     EXAM       Objective/     Vitals:    07/12/22 0815 07/12/22 0821 07/12/22 1000 07/12/22 1009   BP: 102/61  111/63    Pulse: 75   66   Resp: 16      Temp: 97.8 °F (36.6 °C)      TempSrc: Axillary      SpO2: (!) 86% 95%     Weight:       Height:         24HR INTAKE/OUTPUT:      Intake/Output Summary (Last 24 hours) at 7/12/2022 1021  Last data filed at 7/12/2022 0816  Gross per 24 hour   Intake 775 ml   Output 100 ml   Net 675 ml     Constitutional:  Ill appearing, [place]  Eyes:  Pupils reactive, sclera clear   Neck:  Normal thyroid, no masses   Cardiovascular:  Regular, no rub  Respiratory:  No distress, no wheezing  Psychiatry:  Appropriate mood/affect, alert  Abdomen: +bs, soft, nt, no masses   Musculoskeletal: + LE edema, left BKA  Lymphatics:  No LAD in neck, no supraclavicular nodes       MEDICAL DECISION MAKING       Data/  Recent Labs     07/10/22  0457 07/11/22  0441 07/12/22  0453   WBC 9.8 10.5 10.2   HGB 9.2* 9.2* 8.9*   HCT 27.9* 28.2* 27.3*   MCV 90.1 91.5 91.7    147 141     Recent Labs     07/10/22  0457 07/11/22  0441 07/12/22  0453   * 131* 130*   K 6.1* 6.3* 4.8   CL 98* 97* 95*   CO2 15* 12* 20*   GLUCOSE 77 78 105*   BUN 90* 102* 59*   CREATININE 7.5* 8.0* 6.0*   LABGLOM 7* 7* 10*   GFRAA 9* 8* 12*       Assessment/     ESKD. - Missed dialysis for about 2 months.   - Previously at [facility] but discharged from their clinic because of non-compliance. - Started back on dialysis / has agreed to do dialysis for the present time     Hyperkalemia.  - From missed dialysis.     Metabolic Acidosis. - From CKD. - Should improve with dialysis.     Anemia.  - Continue HARPREET with HD, 6K units.     Hypertension.  - BP is acceptable.   - On Metoprolol, Isordil and Hydralazine.     CKD-MBD.  - Hypocalcemia    Plan/     Patient has agreed to dialysis  Will need placement with outpatient dialysis arrangements, this might be difficult due to his history of non-compliance   HD on MW schedule at this time for help with finding a spot and transport is much more difficult on saturdays   -----------------------------  Dana Ardon M.D.   [facility]

## 2022-07-12 NOTE — PLAN OF CARE
Problem: Cardiovascular - Adult  Goal: Absence of cardiac dysrhythmias or at baseline  Flowsheets (Taken 7/12/2022 1810)  Absence of cardiac dysrhythmias or at baseline: Monitor cardiac rate and rhythm  Note: HR 50-70s.   Denied lightheadedness or dizziness     Problem: Skin/Tissue Integrity - Adult  Goal: Skin integrity remains intact  Recent Flowsheet Documentation  Taken 7/12/2022 0816 by Bharat Floyd RN  Skin Integrity Remains Intact: (no new breakdown noted) Monitor for areas of redness and/or skin breakdown  Goal: Incisions, wounds, or drain sites healing without S/S of infection  Note: No new skin breakdown noted     Problem: Infection - Adult  Goal: Absence of infection during hospitalization  Note: afebrile

## 2022-07-12 NOTE — PROGRESS NOTES
Pt called this RN to use the bathroom. When this RN responded to pt's room, pt had O2 off. Pt frequently took O2 off throughout the night. This RN attempted to place O2 on pt and pt said \"I need to get up! I dont need that shit (O2) on.\" Pt educated on importance of wearing oxygen, pt continued to refused to allow RN to put O2 on.

## 2022-07-12 NOTE — PROGRESS NOTES
Patient returned from dialysis  -  Denying complaints. Assessment as noted per flowsheet. Call light placed within patient's reach.

## 2022-07-13 NOTE — PLAN OF CARE
Problem: ABCDS Injury Assessment  Goal: Absence of physical injury  Outcome: Progressing     Problem: Cardiovascular - Adult  Goal: Maintains optimal cardiac output and hemodynamic stability  Outcome: Progressing     Problem: Skin/Tissue Integrity - Adult  Goal: Skin integrity remains intact  Outcome: Progressing  Goal: Incisions, wounds, or drain sites healing without S/S of infection  Outcome: Progressing     Problem: Metabolic/Fluid and Electrolytes - Adult  Goal: Electrolytes maintained within normal limits  Outcome: Progressing  Goal: Hemodynamic stability and optimal renal function maintained  Outcome: Progressing  Goal: Glucose maintained within prescribed range  Outcome: Progressing     Problem: Hematologic - Adult  Goal: Maintains hematologic stability  Outcome: Progressing

## 2022-07-13 NOTE — PROGRESS NOTES
Treatment time: 180    Net UF: 1800    Pre weight: 95  Post weight:93.3  EDW: TBD    Access used: R CVC  Access function: well    Medications or blood products given: epogen 6000 units    Regular outpatient schedule: MWF     Summary of response to treatment: removed 180, tolerated well no issues     Copy of dialysis treatment record placed in chart, to be scanned into EMR.

## 2022-07-13 NOTE — CONSULTS
Palliative Care Progress Note                                                                        07/13/2022    09:21AM Pt off the floor for HD at this time. Pt now agreeable to HD per  notes. Referral made to [facility] [place], [facility] for rehab and (in house HD). Writer left vm for Da Avila with [facility] [place] to confirm whether they will have HD slot for the pt. Awaiting call back from Da Avila. PC following peripherally. 10:38 AM Writer received call back from Da Avila with [facility] [place] and she states she has a time slot for HD for the pt and pre-cert started. JEFF Tsang updated on above POC. Rachid Santacruz spoke with Eden Hernandez this AM and he spoke with the pt r/t the importance of continuing with HD, overall prognosis is poor. Pt verbalized understanding of importance of HD,however, pt does not seem to understand that HD is necessary to sustain his life. Medical staff is hopeful that if pt is in SNF with HD in house then compliance will improve. Pt is not interested in palliative/hospice options at this time,however, pt states will consider comfort care measures if does not improve with rehab stay. Writer spoke with pt's son,Lawson, to update him on above POC per pt request. PC following peripherally.

## 2022-07-13 NOTE — FLOWSHEET NOTE
Pt appears to be resting comfortably. Pt was initially refusing O2, pt states \"I cant hardly breathe with that thing in my nose\". After a few minutes, pt was agreeable to putting O2 back on. Per pt, no new needs at this time.         07/13/22 0036   Vital Signs   Temp 98.2 °F (36.8 °C)   Temp Source Oral   Heart Rate 60   Heart Rate Source Monitor   Resp 16   BP (!) 92/52   BP Location Left upper arm   BP Method Automatic   MAP (Calculated) 65.33   Patient Position Semi fowlers   Level of Consciousness Alert (0)   MEWS Score 2   Pain Assessment   Pain Assessment None - Denies Pain   Oxygen Therapy   SpO2 (!) 88 %   O2 Device None (Room air)  (Pt refusing O2, states \"I cant breathe with that thing\")   Height and Weight   Weight 214 lb 15.2 oz (97.5 kg)   Weight Method Bed scale   BMI (Calculated) 28.4

## 2022-07-13 NOTE — PROGRESS NOTES
End of shift report given to [address] [place] [address] Padmini transferred. Patient appears resting comfortably w/ eyes closed. Call light within patient's reach.

## 2022-07-13 NOTE — PROGRESS NOTES
Progress Note    Admit Date:  7/7/2022    Patient with ESRD, missed dialysis for over 2 months. Presented with fatigue admitted with hyperkalemia, hypoxemia  And initially had emergent dialysis    Subjective:  Mr. Preston Erb stable with no complaints . He had refused dialysis on Saturday  I spoke to patient at length. He is now agreeable for dialysis. He is interested in going to rehab. Discussed with discharge planner. This can likely be set up for Monday for patient to go to [facility] after dialysis    7/11-the patient was seen after dialysis today. He is feeling better. 7/12- patient is doing better. Discharge planning working on rehab.    7/13-seen during dialysis. Discharge planning is working on setting up rehab and outpatient dialysis from rehab. Pre-CERT has been started. Objective:   /61   Pulse 61   Temp 96.8 °F (36 °C) (Temporal)   Resp 16   Ht 6' 1\" (1.854 m)   Wt 209 lb 7 oz (95 kg)   SpO2 93%   BMI 27.63 kg/m²       Intake/Output Summary (Last 24 hours) at 7/13/2022 1120  Last data filed at 7/13/2022 1020  Gross per 24 hour   Intake 640 ml   Output 2300 ml   Net -1660 ml         Physical Exam:   Gen: No distress. Alert. +Chronically ill appearing male   Awake and well-oriented  Eyes: PERRL. No sclera icterus. No conjunctival injection. ENT: No discharge. Pharynx clear. Neck: No JVD. No Carotid Bruit. Trachea midline. Resp: No accessory muscle use. No crackles. No wheezes. No rhonchi. +rales throughout lungs bilaterally   CV: Regular rate. Regular rhythm. No murmur. No rub. No edema. Capillary Refill: Brisk,< 3 seconds   Peripheral Pulses: could not detect pulses in right lower extremity   GI: Non-tender. No masses. No organomegaly. Normal bowel sounds. No hernia.  +distended, soft   Skin: Warm and dry. No nodule on exposed extremities. No rash on exposed extremities.  +chronic scaling of right lower extremity, with chronic healing wounds on the right foot and great toe + eschar   S/p left BKA, healed incision scar   M/S: No cyanosis. No joint deformity. No clubbing. Neuro: Awake. Grossly nonfocal    Psych: Oriented x 3. No anxiety or agitation. Medications:   Scheduled Meds:   [START ON 7/15/2022] epoetin lillie-epbx  6,000 Units IntraVENous Once per day on Mon Wed Fri    insulin regular  10 Units IntraVENous Once    And    dextrose bolus  250 mL IntraVENous Once    calcium gluconate  1,000 mg IntraVENous Once    aspirin  81 mg Oral Daily    atorvastatin  40 mg Oral Nightly    metoprolol succinate  25 mg Oral Daily    sodium chloride flush  5-40 mL IntraVENous 2 times per day    heparin (porcine)  5,000 Units SubCUTAneous 3 times per day    insulin lispro  0-6 Units SubCUTAneous TID WC    insulin lispro  0-3 Units SubCUTAneous Nightly    dextrose bolus  250 mL IntraVENous Once    isosorbide dinitrate  20 mg Oral TID    And    hydrALAZINE  37.5 mg Oral 3 times per day    povidone-iodine   Topical Daily       Continuous Infusions:   dextrose      sodium chloride         Data:  CBC:   Recent Labs     07/11/22 0441 07/12/22  0453 07/13/22  0500   WBC 10.5 10.2 11.3*   RBC 3.08* 2.98* 3.20*   HGB 9.2* 8.9* 9.4*   HCT 28.2* 27.3* 29.3*   MCV 91.5 91.7 91.7   RDW 15.5* 15.8* 15.4    141 148     BMP:   Recent Labs     07/11/22 0441 07/12/22  0453 07/13/22  0500   * 130* 126*   K 6.3* 4.8 5.1   CL 97* 95* 90*   CO2 12* 20* 21   * 59* 66*   CREATININE 8.0* 6.0* 6.5*     BNP: No results for input(s): BNP in the last 72 hours. PT/INR: No results for input(s): PROTIME, INR in the last 72 hours. APTT: No results for input(s): APTT in the last 72 hours. CARDIAC ENZYMES:   No results for input(s): CKMB, CKMBINDEX, TROPONINI in the last 72 hours.     Invalid input(s): CKTOTAL;3  FASTING LIPID PANEL:  Lab Results   Component Value Date    CHOL 96 04/24/2022    HDL 35 (L) 04/24/2022    TRIG 77 04/24/2022     LIVER PROFILE:   No results for input(s): AST, ALT, ALB, BILIDIR, BILITOT, ALKPHOS in the last 72 hours. Cultures  COVID and influenza not detected  Blood cultures no growth to date    Radiology  US GUIDED PARACENTESIS   Final Result   Successful paracentesis. VL Tcpo2 [place] Multi Level   Final Result      XR CHEST PORTABLE   Final Result   No acute cardiopulmonary disease. Stable CHF with bilateral effusions and   bibasilar volume loss. 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 *      Plan:  #Acute hypoxic respiratory failure improving. #Respiratory acidosis   -was initially placed on BIPAP in ED, weaned off after emergent dialysis  - needing oxygen.      #ESRD on HD  -Cr 9.8 on presentation,   -has not been compliant with HD, has not been to HD since May  -required emergent dialysis in ED  -nephrology consulted   Patient refused HD  On saturday. I spoke to him at length. He is now agreeable for continued HD.   He will need hemodialysis set up, with nursing home placement  [facility] is trying to set this up.     #Hyperkalemia   -initially 7.7 --> 5.9    -2/2 to above  -given insulin, D50, lasix, sodium bicarb, calcium gluconate, albuterol  -continue lokelma   -nephrology consulted  Monitor with dialysis     #Metabolic acidosis   -2/2 to above  -should improve with dialysis      #Elevated troponin   -chronically elevated   -0.68 --> 0.70 --> trend  -likely 2/2 to ESRD   -no CP   -ECG showed Wenckebach phenomenon.     #Hypoglycemia   -hypoglycemia protocol  -monitor BS   Resolved     #Hypocalcemia   -5.7 --> 6.0 after calcium gluconate  -will continue to monitor   -PTH, vitamin D, and phosphorus pending  -nephrology following      #Inability to care for self  #Weakness   -PT/OT   -case management consulted      #Chronic combined systolic and diastolic HF   #Ischemic cardiomyopathy   #Pulmonary HTN   -decompensation likely 2/2 to need for HD  -CXR stable CHF    -echo from 1/9/2021 with EF 61%, grade I diastolic dysfunction and SPAP 57 mmHg   -on BB   -daily weights   -I and Os  -will continue to monitor      #CAD  -on ASA, statin      #Paroxysmal Atrial fibrillation   -NSR on presentation   -rate controlled   -on toprol XL   -on ASA   -noncompliant with anticoagulation      #DM   -insulin dependent, unsure of compliance at home  -hypoglycemic, given D5   -will continue to monitor BS   -on SSI      #Chronic anemia  -Hgb 9.3   -will continue to monitor   -on retacrit per nephrology          #HTN   -on toprol xl, bidil, and hydralazine   -BP stable   -continue to monitor      #Decompensated Alcoholic Cirrhosis   -receives weekly paracentesis  -will need para on Monday   -consulted palliative care      #S/p left BKA   -has prosthesis     #Chronic right foot ulcers   -could not palpate pulses   -ABIs pending   -wound care consulted  -can consult podiatry        Per Dr Marylee Bow  # Palliative care consulted for goals of care discussion    on Saturday   I spoke to patient at length. He is not interested in hospice care. He wants to get better and go to a nursing home for  rehab. I have discussed with him that he needs dialysis for maintenance of his life. He does not want end-of-life hospice care. He wants to continue with dialysis as long as it can be coordinated with the nursing home.   He states he has had difficulty with transportation to go for dialysis. DVT Prophylaxis: heparin   Diet: ADULT DIET; Regular; 4 carb choices (60 gm/meal); Low Sodium (2 gm); Low Potassium (Less than 3000 mg/day);  Low Phosphorus (Less than 1000 mg); 60 to 80 gm  Code Status: Full Code      He needs HD slot.       [facility], MD   7/13/2022 11:20 AM

## 2022-07-13 NOTE — PROGRESS NOTES
Report given to Loan Jacobs RN. Pt is currently off the floor for dialysis. Care transferred at this time.

## 2022-07-13 NOTE — PROGRESS NOTES
Progress Note    HISTORY     CC:  Shortness of breath           We are following for ESRD      Subjective/   HPI:  Patient is now willing to do dialysis. Placement will be very difficult. HD today. UF 2 kg as tolerated. TDC is working     ROS:  Constitutional:  No fevers, No Chills, + weakness  Cardiovascular:  No palpations, + edema  Respiratory:  No wheezing, no cough  Skin:  No rash, no itching  :  No hematuria, no dysuria     Social Hx:  No Family at the bedside     Past Medical and Surgical History:  - Reviewed, no changes     EXAM       Objective/     Vitals:    07/13/22 0036 07/13/22 0041 07/13/22 0624 07/13/22 0714   BP: (!) 92/52  121/82 130/64   Pulse: 60  62 57   Resp: 16   16   Temp: 98.2 °F (36.8 °C)   (!) 96.7 °F (35.9 °C)   TempSrc: Oral   Temporal   SpO2: (!) 88% 93%     Weight: 214 lb 15.2 oz (97.5 kg)   209 lb 7 oz (95 kg)   Height:         24HR INTAKE/OUTPUT:      Intake/Output Summary (Last 24 hours) at 7/13/2022 5028  Last data filed at 7/12/2022 1753  Gross per 24 hour   Intake 240 ml   Output 100 ml   Net 140 ml     Constitutional:  Ill appearing, [place]  Eyes:  Pupils reactive, sclera clear   Neck:  Normal thyroid, no masses   Cardiovascular:  Regular, no rub  Respiratory:  No distress, no wheezing  Psychiatry:  Appropriate mood/affect, alert  Abdomen: +bs, soft, nt, no masses   Musculoskeletal: + LE edema, left BKA  Lymphatics:  No LAD in neck, no supraclavicular nodes       MEDICAL DECISION MAKING       Data/  Recent Labs     07/11/22 0441 07/12/22  0453 07/13/22  0500   WBC 10.5 10.2 11.3*   HGB 9.2* 8.9* 9.4*   HCT 28.2* 27.3* 29.3*   MCV 91.5 91.7 91.7    141 148     Recent Labs     07/11/22  0441 07/12/22  0453 07/13/22  0500   * 130* 126*   K 6.3* 4.8 5.1   CL 97* 95* 90*   CO2 12* 20* 21   GLUCOSE 78 105* 82   * 59* 66*   CREATININE 8.0* 6.0* 6.5*   LABGLOM 7* 10* 9*   GFRAA 8* 12* 11*       Assessment/     ESKD.   - Missed dialysis for about 2 months. - Previously at [facility] but discharged from their clinic because of non-compliance. - Started back on dialysis / has agreed to do dialysis for the present time     Hyperkalemia.  - From missed dialysis.     Metabolic Acidosis. - From CKD. - Should improve with dialysis.     Anemia.  - Continue HARPREET with HD, 6K units.     Hypertension.  - BP is acceptable. - On Metoprolol, Isordil and Hydralazine.     CKD-MBD.  - Hypocalcemia    Plan/     Patient has agreed to dialysis but does not seem to understand purpose of dialysis and impacts of skipping it. When he is told his labs are stable we are meaning that dialysis is working but he takes this report as that dialysis is not doing anything for him and it is a pain in the neck to go through. Not sure if fully understands. His prognosis is poor. He might change his mind in the near future on dialysis as he has done in the past after going for a few weeks. If he is in a SNF with dialysis then compliance is likely to be better.   Overall his prognosis is poor and stopping dialysis with consideration of hospice would not be inappropriate.    -----------------------------  Darnell Dominique M.D.   [facility]

## 2022-07-13 NOTE — FLOWSHEET NOTE
Shift assessment complete. See flowsheet for full assessment. Pt denies any needs, call light within reach.      07/12/22 1935   Vital Signs   Temp 98 °F (36.7 °C)   Temp Source Oral   Heart Rate 55   Heart Rate Source Monitor   Resp 16   /65   BP Method Automatic   MAP (Calculated) 82.33   Patient Position Semi fowlers   Oxygen Therapy   SpO2 97 %   O2 Device Nasal cannula   O2 Flow Rate (L/min) 2 L/min

## 2022-07-13 NOTE — PROGRESS NOTES
request made      Bed Mobility:   Supine to Sit:  Min A  Sit to Supine:  SBA  Rolling:           N/A  Scooting:        CGA    Transfer Training: Patient agreeable to sit EOB but declined attempts to stand or transfer to chair. He was encouraged to get out of bed but declined. Explained to pt importance of therapy and activity and pt was receptive but cont to refused any standing activity stating his L LE hurt. Sit to stand:   N/A  Stand to sit:  N/A  Bed to Chair:  N/A  Bed to [facility]:   N/A  Standard toilet:   N/A    Activity Tolerance   Pt completed therapy session with No adverse symptoms noted w/activity  Dizziness noted with sitting EOB     Sitting EOB:  Spo2:  91  HR:  58  BP:  102/49     Balance  Sitting Balance :     SBA  Standing Balance   N/A    ADL Training:   Upper body dressing:  Not Tested don gown   Upper body bathing:  N/A  Lower body dressing:  Not Tested   Lower body bathing:  N/A  Toileting:   N/A  Grooming/Hygiene: Attempted combing hair however pt hair was so matted he was unable to complete task     Feeding :   Independent eating and drinking     Therapeutic Exercise:   Patient introduced to therapy band exercises to complete at bed level. He was able to attempt RUE shoulder extension while in sidelying x10 with instruction on hand placement and therapy band attached to bed rail. He was able to attempt shoulder flexion on RUE while in sidelying and using therapy band attached to bed rail. Patient Education:   Role of OT  Recommendations for DC    Positioning Needs: In bed, call light and needs in reach. Alarm Set    Family Present:  No    Assessment: Pt would benefit greatly from cont OT tx to return to Ind level and return home alone. Patient exhibited limited participation. Reported felt nauseous while sitting EOB. Patient was able to tolerate sitting EOB for approximately 10 minutes. Patient instructed on therapy band exercises to perform at bed level.     Declined attempts to transfer and stand during session. Recommending SNF upon discharge as patient functioning well below baseline, demonstrates good rehab potential and unable to return home due to limited or no family support, burden of care beyond caregiver ability and home environment not conducive to patient recovery. GOALS  1). Bed to toilet/BSC: Min A     To be met in 5 Visits:  1). Supine to/from Sit:             Min A- MET upgrade to Sup   2). Upper Body Bathing:         SBA  3). Lower Body Bathing: Mod A  4). Upper Body Dressing:       SBA  5). Lower Body Dressing: Mod A  6).  Pt to demonstrate UE exs x 15 reps with minimal cues      Plan: cont with [address], OTR/L   #[id number]        If patient discharges from this facility prior to next visit, this note will serve as the Discharge Summary

## 2022-07-14 NOTE — FLOWSHEET NOTE
Pt appears to be resting comfortably. VSS. Pt lying upside down in bed (head at the foot of the bed) and is sleeping with his head on top of control panel. This RN unable to access weight function on the bed and pt is refusing to move. Will attempt to weigh pt when he moves off of the bed control panel. Per pt, no new needs at this time.       07/14/22 0452   Vital Signs   Temp 98.1 °F (36.7 °C)   Temp Source Oral   Heart Rate 62   Heart Rate Source Monitor   Resp 18   BP (!) 92/46   BP Location Right upper arm   BP Method Automatic   MAP (Calculated) 61.33   Patient Position Lying right side   Level of Consciousness Alert (0)   MEWS Score 2   Pain Assessment   Pain Assessment None - Denies Pain   Oxygen Therapy   SpO2 94 %   O2 Device Nasal cannula   O2 Flow Rate (L/min) 2 L/min   Height and Weight   Weight   (See note, pt refused weight)

## 2022-07-14 NOTE — PROGRESS NOTES
Occupational Therapy Attempt   Attempted occupational therapy, however patient declined stating he did not sleep well last night because he was coughing and would like to continue to rest in be this AM  Will attempt again as able.      [address] [facility], OTR/L 8449

## 2022-07-14 NOTE — PROGRESS NOTES
Per lab, Hepatitis panel is currently on machine running. If need to follow up on results, call [phone].

## 2022-07-14 NOTE — PROGRESS NOTES
Progress Note    HISTORY     CC:  Shortness of breath           We are following for ESRD      Subjective/   HPI:  Not feeling well. Says he wants to get out of the hospital.  BP has been good. No issues with dialysis     ROS:  Constitutional:  No fevers, No Chills, + weakness  Cardiovascular:  No palpations, no edema  Respiratory:  No wheezing, no cough  Skin:  No rash, no itching  :  No hematuria, no dysuria     Social Hx:  No Family at the bedside     Past Medical and Surgical History:  - Reviewed, no changes     EXAM       Objective/     Vitals:    07/13/22 1545 07/13/22 2031 07/14/22 0452 07/14/22 0837   BP: (!) 113/51 (!) 95/53 (!) 92/46 (!) 96/55   Pulse: 76 62 62    Resp: 18 18 18 16   Temp: 97.4 °F (36.3 °C) 98.4 °F (36.9 °C) 98.1 °F (36.7 °C) 98 °F (36.7 °C)   TempSrc: Oral Oral Oral Oral   SpO2: 97% 98% 94% 96%   Weight:    212 lb 1.3 oz (96.2 kg)   Height:         24HR INTAKE/OUTPUT:      Intake/Output Summary (Last 24 hours) at 7/14/2022 9052  Last data filed at 7/14/2022 0919  Gross per 24 hour   Intake 1175 ml   Output 2200 ml   Net -1025 ml     Constitutional:  Ill appearing, [place]  Eyes:  Pupils reactive, sclera clear   Neck:  Normal thyroid, no masses   Cardiovascular:  Regular, no rub  Respiratory:  No distress, no wheezing  Psychiatry:  Appropriate mood/affect, alert  Abdomen: +bs, soft, nt, no masses   Musculoskeletal: + LE edema, left BKA  Lymphatics:  No LAD in neck, no supraclavicular nodes       MEDICAL DECISION MAKING       Data/  Recent Labs     07/12/22 0453 07/13/22  0500 07/14/22  0413   WBC 10.2 11.3* 11.5*   HGB 8.9* 9.4* 8.5*   HCT 27.3* 29.3* 26.9*   MCV 91.7 91.7 90.9    148 154     Recent Labs     07/12/22 0453 07/13/22  0500 07/14/22  0413   * 126* 131*   K 4.8 5.1 4.7   CL 95* 90* 93*   CO2 20* 21 25   GLUCOSE 105* 82 93   BUN 59* 66* 39*   CREATININE 6.0* 6.5* 5.4*   LABGLOM 10* 9* 11*   GFRAA 12* 11* 13*       Assessment/     ESKD.   - Missed

## 2022-07-14 NOTE — FLOWSHEET NOTE
Shift assessment complete. See flowsheet for full assessment. Pt denies any needs, call light within reach.        07/13/22 2031   Vital Signs   Temp 98.4 °F (36.9 °C)   Temp Source Oral   Heart Rate 62   Heart Rate Source Monitor   Resp 18   BP (!) 95/53   BP Location Right upper arm   BP Method Automatic   MAP (Calculated) 67   Patient Position Lying left side   Level of Consciousness Alert (0)   MEWS Score 2   Pain Assessment   Pain Assessment None - Denies Pain   Oxygen Therapy   SpO2 98 %   O2 Device Nasal cannula   O2 Flow Rate (L/min) 2 L/min

## 2022-07-14 NOTE — PLAN OF CARE
Problem: ABCDS Injury Assessment  Goal: Absence of physical injury  Outcome: Adequate for Discharge   Patient able to move self around in bed, bed alarm on for safety. No injuries noted.

## 2022-07-14 NOTE — PROGRESS NOTES
Pt a/o. Am assessment completed see flow sheet. Patient incontinent of large amount of stool, and put himself lying at foot of bed. Patient pericare given and complete linen change. Patient encouraged to reposition, and pulled up in bed. Call light in reach, and bed side table in reach.

## 2022-07-14 NOTE — DISCHARGE INSTR - COC
Continuity of Care Form    Patient Name: Iqra Castillo   :  1960  MRN:  [id number]    Admit date:  2022  Discharge date:  2022    Code Status Order: Full Code   Advance Directives:      Admitting Physician:  Marlen Barnhart MD  PCP: Boo Lewis MD    Discharging Nurse: Reyna TO Jeevan [facility] Unit/Room#: /5986-52  Discharging Unit Phone Number: [phone]  Emergency Contact:   Extended Emergency Contact Information  Primary Emergency Contact: Lawson Ramos  Address: [address] [place] [address] [address] Phone: [phone]  Relation: Child    Past Surgical History:  Past Surgical History:   Procedure Laterality Date    IR TUNNELED CATHETER PLACEMENT GREATER THAN 5 YEARS  2021    IR TUNNELED CATHETER PLACEMENT GREATER THAN 5 YEARS 2021 [facility]    LEG AMPUTATION BELOW KNEE      PARACENTESIS      UPPER GASTROINTESTINAL ENDOSCOPY N/A 2019    EGD ESOPHAGOGASTRODUODENOSCOPY performed by Oliver Slater MD at [address]      Left BKA       Immunization History: There is no immunization history for the selected administration types on file for this patient.     Active Problems:  Patient Active Problem List   Diagnosis Code    Tobacco abuse Z72.0    Type 1 diabetes mellitus with neurological manifestations, uncontrolled ([facility]) E10.49, E10.65    Diabetic ulcer of right foot ([facility] E11.621, L97.519    Cellulitis L03.90    NSTEMI (non-ST elevated myocardial infarction) ([facility] I21.4    Osteomyelitis of foot, left, acute ([facility] M86.172    Group B streptococcal bacteriuria R82.71    Type 2 diabetes mellitus with left diabetic foot infection (HCC) E11.628, L08.9    Septic arthritis of left foot (HCC) M00.9    Poorly controlled type 2 diabetes mellitus (HCC) E11.65    Elevated sed rate R70.0    Elevated C-reactive protein (CRP) R79.82    Acute kidney injury superimposed on CKD ([facility] N17.9, N18.9    Hyponatremia E87.1    Diabetic polyneuropathy associated with type 2 diabetes mellitus ([facility] E11.42    Mixed hyperlipidemia E78.2    Tobacco abuse counseling Z71.6    Diabetes education, encounter for Z71.89    Cardiomyopathy ([facility] I42.9    Abnormal nuclear stress test R94.39    Pain of left lower extremity M79.605    Chronic kidney disease N18.9    Osteomyelitis (HCC) M86.9    Acute respiratory failure with hypoxia (HCC) J96.01    Acute respiratory failure (HCC) J96.00    Ascites R18.8    Chronic systolic CHF (congestive heart failure) (HCC) I50.22    Medically noncompliant Z91.19    Chronic systolic heart failure (HCC) I50.22    HTN (hypertension), malignant K90    Metabolic alkalosis I01.0    NSVT (nonsustained ventricular tachycardia) (HCC) I47.2    Moderate malnutrition (HCC) E44.0    Fluid overload E87.70    Abdominal distension R14.0    Hypoxia R09.02    Shortness of breath R06.02    Diabetic foot infection (HCC) E11.628, M25.4    Alcoholic cirrhosis of liver with ascites (HCC) K70.31    PAF (paroxysmal atrial fibrillation) (HCC) I48.0    CAD (coronary artery disease) I25.10    End stage renal disease on dialysis (HCC) N18.6, Z99.2    COVID U07.1    Encephalopathy G93.40    Acute on chronic congestive heart failure (HCC) I50.9    Chest pain R07.9    Hyperkalemia E87.5    Weakness R53.1       Isolation/Infection:   Isolation            No Isolation          Patient Infection Status       Infection Onset Added Last Indicated Last Indicated By Review Planned Expiration Resolved Resolved By    None active    Resolved    COVID-19 (Rule Out) 22 COVID-19 & Influenza Combo (Ordered)   22 Rule-Out Test Resulted    COVID-19 (Rule Out) 22 COVID-19 & Influenza Combo (Ordered)   22 Rule-Out Test Resulted    COVID-19 22 COVID-19 & Influenza Combo   22     COVID-19 (Rule Out) 22 COVID-19 & Influenza Combo (Ordered)   01/31/22 Rule-Out Test Resulted    COVID-19 (Rule Out) 01/15/21 01/15/21 01/15/21 COVID-19 (Ordered)   01/15/21 Rule-Out Test Resulted    MRSA  11/15/17 11/15/17 Thro Joe RN   02/16/19 Beata Cardenas RN            Nurse Assessment:  Last Vital Signs: BP (!) 96/55   Pulse 62   Temp 98 °F (36.7 °C) (Oral)   Resp 16   Ht 6' 1\" (1.854 m)   Wt 212 lb 1.3 oz (96.2 kg)   SpO2 96%   BMI 27.98 kg/m²     Last documented pain score (0-10 scale): Pain Level: 0  Last Weight:   Wt Readings from Last 1 Encounters:   07/14/22 212 lb 1.3 oz (96.2 kg)     Mental Status:  oriented and alert    IV Access:  - None    Nursing Mobility/ADLs:  Walking   Assisted  Transfer  Assisted  Bathing  Assisted  Dressing  Assisted  Toileting  Assisted  Feeding  Independent  Med Admin  Assisted  Med Delivery   whole    Wound Care Documentation and Therapy:  Wound 07/08/22 Knee Anterior;Right (Active)   Wound Image   07/13/22 1211   Wound Etiology Traumatic 07/08/22 2010   Dressing Status Other (Comment) 07/14/22 1038   Wound Cleansed Cleansed with saline 07/08/22 2010   Dressing/Treatment Betadine swabs/povidone iodine 07/14/22 1038   Dressing Change Due 07/11/22 07/08/22 2010   Wound Length (cm) 3 cm 07/08/22 2010   Wound Width (cm) 0.3 cm 07/08/22 2010   Wound Depth (cm) 0.1 cm 07/08/22 2010   Wound Surface Area (cm^2) 0.9 cm^2 07/08/22 2010   Wound Volume (cm^3) 0.09 cm^3 07/08/22 2010   Distance Tunneling (cm) 0 cm 07/08/22 2010   Tunneling Position ___ O'Clock 0 07/08/22 2010   Undermining Starts ___ O'Clock 0 07/08/22 2010   Undermining Ends___ O'Clock 0 07/08/22 2010   Undermining Maxium Distance (cm) 0 07/08/22 2010   Wound Assessment Pink/red 07/08/22 2010   Drainage Amount Scant 07/08/22 2010   Drainage Description Serosanguinous 07/08/22 2010   Odor None 07/08/22 2010   Bella-wound Assessment Dry/flaky;Fragile; Intact 07/08/22 2010   Margins Attached edges; Defined edges 07/08/22 2010   Wound Thickness listed and that he requires {Admit to Appropriate Level of Care:13274} for {GREATER/LESS:309577557} 30 days.      Update Admission H&P: {CHP DME Changes in XWOZP:258272284}    PHYSICIAN SIGNATURE:  {Esignature:645510514}

## 2022-07-14 NOTE — DISCHARGE SUMMARY
Name:  Maribel Altamirano  Room:  /4258-76  MRN:    [id number]    Discharge Summary      This discharge summary is in conjunction with a complete physical exam done on the day of discharge. Discharging Physician: Alea Rodriguez MD      Admit: 7/7/2022  Discharge:   7/15/2022     Diagnoses this Admission    Principal Problem:    Hyperkalemia  Active Problems:    Cardiomyopathy ([facility]    Weakness    Tobacco abuse    Type 1 diabetes mellitus with neurological manifestations, uncontrolled (HCC)    Diabetic ulcer of right foot ([facility]    Poorly controlled type 2 diabetes mellitus ([address]    Mixed hyperlipidemia    Acute respiratory failure with hypoxia ([facility] [facility]    Medically noncompliant    Chronic systolic heart failure (HCC)    Moderate malnutrition (HCC)    Alcoholic cirrhosis of liver with ascites (HCC)    PAF (paroxysmal atrial fibrillation) (HCC)    CAD (coronary artery disease)    End stage renal disease on dialysis [facility])  Resolved Problems:    * No resolved hospital problems. *      Procedures (Please Review Full Report for Details)    Hemo dialysis    US guided paracentesis    Consults    IP CONSULT TO NEPHROLOGY  IP CONSULT TO HOSPITALIST  IP CONSULT TO HOSPITALIST  IP CONSULT TO SOCIAL WORK  IP CONSULT TO NEPHROLOGY  IP CONSULT TO PALLIATIVE CARE      HPI:      The patient is a 59 y.o. male with pmhx of CHF, ESRD, CHF, alcoholic cirrhosis, DM, HTN who presented to [facility] ED with complaint of concern he cannot care for self at home. Patient reports that he has felt \"Crappy\" ever since he had his right BKA amputation, he has had difficulty getting around on his own and caring for himself at home. The past couple months, he has become increasingly weaker. Yesterday, he was unable to get out of his wheelchair so he called EMS and his neighbor assisted him. He denies any falls or LOC. Pt has also felt more short of breath lately, not improved with inhalers.  He does get weekly paracentesis for his decompensated liver cirrhosis. He stopped going to dialysis in May because he felt it wasn't helping. No fever, chills, chest pain, nausea, vomiting, diarrhea, hematuria, dysuria, dizziness, syncope. He does still smoke. EMS reported that his house was in deplorable conditions. He was placed on BIPAP in the ED, after dialysis was able to weaned off on to 2 L O2 NC. Physical Exam at Discharge:  /63   Pulse 92   Temp 98.1 °F (36.7 °C)   Resp 16   Ht 6' 1\" (1.854 m)   Wt 204 lb 12.9 oz (92.9 kg)   SpO2 98%   BMI 27.02 kg/m²     Gen: No distress. Alert. +Chronically ill appearing male   Awake and well-oriented  Eyes: PERRL. No sclera icterus. No conjunctival injection. ENT: No discharge. Pharynx clear. Neck: No JVD. No Carotid Bruit. Trachea midline. Resp: No accessory muscle use. No crackles. No wheezes. No rhonchi. +rales throughout lungs bilaterally   CV: Regular rate. Regular rhythm. No murmur. No rub. No edema. Capillary Refill: Brisk,< 3 seconds   Peripheral Pulses: could not detect pulses in right lower extremity   GI: Non-tender. No masses. No organomegaly. Normal bowel sounds. No hernia.  +distended, soft   Skin: Warm and dry. No nodule on exposed extremities. No rash on exposed extremities. +chronic scaling of right lower extremity, with chronic healing wounds on the right foot and great toe + eschar   S/p left BKA, healed incision scar   M/S: No cyanosis. No joint deformity. No clubbing. Neuro: Awake. Grossly nonfocal    Psych: Oriented x 3. No anxiety or agitation. Hospital Course    #Acute hypoxic respiratory failure improving. #Respiratory acidosis   -was initially placed on BIPAP in ED, weaned off after emergent dialysis  - needing oxygen. #ESRD on HD  -Cr 9.8 on presentation,   -has not been compliant with HD, has not been to HD since May  -required emergent dialysis in ED  -nephrology consulted   Patient refused HD  On saturday.    I spoke to him at length. He is now agreeable for continued HD. He will need hemodialysis set up, with nursing home placement  [facility] to [facility] this up. #Hyperkalemia   -initially 7.7 --> 5.9    -2/2 to above  -given insulin, D50, lasix, sodium bicarb, calcium gluconate, albuterol  -continue lokelma   -nephrology consulted  Monitor with dialysis     #Metabolic acidosis   -2/2 to above  -should improve with dialysis      #Elevated troponin   -chronically elevated   -0.68 --> 0.70 --> trend  -likely 2/2 to ESRD   -no CP   -ECG showed Wenckebach phenomenon.      #Hypoglycemia   -hypoglycemia protocol  -monitor BS   Resolved     #Hypocalcemia   -5.7 --> 6.0 after calcium gluconate  -will continue to monitor   -PTH, vitamin D, and phosphorus pending  -nephrology following      #Inability to care for self  #Weakness   -PT/OT   -case management consulted      #Chronic combined systolic and diastolic HF   #Ischemic cardiomyopathy   #Pulmonary HTN   -decompensation likely 2/2 to need for HD  -CXR stable CHF    -echo from 1/9/2021 with EF 54%, grade I diastolic dysfunction and SPAP 57 mmHg   -on BB   -daily weights   -I and Os  -will continue to monitor      #CAD  -on ASA, statin      #Paroxysmal Atrial fibrillation   -NSR on presentation   -rate controlled   -on toprol XL   -on ASA   -noncompliant with anticoagulation      #DM   -insulin dependent, unsure of compliance at home  -hypoglycemic, given D5   -will continue to monitor BS   -on SSI      #Chronic anemia  -Hgb 9.3   -will continue to monitor   -on retacrit per nephrology          #HTN   -on toprol xl, bidil, and hydralazine   -BP stable   -continue to monitor      #Decompensated Alcoholic Cirrhosis   -receives weekly paracentesis  -will need para on Monday   -consulted palliative care      #S/p left BKA   -has prosthesis     #Chronic right foot ulcers   -could not palpate pulses   -ABIs pending   -wound care consulted  -can consult podiatry         Per Dr Buck Hernandez  # Palliative care consulted for goals of care discussion    on Saturday   I spoke to patient at length. He is not interested in hospice care. He wants to get better and go to a nursing home for  rehab. I have discussed with him that he needs dialysis for maintenance of his life. He does not want end-of-life hospice care. He wants to continue with dialysis as long as it can be coordinated with the nursing home. He states he has had difficulty with transportation to go for dialysis. CBC:   Recent Labs     07/13/22  0500 07/14/22 0413 07/15/22  0534   WBC 11.3* 11.5* 11.5*   HGB 9.4* 8.5* 8.8*   HCT 29.3* 26.9* 28.3*   MCV 91.7 90.9 92.3    154 151     BMP:   Recent Labs     07/13/22  0500 07/14/22  0413 07/15/22  0534   * 131* 127*   K 5.1 4.7 4.7   CL 90* 93* 92*   CO2 21 25 22   BUN 66* 39* 47*   CREATININE 6.5* 5.4* 6.2*           US GUIDED PARACENTESIS   Final Result   Successful paracentesis. VL Tcpo2 Sayra Multi Level   Final Result      XR CHEST PORTABLE   Final Result   No acute cardiopulmonary disease. Stable CHF with bilateral effusions and   bibasilar volume loss.                 Discharge Medications     Medication List        START taking these medications      melatonin 5 MG Tbdp disintegrating tablet  Take 1 tablet by mouth nightly as needed (As needed for sleep)     polyethylene glycol 17 g packet  Commonly known as: GLYCOLAX  Take 17 g by mouth daily as needed for Constipation            CHANGE how you take these medications      atorvastatin 40 MG tablet  Commonly known as: LIPITOR  Take 1 tablet by mouth nightly  What changed: additional instructions     nicotine 7 MG/24HR  Commonly known as: NICODERM CQ  Place 1 patch onto the skin daily  What changed: additional instructions            CONTINUE taking these medications      albuterol sulfate  (90 Base) MCG/ACT inhaler  Commonly known as: ProAir HFA  Inhale 2 puffs into the lungs every 6 hours as needed for Wheezing aspirin 81 MG chewable tablet  Take 1 tablet by mouth daily     insulin lispro 100 UNIT/ML pen  Commonly known as: HumaLOG KwikPen  Glucose: Dose:   No Insulin 140-249 1 Unit 250-349 2 Units Over 350 -3 Units     ipratropium-albuterol 0.5-2.5 (3) MG/3ML Soln nebulizer solution  Commonly known as: DUONEB  Inhale 3 mLs into the lungs every 4 hours as needed for Shortness of Breath     metoprolol succinate 25 MG extended release tablet  Commonly known as: TOPROL XL  Take 1 tablet by mouth daily     Vitamin D3 50 MCG (2000 UT) Tabs            STOP taking these medications      diazePAM 2 MG tablet  Commonly known as: VALIUM     hydrALAZINE 25 MG tablet  Commonly known as: APRESOLINE     isosorbide-hydrALAZINE 20-37.5 MG per tablet  Commonly known as: BIDIL               Where to Get Your Medications        Information about where to get these medications is not yet available    Ask your nurse or doctor about these medications  melatonin 5 MG Tbdp disintegrating tablet  polyethylene glycol 17 g packet           Discharge Condition/Location: Stable. Limited prognosis. Follow Up: Follow up with NH doctor.     More than 30 mts spent        [facility], MD 7/15/2022 3:09 PM

## 2022-07-15 NOTE — PROGRESS NOTES
Treatment time: 3 Hours     Net UF: 2.6 L    Pre weight: 95.5kg   Post weight: 92.9kg  EDW: TBD 93 ? Access used: R TDC   Access function: Well, tolerated 400 BFR     Medications or blood products given: retacrit 6,000, heparin dwells     Regular outpatient schedule: MWF     Summary of response to treatment: tolerated well, maintained SBP above 100    Copy of dialysis treatment record placed in chart, to be scanned into EMR.     07/15/22 0719 07/15/22 1019   Treatment   Time On 0719  --    Time Off  --  1019   Treatment Goal 3000  --    Vital Signs   /60 132/60   Temp 98.3 °F (36.8 °C) 98.1 °F (36.7 °C)   Heart Rate 58  --    Weight 210 lb 8.6 oz (95.5 kg) 204 lb 12.9 oz (92.9 kg)   Technical Checks   Machine Alarm Self Test Completed; Passed  --    Dialysis Bath   K+ (Potassium) 3  --    Ca+ (Calcium) 2.5  --    Na+ (Sodium) 138  --    HCO3 (Bicarb) 38  --

## 2022-07-15 NOTE — PROGRESS NOTES
Patient returned from hemodialysis. Patient tolerated well. 3 liters removed. Pt continues on 2 liters nasal canula with easy/even respirations. Peripheral line is dry/intact and saline locked. Betadine applied to right toes. VSS.

## 2022-07-15 NOTE — PROGRESS NOTES
Progress Note    HISTORY     CC:  Shortness of breath           We are following for ESRD      Subjective/   HPI:  HD today. Post weight of 93 kg. Tolerated well and catheter worked well. Awaiting placement. ROS:  Constitutional:  No fevers, No Chills, + weakness  Cardiovascular:  No palpations, no edema  Respiratory:  No wheezing, no cough  Skin:  No rash, no itching  :  No hematuria, no dysuria     Social Hx:  No Family at the bedside     Past Medical and Surgical History:  - Reviewed, no changes     EXAM       Objective/     Vitals:    07/15/22 0719 07/15/22 1019 07/15/22 1303 07/15/22 1602   BP: 127/60 132/60 123/63 (!) 109/57   Pulse: 58 60 92    Resp:    15   Temp: 98.3 °F (36.8 °C) 98.1 °F (36.7 °C)  98.7 °F (37.1 °C)   TempSrc:    Oral   SpO2:    98%   Weight: 210 lb 8.6 oz (95.5 kg) 204 lb 12.9 oz (92.9 kg)     Height:         24HR INTAKE/OUTPUT:      Intake/Output Summary (Last 24 hours) at 7/15/2022 1610  Last data filed at 7/15/2022 0853  Gross per 24 hour   Intake 190 ml   Output --   Net 190 ml       Constitutional:  Ill appearing, [place]  Eyes:  Pupils reactive, sclera clear   Neck:  Normal thyroid, no masses   Cardiovascular:  Regular, no rub  Respiratory:  No distress, no wheezing  Psychiatry:  Appropriate mood/affect, alert  Abdomen: +bs, soft, nt, no masses   Musculoskeletal: + LE edema, left BKA  Lymphatics:  No LAD in neck, no supraclavicular nodes       MEDICAL DECISION MAKING       Data/  Recent Labs     07/13/22  0500 07/14/22  0413 07/15/22  0534   WBC 11.3* 11.5* 11.5*   HGB 9.4* 8.5* 8.8*   HCT 29.3* 26.9* 28.3*   MCV 91.7 90.9 92.3    154 151       Recent Labs     07/13/22  0500 07/14/22  0413 07/15/22  0534   * 131* 127*   K 5.1 4.7 4.7   CL 90* 93* 92*   CO2 21 25 22   GLUCOSE 82 93 95   BUN 66* 39* 47*   CREATININE 6.5* 5.4* 6.2*   LABGLOM 9* 11* 9*   GFRAA 11* 13* 11*         Assessment/     ESKD. - Missed dialysis for about 2 months.   - Previously at [address] but discharged from their clinic because of non-compliance. - Started back on dialysis / has agreed to do dialysis for the present time     Hyperkalemia.  - From missed dialysis  - Resolved      Metabolic Acidosis. - From CKD. - Resolved with restart of dialysis      Anemia.  - Continue HARPREET with HD, 6K units. Hypertension.  - BP is better after stopping hydralazine and Imdur      CKD-MBD.  - Hypocalcemia    Plan/     HD mwf schedule, he is willing to continue but needs help with transport.   Awaiting approval for [facility] with in house HD  Follow labs  IV calcium  Continue HARPREET  Discharge planning    -----------------------------  Velasquez Tsai M.D.   [facility]

## 2022-07-15 NOTE — PROGRESS NOTES
Progress Note    Admit Date:  7/7/2022    Patient with ESRD, missed dialysis for over 2 months. Presented with fatigue admitted with hyperkalemia, hypoxemia  And initially had emergent dialysis    Subjective:  Mr. Singh Barley stable with no complaints . He had refused dialysis on Saturday  I spoke to patient at length. He is now agreeable for dialysis. He is interested in going to rehab. Discussed with discharge planner. This can likely be set up for Monday for patient to go to [facility] after dialysis    7/11-the patient was seen after dialysis today. He is feeling better. 7/12- patient is doing better. Discharge planning working on rehab.    7/13-seen during dialysis. Discharge planning is working on setting up rehab and outpatient dialysis from rehab. Pre-CERT has been started. 5/37 -awaiting pre cert      Objective:   /69   Pulse 72   Temp 98.8 °F (37.1 °C) (Oral)   Resp 18   Ht 6' 1\" (1.854 m)   Wt 212 lb 1.3 oz (96.2 kg)   SpO2 91%   BMI 27.98 kg/m²       Intake/Output Summary (Last 24 hours) at 7/14/2022 2144  Last data filed at 7/14/2022 2044  Gross per 24 hour   Intake 590.28 ml   Output --   Net 590.28 ml         Physical Exam:   Gen: No distress. Alert. +Chronically ill appearing male   Awake and well-oriented  Eyes: PERRL. No sclera icterus. No conjunctival injection. ENT: No discharge. Pharynx clear. Neck: No JVD. No Carotid Bruit. Trachea midline. Resp: No accessory muscle use. No crackles. No wheezes. No rhonchi. +rales throughout lungs bilaterally   CV: Regular rate. Regular rhythm. No murmur. No rub. No edema. Capillary Refill: Brisk,< 3 seconds   Peripheral Pulses: could not detect pulses in right lower extremity   GI: Non-tender. No masses. No organomegaly. Normal bowel sounds. No hernia.  +distended, soft   Skin: Warm and dry. No nodule on exposed extremities. No rash on exposed extremities.  +chronic scaling of right lower extremity, with chronic healing wounds on the right foot and great toe + eschar   S/p left BKA, healed incision scar   M/S: No cyanosis. No joint deformity. No clubbing. Neuro: Awake. Grossly nonfocal    Psych: Oriented x 3. No anxiety or agitation. Medications:   Scheduled Meds:   [START ON 7/15/2022] epoetin lillie-epbx  6,000 Units IntraVENous Once per day on Mon Wed Fri    insulin regular  10 Units IntraVENous Once    And    dextrose bolus  250 mL IntraVENous Once    aspirin  81 mg Oral Daily    atorvastatin  40 mg Oral Nightly    metoprolol succinate  25 mg Oral Daily    sodium chloride flush  5-40 mL IntraVENous 2 times per day    heparin (porcine)  5,000 Units SubCUTAneous 3 times per day    insulin lispro  0-6 Units SubCUTAneous TID WC    insulin lispro  0-3 Units SubCUTAneous Nightly    dextrose bolus  250 mL IntraVENous Once    povidone-iodine   Topical Daily       Continuous Infusions:   dextrose      sodium chloride         Data:  CBC:   Recent Labs     07/12/22 0453 07/13/22  0500 07/14/22  0413   WBC 10.2 11.3* 11.5*   RBC 2.98* 3.20* 2.96*   HGB 8.9* 9.4* 8.5*   HCT 27.3* 29.3* 26.9*   MCV 91.7 91.7 90.9   RDW 15.8* 15.4 15.5*    148 154     BMP:   Recent Labs     07/12/22 0453 07/13/22  0500 07/14/22  0413   * 126* 131*   K 4.8 5.1 4.7   CL 95* 90* 93*   CO2 20* 21 25   BUN 59* 66* 39*   CREATININE 6.0* 6.5* 5.4*     BNP: No results for input(s): BNP in the last 72 hours. PT/INR: No results for input(s): PROTIME, INR in the last 72 hours. APTT: No results for input(s): APTT in the last 72 hours. CARDIAC ENZYMES:   No results for input(s): CKMB, CKMBINDEX, TROPONINI in the last 72 hours. Invalid input(s): CKTOTAL;3  FASTING LIPID PANEL:  Lab Results   Component Value Date    CHOL 96 04/24/2022    HDL 35 (L) 04/24/2022    TRIG 77 04/24/2022     LIVER PROFILE:   No results for input(s): AST, ALT, ALB, BILIDIR, BILITOT, ALKPHOS in the last 72 hours.        Cultures  COVID and influenza not detected  Blood cultures no growth to date    Radiology  US GUIDED PARACENTESIS   Final Result   Successful paracentesis. VL Tcpo2 [place] Multi Level   Final Result      XR CHEST PORTABLE   Final Result   No acute cardiopulmonary disease. Stable CHF with bilateral effusions and   bibasilar volume loss. Assessment:  Principal Problem:    Hyperkalemia  Active Problems:    Cardiomyopathy ([address]    Weakness    Tobacco abuse    Type 1 diabetes mellitus with neurological manifestations, uncontrolled (HCC)    Diabetic ulcer of right foot ([address]    Poorly controlled type 2 diabetes mellitus ([address]    Mixed hyperlipidemia    Acute respiratory failure with hypoxia [facility]    Medically noncompliant    Chronic systolic heart failure (HCC)    Moderate malnutrition (HCC)    Alcoholic cirrhosis of liver with ascites (HCC)    PAF (paroxysmal atrial fibrillation) (HCC)    CAD (coronary artery disease)    End stage renal disease on dialysis [facility])  Resolved Problems:    * No resolved hospital problems. *      Plan:  #Acute hypoxic respiratory failure improving. #Respiratory acidosis   -was initially placed on BIPAP in ED, weaned off after emergent dialysis  - needing oxygen.      #ESRD on HD  -Cr 9.8 on presentation,   -has not been compliant with HD, has not been to HD since May  -required emergent dialysis in ED  -nephrology consulted   Patient refused HD  On saturday. I spoke to him at length. He is now agreeable for continued HD.   He will need hemodialysis set up, with nursing home placement  [facility] is trying to set this up.     #Hyperkalemia   -initially 7.7 --> 5.9    -2/2 to above  -given insulin, D50, lasix, sodium bicarb, calcium gluconate, albuterol  -continue lokelma   -nephrology consulted  Monitor with dialysis     #Metabolic acidosis   -2/2 to above  -should improve with dialysis      #Elevated troponin   -chronically elevated   -0.68 --> 0.70 --> trend  -likely 2/2 to ESRD   -no CP   -ECG showed Wenckebach phenomenon.     #Hypoglycemia   -hypoglycemia protocol  -monitor BS   Resolved     #Hypocalcemia   -5.7 --> 6.0 after calcium gluconate  -will continue to monitor   -PTH, vitamin D, and phosphorus pending  -nephrology following      #Inability to care for self  #Weakness   -PT/OT   -case management consulted      #Chronic combined systolic and diastolic HF   #Ischemic cardiomyopathy   #Pulmonary HTN   -decompensation likely 2/2 to need for HD  -CXR stable CHF    -echo from 1/9/2021 with EF 50%, grade I diastolic dysfunction and SPAP 57 mmHg   -on BB   -daily weights   -I and Os  -will continue to monitor      #CAD  -on ASA, statin      #Paroxysmal Atrial fibrillation   -NSR on presentation   -rate controlled   -on toprol XL   -on ASA   -noncompliant with anticoagulation      #DM   -insulin dependent, unsure of compliance at home  -hypoglycemic, given D5   -will continue to monitor BS   -on SSI      #Chronic anemia  -Hgb 9.3   -will continue to monitor   -on retacrit per nephrology          #HTN   -on toprol xl, bidil, and hydralazine   -BP stable   -continue to monitor      #Decompensated Alcoholic Cirrhosis   -receives weekly paracentesis  -will need para on Monday   -consulted palliative care      #S/p left BKA   -has prosthesis     #Chronic right foot ulcers   -could not palpate pulses   -ABIs pending   -wound care consulted  -can consult podiatry        Per Dr Anat Lopez  # Palliative care consulted for goals of care discussion    on Saturday   I spoke to patient at length. He is not interested in hospice care. He wants to get better and go to a nursing home for  rehab. I have discussed with him that he needs dialysis for maintenance of his life. He does not want end-of-life hospice care. He wants to continue with dialysis as long as it can be coordinated with the nursing home. He states he has had difficulty with transportation to go for dialysis.       DVT Prophylaxis: heparin   Diet: ADULT DIET; Regular; 4 carb choices (60 gm/meal); Low Sodium (2 gm); Low Potassium (Less than 3000 mg/day);  Low Phosphorus (Less than 1000 mg); 60 to 80 gm  Code Status: Full Code      He needs HD slot.       Abby Garrison MD   7/14/2022 9:44 PM

## 2022-07-15 NOTE — FLOWSHEET NOTE
07/14/22 2031   Vital Signs   Temp 98.8 °F (37.1 °C)   Temp Source Oral   Heart Rate 72   Heart Rate Source Monitor   Resp 19   /69   BP Location Right upper arm   BP Method Automatic   MAP (Calculated) 86.67   Patient Position Semi fowlers   Level of Consciousness Alert (0)   MEWS Score 1   Oxygen Therapy   SpO2 90 %   O2 Device None (Room air)   Shift assessment completed. Patient noted in bed without O2 on,educated on wearing O2 and bodies need for O2,voices understanding but becomes highly agitated with education. Verbalizes upset over cough. Wet cough noted. States he has had cough for several days, states it is productive, no sputum noted by writer, will continue to monitor. Resting in bed at this time, call light in reach.  Velasquez Mackay RN

## 2022-07-15 NOTE — CARE COORDINATION
DISCHARGE ORDER  Date/Time 7/15/2022 3:34 PM  Completed by: Inocencio Parish, RN, Case Management    Patient Name: Maribel Palmer    : 1960      Admit order Date and Status: IP 2022  Noted discharge order. (verify MD's last order for status of admission/Traditional Medicare 3 [place] Inpatient qualifying stay required for SNF)    Confirmed discharge plan with:              Patient:  Yes              And sonLawson               Discharge to Facility: Name: [facility]  Address: [address] [address]  Phone: [phone]  Fax: [phone]    Facility phone number for staff giving report:    Pre-certification completed: [facility] Exemption Notification (HENS) completed: MARK   Discharge orders and Continuity of Care faxed to facility:  [facility]      Transportation:               Medical Transport explained with choice list offered to pt/family. Choice:No(no preference)  Agency used: [facility]   time:   20:00      Pt/family/Nursing/Facility aware of  time:   patient and son  Alice Jett ()   Priscilla George (Admissions at [facility])    Ambulance form completed:  YES      Date Last IMM Given: NA    Comments:    Pt is being d/c'd to SNF at [facility] today. Pt's O2 sats are 98% on RA. Discharge timeout done with [facility]. All discharge needs and concerns addressed. Discharging nurse to complete DILLAN, reconcile AVS, and place final copy with patient's discharge packet. Discharging RN to ensure that written prescriptions for  Level II medications are sent with patient to the facility as per protocol.

## 2022-07-15 NOTE — PROGRESS NOTES
Patient requested PRN Melatonin stating he has not been able to sleep for several nights. Also requested PRN nebulizer tx, call placed to respiratory for request at this time. Chris Fields RN.

## 2022-07-15 NOTE — CARE COORDINATION
INTERDISCIPLINARY PLAN OF CARE CONFERENCE    Date/Time: 7/15/2022 1:23 PM  Completed by: Alina Ballesteros RN, Case Management      Patient Name:  Wicho Almeida  YOB: 1960  Admitting Diagnosis: Hyperkalemia [E87.5]  End stage renal disease on dialysis [facility]) [N18.6, Z99.2]  Medically noncompliant [Z91.19]     Admit Date/Time:  7/7/2022  7:50 PM    Chart reviewed. Interdisciplinary team contacted or reviewed plan related to patient progress and discharge plans. Disciplines included Case Management, Nursing, and Dietitian. Current Status:07/08/2022  PT/OT recommendation for discharge plan of care:  Discharge Recommendations: SNF  DME needs for discharge: defer to facility    Expected D/C Disposition:  Skilled nursing facility  Confirmed plan with patient   Discharge Plan Comments: Chart reviewed. Met with pt at bedside and explained the role of the CM. Plans to go to [facility] for SNF w/ in house hemodialysis. CM discussed plan at length with patient and he is in agreement with going to skilled care at [facility]  and understands that DC to SNF is dependant on insurance pre-cert approval. CM spoke with [facility], [facility]. admissions)at noon. Pre-cert is still in process with [facility]. CM following.       Home O2 in place on admit: No  Pt informed of need to bring portable home O2 tank on day of discharge for nursing to connect prior to leaving:  Not Indicated  Verbalized agreement/Understanding:  Not Indicated

## 2022-07-15 NOTE — PROGRESS NOTES
Patient currently resting in bed with eyes closed. Melatonin effective at this time. No noted coughing at this time. Will continue to monitor. Call light in reach.  Kerri Luna RN

## 2022-07-15 NOTE — PLAN OF CARE
Problem: Discharge Planning  Goal: Discharge to home or other facility with appropriate resources  Outcome: Completed     Problem: Safety - Adult  Goal: Free from fall injury  Outcome: Completed     Problem: ABCDS Injury Assessment  Goal: Absence of physical injury  Outcome: Completed     Problem: Cardiovascular - Adult  Goal: Maintains optimal cardiac output and hemodynamic stability  Outcome: Completed  Goal: Absence of cardiac dysrhythmias or at baseline  Outcome: Completed     Problem: Skin/Tissue Integrity - Adult  Goal: Skin integrity remains intact  Outcome: Completed  Goal: Incisions, wounds, or drain sites healing without S/S of infection  Outcome: Completed  Goal: Oral mucous membranes remain intact  Outcome: Completed     Problem: Musculoskeletal - Adult  Goal: Return mobility to safest level of function  Outcome: Completed  Goal: Maintain proper alignment of affected body part  Outcome: Completed  Goal: Return ADL status to a safe level of function  Outcome: Completed     Problem: Infection - Adult  Goal: Absence of infection at discharge  Outcome: Completed  Goal: Absence of infection during hospitalization  Outcome: Completed  Goal: Absence of fever/infection during anticipated neutropenic period  Outcome: Completed     Problem: Metabolic/Fluid and Electrolytes - Adult  Goal: Electrolytes maintained within normal limits  Outcome: Completed  Goal: Hemodynamic stability and optimal renal function maintained  Outcome: Completed  Goal: Glucose maintained within prescribed range  Outcome: Completed     Problem: Hematologic - Adult  Goal: Maintains hematologic stability  Outcome: Completed     Problem: Skin/Tissue Integrity  Goal: Absence of new skin breakdown  Description: 1. Monitor for areas of redness and/or skin breakdown  2. Assess vascular access sites hourly  3. Every 4-6 hours minimum:  Change oxygen saturation probe site  4.   Every 4-6 hours:  If on nasal continuous positive airway pressure, respiratory therapy assess nares and determine need for appliance change or resting period.   Outcome: Completed     Problem: Chronic Conditions and Co-morbidities  Goal: Patient's chronic conditions and co-morbidity symptoms are monitored and maintained or improved  Outcome: Completed     Problem: Pain  Goal: Verbalizes/displays adequate comfort level or baseline comfort level  Outcome: Completed

## 2022-07-15 NOTE — PROGRESS NOTES
Progress Note    Admit Date:  7/7/2022    Patient with ESRD, missed dialysis for over 2 months. Presented with fatigue admitted with hyperkalemia, hypoxemia  And initially had emergent dialysis    Subjective:  Mr. Castillo Fury stable with no complaints . He had refused dialysis on Saturday  I spoke to patient at length. He is now agreeable for dialysis. He is interested in going to rehab. Discussed with discharge planner. This can likely be set up for Monday for patient to go to [facility] after dialysis    7/11-the patient was seen after dialysis today. He is feeling better. 7/12- patient is doing better. Discharge planning working on rehab.    7/13-seen during dialysis. Discharge planning is working on setting up rehab and outpatient dialysis from rehab. Pre-CERT has been started. 7/86 -awaiting pre cert    0/34- going to HD today. Awaiting pre cert        Objective:   /60   Pulse 58   Temp 98.3 °F (36.8 °C)   Resp 16   Ht 6' 1\" (1.854 m)   Wt 210 lb 8.6 oz (95.5 kg)   SpO2 98%   BMI 27.78 kg/m²       Intake/Output Summary (Last 24 hours) at 7/15/2022 1018  Last data filed at 7/15/2022 0853  Gross per 24 hour   Intake 410.28 ml   Output --   Net 410.28 ml           Physical Exam:   Gen: No distress. Alert. +Chronically ill appearing male   Awake and well-oriented  Eyes: PERRL. No sclera icterus. No conjunctival injection. ENT: No discharge. Pharynx clear. Neck: No JVD. No Carotid Bruit. Trachea midline. Resp: No accessory muscle use. No crackles. No wheezes. No rhonchi. +rales throughout lungs bilaterally   CV: Regular rate. Regular rhythm. No murmur. No rub. No edema. Capillary Refill: Brisk,< 3 seconds   Peripheral Pulses: could not detect pulses in right lower extremity   GI: Non-tender. No masses. No organomegaly. Normal bowel sounds. No hernia.  +distended, soft   Skin: Warm and dry. No nodule on exposed extremities. No rash on exposed extremities.  +chronic scaling of right lower extremity, with chronic healing wounds on the right foot and great toe + eschar   S/p left BKA, healed incision scar   M/S: No cyanosis. No joint deformity. No clubbing. Neuro: Awake. Grossly nonfocal    Psych: Oriented x 3. No anxiety or agitation. Medications:   Scheduled Meds:   epoetin lillie-epbx  6,000 Units IntraVENous Once per day on Mon Wed Fri    insulin regular  10 Units IntraVENous Once    And    dextrose bolus  250 mL IntraVENous Once    aspirin  81 mg Oral Daily    atorvastatin  40 mg Oral Nightly    metoprolol succinate  25 mg Oral Daily    sodium chloride flush  5-40 mL IntraVENous 2 times per day    heparin (porcine)  5,000 Units SubCUTAneous 3 times per day    insulin lispro  0-6 Units SubCUTAneous TID WC    insulin lispro  0-3 Units SubCUTAneous Nightly    dextrose bolus  250 mL IntraVENous Once    povidone-iodine   Topical Daily       Continuous Infusions:   dextrose      sodium chloride         Data:  CBC:   Recent Labs     07/13/22  0500 07/14/22  0413 07/15/22  0534   WBC 11.3* 11.5* 11.5*   RBC 3.20* 2.96* 3.07*   HGB 9.4* 8.5* 8.8*   HCT 29.3* 26.9* 28.3*   MCV 91.7 90.9 92.3   RDW 15.4 15.5* 15.4    154 151       BMP:   Recent Labs     07/13/22  0500 07/14/22 0413 07/15/22  0534   * 131* 127*   K 5.1 4.7 4.7   CL 90* 93* 92*   CO2 21 25 22   BUN 66* 39* 47*   CREATININE 6.5* 5.4* 6.2*       BNP: No results for input(s): BNP in the last 72 hours. PT/INR: No results for input(s): PROTIME, INR in the last 72 hours. APTT: No results for input(s): APTT in the last 72 hours. CARDIAC ENZYMES:   No results for input(s): CKMB, CKMBINDEX, TROPONINI in the last 72 hours. Invalid input(s): CKTOTAL;3  FASTING LIPID PANEL:  Lab Results   Component Value Date    CHOL 96 04/24/2022    HDL 35 (L) 04/24/2022    TRIG 77 04/24/2022     LIVER PROFILE:   No results for input(s): AST, ALT, ALB, BILIDIR, BILITOT, ALKPHOS in the last 72 hours.        Cultures  COVID and influenza not detected  Blood cultures no growth to date    Radiology  US GUIDED PARACENTESIS   Final Result   Successful paracentesis. VL Tcpo2 [place] Multi Level   Final Result      XR CHEST PORTABLE   Final Result   No acute cardiopulmonary disease. Stable CHF with bilateral effusions and   bibasilar volume loss. 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 *      Plan:  #Acute hypoxic respiratory failure improving. #Respiratory acidosis   -was initially placed on BIPAP in ED, weaned off after emergent dialysis  - needing oxygen. #ESRD on HD  -Cr 9.8 on presentation,   -has not been compliant with HD, has not been to HD since May  -required emergent dialysis in ED  -nephrology consulted   Patient refused HD  On saturday. I spoke to him at length. He is now agreeable for continued HD. He will need hemodialysis set up, with nursing home placement  [facility] [place] [facility] this up.      #Hyperkalemia   -initially 7.7 --> 5.9    -2/2 to above  -given insulin, D50, lasix, sodium bicarb, calcium gluconate, albuterol  -continue lokelma   -nephrology consulted  Monitor with dialysis     #Metabolic acidosis   -2/2 to above  -should improve with dialysis      #Elevated troponin   -chronically elevated   -0.68 --> 0.70 --> trend  -likely 2/2 to ESRD   -no CP   -ECG showed Wenckebach phenomenon. #Hypoglycemia   -hypoglycemia protocol  -monitor BS   Resolved     #Hypocalcemia   -5.7 --> 6.0 after calcium gluconate  -will continue to monitor   -PTH, vitamin D, and phosphorus pending  -nephrology following      #Inability to care for self  #Weakness   -PT/OT   -case management consulted      #Chronic combined systolic and diastolic HF   #Ischemic cardiomyopathy   #Pulmonary HTN   -decompensation likely 2/2 to need for HD  -CXR stable CHF    -echo from 1/9/2021 with EF 13%, grade I diastolic dysfunction and SPAP 57 mmHg   -on BB   -daily weights   -I and Os  -will continue to monitor      #CAD  -on ASA, statin      #Paroxysmal Atrial fibrillation   -NSR on presentation   -rate controlled   -on toprol XL   -on ASA   -noncompliant with anticoagulation      #DM   -insulin dependent, unsure of compliance at home  -hypoglycemic, given D5   -will continue to monitor BS   -on SSI      #Chronic anemia  -Hgb 9.3   -will continue to monitor   -on retacrit per nephrology          #HTN   -on toprol xl, bidil, and hydralazine   -BP stable   -continue to monitor      #Decompensated Alcoholic Cirrhosis   -receives weekly paracentesis  -will need para on Monday   -consulted palliative care      #S/p left BKA   -has prosthesis     #Chronic right foot ulcers   -could not palpate pulses   -ABIs pending   -wound care consulted  -can consult podiatry        Per Dr Kushal Bingham  # Palliative care consulted for goals of care discussion    on Saturday   I spoke to patient at length. He is not interested in hospice care. He wants to get better and go to a nursing home for  rehab. I have discussed with him that he needs dialysis for maintenance of his life. He does not want end-of-life hospice care. He wants to continue with dialysis as long as it can be coordinated with the nursing home. He states he has had difficulty with transportation to go for dialysis.       DVT Prophylaxis: heparin   Diet: ADULT DIET; Regular; 4 carb choices (60 gm/meal); Low Sodium (2 gm); Low Potassium (Less than 3000 mg/day); Low Phosphorus (Less than 1000 mg); 60 to 80 gm  Code Status: Full Code      He needs HD slot. Awaiting pre cert. [id number] [address] for discharge.         Lindsay Farah MD   7/15/2022 10:18 AM

## 2022-07-15 NOTE — PLAN OF CARE
Problem: Discharge Planning  Goal: Discharge to home or other facility with appropriate resources  Outcome: Progressing  Flowsheets (Taken 7/14/2022 2325)  Discharge to home or other facility with appropriate resources:   Identify barriers to discharge with patient and caregiver   Arrange for needed discharge resources and transportation as appropriate   Identify discharge learning needs (meds, wound care, etc)  Note: Pending placement at a SNF. Patient verbalizes readiness for dishcarge. Problem: Safety - Adult  Goal: Free from fall injury  Outcome: Progressing  Flowsheets  Taken 7/14/2022 2325  Free From Fall Injury: Instruct family/caregiver on patient safety  Taken 7/14/2022 2324  Free From Fall Injury: Instruct family/caregiver on patient safety  Note: Continues with need for verbal safety reminders. Problem: Metabolic/Fluid and Electrolytes - Adult  Goal: Electrolytes maintained within normal limits  Outcome: Progressing  Flowsheets (Taken 7/14/2022 2325)  Electrolytes maintained within normal limits: Monitor labs and assess patient for signs and symptoms of electrolyte imbalances  Note: Continue to monitor labs per order.  Hemodialysis as ordered

## 2022-07-15 NOTE — PROGRESS NOTES
Shift report given to [facility]. Patient stable. Care transferred at this time.  Davi Persaud RN

## 2022-07-15 NOTE — PROGRESS NOTES
VSS. Peripheral line removed and within normal limites. Right chest HD vas cath is dry/intact and heparin locked after HD today. Patient will be picked up at 2000 for [facility]CF. AVS printed. Attempted to call facility for report, gave a call back number.

## 2022-07-18 NOTE — BRIEF OP NOTE
Brief Postoperative Note    Lori Cherry  YOB: 1960  [id number]    Pre-operative Diagnosis: Ascites    Post-operative Diagnosis: Same    Procedure: US Guided Paracentesis    Anesthesia: Local    Surgeons/Assistants: Adolfo Dubose MD    Estimated Blood Loss: less than 5 mL    Complications: None    Specimens: Was Obtained: serous Ascitic Fluid    Findings: Technically successful US guided paracentesis, 2.9 L removed    Electronically signed by Adolfo Dubose MD on 7/18/2022 at 2:16 PM

## 2022-07-24 NOTE — SIGNIFICANT EVENT
Family called giving  home info [place] [facility]  home.  home notified of release. Family also requesting to  pt belongings. Family instructed to come to hospital and ask for security who can get the belongings for them.   Tk Ma BSN, RN

## 2022-07-24 NOTE — ED NOTES
Spoke with Chung Briggs at [facility], pt is released from . Spoke with Yuridia Garza, verification number [id number]. States will be sending eye tech for patient.       Everardo Elaine RN  07/24/22 1624

## 2022-07-24 NOTE — ED NOTES
Spoke with patients son Karo. Family unsure of service arrangements and will contact us when they have decided.       Hayley Valencia RN  07/24/22 0070

## 2022-07-24 NOTE — PROGRESS NOTES
This Rt present for code blue, patient intubated 8.0 25 at the lip, good color change. Bag -ventilation until TOD.   Gearlean Flow RRT

## 2022-07-24 NOTE — ED PROVIDER NOTES
[address] ED  EMERGENCY DEPARTMENT ENCOUNTER        Pt Name: Iqra Castillo  MRN: [id number]  [place] 1960  Date of evaluation: 7/24/2022  Provider: Cheryl Hung MD  PCP: Boo Lewis MD      [address] [place] [address]       Chief Complaint   Patient presents with    Abdominal Pain     EMS reports patient called for ABD pain, patient states he hasn't urinated in a week and belly hurts. Patient Dialysis on Mondays. Patient with C/O left leg pain. HISTORY OFPRESENT ILLNESS   (Location/Symptom, Timing/Onset, Context/Setting, Quality, Duration, Modifying Factors,Severity)  Note limiting factors. Iqra Castillo is a 59 y.o. male presenting today due to concern for reportedly having increased abdominal pain over the past few days with decreased urination and complaining of left leg pain near the area of his stump. He was brought by EMS and was hypoxic on arrival and does not normally wear oxygen. He normally goes to dialysis on Monday and denies any other dialysis throughout the week. He denies any fever. He is mainly complaining of significant left leg pain and wanting pain medication for this. He also reports no bowel movement over the last week as well. He has a significant medical history including end-stage renal disease, cirrhosis, congestive heart failure, coronary artery disease. He denied any falls or trauma. Due to concern for significant abdominal and leg pain, he came to ED for further evaluation. He does have some shortness of breath but denies any chest pain. REVIEW OF SYSTEMS    (2-9 systems for level 4, 10 or more for level 5)     Review of Systems   Constitutional:  Positive for fatigue. Negative for fever. HENT:  Negative for congestion. Respiratory:  Positive for shortness of breath. Negative for chest tightness. Cardiovascular:  Negative for chest pain. Gastrointestinal:  Positive for abdominal distention, abdominal pain and nausea.  Negative for vomiting. Genitourinary:  Positive for decreased urine volume and flank pain. Musculoskeletal:  Positive for back pain and gait problem (chronic issue). Skin:  Negative for wound (denies any falls or trauma). Neurological:  Positive for weakness (generalized). Negative for syncope and headaches. Psychiatric/Behavioral:  The patient is nervous/anxious. Positives and Pertinent negatives as per HPI.       PASTMEDICAL HISTORY     Past Medical History:   Diagnosis Date    Bacteremia 11/18/2017    strep agalactiae (also cultured from foot)    CAD (coronary artery disease) 8/18/2021    Cellulitis and abscess of foot, except toes     CHF (congestive heart failure) ([facility]     Cirrhosis with alcoholism ([facility] [address] [facility]     Diabetes mellitus ([facility] [address]     History of liver failure     Hypertension     Kidney disease     MRSA (methicillin resistant staph aureus) culture positive 4/17/18, 11/19/2017    left foot         SURGICAL HISTORY       Past Surgical History:   Procedure Laterality Date    IR TUNNELED CATHETER PLACEMENT GREATER THAN 5 YEARS  1/11/2021    IR TUNNELED CATHETER PLACEMENT GREATER THAN 5 YEARS 1/11/2021 Dario Martinez MD [place] [facility] PROCEDURES    LEG AMPUTATION BELOW KNEE      PARACENTESIS      UPPER GASTROINTESTINAL ENDOSCOPY N/A 1/29/2019    EGD ESOPHAGOGASTRODUODENOSCOPY performed by Analy Soto MD at [address]       Discharge Medication List as of 7/24/2022  8:37 AM        CONTINUE these medications which have NOT CHANGED    Details   polyethylene glycol (GLYCOLAX) 17 g packet Take 17 g by mouth daily as needed for Constipation, Disp-527 g, R-1NO PRINT      melatonin 5 MG TBDP disintegrating tablet Take 1 tablet by mouth nightly as needed (As needed for sleep), Disp-1 tablet, R-0NO PRINT      Cholecalciferol (VITAMIN D3) 50 MCG (2000 UT) TABS Take 2,000 Units by mouth dailyHistorical Med      aspirin 81 MG chewable tablet Take 1 tablet by mouth daily, Disp-30 tablet, R-0Normal      metoprolol succinate (TOPROL XL) 25 MG extended release tablet Take 1 tablet by mouth daily, Disp-30 tablet, R-0Normal      ipratropium-albuterol (DUONEB) 0.5-2.5 (3) MG/3ML SOLN nebulizer solution Inhale 3 mLs into the lungs every 4 hours as needed for Shortness of Breath, Disp-360 mLDC to SNF      nicotine (NICODERM CQ) 7 MG/24HR Place 1 patch onto the skin daily, Disp-30 patch, R-3DC to SNF      insulin lispro (HUMALOG KWIKPEN) 100 UNIT/ML pen Glucose: Dose:   No Insulin 140-249 1 Unit 250-349 2 Units Over 350 -3 Units, Disp-2 pen, R-0Print      atorvastatin (LIPITOR) 40 MG tablet Take 1 tablet by mouth nightly, Disp-30 tablet, R-0Print      albuterol sulfate HFA (PROAIR HFA) 108 (90 Base) MCG/ACT inhaler Inhale 2 puffs into the lungs every 6 hours as needed for Wheezing, Disp-1 Inhaler, R-3Print             ALLERGIES     Pcn [penicillins]    FAMILY HISTORY       Family History   Problem Relation Age of Onset    Diabetes Mother     Hypertension Mother     Dementia Mother     Heart Attack Father     Stroke Brother     No Known Problems Maternal Grandmother     No Known Problems Maternal Grandfather     No Known Problems Paternal Grandmother     No Known Problems Paternal Grandfather     Heart Attack Brother           SOCIAL HISTORY       Social History     Socioeconomic History    Marital status:     Occupational History    Occupation: unemployed   Tobacco Use    Smoking status: Former     Packs/day: 1.00     Years: 40.00     Pack years: 40.00     Types: Cigarettes     Quit date: 2022     Years since quittin.4    Smokeless tobacco: Former     Types: Chew   Vaping Use    Vaping Use: Never used   Substance and Sexual Activity    Alcohol use: No     Comment: none    Drug use: Yes     Types: Marijuana Juanetta Dade)     Comment: occassionally     Sexual activity: Yes     Partners: Female       SCREENINGS    Arline Coma Scale  Eye Opening: Spontaneous  Best Verbal Response: Oriented  Best Motor Response: Obeys commands  Arline Coma Scale Score: 15  OPO Notified: Yes  Date OPO Notified: 07/24/22  Time OPO Notified: 0500  OPO Referral Number: [id number]           PHYSICAL EXAM    (up to 7 for level 4, 8 or more for level 5)     ED Triage Vitals   BP Temp Temp src Pulse Resp SpO2 Height Weight   -- -- -- -- -- -- -- --       Physical Exam  Vitals and nursing note reviewed. Constitutional:       General: He is awake. He is in acute distress (mild). Appearance: He is well-developed. He is obese. He is ill-appearing. He is not toxic-appearing or diaphoretic. Interventions: He is not intubated. HENT:      Head: Normocephalic and atraumatic. Right Ear: External ear normal.      Left Ear: External ear normal.      Nose: Nose normal.      Mouth/Throat:      Mouth: Mucous membranes are dry. Eyes:      General: No scleral icterus. Right eye: No discharge. Left eye: No discharge. Conjunctiva/sclera: Conjunctivae normal.      Pupils: Pupils are equal, round, and reactive to light. Neck:      Trachea: Trachea and phonation normal. No tracheal deviation. Cardiovascular:      Rate and Rhythm: Normal rate and regular rhythm. Pulses: Normal pulses. Pulmonary:      Effort: Pulmonary effort is normal. Tachypnea present. No bradypnea, accessory muscle usage, prolonged expiration, respiratory distress or retractions. He is not intubated. Breath sounds: Normal air entry. No stridor. Examination of the right-upper field reveals decreased breath sounds. Examination of the left-upper field reveals decreased breath sounds. Examination of the right-middle field reveals decreased breath sounds. Examination of the left-middle field reveals decreased breath sounds. Examination of the right-lower field reveals decreased breath sounds. Examination of the left-lower field reveals decreased breath sounds.  Decreased breath sounds and rhonchi present. No wheezing or rales. Chest:      Chest wall: No tenderness. Abdominal:      General: Abdomen is protuberant. Bowel sounds are normal. There is distension (mild). There are no signs of injury. Tenderness: There is generalized abdominal tenderness (mild). There is no guarding or rebound. Musculoskeletal:         General: No tenderness or deformity. Cervical back: Full passive range of motion without pain, normal range of motion and neck supple. No edema, erythema, signs of trauma, rigidity, torticollis, tenderness or crepitus. No pain with movement, spinous process tenderness or muscular tenderness. Normal range of motion. Right lower leg: Edema present. Skin:     General: Skin is warm and dry. Coloration: Skin is not jaundiced or pale. Findings: No erythema or rash. Neurological:      General: No focal deficit present. Mental Status: He is alert and oriented to person, place, and time. Mental status is at baseline. GCS: GCS eye subscore is 4. GCS verbal subscore is 5. GCS motor subscore is 6. Cranial Nerves: No dysarthria or facial asymmetry. Sensory: Sensation is intact. No sensory deficit. Motor: Motor function is intact. No weakness, tremor, atrophy, abnormal muscle tone or seizure activity. Comments: Normal  strength to bilateral upper extremities, limited left lower extremity exam due to prior amputation   Psychiatric:         Attention and Perception: Attention normal.         Mood and Affect: Mood and affect normal. Mood is not anxious. Speech: Speech normal. Speech is not delayed or slurred. Behavior: Behavior normal. Behavior is cooperative.            DIAGNOSTIC RESULTS   :    Labs Reviewed   CBC WITH AUTO DIFFERENTIAL - Abnormal; Notable for the following components:       Result Value    WBC 20.5 (*)     RBC 3.28 (*)     Hemoglobin 9.5 (*)     Hematocrit 30.1 (*)     Neutrophils Absolute 19.4 (*) Lymphocytes Absolute 0.4 (*)     All other components within normal limits   BLOOD GAS, VENOUS - Abnormal; Notable for the following components:    pCO2, Inocencio 37.3 (*)     pO2, Inocencio 83.8 (*)     Carboxyhemoglobin 5.3 (*)     All other components within normal limits   COMPREHENSIVE METABOLIC PANEL - Abnormal; Notable for the following components:    Sodium 130 (*)     Potassium 8.0 (*)     Chloride 85 (*)     Anion Gap 23 (*)     Glucose 156 (*)      (*)     Creatinine 9.1 (*)     GFR Non- 6 (*)     GFR  7 (*)     Total Protein 5.7 (*)     Albumin 2.1 (*)     Albumin/Globulin Ratio 0.6 (*)     Alkaline Phosphatase 199 (*)     All other components within normal limits    Narrative:     Marcial Irvin tel. [phone],  Chemistry results called to and read back by lenard jeter rn, 07/24/2022  04:49, by ADI   LIPASE - Abnormal; Notable for the following components:    Lipase 7.0 (*)     All other components within normal limits    Narrative:     Marcial Grafevelyn tel. [phone],  Chemistry results called to and read back by lenard jeter rn, 07/24/2022  04:49, by ADI   TROPONIN - Abnormal; Notable for the following components:    Troponin 0.46 (*)     All other components within normal limits    Narrative:     Marcial Grafevelyn tel. [phone],  Chemistry results called to and read back by lenard jeter rn, 07/24/2022  04:49, by ADI   LACTIC ACID   SPECIMEN REJECTION   MAGNESIUM    Narrative:     Marcial Grafevelyn tel. [phone],  Chemistry results called to and read back by lenard jeter rn, 07/24/2022  04:49, by Aishwarya Diaz       All other labs were within normal range or not returned asof this dictation. EKG:  All EKG's are interpreted by the Emergency Department Physician who either signs or Co-signs this chart in the absence of a cardiologist.    The Ekg interpreted by me shows  Undetermined rhythm  with a rate of 100  Axis is   undetermined rhythm  QTc is  within an acceptable range  Intervals and Durations are unremarkable. ST Segments: nonspecific changes  No significant change from prior EKG dated - 7/11/22  No STEMI but ST depression noted in V2 which appears new compared to old EKG with RBBB present and some more widening of QRS complex         RADIOLOGY:   Non-plain film images such as CT, Ultrasound and MRI are read by the radiologist. [place] images are visualized and preliminarily interpreted by the  ED Provider with the belowfindings:        Interpretation per the Radiologist below, if available at the time of this note:    XR CHEST PORTABLE   Final Result   No significant interval change in basilar airspace disease and pleural   effusions. CT CHEST ABDOMEN PELVIS WO CONTRAST   Final Result   1. Moderate size right pleural effusion and small left effusion with   associated atelectasis. 2.  Body wall edema and small volume abdominopelvic ascites. 3.  Long segment inflammatory change in the rectum. 4.  Cholelithiasis. PROCEDURES   Unless otherwise noted below, none     CPR: performed CPR for 40 minutes (not involving critical care time)        Ultrasound guided central venous access  Procedure note:  Indication: medication administration, fluid resuscitation, hypotension, sepsis, septic shock, lab draw, educational, credentialing    Billable study: no    Views:  Long access view of target vein: Adequate  Short access view of target vein: Adequate  Adjacent artery: Adequate      Images obtained with findings:   Vein compressible: yes  Needle tip within vein: yes    Impression:   Successful cannulation of central vein: yes     Images obtained by Rachelle Ku, interpreted by Rachelle Ku. Images were NOT saved to ultrasound machine.      Intubation    Date/Time: 7/24/2022 6:44 PM  Performed by: Alfredo Melgar MD  Authorized by: Alfredo Melgar MD     Consent:     Consent obtained:  Emergent situation  Pre-procedure details: Indication: failure to oxygenate, failure to protect airway and failure to ventilate      Patient status:  Unresponsive    Obstruction: none      Pharmacologic strategy: none      Induction agents:  None    Paralytics:  None  Procedure details:     Preoxygenation:  Bag valve mask    CPR in progress: yes      Intubation method:  Oral    Intubation technique: video assisted      Laryngoscope blade: Mac 3    Bougie used: no      Tube size (mm):  8.0    Tube type:  Cuffed    Number of attempts:  1    Tube visualized through cords: yes    Placement assessment:     ETT at teeth/gumline (cm):  24    Tube secured with:  ETT castillo    Placement verification: colorimetric ETCO2, direct visualization and equal breath sounds    Post-procedure details:     Procedure completion:  Tolerated  Central Line    Date/Time: 7/24/2022 6:46 PM  Performed by: Yohannes Erazo MD  Authorized by: Yohannes Erazo MD     Consent:     Consent obtained:  Emergent situation  Pre-procedure details:     Indication(s): central venous access and insufficient peripheral access      All elements of maximal sterile barrier technique followed: not done sterilely due to emergent nature of procedure being in cardiac arrest needing additional IV access. Skin preparation:  Chlorhexidine  Sedation:     Sedation type:  None  Anesthesia:     Anesthesia method:  None  Procedure details:     Location:  L femoral (initially attempted blind approach to right femoral vein with good return of blood but wire would not thread through needle, then switched to left femoral vein with US guidance and was successful)    Site selection rationale:   Had dialysis access to right IJ region and therefore had to go to femoral region based on emergent procedure and being in cardiac arrest    Patient position:  Supine    Procedural supplies:  Triple lumen    Catheter size:  7 Fr    Landmarks identified: yes      Ultrasound guidance: yes      Ultrasound guidance timing: prior to insertion and real time      Ultrasound guidance timing comment:  Used US prior to insertion on right but used it in real time when doing the left femoral venous access    Number of attempts: 3 times on right, 1 time on left. Successful placement: yes    Post-procedure details:     Post-procedure:  Dressing applied and line sutured    Assessment:  Blood return through all ports and free fluid flow    Procedure completion:  Tolerated      I did perform cardiac ultrasound multiple times as well during the attempted resuscitation while performing CPR and during brief ROSC episodes and it did seem to show heart function was present although initially moderately diminished ejection fraction during multiple evaluations. As cardiac arrest returned and CPR persisted, the ejection fraction continued to worsen and did not appear to be squeezing at all but valve activity still noted. This would be considered cardiac activity not sustainable with life, and it did not improve even when giving 1 mg bolus of epinephrine and ultimately, after attempting to code the patient from 0341 to 0438, I did finally call the code at 0438 based on cardiac activity not sustainable with life (fibrillating at that time) and no organized cardiac activity on the monitor (coarse v fib at that time of pronouncing the patient dead at [phone]). CRITICAL CARE TIME   Time: 60 minutes  Includes repeat examinations,  lab interpretation, charting, initially treating for acute respiratory failure requiring Vapotherm  Excludes separate billable procedures. Patient at risk for serious decompensation if not treated for this life-threatening condition. CONSULTS: Spoke with primary provider Tracey Gray at 0331 on call for Dr. Chetan Chan and he does agree to let Dr. Ofelia Robles know to sign the death certificate.   IP CONSULT TO PRIMARY CARE PROVIDER    EMERGENCY DEPARTMENT COURSE and DIFFERENTIAL DIAGNOSIS/MDM:   Vitals:    Vitals:    07/24/22 0221 07/24/22 0231 07/24/22 0248 07/24/22 0329   BP: 103/74      Pulse: 80   81   Resp: 20   25   Temp: 97.6 °F (36.4 °C)      TempSrc: Oral      SpO2:  (!) 85% 100% 100%   Weight: 245 lb (111.1 kg)      Height: 6' 1\" (1.854 m)          Patient was given the following medications:  Medications   morphine sulfate (PF) injection 4 mg (4 mg IntraVENous Given 7/24/22 0238)   ondansetron (ZOFRAN) injection 4 mg (4 mg IntraVENous Given 7/24/22 0237)   ipratropium-albuterol (DUONEB) nebulizer solution 1 ampule (1 ampule Inhalation Given 7/24/22 0307)   EPINEPHrine 1 MG/10ML injection (0.5 mg IntraVENous Given 7/24/22 0421)   sodium bicarbonate 8.4 % injection (50 mEq IntraVENous Given 7/24/22 0400)   calcium chloride 10 % injection (1,000 mg IntraVENous Given 7/24/22 0346)   amiodarone (CORDARONE) injection (300 mg IntraVENous Given 7/24/22 0408)   EPINEPHrine 1 MG/10ML injection (1 mg IntraVENous Given 7/24/22 0434)   dextrose 50 % IV solution (25 g IntraVENous Given 7/24/22 0435)     Patient was initially evaluated due to complaining of left leg pain and abdominal pain along with some shortness of breath worsening over the last week with decreased urination. He was reportedly on dialysis 1 time per week. Initially denies any chest pain. We did set up basic lab work and started him on Vapotherm since on nonrebreather he was still saturating in the 80s. On Vapotherm his oxygen saturation improved accordingly and other than complaining of abdominal pain, he seemed to be tolerating the oxygen well. I did order a CT of the chest and abdomen to further evaluate for possibility of a bowel obstruction or pneumonia as a cause of his symptoms. EKG did show right bundle branch block that did appear to be slightly more widened than previous 1. I did order stat labs to determine possibility of hyperkalemia versus other causes of his abdominal pain. His initial chemistry was ultimately rejected and lab never informed us of this.   Another very critical patient came in shortly after him and therefore I was in another room treating the other individual when I was told that his cardiac monitor showed concern for ventricular tachycardia or fibrillation and therefore I immediately left the room I was in and went to his room where he did appear to have agonal respirations but was unresponsive otherwise. Therefore, I personally started performing chest compressions while nursing staff brought in the monitor and respiratory staff was called. He did have one IV access site and therefore he did receive epinephrine immediately along with bicarbonate and calcium chloride in case his cardiac arrest was related to hyperkalemia. When the pads were finally placed correctly, it was still concerning for ventricular fibrillation and therefore he also received a shock. He did seem to briefly have a pulse back following this but pulse rapidly went away and ultimately he did receive an additional dose of bicarbonate. He was being coded for at least 10 to 15 minutes with persistent pulseless V. tach versus ventricular fibrillation and since at that point his chemistry had been rejected and not resulted, and I was not having any improvement with the calcium and bicarbonate, along with chest compressions being started immediately upon rhythm change in the ED, I was concerned that maybe his arrhythmia may have been related to ischemic heart disease and V. tach and therefore ultimately determined that amiodarone benefit would outweigh the risk since I was not having any success with typical medications for hyperkalemia. Prior to amiodarone administration, I did intubate the patient and did visualize the tube going through the cords and had no difficulty with intubation. He did not have any cord movement during intubation and had no gag or cough reflex and this was done without any paralysis or sedative on board. Again, he was in cardiac arrest at that time.     I did initially attempt to place a central line on the right side but was unsuccessful in the right femoral vein since the wire would not thread even with good blood return when aspirating. Therefore, I did tell nurse to pull blood off dialysis site since he was not having good blood return from his peripheral access in order to determine if he had hyperkalemia or any other abnormalities since his initial lab work hemolyzed. We ultimately did pull some blood off of his dialysis site and sent it to the lab and even this blood work reported the potassium being hemolyzed but was concerning for being elevated - this was drawn about 20 minutes after code began and therefore it could have been falsely elevated by this time due to acidosis, and again it did state it was hemolyzed. By the time that result returned, he had all received to have 300 mg amiodarone bolus followed by the 150 mg bolus and we found about the critical result roughly 35 to 40 minutes into the code. He had received multiple additional doses of epinephrine. He did have brief times for less than 1 to 2 minutes intermittently where he felt like he had a pulse but it would rapidly go away. I did try to start him on an epinephrine drip to see if that could potentially keep his blood pressure up but since we did not know if he truly had a good pulse or not and his cardiac ultrasound showed no good organized cardiac squeeze as the resuscitation persisted, CPR was started frequently once no pulse was felt. He ultimately had CPR for at least 40 minutes along with multiple shocks due to concern for pulseless V. tach and ventricular fibrillation. Near the end of his attempted resuscitation which lasted almost 1 hour, we did also give D50 in case he was hypoglycemic. Respiratory stated that he was easily bagged when Radha McPherson was stopped and therefore I have low suspicion for pneumothorax at this point.   He never regained any purposeful movements and GCS neurological outcome was almost nonexistent and again based on this prolonged attempted resuscitation along with poor cardiac function on ultrasound, he was pronounced dead at 0438. The patient tolerated their visit well. The patient and / or the family were informed of the results of any tests, a time was given to answer questions. FINAL IMPRESSION      1. Cardiac arrest ([facility] [address]    2. Acute respiratory failure with hypoxia (HCC)    3. Acute kidney injury superimposed on chronic kidney disease ([facility] [address]    4. Generalized abdominal pain    5. Left leg pain    6. Hypervolemia, unspecified hypervolemia type    7. Anasarca    8. Multi-organ failure with heart failure [facility])          DISPOSITION/PLAN   DISPOSITION  2022 04:50:57 AM      PATIENT REFERRED TO:  No follow-up provider specified.     DISCHARGEMEDICATIONS:  Discharge Medication List as of 2022  8:37 AM          DISCONTINUED MEDICATIONS:  Discharge Medication List as of 2022  8:37 AM                 (Please note that portions of this note were completed with a voicerecognition program.  Efforts were made to edit the dictations but occasionally words are mis-transcribed.)    Yohannes Erazo MD (electronically signed)           Yohannes Erazo MD  [address] Plasencia MD  [id number]       Yohannes Erazo MD  [id number]

## 2022-07-24 NOTE — PROGRESS NOTES
07/24/22 0248   Oxygen Therapy/Pulse Ox   O2 Therapy Oxygen humidified   $Oxygen $Daily Charge   O2 Device Heated high flow cannula   O2 Flow Rate (L/min) 40 L/min   FiO2  100 %   SpO2 100 %   Humidification Source Heated wire   Humidification Temp 36   Humidification Temp Measured 36   $Pulse Oximeter $Spot check (multiple/continuous)

## 2022-08-05 NOTE — ED NOTES
Spoke with matilde from coroners office who had questions about who would sign the death certificate     Pablo Dougherty RN  08/05/22 [id number]
